# Patient Record
Sex: MALE | Race: WHITE | NOT HISPANIC OR LATINO | Employment: OTHER | URBAN - METROPOLITAN AREA
[De-identification: names, ages, dates, MRNs, and addresses within clinical notes are randomized per-mention and may not be internally consistent; named-entity substitution may affect disease eponyms.]

---

## 2017-02-08 ENCOUNTER — CONVERSION ENCOUNTER (OUTPATIENT)
Dept: FAMILY MEDICINE CLINIC | Facility: CLINIC | Age: 72
End: 2017-02-08

## 2017-02-08 LAB
ALBUMIN SERPL-MCNC: 4.6 G/DL (ref 3.6–5.1)
ALBUMIN/GLOB SERPL: ABNORMAL {RATIO} (ref 1–2.5)
ALP SERPL-CCNC: 60 UNITS/L (ref 40–115)
ALT SERPL-CCNC: 26 UNITS/L (ref 9–46)
AST SERPL-CCNC: 27 UNITS/L (ref 10–35)
BASOPHILS # BLD AUTO: ABNORMAL 10*3/MM3 (ref 0–200)
BASOPHILS NFR BLD AUTO: 0.6 %
BILIRUB SERPL-MCNC: 0.7 MG/DL (ref 0.2–1.2)
BUN SERPL-MCNC: 7 MG/DL (ref 7–25)
BUN/CREAT SERPL: ABNORMAL (ref 6–22)
CALCIUM SERPL-MCNC: 10 MG/DL (ref 8.6–10.3)
CHLORIDE SERPL-SCNC: 104 MMOL/L (ref 98–110)
CO2 CONTENT VENOUS: 30 MMOL/L (ref 20–31)
CONV NEUTROPHILS/100 LEUKOCYTES IN BODY FLUID BY MANUAL COUNT: 58.2 %
CONV TOTAL PROTEIN: 7.3 G/DL (ref 6.1–8.1)
CREAT UR-MCNC: 1.02 MG/DL (ref 0.7–1.18)
EOSINOPHIL # BLD AUTO: 2.7 %
EOSINOPHIL # BLD AUTO: ABNORMAL 10*3/MM3 (ref 15–500)
ERYTHROCYTE [DISTWIDTH] IN BLOOD BY AUTOMATED COUNT: 14.9 % (ref 11–15)
GLOBULIN UR ELPH-MCNC: ABNORMAL G/DL (ref 1.9–3.7)
GLUCOSE SERPL-MCNC: 100 MG/DL (ref 65–99)
HCT VFR BLD AUTO: 44.1 % (ref 38.5–50)
HGB BLD-MCNC: 14.6 G/DL (ref 13.2–17.1)
LYMPHOCYTES # BLD AUTO: ABNORMAL 10*3/MM3 (ref 850–3900)
LYMPHOCYTES NFR BLD AUTO: 30.5 %
MCH RBC QN AUTO: 30.1 PG (ref 27–33)
MCHC RBC AUTO-ENTMCNC: ABNORMAL % (ref 32–36)
MCV RBC AUTO: 90.7 FL (ref 80–100)
MONOCYTES # BLD AUTO: ABNORMAL 10*3/MICROLITER (ref 200–950)
MONOCYTES NFR BLD AUTO: 8 %
NEUTROPHILS # BLD AUTO: ABNORMAL 10*3/MM3 (ref 1500–7800)
PLATELET # BLD AUTO: ABNORMAL 10*3/MM3 (ref 140–400)
PMV BLD AUTO: 9.8 FL (ref 7.5–12.5)
POTASSIUM SERPL-SCNC: 4.2 MMOL/L (ref 3.5–5.3)
PSA SERPL-MCNC: 0.4 NG/ML
RBC # BLD AUTO: ABNORMAL 10*6/MM3 (ref 4.2–5.8)
SODIUM SERPL-SCNC: 142 MMOL/L (ref 135–146)
TSH SERPL-ACNC: 3.9 MICROINTL UNITS/ML (ref 0.4–4.5)
WBC # BLD AUTO: ABNORMAL K/UL (ref 3.8–10.8)

## 2017-08-03 ENCOUNTER — HOSPITAL ENCOUNTER (OUTPATIENT)
Dept: MRI IMAGING | Facility: HOSPITAL | Age: 72
Discharge: HOME | End: 2017-08-03
Attending: PSYCHIATRY & NEUROLOGY | Admitting: PSYCHIATRY & NEUROLOGY

## 2017-08-03 LAB — CREAT BLDA-MCNC: 0.9 MG/DL (ref 0.6–1.3)

## 2017-08-10 ENCOUNTER — HOSPITAL ENCOUNTER (OUTPATIENT)
Dept: FAMILY MEDICINE CLINIC | Facility: CLINIC | Age: 72
Setting detail: SPECIMEN
Discharge: HOME | End: 2017-08-10
Attending: NURSE PRACTITIONER | Admitting: NURSE PRACTITIONER

## 2017-08-10 LAB
ALBUMIN SERPL-MCNC: 4.2 G/DL (ref 3.5–4.8)
ALBUMIN/GLOB SERPL: 1.5 {RATIO} (ref 1–1.7)
ALP SERPL-CCNC: 56 IU/L (ref 32–91)
ALT SERPL-CCNC: 20 IU/L (ref 17–63)
ANION GAP SERPL CALC-SCNC: 16.9 MMOL/L (ref 10–20)
AST SERPL-CCNC: 27 IU/L (ref 15–41)
BASOPHILS # BLD AUTO: 0 10*3/UL (ref 0–0.2)
BASOPHILS NFR BLD AUTO: 1 % (ref 0–2)
BILIRUB SERPL-MCNC: 0.7 MG/DL (ref 0.3–1.2)
BUN SERPL-MCNC: 5 MG/DL (ref 8–20)
BUN/CREAT SERPL: 5 (ref 6.2–20.3)
CALCIUM SERPL-MCNC: 9.4 MG/DL (ref 8.9–10.3)
CHLORIDE SERPL-SCNC: 102 MMOL/L (ref 101–111)
CONV CO2: 27 MMOL/L (ref 22–32)
CONV TOTAL PROTEIN: 7 G/DL (ref 6.1–7.9)
CREAT UR-MCNC: 1 MG/DL (ref 0.7–1.2)
DIFFERENTIAL METHOD BLD: (no result)
EOSINOPHIL # BLD AUTO: 0.1 10*3/UL (ref 0–0.3)
EOSINOPHIL # BLD AUTO: 2 % (ref 0–3)
ERYTHROCYTE [DISTWIDTH] IN BLOOD BY AUTOMATED COUNT: 14.7 % (ref 11.5–14.5)
GLOBULIN UR ELPH-MCNC: 2.8 G/DL (ref 2.5–3.8)
GLUCOSE SERPL-MCNC: 113 MG/DL (ref 65–99)
HCT VFR BLD AUTO: 43.7 % (ref 40–54)
HGB BLD-MCNC: 14.4 G/DL (ref 14–18)
LYMPHOCYTES # BLD AUTO: 1.7 10*3/UL (ref 0.8–4.8)
LYMPHOCYTES NFR BLD AUTO: 30 % (ref 18–42)
MCH RBC QN AUTO: 29.1 PG (ref 26–32)
MCHC RBC AUTO-ENTMCNC: 32.9 G/DL (ref 32–36)
MCV RBC AUTO: 88.5 FL (ref 80–94)
MONOCYTES # BLD AUTO: 0.6 10*3/UL (ref 0.1–1.3)
MONOCYTES NFR BLD AUTO: 10 % (ref 2–11)
NEUTROPHILS # BLD AUTO: 3.4 10*3/UL (ref 2.3–8.6)
NEUTROPHILS NFR BLD AUTO: 57 % (ref 50–75)
NRBC BLD AUTO-RTO: 0 /100{WBCS}
NRBC/RBC NFR BLD MANUAL: 0 10*3/UL
PLATELET # BLD AUTO: 178 10*3/UL (ref 150–450)
PMV BLD AUTO: 9.7 FL (ref 7.4–10.4)
POTASSIUM SERPL-SCNC: 3.9 MMOL/L (ref 3.6–5.1)
RBC # BLD AUTO: 4.94 10*6/UL (ref 4.6–6)
SODIUM SERPL-SCNC: 142 MMOL/L (ref 136–144)
TESTOST SERPL-MCNC: 3.7 NG/ML (ref 1.7–7.8)
VIT B12 SERPL-MCNC: >1500 PG/ML (ref 180–914)
WBC # BLD AUTO: 5.8 10*3/UL (ref 4.5–11.5)

## 2018-01-09 ENCOUNTER — HOSPITAL ENCOUNTER (OUTPATIENT)
Dept: FAMILY MEDICINE CLINIC | Facility: CLINIC | Age: 73
Setting detail: SPECIMEN
Discharge: HOME | End: 2018-01-09
Attending: NURSE PRACTITIONER | Admitting: NURSE PRACTITIONER

## 2018-01-09 LAB
ALBUMIN SERPL-MCNC: 4.1 G/DL (ref 3.5–4.8)
ALBUMIN/GLOB SERPL: 1.4 {RATIO} (ref 1–1.7)
ALP SERPL-CCNC: 51 IU/L (ref 32–91)
ALT SERPL-CCNC: 28 IU/L (ref 17–63)
ANION GAP SERPL CALC-SCNC: 13.1 MMOL/L (ref 10–20)
AST SERPL-CCNC: 29 IU/L (ref 15–41)
BASOPHILS # BLD AUTO: 0.1 10*3/UL (ref 0–0.2)
BASOPHILS NFR BLD AUTO: 1 % (ref 0–2)
BILIRUB SERPL-MCNC: 0.9 MG/DL (ref 0.3–1.2)
BUN SERPL-MCNC: 10 MG/DL (ref 8–20)
BUN/CREAT SERPL: 9.1 (ref 6.2–20.3)
CALCIUM SERPL-MCNC: 9.2 MG/DL (ref 8.9–10.3)
CHLORIDE SERPL-SCNC: 103 MMOL/L (ref 101–111)
CHOLEST SERPL-MCNC: 173 MG/DL
CHOLEST/HDLC SERPL: 3.2 {RATIO}
CONV CO2: 26 MMOL/L (ref 22–32)
CONV LDL CHOLESTEROL DIRECT: 99 MG/DL (ref 0–100)
CONV TOTAL PROTEIN: 7.1 G/DL (ref 6.1–7.9)
CREAT UR-MCNC: 1.1 MG/DL (ref 0.7–1.2)
DIFFERENTIAL METHOD BLD: (no result)
EOSINOPHIL # BLD AUTO: 0.2 10*3/UL (ref 0–0.3)
EOSINOPHIL # BLD AUTO: 3 % (ref 0–3)
ERYTHROCYTE [DISTWIDTH] IN BLOOD BY AUTOMATED COUNT: 14.1 % (ref 11.5–14.5)
GLOBULIN UR ELPH-MCNC: 3 G/DL (ref 2.5–3.8)
GLUCOSE SERPL-MCNC: 108 MG/DL (ref 65–99)
HCT VFR BLD AUTO: 45.4 % (ref 40–54)
HDLC SERPL-MCNC: 53 MG/DL
HGB BLD-MCNC: 15 G/DL (ref 14–18)
LDLC/HDLC SERPL: 1.9 {RATIO}
LIPID INTERPRETATION: NORMAL
LYMPHOCYTES # BLD AUTO: 2 10*3/UL (ref 0.8–4.8)
LYMPHOCYTES NFR BLD AUTO: 29 % (ref 18–42)
MCH RBC QN AUTO: 30 PG (ref 26–32)
MCHC RBC AUTO-ENTMCNC: 33.1 G/DL (ref 32–36)
MCV RBC AUTO: 90.7 FL (ref 80–94)
MONOCYTES # BLD AUTO: 0.6 10*3/UL (ref 0.1–1.3)
MONOCYTES NFR BLD AUTO: 9 % (ref 2–11)
NEUTROPHILS # BLD AUTO: 3.9 10*3/UL (ref 2.3–8.6)
NEUTROPHILS NFR BLD AUTO: 58 % (ref 50–75)
NRBC BLD AUTO-RTO: 0 /100{WBCS}
NRBC/RBC NFR BLD MANUAL: 0 10*3/UL
PLATELET # BLD AUTO: 185 10*3/UL (ref 150–450)
PMV BLD AUTO: 9.3 FL (ref 7.4–10.4)
POTASSIUM SERPL-SCNC: 4.1 MMOL/L (ref 3.6–5.1)
RBC # BLD AUTO: 5 10*6/UL (ref 4.6–6)
SODIUM SERPL-SCNC: 138 MMOL/L (ref 136–144)
TRIGL SERPL-MCNC: 140 MG/DL
VLDLC SERPL CALC-MCNC: 20.5 MG/DL
WBC # BLD AUTO: 6.7 10*3/UL (ref 4.5–11.5)

## 2018-02-13 ENCOUNTER — HOSPITAL ENCOUNTER (OUTPATIENT)
Dept: CARDIOLOGY | Facility: HOSPITAL | Age: 73
Discharge: HOME | End: 2018-02-13
Attending: INTERNAL MEDICINE | Admitting: INTERNAL MEDICINE

## 2018-03-05 ENCOUNTER — HOSPITAL ENCOUNTER (OUTPATIENT)
Dept: LAB | Facility: HOSPITAL | Age: 73
Discharge: HOME | End: 2018-03-05
Attending: PSYCHIATRY & NEUROLOGY | Admitting: PSYCHIATRY & NEUROLOGY

## 2018-05-15 ENCOUNTER — HOSPITAL ENCOUNTER (OUTPATIENT)
Dept: MRI IMAGING | Facility: HOSPITAL | Age: 73
Discharge: HOME | End: 2018-05-15
Attending: PSYCHIATRY & NEUROLOGY | Admitting: PSYCHIATRY & NEUROLOGY

## 2018-05-15 LAB — CREAT BLDA-MCNC: 1 MG/DL (ref 0.6–1.3)

## 2019-02-21 ENCOUNTER — HOSPITAL ENCOUNTER (OUTPATIENT)
Dept: FAMILY MEDICINE CLINIC | Facility: CLINIC | Age: 74
Setting detail: SPECIMEN
Discharge: HOME | End: 2019-02-21
Attending: NURSE PRACTITIONER | Admitting: NURSE PRACTITIONER

## 2019-02-21 LAB
ALBUMIN SERPL-MCNC: 4.3 G/DL (ref 3.5–4.8)
ALBUMIN/GLOB SERPL: 1.5 {RATIO} (ref 1–1.7)
ALP SERPL-CCNC: 63 IU/L (ref 32–91)
ALT SERPL-CCNC: 20 IU/L (ref 17–63)
ANION GAP SERPL CALC-SCNC: 15.4 MMOL/L (ref 10–20)
AST SERPL-CCNC: 24 IU/L (ref 15–41)
BASOPHILS # BLD AUTO: 0 10*3/UL (ref 0–0.2)
BASOPHILS NFR BLD AUTO: 1 % (ref 0–2)
BILIRUB SERPL-MCNC: 1.1 MG/DL (ref 0.3–1.2)
BUN SERPL-MCNC: 7 MG/DL (ref 8–20)
BUN/CREAT SERPL: 6.4 (ref 6.2–20.3)
CALCIUM SERPL-MCNC: 9.2 MG/DL (ref 8.9–10.3)
CHLORIDE SERPL-SCNC: 102 MMOL/L (ref 101–111)
CHOLEST SERPL-MCNC: 160 MG/DL
CHOLEST/HDLC SERPL: 2.9 {RATIO}
CONV CO2: 27 MMOL/L (ref 22–32)
CONV LDL CHOLESTEROL DIRECT: 84 MG/DL (ref 0–100)
CONV TOTAL PROTEIN: 7.2 G/DL (ref 6.1–7.9)
CREAT UR-MCNC: 1.1 MG/DL (ref 0.7–1.2)
DIFFERENTIAL METHOD BLD: (no result)
EOSINOPHIL # BLD AUTO: 0.1 10*3/UL (ref 0–0.3)
EOSINOPHIL # BLD AUTO: 2 % (ref 0–3)
ERYTHROCYTE [DISTWIDTH] IN BLOOD BY AUTOMATED COUNT: 14.3 % (ref 11.5–14.5)
GLOBULIN UR ELPH-MCNC: 2.9 G/DL (ref 2.5–3.8)
GLUCOSE SERPL-MCNC: 106 MG/DL (ref 65–99)
HCT VFR BLD AUTO: 46.3 % (ref 40–54)
HDLC SERPL-MCNC: 56 MG/DL
HGB BLD-MCNC: 15.7 G/DL (ref 14–18)
LDLC/HDLC SERPL: 1.5 {RATIO}
LIPID INTERPRETATION: NORMAL
LYMPHOCYTES # BLD AUTO: 1.7 10*3/UL (ref 0.8–4.8)
LYMPHOCYTES NFR BLD AUTO: 30 % (ref 18–42)
MCH RBC QN AUTO: 30.2 PG (ref 26–32)
MCHC RBC AUTO-ENTMCNC: 33.8 G/DL (ref 32–36)
MCV RBC AUTO: 89.2 FL (ref 80–94)
MONOCYTES # BLD AUTO: 0.4 10*3/UL (ref 0.1–1.3)
MONOCYTES NFR BLD AUTO: 8 % (ref 2–11)
NEUTROPHILS # BLD AUTO: 3.5 10*3/UL (ref 2.3–8.6)
NEUTROPHILS NFR BLD AUTO: 59 % (ref 50–75)
NRBC BLD AUTO-RTO: 0 /100{WBCS}
NRBC/RBC NFR BLD MANUAL: 0 10*3/UL
PLATELET # BLD AUTO: 173 10*3/UL (ref 150–450)
PMV BLD AUTO: 9.5 FL (ref 7.4–10.4)
POTASSIUM SERPL-SCNC: 4.4 MMOL/L (ref 3.6–5.1)
RBC # BLD AUTO: 5.19 10*6/UL (ref 4.6–6)
SODIUM SERPL-SCNC: 140 MMOL/L (ref 136–144)
TRIGL SERPL-MCNC: 109 MG/DL
VLDLC SERPL CALC-MCNC: 20.2 MG/DL
WBC # BLD AUTO: 5.8 10*3/UL (ref 4.5–11.5)

## 2019-06-01 ENCOUNTER — TRANSCRIBE ORDERS (OUTPATIENT)
Dept: CARDIOLOGY | Facility: CLINIC | Age: 74
End: 2019-06-01

## 2019-06-01 DIAGNOSIS — R03.0 BORDERLINE SYSTOLIC HTN: ICD-10-CM

## 2019-06-01 DIAGNOSIS — I25.10 CVD (CARDIOVASCULAR DISEASE): Primary | ICD-10-CM

## 2019-08-19 ENCOUNTER — APPOINTMENT (OUTPATIENT)
Dept: GENERAL RADIOLOGY | Facility: HOSPITAL | Age: 74
End: 2019-08-19

## 2019-08-19 ENCOUNTER — HOSPITAL ENCOUNTER (OUTPATIENT)
Facility: HOSPITAL | Age: 74
Setting detail: OBSERVATION
Discharge: HOME | End: 2019-08-21
Attending: INTERNAL MEDICINE | Admitting: INTERNAL MEDICINE

## 2019-08-19 DIAGNOSIS — R07.9 CHEST PAIN, UNSPECIFIED TYPE: Primary | ICD-10-CM

## 2019-08-19 LAB
ANION GAP SERPL CALCULATED.3IONS-SCNC: 14.6 MMOL/L (ref 5–15)
APTT PPP: 23.7 SECONDS (ref 24–31)
BASOPHILS # BLD AUTO: 0.1 10*3/MM3 (ref 0–0.2)
BASOPHILS NFR BLD AUTO: 1.3 % (ref 0–1.5)
BNP SERPL-MCNC: 11 PG/ML
BUN BLD-MCNC: 6 MG/DL (ref 8–20)
BUN/CREAT SERPL: 4.6 (ref 6.2–20.3)
CALCIUM SPEC-SCNC: 9.2 MG/DL (ref 8.9–10.3)
CHLORIDE SERPL-SCNC: 100 MMOL/L (ref 101–111)
CO2 SERPL-SCNC: 24 MMOL/L (ref 22–32)
CREAT BLD-MCNC: 1.3 MG/DL (ref 0.7–1.2)
DEPRECATED RDW RBC AUTO: 42.4 FL (ref 37–54)
EOSINOPHIL # BLD AUTO: 0.1 10*3/MM3 (ref 0–0.4)
EOSINOPHIL NFR BLD AUTO: 1.7 % (ref 0.3–6.2)
ERYTHROCYTE [DISTWIDTH] IN BLOOD BY AUTOMATED COUNT: 13.7 % (ref 12.3–15.4)
GFR SERPL CREATININE-BSD FRML MDRD: 54 ML/MIN/1.73
GLUCOSE BLD-MCNC: 90 MG/DL (ref 65–99)
HCT VFR BLD AUTO: 42 % (ref 37.5–51)
HGB BLD-MCNC: 14 G/DL (ref 13–17.7)
INR PPP: 1.03 (ref 0.9–1.1)
LYMPHOCYTES # BLD AUTO: 2.2 10*3/MM3 (ref 0.7–3.1)
LYMPHOCYTES NFR BLD AUTO: 25.1 % (ref 19.6–45.3)
MCH RBC QN AUTO: 29.5 PG (ref 26.6–33)
MCHC RBC AUTO-ENTMCNC: 33.3 G/DL (ref 31.5–35.7)
MCV RBC AUTO: 88.5 FL (ref 79–97)
MONOCYTES # BLD AUTO: 0.7 10*3/MM3 (ref 0.1–0.9)
MONOCYTES NFR BLD AUTO: 7.6 % (ref 5–12)
NEUTROPHILS # BLD AUTO: 5.6 10*3/MM3 (ref 1.7–7)
NEUTROPHILS NFR BLD AUTO: 64.3 % (ref 42.7–76)
NRBC BLD AUTO-RTO: 0.1 /100 WBC (ref 0–0.2)
PLATELET # BLD AUTO: 175 10*3/MM3 (ref 140–450)
PMV BLD AUTO: 8.8 FL (ref 6–12)
POTASSIUM BLD-SCNC: 3.6 MMOL/L (ref 3.6–5.1)
PROTHROMBIN TIME: 10.6 SECONDS (ref 9.6–11.7)
RBC # BLD AUTO: 4.75 10*6/MM3 (ref 4.14–5.8)
SODIUM BLD-SCNC: 135 MMOL/L (ref 136–144)
TROPONIN I SERPL-MCNC: <0.03 NG/ML (ref 0–0.03)
WBC NRBC COR # BLD: 8.7 10*3/MM3 (ref 3.4–10.8)

## 2019-08-19 PROCEDURE — 85730 THROMBOPLASTIN TIME PARTIAL: CPT | Performed by: NURSE PRACTITIONER

## 2019-08-19 PROCEDURE — 99284 EMERGENCY DEPT VISIT MOD MDM: CPT

## 2019-08-19 PROCEDURE — 25010000002 ENOXAPARIN PER 10 MG: Performed by: INTERNAL MEDICINE

## 2019-08-19 PROCEDURE — G0378 HOSPITAL OBSERVATION PER HR: HCPCS

## 2019-08-19 PROCEDURE — 80048 BASIC METABOLIC PNL TOTAL CA: CPT | Performed by: NURSE PRACTITIONER

## 2019-08-19 PROCEDURE — 85025 COMPLETE CBC W/AUTO DIFF WBC: CPT | Performed by: NURSE PRACTITIONER

## 2019-08-19 PROCEDURE — 96372 THER/PROPH/DIAG INJ SC/IM: CPT

## 2019-08-19 PROCEDURE — 85610 PROTHROMBIN TIME: CPT | Performed by: NURSE PRACTITIONER

## 2019-08-19 PROCEDURE — 99220 PR INITIAL OBSERVATION CARE/DAY 70 MINUTES: CPT | Performed by: INTERNAL MEDICINE

## 2019-08-19 PROCEDURE — 71045 X-RAY EXAM CHEST 1 VIEW: CPT

## 2019-08-19 PROCEDURE — 99214 OFFICE O/P EST MOD 30 MIN: CPT | Performed by: INTERNAL MEDICINE

## 2019-08-19 PROCEDURE — 83880 ASSAY OF NATRIURETIC PEPTIDE: CPT | Performed by: NURSE PRACTITIONER

## 2019-08-19 PROCEDURE — 93005 ELECTROCARDIOGRAM TRACING: CPT | Performed by: INTERNAL MEDICINE

## 2019-08-19 PROCEDURE — 84484 ASSAY OF TROPONIN QUANT: CPT | Performed by: NURSE PRACTITIONER

## 2019-08-19 PROCEDURE — 93005 ELECTROCARDIOGRAM TRACING: CPT

## 2019-08-19 RX ORDER — PANTOPRAZOLE SODIUM 40 MG/1
40 TABLET, DELAYED RELEASE ORAL DAILY
Status: DISCONTINUED | OUTPATIENT
Start: 2019-08-20 | End: 2019-08-21 | Stop reason: HOSPADM

## 2019-08-19 RX ORDER — SODIUM CHLORIDE 0.9 % (FLUSH) 0.9 %
3 SYRINGE (ML) INJECTION EVERY 12 HOURS SCHEDULED
Status: DISCONTINUED | OUTPATIENT
Start: 2019-08-19 | End: 2019-08-21 | Stop reason: HOSPADM

## 2019-08-19 RX ORDER — SODIUM CHLORIDE 0.9 % (FLUSH) 0.9 %
10 SYRINGE (ML) INJECTION AS NEEDED
Status: DISCONTINUED | OUTPATIENT
Start: 2019-08-19 | End: 2019-08-21 | Stop reason: HOSPADM

## 2019-08-19 RX ORDER — MORPHINE SULFATE 4 MG/ML
4 INJECTION, SOLUTION INTRAMUSCULAR; INTRAVENOUS
Status: DISCONTINUED | OUTPATIENT
Start: 2019-08-19 | End: 2019-08-21 | Stop reason: HOSPADM

## 2019-08-19 RX ORDER — ROSUVASTATIN CALCIUM 10 MG/1
10 TABLET, COATED ORAL DAILY
Status: DISCONTINUED | OUTPATIENT
Start: 2019-08-20 | End: 2019-08-21 | Stop reason: HOSPADM

## 2019-08-19 RX ORDER — BIOTIN 1000 MCG
1 TABLET,CHEWABLE ORAL DAILY
COMMUNITY
End: 2021-01-01

## 2019-08-19 RX ORDER — NITROGLYCERIN 0.4 MG/1
0.4 TABLET SUBLINGUAL
Status: DISCONTINUED | OUTPATIENT
Start: 2019-08-19 | End: 2019-08-19 | Stop reason: SDUPTHER

## 2019-08-19 RX ORDER — GABAPENTIN 400 MG/1
400 CAPSULE ORAL EVERY 8 HOURS SCHEDULED
Status: DISCONTINUED | OUTPATIENT
Start: 2019-08-19 | End: 2019-08-21 | Stop reason: HOSPADM

## 2019-08-19 RX ORDER — NITROGLYCERIN 0.4 MG/1
0.4 TABLET SUBLINGUAL
Status: DISCONTINUED | OUTPATIENT
Start: 2019-08-19 | End: 2019-08-21 | Stop reason: HOSPADM

## 2019-08-19 RX ORDER — SODIUM CHLORIDE 0.9 % (FLUSH) 0.9 %
3-10 SYRINGE (ML) INJECTION AS NEEDED
Status: DISCONTINUED | OUTPATIENT
Start: 2019-08-19 | End: 2019-08-21 | Stop reason: HOSPADM

## 2019-08-19 RX ORDER — LANOLIN ALCOHOL/MO/W.PET/CERES
1000 CREAM (GRAM) TOPICAL DAILY
Status: DISCONTINUED | OUTPATIENT
Start: 2019-08-20 | End: 2019-08-21 | Stop reason: HOSPADM

## 2019-08-19 RX ORDER — LEVOTHYROXINE SODIUM 0.07 MG/1
75 TABLET ORAL
Status: DISCONTINUED | OUTPATIENT
Start: 2019-08-20 | End: 2019-08-21 | Stop reason: HOSPADM

## 2019-08-19 RX ORDER — ACETAMINOPHEN 325 MG/1
650 TABLET ORAL EVERY 6 HOURS PRN
Status: DISCONTINUED | OUTPATIENT
Start: 2019-08-19 | End: 2019-08-21 | Stop reason: HOSPADM

## 2019-08-19 RX ORDER — ASPIRIN 81 MG/1
324 TABLET, CHEWABLE ORAL ONCE
Status: COMPLETED | OUTPATIENT
Start: 2019-08-19 | End: 2019-08-19

## 2019-08-19 RX ORDER — ASPIRIN 81 MG/1
81 TABLET ORAL DAILY
Status: DISCONTINUED | OUTPATIENT
Start: 2019-08-20 | End: 2019-08-21 | Stop reason: HOSPADM

## 2019-08-19 RX ORDER — CHLORAL HYDRATE 500 MG
1000 CAPSULE ORAL
COMMUNITY
End: 2021-01-01

## 2019-08-19 RX ORDER — CYANOCOBALAMIN (VITAMIN B-12) 1000 MCG
100 TABLET ORAL DAILY
COMMUNITY
End: 2021-01-01

## 2019-08-19 RX ADMIN — ENOXAPARIN SODIUM 40 MG: 40 INJECTION SUBCUTANEOUS at 20:06

## 2019-08-19 RX ADMIN — GABAPENTIN 400 MG: 400 CAPSULE ORAL at 21:03

## 2019-08-19 RX ADMIN — Medication 3 ML: at 20:07

## 2019-08-19 RX ADMIN — ASPIRIN 81 MG 324 MG: 81 TABLET ORAL at 15:45

## 2019-08-19 RX ADMIN — ACETAMINOPHEN 650 MG: 325 TABLET ORAL at 23:13

## 2019-08-19 RX ADMIN — NITROGLYCERIN 1 INCH: 20 OINTMENT TOPICAL at 15:45

## 2019-08-19 NOTE — H&P
[facility] HOSPITALIST     Gris Muro APRN    CHIEF COMPLAINT:     Chest pain    HISTORY OF PRESENT ILLNESS:    Subjective  Clifford Weber is a 74 y.o. male who presents for evaluation of chest pain. Onset was 3 days ago. Symptoms have been unchanged since that time. The patient describes the pain as heaviness and radiates to the left shoulder. Patient rates pain as a 7/10 in intensity. Associated symptoms are: dyspnea. Aggravating factors are: exercise. Alleviating factors are: none. Patient's cardiac risk factors are: diabetes mellitus, dyslipidemia, family history of premature cardiovascular disease, hypertension and male gender. Patient's risk factors for DVT/PE: none. Previous cardiac testing: coronary angiography 3 years ago                    Past Medical History:   Diagnosis Date   • Abnormal cardiovascular stress test 11/01/2016    Abnormal Cardiolite Stress Test. Abstracted from [facility]   • Anxiety 03/15/2012    Uses this PRN, has stress due to caring for elderly parents. Abstracted from [facility].   • Atherosclerotic heart disease 03/15/2012   • Borderline hypertension 10/19/2015   • Bronchitis, acute 05/16/2016   • Chest pain 10/15/2014   • Chronic foot pain 10/16/2012    Will see his podiatrist. Abstracted from [facility].   • Constipation 01/09/2018   • Coronary artery disease 10/24/2016   • DDD (degenerative disc disease), lumbar 02/13/2013   • Depression 08/10/2017   • Dyslipidemia    • Erectile dysfunction of organic origin 12/07/2012    Samples of cialis 20mg, voucher for the 5mg, samples of levitra 20mg and voucher for viagra 100mg. Call with preference. Order total T. Abstracted from [facility].   • GERD (gastroesophageal reflux disease) 12/07/2012   • H/O myocardial perfusion scan 11/01/2016    With Stress Test, abnormal. Abstracted from [facility].   • Hand pain 04/24/2014   • Headache 01/18/2016   • Hyperlipidemia 02/13/2018   • Hypertension 08/10/2017   •  Hypogonadism, male 12/07/2012   • Hypothyroidism 02/08/2018   • Impacted cerumen, left ear 04/03/2019   • Influenza vaccination ordered 10/19/2016   • Insomnia 03/15/2012   • Lateral epicondylitis, left elbow 02/07/2017   • Left knee sprain 01/24/2013   • Leg pain, bilateral 04/02/2019   • Lumbar disc herniation with radiculopathy 09/08/2016   • Lumbar radiculitis 08/30/2016   • Mild memory disturbance 01/18/2016   • Myocardial infarction (CMS/HCC) 11/01/2016   • Osteoarthritis of sacroiliac joint 03/12/2014   • Pain in right lower leg 07/11/2016   • Paresthesia 08/30/2016   • Paresthesia of both legs     Lower legs   • Postherpetic neuralgia 03/15/2016   • Preoperative cardiovascular examination 10/24/2016   • Preventative health care 02/07/2017   • S/P cardiac catheterization 01/01/2000    S/P cardiac cath, left heart-- Heart catheterization 2000 at [facility] in [place], [place]. No records available. Abstracted from [facility].   • Sciatica, right side 06/04/2013   • Shingles    • Shortness of breath 10/15/2014   • Spinal stenosis, lumbar 02/13/2013    TENS unit evidently not covered by Medicare. Abstracted from [facility].   • Vitamin B12 deficiency 08/10/2017   • Vitamin D deficiency 01/09/2018     Past Surgical History:   Procedure Laterality Date   • CARDIAC CATHETERIZATION  2000   • COLONOSCOPY  10/28/2009   • OTHER SURGICAL HISTORY      Lapscopic surgery, both knees. Abstracted by [facility].   • OTHER SURGICAL HISTORY      Right rotor cuff Disorder. Abstracted from [facility].   • OTHER SURGICAL HISTORY      Kneww injections. Abstracted from [facility].   • OTHER SURGICAL HISTORY      Shot in lower back for bulging disc. Abstracted from [facility].     Family History   Problem Relation Age of Onset   • Alzheimer's disease Mother    • Hyperlipidemia Father    • Heart disease Father    • Hypertension Father    • Hyperlipidemia Brother    • Diabetes Brother    • Heart disease Brother    • Hypertension  Brother    • Heart disease Maternal Grandfather    • Alzheimer's disease Paternal Grandmother    • Cancer Cousin    • Cancer Other      Social History     Tobacco Use   • Smoking status: Never Smoker   • Smokeless tobacco: Never Used   Substance Use Topics   • Alcohol use: No     Frequency: Never   • Drug use: No     Medications Prior to Admission   Medication Sig Dispense Refill Last Dose   • aspirin (ASPIR) 81 MG EC tablet Take 81 mg by mouth Daily.   8/19/2019 at Unknown time   • Biotin 1000 MCG chewable tablet Chew 1 tablet Daily.   8/19/2019 at Unknown time   • Cyanocobalamin (B-12) 1000 MCG tablet Take 100 mcg by mouth Daily.   8/19/2019 at Unknown time   • gabapentin (NEURONTIN) 800 MG tablet Take 400 mg by mouth 3 (Three) Times a Day.   8/19/2019 at Unknown time   • levothyroxine (SYNTHROID) 75 MCG tablet Take 75 mcg by mouth Daily.   8/19/2019 at Unknown time   • Omega-3 Fatty Acids (FISH OIL) 1000 MG capsule capsule Take 1,000 mg by mouth Daily With Breakfast.   8/19/2019 at Unknown time   • omeprazole (priLOSEC) 40 MG capsule Take 20 mg by mouth Daily. Take two pills daily. Abstracted from [facility].    8/19/2019 at Unknown time   • rosuvastatin (CRESTOR) 10 MG tablet Take 10 mg by mouth Daily.   8/19/2019 at Unknown time   • nitroglycerin (NITROSTAT) 0.4 MG SL tablet 0.4 mg. Dissolve 1 tablet under tongue as needed for chest pain. May repeat dose every 5 min as needed for total of doses. Abstracted from [facility].      • polyethylene glycol (MIRALAX) powder Take 17 g by mouth Daily As Needed. Dissolve 17 grams in 8 oz. Liquid and drink daily. Abstracted from [facility].    Unknown at Unknown time   • tadalafil (CIALIS) 5 MG tablet Take 5 mg by mouth Daily As Needed.   Unknown at Unknown time   • vitamin D (ERGOCALCIFEROL) 12555 units capsule capsule Take 50,000 Units by mouth 1 (One) Time Per Week. Thursday   8/15/2019     Allergies:  Niacin    Immunization History   Administered Date(s) Administered  "  • Flu Vaccine High Dose PF 65YR+ 11/03/2011, 10/16/2012, 09/13/2013, 09/12/2014, 10/19/2016, 02/08/2018   • Pneumococcal Polysaccharide (PPSV23) 09/12/2014   • Zoster, Unspecified 09/12/2014           REVIEW OF SYSTEMS:     Review of Systems   All other systems reviewed and are negative.      Vital Signs  Temp:  [97.7 °F (36.5 °C)-97.8 °F (36.6 °C)] 97.7 °F (36.5 °C)  Heart Rate:  [51-60] 55  Resp:  [12-18] 12  BP: (104-160)/(68-76) 160/76    Flowsheet Rows      First Filed Value   Admission Height  188 cm (74\") Documented at 08/19/2019 1512   Admission Weight  93.8 kg (206 lb 12.7 oz) Documented at 08/19/2019 1512           Physical Exam:    Physical Exam  Physical Exam:    Vitals and Nursing notes reveiwed.     General Appearance: Alert, cooperative, in no acute distress    Head:    Normocephalic, without obvious abnormality, atraumatic    Eyes:           Lids and lashes normal, conjunctivae and sclerae normal, no   icterus, no pallor, corneas clear, PERRLA    Ears:    Ears appear intact with no abnormalities noted    Throat:   No oral lesions, no thrush, oral mucosa moist    Neck:   No adenopathy, supple, trachea midline, no thyromegaly, no carotid bruit, no JVD    Back:     No kyphosis present, no scoliosis present, no skin lesions, erythema or scars, no tenderness to percussion or palpation, range of motion normal    Lungs:     Clear to auscultation,respirations regular, even and unlabored    Heart:   Regular rhythm and normal rate, normal S1 and S2, no            murmur, no gallop, no rub, no click    Breast Exam  Deferred    Abdomen: Normal bowel sounds, no masses, no organomegaly, soft  non-tender, non-distended, no guarding, no rebound tenderness    Genitalia:    Deferred    Extremities: Moves all extremities well, no edema, no cyanosis, no redness    Pulses:   Pulses palpable and equal bilaterally    Skin:   No bleeding, bruising or rash    Lymph nodes: No palpable adenopathy    Neurologic:   Cranial " nerves 2 - 12 grossly intact, sensation intact, DTR  present and equal bilaterally      Emotional Behavior:   Normal   Debilities:  Age appropriate      Results Review:    I reviewed the patient's new clinical results.  Lab Results (most recent)     Procedure Component Value Units Date/Time    BNP [[id number]]  (Normal) Collected:  08/19/19 1535    Specimen:  Blood Updated:  08/19/19 1620     BNP 11.0 pg/mL      Comment: Results may be falsely decreased if patient taking Biotin.       Troponin [[id number]]  (Normal) Collected:  08/19/19 1534    Specimen:  Blood Updated:  08/19/19 1617     Troponin I <0.030 ng/mL     Narrative:       Troponin I Reference Range:    0.00-0.03  Negative.  Repeat testing in 4-6 hours if clinically indicated.    0.04-0.29  Suspicious for myocardial injury. Serial measurements and clinical  correlation may be necessary to confirm or exclude diagnosis of acute  coronary syndrome.  Repeat testing in 4-6 hours if indicated.     >0.29 Consistent with myocardial injury.  Recommend clinical and laboratory correlation.     Results my be falsely decreased if patient taking Biotin.     Basic Metabolic Panel [[id number]]  (Abnormal) Collected:  08/19/19 1535    Specimen:  Blood Updated:  08/19/19 1615     Glucose 90 mg/dL      BUN 6 mg/dL      Creatinine 1.30 mg/dL      Sodium 135 mmol/L      Potassium 3.6 mmol/L      Chloride 100 mmol/L      CO2 24.0 mmol/L      Calcium 9.2 mg/dL      eGFR Non African Amer 54 mL/min/1.73      BUN/Creatinine Ratio 4.6     Anion Gap 14.6 mmol/L     Narrative:       The MDRD GFR formula is only valid for adults with stable renal function between ages 18 and 70.    Protime-INR [[id number]]  (Normal) Collected:  08/19/19 1535    Specimen:  Blood Updated:  08/19/19 1550     Protime 10.6 Seconds      INR 1.03    aPTT [[id number]]  (Abnormal) Collected:  08/19/19 1535    Specimen:  Blood Updated:  08/19/19 1550     PTT 23.7 seconds     CBC & Differential [[id number]] Collected:   08/19/19 1534    Specimen:  Blood Updated:  08/19/19 1542    Narrative:       The following orders were created for panel order CBC & Differential.  Procedure                               Abnormality         Status                     ---------                               -----------         ------                     CBC Auto Differential[[id number]]        Normal              Final result                 Please view results for these tests on the individual orders.    CBC Auto Differential [[id number]]  (Normal) Collected:  08/19/19 1534    Specimen:  Blood Updated:  08/19/19 1542     WBC 8.70 10*3/mm3      RBC 4.75 10*6/mm3      Hemoglobin 14.0 g/dL      Hematocrit 42.0 %      MCV 88.5 fL      MCH 29.5 pg      MCHC 33.3 g/dL      RDW 13.7 %      RDW-SD 42.4 fl      MPV 8.8 fL      Platelets 175 10*3/mm3      Neutrophil % 64.3 %      Lymphocyte % 25.1 %      Monocyte % 7.6 %      Eosinophil % 1.7 %      Basophil % 1.3 %      Neutrophils, Absolute 5.60 10*3/mm3      Lymphocytes, Absolute 2.20 10*3/mm3      Monocytes, Absolute 0.70 10*3/mm3      Eosinophils, Absolute 0.10 10*3/mm3      Basophils, Absolute 0.10 10*3/mm3      nRBC 0.1 /100 WBC           Imaging Results (most recent)     Procedure Component Value Units Date/Time    XR Chest 1 View [[id number]] Collected:  08/19/19 1555     Updated:  08/19/19 1559    Narrative:       DATE OF EXAM:  8/19/2019 3:35 PM     PROCEDURE:  XR CHEST 1 VW-     INDICATIONS:  cp dyspnea       COMPARISON:  PA and lateral chest 11/02/2016.     TECHNIQUE:   Single radiographic view of the chest was obtained.     FINDINGS:  Clear lungs. Heart size within normal limits. Benign calcified right  hilar lymph nodes. Radiopaque material or Shield over the neck obscures  the thoracic inlet. No acute osseous abnormalities are identified. No  pneumothorax or pleural effusion.       Impression:       No acute chest findings.     Electronically Signed By-Dr. Mary Jo Patel MD On:8/19/2019 3:55  PM  This report was finalized on [id number] by Dr. Mary Jo Patel MD.        reviewed    ECG/EMG Results (most recent)     None        reviewed              XR Chest 1 View  Narrative: DATE OF EXAM:  8/19/2019 3:35 PM     PROCEDURE:  XR CHEST 1 VW-     INDICATIONS:  cp dyspnea       COMPARISON:  PA and lateral chest 11/02/2016.     TECHNIQUE:   Single radiographic view of the chest was obtained.     FINDINGS:  Clear lungs. Heart size within normal limits. Benign calcified right  hilar lymph nodes. Radiopaque material or Shield over the neck obscures  the thoracic inlet. No acute osseous abnormalities are identified. No  pneumothorax or pleural effusion.     Impression: No acute chest findings.     Electronically Signed By-Dr. Mary Jo Patel MD On:8/19/2019 3:55 PM  This report was finalized on [id number] by Dr. Mary Jo Patel MD.      Assessment/Plan       Chest pain      Plan    Chest pain concerning for unstable angina and patient has risk factors including hyperlipidemia/hypertension/coronary disease in the past which is nonobstructive and as well as patient has family history of CAD as well.  Patient at this time will be ruled out for MI and patient will be seen by cardiology and I recommend the patient should go for cardiac cath.    Hyperlipidemia patient is on Crestor and will check lipids    GERD and patient is on Protonix and will continue it.    DVT prophylaxis and will continue Lovenox which was ordered at the time of admission.    Hypothyroidism stable on levothyroxine    Disposition    Hopefully discharge tomorrow once ruled out and also evaluated for CAD    I discussed the patients findings and my recommendations with patient and family.     Bautista Quigley MD  08/19/19  6:36 PM

## 2019-08-19 NOTE — PLAN OF CARE
Problem: Patient Care Overview  Goal: Plan of Care Review  Outcome: Ongoing (interventions implemented as appropriate)   08/19/19 5335   Coping/Psychosocial   Plan of Care Reviewed With patient     Goal: Individualization and Mutuality  Outcome: Ongoing (interventions implemented as appropriate)    Goal: Discharge Needs Assessment  Outcome: Ongoing (interventions implemented as appropriate)    Goal: Interprofessional Rounds/Family Conf  Outcome: Ongoing (interventions implemented as appropriate)      Problem: Pain, Chronic (Adult)  Goal: Identify Related Risk Factors and Signs and Symptoms  Outcome: Ongoing (interventions implemented as appropriate)    Goal: Acceptable Pain/Comfort Level and Functional Ability  Outcome: Ongoing (interventions implemented as appropriate)

## 2019-08-19 NOTE — ED PROVIDER NOTES
Subjective   Chief Complaint: Chest pain  Context: Patient reports that he has been having intermittent chest discomfort with shortness of breath over the last few weeks.  He reports is becoming more prevalent.  Patient does have a heart history.  He reports that it has been a while since he had a stress test he thinks he is due for one.  Describes the pain as a heaviness/pressure with occasional cramping.  He does report some pain in his left upper back.  Duration: A few weeks  Timing: Intermittent  Severity: Mild  Associated symptoms and or modifying factors: As above      Request [facility] if available        History provided by:  Patient      Review of Systems   Constitutional: Negative for chills and fever.   HENT: Negative for congestion and sore throat.    Eyes: Negative for redness.   Respiratory: Positive for shortness of breath. Negative for cough.    Cardiovascular: Positive for chest pain.   Gastrointestinal: Negative for abdominal pain, diarrhea, nausea and vomiting.   Genitourinary: Negative for dysuria.   Musculoskeletal: Negative for back pain.   Skin: Negative for rash.   Neurological: Negative for headaches.   All other systems reviewed and are negative.      Past Medical History:   Diagnosis Date   • Abnormal cardiovascular stress test 11/01/2016    Abnormal Cardiolite Stress Test. Abstracted from [facility].   • Anxiety 03/15/2012    Uses this PRN, has stress due to caring for elderly parents. Abstracted from [facility].   • Atherosclerotic heart disease 03/15/2012   • Borderline hypertension 10/19/2015   • Bronchitis, acute 05/16/2016   • Chest pain 10/15/2014   • Chronic foot pain 10/16/2012    Will see his podiatrist. Abstracted from [facility].   • Constipation 01/09/2018   • Coronary artery disease 10/24/2016   • DDD (degenerative disc disease), lumbar 02/13/2013   • Depression 08/10/2017   • Dyslipidemia    • Erectile dysfunction of organic origin 12/07/2012    Samples of cialis 20mg, voucher  for the 5mg, samples of levitra 20mg and voucher for viagra 100mg. Call with preference. Order total T. Abstracted from [facility].   • GERD (gastroesophageal reflux disease) 12/07/2012   • H/O myocardial perfusion scan 11/01/2016    With Stress Test, abnormal. Abstracted from [facility].   • Hand pain 04/24/2014   • Headache 01/18/2016   • Hyperlipidemia 02/13/2018   • Hypertension 08/10/2017   • Hypogonadism, male 12/07/2012   • Hypothyroidism 02/08/2018   • Impacted cerumen, left ear 04/03/2019   • Influenza vaccination ordered 10/19/2016   • Insomnia 03/15/2012   • Lateral epicondylitis, left elbow 02/07/2017   • Left knee sprain 01/24/2013   • Leg pain, bilateral 04/02/2019   • Lumbar disc herniation with radiculopathy 09/08/2016   • Lumbar radiculitis 08/30/2016   • Mild memory disturbance 01/18/2016   • Myocardial infarction (CMS/[facility]) 11/01/2016   • Osteoarthritis of sacroiliac joint 03/12/2014   • Pain in right lower leg 07/11/2016   • Paresthesia 08/30/2016   • Paresthesia of both legs     Lower legs   • Postherpetic neuralgia 03/15/2016   • Preoperative cardiovascular examination 10/24/2016   • Preventative health care 02/07/2017   • S/P cardiac catheterization 01/01/2000    S/P cardiac cath, left heart-- Heart catheterization 2000 at [facility] in [place], [place]. No records available. Abstracted from [facility].   • Sciatica, right side 06/04/2013   • Shingles    • Shortness of breath 10/15/2014   • Spinal stenosis, lumbar 02/13/2013    TENS unit evidently not covered by Medicare. Abstracted from [facility].   • Vitamin B12 deficiency 08/10/2017   • Vitamin D deficiency 01/09/2018       Allergies   Allergen Reactions   • Niacin Unknown (See Comments)     Abstracted from [facility].       Past Surgical History:   Procedure Laterality Date   • CARDIAC CATHETERIZATION  2000   • COLONOSCOPY  10/28/2009   • OTHER SURGICAL HISTORY      Lapscopic surgery, both knees. Abstracted by [facility].   • OTHER  SURGICAL HISTORY      Right rotor cuff Disorder. Abstracted from [facility].   • OTHER SURGICAL HISTORY      Kneww injections. Abstracted from [facility].   • OTHER SURGICAL HISTORY      Shot in lower back for bulging disc. Abstracted from [facility].       Family History   Problem Relation Age of Onset   • Alzheimer's disease Mother    • Hyperlipidemia Father    • Heart disease Father    • Hypertension Father    • Hyperlipidemia Brother    • Diabetes Brother    • Heart disease Brother    • Hypertension Brother    • Heart disease Maternal Grandfather    • Alzheimer's disease Paternal Grandmother    • Cancer Cousin    • Cancer Other        Social History     Socioeconomic History   • Marital status:      Spouse name: Not on file   • Number of children: Not on file   • Years of education: Not on file   • Highest education level: Not on file   Tobacco Use   • Smoking status: Never Smoker   Substance and Sexual Activity   • Alcohol use: No     Frequency: Never   • Drug use: No           Objective   Physical Exam  The patient is well-developed, well-nourished, alert, cooperative and in no acute distress.    HEENT exam is normocephalic and atraumatic. Pupils equal ,round, reactive to light. Extraocular muscles are intact. Conjunctivae non- injected, sclerae anicteric, lids without ptosis, edema or erythema. Mucous membranes are moist.     Neck is supple, non-tender without lymphadenopathy. No JVD, bruit or stridor noted.     Lungs clear to auscultation bilaterally.    Cardiovascular. Regular rate and rhythm without murmur.    Abdomen is soft, nontender, nondistended. No guarding or rebound noted.     Back shows no CVA tenderness.     Extremities: There is no significant deformity or joint abnormality. No edema.     Neurologic: Oriented x3 without focal neurological deficits.    Skin shows no rash, petechiae, or purpura.    Procedures           ED Course  ED Course as of Aug 19 1703   Mon Aug 19, 2019   1623 CBC  unremarkable.  BUN 6, creatinine 1.3, sodium 135, chloride 100, initial troponin is negative at less than 0.03, BNP is 11.  Chest x-ray with no acute cardiopulmonary abnormality.  [AC]      ED Course User Index  [AC] Tyrese Chavarria APRN      Labs Reviewed   BASIC METABOLIC PANEL - Abnormal; Notable for the following components:       Result Value    BUN 6 (*)     Creatinine 1.30 (*)     Sodium 135 (*)     Chloride 100 (*)     eGFR Non African Amer 54 (*)     BUN/Creatinine Ratio 4.6 (*)     All other components within normal limits    Narrative:     The MDRD GFR formula is only valid for adults with stable renal function between ages 18 and 70.   APTT - Abnormal; Notable for the following components:    PTT 23.7 (*)     All other components within normal limits   PROTIME-INR - Normal   BNP (IN-HOUSE) - Normal   TROPONIN (IN-HOUSE) - Normal    Narrative:     Troponin I Reference Range:    0.00-0.03  Negative.  Repeat testing in 4-6 hours if clinically indicated.    0.04-0.29  Suspicious for myocardial injury. Serial measurements and clinical  correlation may be necessary to confirm or exclude diagnosis of acute  coronary syndrome.  Repeat testing in 4-6 hours if indicated.     >0.29 Consistent with myocardial injury.  Recommend clinical and laboratory correlation.     Results my be falsely decreased if patient taking Biotin.    CBC WITH AUTO DIFFERENTIAL - Normal   CBC AND DIFFERENTIAL    Narrative:     The following orders were created for panel order CBC & Differential.  Procedure                               Abnormality         Status                     ---------                               -----------         ------                     CBC Auto Differential[[id number]]        Normal              Final result                 Please view results for these tests on the individual orders.     Xr Chest 1 View    Result Date: 8/19/2019  No acute chest findings.  Electronically Signed By-Dr. Mary Jo Patel MD  "On:8/19/2019 3:55 PM This report was finalized on [id number] by Dr. Mary Jo Patel MD.    Medications   sodium chloride 0.9 % flush 10 mL (not administered)   nitroglycerin (NITROSTAT) ointment 1 inch (1 inch Topical Given 8/19/19 1545)   aspirin chewable tablet 324 mg (324 mg Oral Given 8/19/19 1545)     /71 (BP Location: Right arm, Patient Position: Lying)   Pulse 51   Temp 97.8 °F (36.6 °C) (Oral)   Resp 15   Ht 188 cm (74\")   Wt 93.8 kg (206 lb 12.7 oz)   SpO2 98%   BMI 26.55 kg/m²     EKG interpreted by Dr Mosher, sinus bradycardia, rate 58, old anterior septal infarct, no significant change from 11/2/2016          MDM  Number of Diagnoses or Management Options  Chest pain, unspecified type:   Diagnosis management comments: MEDICAL DECISION  Comorbidities: Noted in past history  Differentials: angina, STEMI, non-STEMI, pneumonia; this list is not all inclusive and does not constitute the entirety of considered causes  Radiology interpretation:  Images reviewed by me and interpreted by radiologist, chest x-ray no acute chest findings.  Lab interpretation:  Labs viewed by me significant for, initial troponin is negative.  Other labs as above    Results and plan for admission were discussed.  Admitted to Dr Quigley.        Amount and/or Complexity of Data Reviewed  Clinical lab tests: reviewed and ordered  Tests in the radiology section of CPT®: reviewed and ordered          Final diagnoses:   Chest pain, unspecified type            Tyrese Chavarria, APRN  08/19/19 1704    "

## 2019-08-20 ENCOUNTER — APPOINTMENT (OUTPATIENT)
Dept: NUCLEAR MEDICINE | Facility: HOSPITAL | Age: 74
End: 2019-08-20

## 2019-08-20 ENCOUNTER — HOSPITAL ENCOUNTER (OUTPATIENT)
Dept: CARDIOLOGY | Facility: HOSPITAL | Age: 74
Setting detail: OBSERVATION
Discharge: HOME | End: 2019-08-20

## 2019-08-20 VITALS
BODY MASS INDEX: 28.13 KG/M2 | SYSTOLIC BLOOD PRESSURE: 162 MMHG | HEIGHT: 72 IN | RESPIRATION RATE: 20 BRPM | HEART RATE: 53 BPM | DIASTOLIC BLOOD PRESSURE: 96 MMHG | WEIGHT: 207.67 LBS

## 2019-08-20 LAB
BH CV ECHO MEAS - ACS: 1.7 CM
BH CV ECHO MEAS - AO MAX PG (FULL): -0.34 MMHG
BH CV ECHO MEAS - AO MAX PG: 6.9 MMHG
BH CV ECHO MEAS - AO MEAN PG (FULL): 0.38 MMHG
BH CV ECHO MEAS - AO MEAN PG: 3.6 MMHG
BH CV ECHO MEAS - AO ROOT AREA (BSA CORRECTED): 1.4
BH CV ECHO MEAS - AO ROOT AREA: 7.4 CM^2
BH CV ECHO MEAS - AO ROOT DIAM: 3.1 CM
BH CV ECHO MEAS - AO V2 MAX: 131.4 CM/SEC
BH CV ECHO MEAS - AO V2 MEAN: 88.9 CM/SEC
BH CV ECHO MEAS - AO V2 VTI: 33.6 CM
BH CV ECHO MEAS - ASC AORTA: 3.4 CM
BH CV ECHO MEAS - AVA(I,A): 3.5 CM^2
BH CV ECHO MEAS - AVA(I,D): 3.5 CM^2
BH CV ECHO MEAS - AVA(V,A): 3.7 CM^2
BH CV ECHO MEAS - AVA(V,D): 3.7 CM^2
BH CV ECHO MEAS - BSA(HAYCOCK): 2.2 M^2
BH CV ECHO MEAS - BSA: 2.2 M^2
BH CV ECHO MEAS - BZI_BMI: 28.1 KILOGRAMS/M^2
BH CV ECHO MEAS - BZI_METRIC_HEIGHT: 182.9 CM
BH CV ECHO MEAS - BZI_METRIC_WEIGHT: 93.9 KG
BH CV ECHO MEAS - EDV(CUBED): 108.9 ML
BH CV ECHO MEAS - EDV(MOD-SP2): 114.3 ML
BH CV ECHO MEAS - EDV(MOD-SP4): 134.1 ML
BH CV ECHO MEAS - EDV(TEICH): 106.2 ML
BH CV ECHO MEAS - EF(CUBED): 70.3 %
BH CV ECHO MEAS - EF(MOD-BP): 58 %
BH CV ECHO MEAS - EF(MOD-SP2): 48.8 %
BH CV ECHO MEAS - EF(MOD-SP4): 67.9 %
BH CV ECHO MEAS - EF(TEICH): 61.9 %
BH CV ECHO MEAS - ESV(CUBED): 32.3 ML
BH CV ECHO MEAS - ESV(MOD-SP2): 58.5 ML
BH CV ECHO MEAS - ESV(MOD-SP4): 43 ML
BH CV ECHO MEAS - ESV(TEICH): 40.5 ML
BH CV ECHO MEAS - FS: 33.3 %
BH CV ECHO MEAS - IVS/LVPW: 0.98
BH CV ECHO MEAS - IVSD: 1.2 CM
BH CV ECHO MEAS - LA DIMENSION(2D): 3.6 CM
BH CV ECHO MEAS - LV DIASTOLIC VOL/BSA (35-75): 62 ML/M^2
BH CV ECHO MEAS - LV MASS(C)D: 213.5 GRAMS
BH CV ECHO MEAS - LV MASS(C)DI: 98.8 GRAMS/M^2
BH CV ECHO MEAS - LV MAX PG: 7.2 MMHG
BH CV ECHO MEAS - LV MEAN PG: 3.3 MMHG
BH CV ECHO MEAS - LV SYSTOLIC VOL/BSA (12-30): 19.9 ML/M^2
BH CV ECHO MEAS - LV V1 MAX: 134.6 CM/SEC
BH CV ECHO MEAS - LV V1 MEAN: 83.1 CM/SEC
BH CV ECHO MEAS - LV V1 VTI: 32.2 CM
BH CV ECHO MEAS - LVIDD: 4.8 CM
BH CV ECHO MEAS - LVIDS: 3.2 CM
BH CV ECHO MEAS - LVOT AREA: 3.6 CM^2
BH CV ECHO MEAS - LVOT DIAM: 2.2 CM
BH CV ECHO MEAS - LVPWD: 1.2 CM
BH CV ECHO MEAS - MV A MAX VEL: 59.3 CM/SEC
BH CV ECHO MEAS - MV DEC SLOPE: 406.8 CM/SEC^2
BH CV ECHO MEAS - MV DEC TIME: 0.21 SEC
BH CV ECHO MEAS - MV E MAX VEL: 87.1 CM/SEC
BH CV ECHO MEAS - MV E/A: 1.5
BH CV ECHO MEAS - MV MAX PG: 2.9 MMHG
BH CV ECHO MEAS - MV MEAN PG: 1.2 MMHG
BH CV ECHO MEAS - MV V2 MAX: 85.8 CM/SEC
BH CV ECHO MEAS - MV V2 MEAN: 52.7 CM/SEC
BH CV ECHO MEAS - MV V2 VTI: 33.1 CM
BH CV ECHO MEAS - MVA(VTI): 3.6 CM^2
BH CV ECHO MEAS - PA ACC TIME: 0.18 SEC
BH CV ECHO MEAS - PA MAX PG (FULL): 4.6 MMHG
BH CV ECHO MEAS - PA MAX PG: 6.5 MMHG
BH CV ECHO MEAS - PA MEAN PG (FULL): 2.5 MMHG
BH CV ECHO MEAS - PA MEAN PG: 3.6 MMHG
BH CV ECHO MEAS - PA PR(ACCEL): 0.11 MMHG
BH CV ECHO MEAS - PA V2 MAX: 127.9 CM/SEC
BH CV ECHO MEAS - PA V2 MEAN: 89.3 CM/SEC
BH CV ECHO MEAS - PA V2 VTI: 32.6 CM
BH CV ECHO MEAS - PI END-D VEL: 99.2 CM/SEC
BH CV ECHO MEAS - PI MAX PG: 9.9 MMHG
BH CV ECHO MEAS - PI MAX VEL: 157.4 CM/SEC
BH CV ECHO MEAS - PVA(I,A): 2.6 CM^2
BH CV ECHO MEAS - PVA(I,D): 2.6 CM^2
BH CV ECHO MEAS - PVA(V,A): 2.2 CM^2
BH CV ECHO MEAS - PVA(V,D): 2.2 CM^2
BH CV ECHO MEAS - QP/QS: 0.71
BH CV ECHO MEAS - RAP SYSTOLE: 5 MMHG
BH CV ECHO MEAS - RV MAX PG: 2 MMHG
BH CV ECHO MEAS - RV MEAN PG: 1.2 MMHG
BH CV ECHO MEAS - RV V1 MAX: 70 CM/SEC
BH CV ECHO MEAS - RV V1 MEAN: 51.2 CM/SEC
BH CV ECHO MEAS - RV V1 VTI: 20.8 CM
BH CV ECHO MEAS - RVDD: 3.5 CM
BH CV ECHO MEAS - RVOT AREA: 4 CM^2
BH CV ECHO MEAS - RVOT DIAM: 2.3 CM
BH CV ECHO MEAS - RVSP: 23.5 MMHG
BH CV ECHO MEAS - SI(AO): 115.6 ML/M^2
BH CV ECHO MEAS - SI(CUBED): 35.4 ML/M^2
BH CV ECHO MEAS - SI(LVOT): 54.3 ML/M^2
BH CV ECHO MEAS - SI(MOD-SP2): 25.8 ML/M^2
BH CV ECHO MEAS - SI(MOD-SP4): 42.1 ML/M^2
BH CV ECHO MEAS - SI(TEICH): 30.4 ML/M^2
BH CV ECHO MEAS - SV(AO): 249.9 ML
BH CV ECHO MEAS - SV(CUBED): 76.6 ML
BH CV ECHO MEAS - SV(LVOT): 117.5 ML
BH CV ECHO MEAS - SV(MOD-SP2): 55.8 ML
BH CV ECHO MEAS - SV(MOD-SP4): 91.1 ML
BH CV ECHO MEAS - SV(RVOT): 83.1 ML
BH CV ECHO MEAS - SV(TEICH): 65.7 ML
BH CV ECHO MEAS - TR MAX VEL: 214.5 CM/SEC

## 2019-08-20 PROCEDURE — 93016 CV STRESS TEST SUPVJ ONLY: CPT | Performed by: INTERNAL MEDICINE

## 2019-08-20 PROCEDURE — 78452 HT MUSCLE IMAGE SPECT MULT: CPT

## 2019-08-20 PROCEDURE — 93306 TTE W/DOPPLER COMPLETE: CPT

## 2019-08-20 PROCEDURE — 78452 HT MUSCLE IMAGE SPECT MULT: CPT | Performed by: INTERNAL MEDICINE

## 2019-08-20 PROCEDURE — A9500 TC99M SESTAMIBI: HCPCS | Performed by: INTERNAL MEDICINE

## 2019-08-20 PROCEDURE — 0 TECHNETIUM SESTAMIBI: Performed by: INTERNAL MEDICINE

## 2019-08-20 PROCEDURE — 99226 PR SBSQ OBSERVATION CARE/DAY 35 MINUTES: CPT | Performed by: INTERNAL MEDICINE

## 2019-08-20 PROCEDURE — 93018 CV STRESS TEST I&R ONLY: CPT | Performed by: INTERNAL MEDICINE

## 2019-08-20 PROCEDURE — 93306 TTE W/DOPPLER COMPLETE: CPT | Performed by: INTERNAL MEDICINE

## 2019-08-20 PROCEDURE — 93017 CV STRESS TEST TRACING ONLY: CPT

## 2019-08-20 PROCEDURE — G0378 HOSPITAL OBSERVATION PER HR: HCPCS

## 2019-08-20 PROCEDURE — 99214 OFFICE O/P EST MOD 30 MIN: CPT | Performed by: INTERNAL MEDICINE

## 2019-08-20 PROCEDURE — 25010000002 REGADENOSON 0.4 MG/5ML SOLUTION: Performed by: INTERNAL MEDICINE

## 2019-08-20 PROCEDURE — 96372 THER/PROPH/DIAG INJ SC/IM: CPT

## 2019-08-20 PROCEDURE — 25010000002 ENOXAPARIN PER 10 MG: Performed by: INTERNAL MEDICINE

## 2019-08-20 PROCEDURE — 0399T HC MYOCARDL STRAIN IMAG QUAN ASSMT PER SESS: CPT

## 2019-08-20 PROCEDURE — 0399T ADULT TRANSTHORACIC ECHO COMPLETE W/ CONT IF NECESSARY PER PROTOCOL: CPT | Performed by: INTERNAL MEDICINE

## 2019-08-20 RX ADMIN — GABAPENTIN 400 MG: 400 CAPSULE ORAL at 21:13

## 2019-08-20 RX ADMIN — LEVOTHYROXINE SODIUM 75 MCG: 75 TABLET ORAL at 05:19

## 2019-08-20 RX ADMIN — Medication 3 ML: at 10:47

## 2019-08-20 RX ADMIN — PANTOPRAZOLE SODIUM 40 MG: 40 TABLET, DELAYED RELEASE ORAL at 10:46

## 2019-08-20 RX ADMIN — CYANOCOBALAMIN TAB 1000 MCG 1000 MCG: 1000 TAB at 10:46

## 2019-08-20 RX ADMIN — TECHNETIUM TC 99M SESTAMIBI 1 DOSE: 1 INJECTION INTRAVENOUS at 06:30

## 2019-08-20 RX ADMIN — ROSUVASTATIN CALCIUM 10 MG: 10 TABLET, FILM COATED ORAL at 10:46

## 2019-08-20 RX ADMIN — TECHNETIUM TC 99M SESTAMIBI 1 DOSE: 1 INJECTION INTRAVENOUS at 08:36

## 2019-08-20 RX ADMIN — ENOXAPARIN SODIUM 40 MG: 40 INJECTION SUBCUTANEOUS at 15:08

## 2019-08-20 RX ADMIN — Medication 3 ML: at 21:13

## 2019-08-20 RX ADMIN — ASPIRIN 81 MG: 81 TABLET ORAL at 10:46

## 2019-08-20 RX ADMIN — GABAPENTIN 400 MG: 400 CAPSULE ORAL at 15:08

## 2019-08-20 RX ADMIN — REGADENOSON 0.4 MG: 0.08 INJECTION, SOLUTION INTRAVENOUS at 08:36

## 2019-08-20 RX ADMIN — GABAPENTIN 400 MG: 400 CAPSULE ORAL at 05:20

## 2019-08-20 NOTE — PLAN OF CARE
Problem: Patient Care Overview  Goal: Plan of Care Review  Outcome: Ongoing (interventions implemented as appropriate)   08/20/19 9855   Coping/Psychosocial   Plan of Care Reviewed With patient   Plan of Care Review   Progress no change   OTHER   Outcome Summary Patient underwent Myoview and echo this morning. No further complaints of pain. Patient waiting for results from tests this morning. Will continue to monitor.     Goal: Individualization and Mutuality  Outcome: Ongoing (interventions implemented as appropriate)    Goal: Discharge Needs Assessment  Outcome: Ongoing (interventions implemented as appropriate)    Goal: Interprofessional Rounds/Family Conf  Outcome: Ongoing (interventions implemented as appropriate)      Problem: Pain, Chronic (Adult)  Goal: Identify Related Risk Factors and Signs and Symptoms  Outcome: Ongoing (interventions implemented as appropriate)    Goal: Acceptable Pain/Comfort Level and Functional Ability  Outcome: Ongoing (interventions implemented as appropriate)

## 2019-08-20 NOTE — PROGRESS NOTES
Referring Provider: Bautista Quigley MD    Reason for follow-up:   Chest pain  Coronary artery disease  Renal dysfunction     Patient Care Team:  Gris Muro APRN as PCP - General  Gris Muro APRN as PCP - Claims Attributed    Subjective .  Feeling better     ROS    Since I have last seen, the patient has been without any chest discomfort ,shortness of breath, palpitations, dizziness or syncope.  Denies having any headache ,abdominal pain ,nausea, vomiting , diarrhea constipation, loss of weight or loss of appetite.  Denies having any excessive bruising ,hematuria or blood in the stool.    Review of all systems negative except as indicated    History  Past Medical History:   Diagnosis Date   • Abnormal cardiovascular stress test 11/01/2016    Abnormal Cardiolite Stress Test. Abstracted from [facility].   • Anxiety 03/15/2012    Uses this PRN, has stress due to caring for elderly parents. Abstracted from [facility].   • Atherosclerotic heart disease 03/15/2012   • Borderline hypertension 10/19/2015   • Bronchitis, acute 05/16/2016   • Chest pain 10/15/2014   • Chronic foot pain 10/16/2012    Will see his podiatrist. Abstracted from [facility].   • Constipation 01/09/2018   • Coronary artery disease 10/24/2016   • DDD (degenerative disc disease), lumbar 02/13/2013   • Depression 08/10/2017   • Dyslipidemia    • Erectile dysfunction of organic origin 12/07/2012    Samples of cialis 20mg, voucher for the 5mg, samples of levitra 20mg and voucher for viagra 100mg. Call with preference. Order total T. Abstracted from [facility].   • GERD (gastroesophageal reflux disease) 12/07/2012   • H/O myocardial perfusion scan 11/01/2016    With Stress Test, abnormal. Abstracted from [facility].   • Hand pain 04/24/2014   • Headache 01/18/2016   • Hyperlipidemia 02/13/2018   • Hypertension 08/10/2017   • Hypogonadism, male 12/07/2012   • Hypothyroidism 02/08/2018   • Impacted cerumen, left ear 04/03/2019   • Influenza  vaccination ordered 10/19/2016   • Insomnia 03/15/2012   • Lateral epicondylitis, left elbow 02/07/2017   • Left knee sprain 01/24/2013   • Leg pain, bilateral 04/02/2019   • Lumbar disc herniation with radiculopathy 09/08/2016   • Lumbar radiculitis 08/30/2016   • Mild memory disturbance 01/18/2016   • Myocardial infarction (CMS/HCC) 11/01/2016   • Osteoarthritis of sacroiliac joint 03/12/2014   • Pain in right lower leg 07/11/2016   • Paresthesia 08/30/2016   • Paresthesia of both legs     Lower legs   • Postherpetic neuralgia 03/15/2016   • Preoperative cardiovascular examination 10/24/2016   • Preventative health care 02/07/2017   • S/P cardiac catheterization 01/01/2000    S/P cardiac cath, left heart-- Heart catheterization 2000 at [facility] in [place], [place]. No records available. Abstracted from [email or web address].   • Sciatica, right side 06/04/2013   • Shingles    • Shortness of breath 10/15/2014   • Spinal stenosis, lumbar 02/13/2013    TENS unit evidently not covered by Medicare. Abstracted from [email or web address].   • Vitamin B12 deficiency 08/10/2017   • Vitamin D deficiency 01/09/2018       Past Surgical History:   Procedure Laterality Date   • CARDIAC CATHETERIZATION  2000   • COLONOSCOPY  10/28/2009   • OTHER SURGICAL HISTORY      Lapscopic surgery, both knees. Abstracted by [email or web address].   • OTHER SURGICAL HISTORY      Right rotor cuff Disorder. Abstracted from [email or web address].   • OTHER SURGICAL HISTORY      Kneww injections. Abstracted from [email or web address].   • OTHER SURGICAL HISTORY      Shot in lower back for bulging disc. Abstracted from [email or web address].       Family History   Problem Relation Age of Onset   • Alzheimer's disease Mother    • Hyperlipidemia Father    • Heart disease Father    • Hypertension Father    • Hyperlipidemia Brother    • Diabetes Brother    • Heart disease Brother    • Hypertension Brother    • Heart disease Maternal Grandfather    • Alzheimer's disease Paternal Grandmother    • Cancer Cousin     • Cancer Other        Social History     Tobacco Use   • Smoking status: Never Smoker   • Smokeless tobacco: Never Used   Substance Use Topics   • Alcohol use: No     Frequency: Never   • Drug use: No        Medications Prior to Admission   Medication Sig Dispense Refill Last Dose   • aspirin (ASPIR) 81 MG EC tablet Take 81 mg by mouth Daily.   8/19/2019 at Unknown time   • Biotin 1000 MCG chewable tablet Chew 1 tablet Daily.   8/19/2019 at Unknown time   • Cyanocobalamin (B-12) 1000 MCG tablet Take 100 mcg by mouth Daily.   8/19/2019 at Unknown time   • gabapentin (NEURONTIN) 800 MG tablet Take 400 mg by mouth 3 (Three) Times a Day.   8/19/2019 at Unknown time   • levothyroxine (SYNTHROID) 75 MCG tablet Take 75 mcg by mouth Daily.   8/19/2019 at Unknown time   • Omega-3 Fatty Acids (FISH OIL) 1000 MG capsule capsule Take 1,000 mg by mouth Daily With Breakfast.   8/19/2019 at Unknown time   • omeprazole (priLOSEC) 40 MG capsule Take 20 mg by mouth Daily. Take two pills daily. Abstracted from [facility].    8/19/2019 at Unknown time   • rosuvastatin (CRESTOR) 10 MG tablet Take 10 mg by mouth Daily.   8/19/2019 at Unknown time   • nitroglycerin (NITROSTAT) 0.4 MG SL tablet 0.4 mg. Dissolve 1 tablet under tongue as needed for chest pain. May repeat dose every 5 min as needed for total of doses. Abstracted from [facility].      • polyethylene glycol (MIRALAX) powder Take 17 g by mouth Daily As Needed. Dissolve 17 grams in 8 oz. Liquid and drink daily. Abstracted from [facility].    Unknown at Unknown time   • tadalafil (CIALIS) 5 MG tablet Take 5 mg by mouth Daily As Needed.   Unknown at Unknown time   • vitamin D (ERGOCALCIFEROL) 91920 units capsule capsule Take 50,000 Units by mouth 1 (One) Time Per Week. Thursday   8/15/2019       Allergies  Niacin    Scheduled Meds:  aspirin 81 mg Oral Daily   enoxaparin 40 mg Subcutaneous Daily   gabapentin 400 mg Oral Q8H   levothyroxine 75 mcg Oral Q AM   pantoprazole 40 mg  "Oral Daily   polyethylene glycol 17 g Oral Daily   rosuvastatin 10 mg Oral Daily   sodium chloride 3 mL Intravenous Q12H   vitamin B-12 1,000 mcg Oral Daily     Continuous Infusions:   PRN Meds:.•  acetaminophen  •  Morphine  •  nitroglycerin  •  [COMPLETED] Insert peripheral IV **AND** sodium chloride  •  sodium chloride    Objective     VITAL SIGNS  Vitals:    08/19/19 1637 08/19/19 1711 08/19/19 2000 08/20/19 0305   BP: 104/71 160/76 122/73 157/85   BP Location: Right arm Right arm     Patient Position: Lying Sitting     Pulse: 51 55 52 (!) 49   Resp: 15 12 18 16   Temp:  97.7 °F (36.5 °C) 97.7 °F (36.5 °C) 97.9 °F (36.6 °C)   TempSrc:  Oral     SpO2: 98% 96% 97% 97%   Weight:  87.2 kg (192 lb 3.9 oz)  94.2 kg (207 lb 10.8 oz)   Height:  182.9 cm (72\")         Flowsheet Rows      First Filed Value   Admission Height  188 cm (74\") Documented at 08/19/2019 1512   Admission Weight  93.8 kg (206 lb 12.7 oz) Documented at 08/19/2019 1512           TELEMETRY: Sinus rhythm    Physical Exam:  The patient is alert, oriented and in no distress.  Vital signs as noted above.  Head and neck revealed no carotid bruits or jugular venous distention.  No thyromegaly or lymphadenopathy is present  Lungs clear.  No wheezing.  Breath sounds are normal bilaterally.  Heart normal first and second heart sounds.  No murmur. No precordial rub is present.  No gallop is present.  Abdomen soft and nontender.  No organomegaly is present.  Extremities with good peripheral pulses without any pedal edema.  Skin warm and dry.  Musculoskeletal system is grossly normal  CNS grossly normal      Results Review:   I reviewed the patient's new clinical results.  Lab Results (last 24 hours)     Procedure Component Value Units Date/Time    BNP [[id number]]  (Normal) Collected:  08/19/19 1535    Specimen:  Blood Updated:  08/19/19 1620     BNP 11.0 pg/mL      Comment: Results may be falsely decreased if patient taking Biotin.       Troponin [[id number]]  " (Normal) Collected:  08/19/19 1534    Specimen:  Blood Updated:  08/19/19 1617     Troponin I <0.030 ng/mL     Narrative:       Troponin I Reference Range:    0.00-0.03  Negative.  Repeat testing in 4-6 hours if clinically indicated.    0.04-0.29  Suspicious for myocardial injury. Serial measurements and clinical  correlation may be necessary to confirm or exclude diagnosis of acute  coronary syndrome.  Repeat testing in 4-6 hours if indicated.     >0.29 Consistent with myocardial injury.  Recommend clinical and laboratory correlation.     Results my be falsely decreased if patient taking Biotin.     Basic Metabolic Panel [[id number]]  (Abnormal) Collected:  08/19/19 1535    Specimen:  Blood Updated:  08/19/19 1615     Glucose 90 mg/dL      BUN 6 mg/dL      Creatinine 1.30 mg/dL      Sodium 135 mmol/L      Potassium 3.6 mmol/L      Chloride 100 mmol/L      CO2 24.0 mmol/L      Calcium 9.2 mg/dL      eGFR Non African Amer 54 mL/min/1.73      BUN/Creatinine Ratio 4.6     Anion Gap 14.6 mmol/L     Narrative:       The MDRD GFR formula is only valid for adults with stable renal function between ages 18 and 70.    Protime-INR [[id number]]  (Normal) Collected:  08/19/19 1535    Specimen:  Blood Updated:  08/19/19 1550     Protime 10.6 Seconds      INR 1.03    aPTT [[id number]]  (Abnormal) Collected:  08/19/19 1535    Specimen:  Blood Updated:  08/19/19 1550     PTT 23.7 seconds     CBC & Differential [[id number]] Collected:  08/19/19 1534    Specimen:  Blood Updated:  08/19/19 1542    Narrative:       The following orders were created for panel order CBC & Differential.  Procedure                               Abnormality         Status                     ---------                               -----------         ------                     CBC Auto Differential[[id number]]        Normal              Final result                 Please view results for these tests on the individual orders.    CBC Auto Differential [[id number]]   (Normal) Collected:  08/19/19 1534    Specimen:  Blood Updated:  08/19/19 1542     WBC 8.70 10*3/mm3      RBC 4.75 10*6/mm3      Hemoglobin 14.0 g/dL      Hematocrit 42.0 %      MCV 88.5 fL      MCH 29.5 pg      MCHC 33.3 g/dL      RDW 13.7 %      RDW-SD 42.4 fl      MPV 8.8 fL      Platelets 175 10*3/mm3      Neutrophil % 64.3 %      Lymphocyte % 25.1 %      Monocyte % 7.6 %      Eosinophil % 1.7 %      Basophil % 1.3 %      Neutrophils, Absolute 5.60 10*3/mm3      Lymphocytes, Absolute 2.20 10*3/mm3      Monocytes, Absolute 0.70 10*3/mm3      Eosinophils, Absolute 0.10 10*3/mm3      Basophils, Absolute 0.10 10*3/mm3      nRBC 0.1 /100 WBC           Imaging Results (last 24 hours)     Procedure Component Value Units Date/Time    XR Chest 1 View [[id number]] Collected:  08/19/19 1555     Updated:  08/19/19 1559    Narrative:       DATE OF EXAM:  8/19/2019 3:35 PM     PROCEDURE:  XR CHEST 1 VW-     INDICATIONS:  cp dyspnea       COMPARISON:  PA and lateral chest 11/02/2016.     TECHNIQUE:   Single radiographic view of the chest was obtained.     FINDINGS:  Clear lungs. Heart size within normal limits. Benign calcified right  hilar lymph nodes. Radiopaque material or Shield over the neck obscures  the thoracic inlet. No acute osseous abnormalities are identified. No  pneumothorax or pleural effusion.       Impression:       No acute chest findings.     Electronically Signed By-Dr. Mary Jo Patel MD On:8/19/2019 3:55 PM  This report was finalized on [id number] by Dr. Mary Jo Patel MD.      LAB RESULTS (LAST 7 DAYS)    CBC  Results from last 7 days   Lab Units 08/19/19  1534   WBC 10*3/mm3 8.70   RBC 10*6/mm3 4.75   HEMOGLOBIN g/dL 14.0   HEMATOCRIT % 42.0   MCV fL 88.5   PLATELETS 10*3/mm3 175       BMP  Results from last 7 days   Lab Units 08/19/19  1535   SODIUM mmol/L 135*   POTASSIUM mmol/L 3.6   CHLORIDE mmol/L 100*   CO2 mmol/L 24.0   BUN mg/dL 6*   CREATININE mg/dL 1.30*   GLUCOSE mg/dL 90       CMP Results  from last 7 days   Lab Units 08/19/19  1535   SODIUM mmol/L 135*   POTASSIUM mmol/L 3.6   CHLORIDE mmol/L 100*   CO2 mmol/L 24.0   BUN mg/dL 6*   CREATININE mg/dL 1.30*   GLUCOSE mg/dL 90         BNP  Results from last 7 days   Lab Units 08/19/19  1535   BNP pg/mL 11.0       TROPONIN  Results from last 7 days   Lab Units 08/19/19  1534   TROPONIN I ng/mL <0.030       CoAg  Results from last 7 days   Lab Units 08/19/19  1535   INR  1.03   APTT seconds 23.7*       Creatinine Clearance  Estimated Creatinine Clearance: 61.2 mL/min (A) (by C-G formula based on SCr of 1.3 mg/dL (H)).    ABG        Radiology  Xr Chest 1 View    Result Date: 8/19/2019  No acute chest findings.  Electronically Signed By-Dr. Mary Jo Patel MD On:8/19/2019 3:55 PM This report was finalized on [id number] by Dr. Mary Jo Patel MD.              EKG      I personally viewed and interpreted the patient's EKG/Telemetry data:    ECHOCARDIOGRAM:             STRESS MYOVIEW:    Cardiolite (Tc-99m Sestamibi) stress test    CARDIAC CATHETERIZATION:            OTHER:         Assessment/Plan     Active Problems:    Chest pain        [[[[[[[[[[[[[[[[[[[    Impression  ===========      - chest discomfort suggestive of possible angina pectoris.   Troponin levels are negative.  X     Patient had cardiac catheterization in 2000 in [place].  Details are not available. ([place], [place] ).   Cardiac catheterization 11/04/2016 revealed 50% lad with anterior RCA.  Normal left ventricular function.  (Right radial approach)  Abnormal Lexiscan Cardiolite test with proximal inferior ischemia (11/01/2016) and 02/13/2018 (similar to 2016 and)     Echocardiogram is normal (11/01/2016 )     -dyslipidemia.  Renal dysfunction dysfunction.  Creatinine 1.3.     - strong family history of coronary artery disease     -Lapscopic surgery both knee Right rotor cuff disorder      -History of lumbar bulging disc .           -allergy to  niacin  ============  Plan  =========   chest pain suggestive of angina pectoris.  Troponin levels are negative.  EKG showed no acute changes.  Rule out progression of coronary artery disease.  Stress Cardiolite test-today  Echocardiogram-today.  Will review  Renal dysfunction BUN 6 creatinine 1.3    Medications were reviewed and updated.  Further plan will depend on patient's progress.    [[[[[[[[[[[[[[[[[[[[[            Cordelia Jacobo MD  08/20/19  6:11 AM

## 2019-08-20 NOTE — PLAN OF CARE
Problem: Patient Care Overview  Goal: Plan of Care Review  Outcome: Ongoing (interventions implemented as appropriate)    Goal: Individualization and Mutuality  Outcome: Ongoing (interventions implemented as appropriate)    Goal: Discharge Needs Assessment  Outcome: Ongoing (interventions implemented as appropriate)    Goal: Interprofessional Rounds/Family Conf  Outcome: Ongoing (interventions implemented as appropriate)      Problem: Pain, Chronic (Adult)  Goal: Identify Related Risk Factors and Signs and Symptoms  Outcome: Ongoing (interventions implemented as appropriate)    Goal: Acceptable Pain/Comfort Level and Functional Ability  Outcome: Ongoing (interventions implemented as appropriate)

## 2019-08-20 NOTE — PROGRESS NOTES
Hospitalist Team  LOS 0 days      Patient Care Team:  Gris Muro APRN as PCP - General  Gris Muro APRN as PCP - Claims Attributed    Assessment / Plan    Chest pain  -Patient denies any chest pain at this time  -Chest pain concerning for unstable angina and patient had a stress test done today and has been seen by cardiology.  As well as patient had echocardiogram done today  -Cardiac cath if patient has positive stress test     HOLA - eGFR 54, Cr 1.2, BUN 6 -Unsure of etiology at this time.       HTN - most recently measured at 137/70. Systolic may be elevated due to stress of hospitalization.     Hyperlipidemia -  follow up with primary care. Last lipid profile performed 2.21.19 TC was 160, HDL 56, LDL 84, VLDL 20.2 and TGs 109.  -Patient is on Crestor and will continue     Bilateral numbness and tingling in legs- history of lumbar disk herniation. outpatient follow up with primary care provider for further evaluation  -Patient is on gabapentin     Hypothyroidism - appears to be well controlled with levothyroxine. TSH last measured 2/2017.    GERD and patient is on Protonix and he is a stable with his symptoms        Chief Complaint / Subjective  Patient was seen for follow-up on chest pain  Patient seen and examined and he denies any complaints and feeling much better today and getting his a stress test and he also has been seen by cardiology.  Brief Synopsis of Hospital Course/HPI    Clifford Weber is a 74 y.o. male who presents for evaluation of chest pain. Onset was 3 days ago. Symptoms have been unchanged since that time. The patient describes the pain as heaviness and radiates to the left shoulder. Patient rates pain as a 7/10 in intensity. Associated symptoms are: dyspnea. Aggravating factors are: exercise. Alleviating factors are: none. Patient's cardiac risk factors are: diabetes mellitus, dyslipidemia, family history of premature cardiovascular disease, hypertension and male gender.  Patient's risk factors for DVT/PE: none. Previous cardiac testing: coronary angiography 3 years ago          Review of systems negative for all other 10 systems    ROS    Family History   Problem Relation Age of Onset   • Alzheimer's disease Mother    • Hyperlipidemia Father    • Heart disease Father    • Hypertension Father    • Hyperlipidemia Brother    • Diabetes Brother    • Heart disease Brother    • Hypertension Brother    • Heart disease Maternal Grandfather    • Alzheimer's disease Paternal Grandmother    • Cancer Cousin    • Cancer Other        Past Medical History:   Diagnosis Date   • Abnormal cardiovascular stress test 11/01/2016    Abnormal Cardiolite Stress Test. Abstracted from [facility].   • Anxiety 03/15/2012    Uses this PRN, has stress due to caring for elderly parents. Abstracted from [facility].   • Atherosclerotic heart disease 03/15/2012   • Borderline hypertension 10/19/2015   • Bronchitis, acute 05/16/2016   • Chest pain 10/15/2014   • Chronic foot pain 10/16/2012    Will see his podiatrist. Abstracted from [facility].   • Constipation 01/09/2018   • Coronary artery disease 10/24/2016   • DDD (degenerative disc disease), lumbar 02/13/2013   • Depression 08/10/2017   • Dyslipidemia    • Erectile dysfunction of organic origin 12/07/2012    Samples of cialis 20mg, voucher for the 5mg, samples of levitra 20mg and voucher for viagra 100mg. Call with preference. Order total T. Abstracted from [facility].   • GERD (gastroesophageal reflux disease) 12/07/2012   • H/O myocardial perfusion scan 11/01/2016    With Stress Test, abnormal. Abstracted from [facility].   • Hand pain 04/24/2014   • Headache 01/18/2016   • Hyperlipidemia 02/13/2018   • Hypertension 08/10/2017   • Hypogonadism, male 12/07/2012   • Hypothyroidism 02/08/2018   • Impacted cerumen, left ear 04/03/2019   • Influenza vaccination ordered 10/19/2016   • Insomnia 03/15/2012   • Lateral epicondylitis, left elbow 02/07/2017   • Left  "knee sprain 01/24/2013   • Leg pain, bilateral 04/02/2019   • Lumbar disc herniation with radiculopathy 09/08/2016   • Lumbar radiculitis 08/30/2016   • Mild memory disturbance 01/18/2016   • Myocardial infarction (CMS/HCC) 11/01/2016   • Osteoarthritis of sacroiliac joint 03/12/2014   • Pain in right lower leg 07/11/2016   • Paresthesia 08/30/2016   • Paresthesia of both legs     Lower legs   • Postherpetic neuralgia 03/15/2016   • Preoperative cardiovascular examination 10/24/2016   • Preventative health care 02/07/2017   • S/P cardiac catheterization 01/01/2000    S/P cardiac cath, left heart-- Heart catheterization 2000 at [facility] in [place], [place]. No records available. Abstracted from [facility].   • Sciatica, right side 06/04/2013   • Shingles    • Shortness of breath 10/15/2014   • Spinal stenosis, lumbar 02/13/2013    TENS unit evidently not covered by Medicare. Abstracted from [facility].   • Vitamin B12 deficiency 08/10/2017   • Vitamin D deficiency 01/09/2018       Social History     Socioeconomic History   • Marital status:      Spouse name: Not on file   • Number of children: Not on file   • Years of education: Not on file   • Highest education level: Not on file   Tobacco Use   • Smoking status: Never Smoker   • Smokeless tobacco: Never Used   Substance and Sexual Activity   • Alcohol use: No     Frequency: Never   • Drug use: No   • Sexual activity: Defer           Objective      Vital Signs  Temp:  [97.7 °F (36.5 °C)-97.9 °F (36.6 °C)] 97.9 °F (36.6 °C)  Heart Rate:  [49-55] 49  Resp:  [12-20] 16  BP: (104-162)/(71-96) 137/79  Oxygen Therapy  SpO2: 95 %  Pulse Oximetry Type: Continuous  Device (Oxygen Therapy): room air  Flowsheet Rows      First Filed Value   Admission Height  188 cm (74\") Documented at 08/19/2019 1512   Admission Weight  93.8 kg (206 lb 12.7 oz) Documented at 08/19/2019 1512        Intake & Output (last 3 days)       08/17 0701 - 08/18 0700 08/18 0701 - 08/19 " 0700 08/19 0701 - 08/20 0700 08/20 0701 - 08/21 0700            Urine Unmeasured Occurrence   2 x     Stool Unmeasured Occurrence   0 x         Lines, Drains & Airways    Active LDAs     Name:   Placement date:   Placement time:   Site:   Days:    Peripheral IV 08/19/19 1536 Left Antecubital   08/19/19    1536    Antecubital   1                  Physical Exam:    Vitals and Nursing notes reveiwed.     General Appearance: Alert, cooperative, in no acute distress    Head:    Normocephalic, without obvious abnormality, atraumatic    Eyes:           Lids and lashes normal, conjunctivae and sclerae normal, no   icterus, no pallor, corneas clear, PERRLA    Ears:    Ears appear intact with no abnormalities noted    Throat:   No oral lesions, no thrush, oral mucosa moist    Neck:   No adenopathy, supple, trachea midline, no thyromegaly, no carotid bruit, no JVD    Back:     No kyphosis present, no scoliosis present, no skin lesions, erythema or scars, no tenderness to percussion or palpation, range of motion normal    Lungs:     Clear to auscultation,respirations regular, even and unlabored    Heart:   Regular rhythm and normal rate, normal S1 and S2, no            murmur, no gallop, no rub, no click    Breast Exam  Deferred    Abdomen: Normal bowel sounds, no masses, no organomegaly, soft  non-tender, non-distended, no guarding, no rebound tenderness    Genitalia:    Deferred    Extremities: Moves all extremities well, no edema, no cyanosis, no redness    Pulses:   Pulses palpable and equal bilaterally    Skin:   No bleeding, bruising or rash    Lymph nodes: No palpable adenopathy    Neurologic:   Cranial nerves 2 - 12 grossly intact, sensation intact, DTR  present and equal bilaterally        Procedures:              Results Review:     I reviewed the patient's new clinical results.    Results from last 7 days   Lab Units 08/19/19  1534   WBC 10*3/mm3 8.70   HEMOGLOBIN g/dL 14.0   HEMATOCRIT % 42.0   PLATELETS 10*3/mm3  175     Results from last 7 days   Lab Units 08/19/19  1535   SODIUM mmol/L 135*   POTASSIUM mmol/L 3.6   CHLORIDE mmol/L 100*   CO2 mmol/L 24.0   BUN mg/dL 6*   CREATININE mg/dL 1.30*   CALCIUM mg/dL 9.2   GLUCOSE mg/dL 90         Lab Results   Component Value Date    CALCIUM 9.2 08/19/2019     No results found for: HGBA1C  Results from last 7 days   Lab Units 08/19/19  1535   INR  1.03               Microbiology Results (last 10 days)     ** No results found for the last 240 hours. **          ECG/EMG Results (most recent)     Procedure Component Value Units Date/Time    ECG 12 Lead [[id number]] Collected:  08/19/19 1528     Updated:  08/20/19 1054    Narrative:       HEART RATE= 58  bpm  RR Interval= 1024  ms  TN Interval= 206  ms  P Horizontal Axis= -13  deg  P Front Axis= 56  deg  QRSD Interval= 93  ms  QT Interval= 416  ms  QRS Axis= 52  deg  T Wave Axis= 59  deg  - ABNORMAL ECG -  Sinus bradycardia  Anteroseptal infarct, old  Electronically Signed By:   Date and Time of Study: 2019-08-19 15:28:50    Adult Transthoracic Echo Complete W/ Cont if Necessary Per Protocol [[id number]] Collected:  08/20/19 0834     Updated:  08/20/19 1353     BSA 2.2 m^2       CV ECHO KARLA - RVDD 3.5 cm      IVSd 1.2 cm      LVIDd 4.8 cm      LVIDs 3.2 cm      LVPWd 1.2 cm      IVS/LVPW 0.98     FS 33.3 %      EDV(Teich) 106.2 ml      ESV(Teich) 40.5 ml      EF(Teich) 61.9 %      EDV(cubed) 108.9 ml      ESV(cubed) 32.3 ml      EF(cubed) 70.3 %      LV mass(C)d 213.5 grams      LV mass(C)dI 98.8 grams/m^2      SV(Teich) 65.7 ml      SI(Teich) 30.4 ml/m^2      SV(cubed) 76.6 ml      SI(cubed) 35.4 ml/m^2      Ao root diam 3.1 cm      Ao root area 7.4 cm^2      ACS 1.7 cm      asc Aorta Diam 3.4 cm      LVOT diam 2.2 cm      LVOT area 3.6 cm^2      RVOT diam 2.3 cm      RVOT area 4.0 cm^2      EDV(MOD-sp4) 134.1 ml      ESV(MOD-sp4) 43.0 ml      EF(MOD-sp4) 67.9 %      EDV(MOD-sp2) 114.3 ml      ESV(MOD-sp2) 58.5 ml      EF(MOD-sp2)  48.8 %      SV(MOD-sp4) 91.1 ml      SI(MOD-sp4) 42.1 ml/m^2      SV(MOD-sp2) 55.8 ml      SI(MOD-sp2) 25.8 ml/m^2      Ao root area (BSA corrected) 1.4     LV Noble Vol (BSA corrected) 62.0 ml/m^2      LV Sys Vol (BSA corrected) 19.9 ml/m^2      MV E max compa 87.1 cm/sec      MV A max compa 59.3 cm/sec      MV E/A 1.5     MV V2 max 85.8 cm/sec      MV max PG 2.9 mmHg      MV V2 mean 52.7 cm/sec      MV mean PG 1.2 mmHg      MV V2 VTI 33.1 cm      MVA(VTI) 3.6 cm^2      MV dec slope 406.8 cm/sec^2      MV dec time 0.21 sec      Ao pk compa 131.4 cm/sec      Ao max PG 6.9 mmHg      Ao max PG (full) -0.34 mmHg      Ao V2 mean 88.9 cm/sec      Ao mean PG 3.6 mmHg      Ao mean PG (full) 0.38 mmHg      Ao V2 VTI 33.6 cm      ZAHIRA(I,A) 3.5 cm^2      ZAHIRA(I,D) 3.5 cm^2      ZAHIRA(V,A) 3.7 cm^2      ZAHIRA(V,D) 3.7 cm^2      LV V1 max PG 7.2 mmHg      LV V1 mean PG 3.3 mmHg      LV V1 max 134.6 cm/sec      LV V1 mean 83.1 cm/sec      LV V1 VTI 32.2 cm      SV(Ao) 249.9 ml      SI(Ao) 115.6 ml/m^2      SV(LVOT) 117.5 ml      SV(RVOT) 83.1 ml      SI(LVOT) 54.3 ml/m^2      PA V2 max 127.9 cm/sec      PA max PG 6.5 mmHg      PA max PG (full) 4.6 mmHg      PA V2 mean 89.3 cm/sec      PA mean PG 3.6 mmHg      PA mean PG (full) 2.5 mmHg      PA V2 VTI 32.6 cm      PVA(I,A) 2.6 cm^2      BH CV ECHO KARLA - PVA(I,D) 2.6 cm^2      BH CV ECHO KARLA - PVA(V,A) 2.2 cm^2      BH CV ECHO KARLA - PVA(V,D) 2.2 cm^2      PA acc time 0.18 sec      PI end-d compa 99.2 cm/sec      PI max compa 157.4 cm/sec      PI max PG 9.9 mmHg      RV V1 max PG 2.0 mmHg      RV V1 mean PG 1.2 mmHg      RV V1 max 70.0 cm/sec      RV V1 mean 51.2 cm/sec      RV V1 VTI 20.8 cm      TR max compa 214.5 cm/sec      RVSP(TR) 23.5 mmHg      RAP systole 5.0 mmHg      PA pr(Accel) 0.11 mmHg      Qp/Qs 0.71      CV ECHO KARLA - BZI_BMI 28.1 kilograms/m^2       CV ECHO KARLA - BSA(HAYCOCK) 2.2 m^2       CV ECHO KARLA - BZI_METRIC_WEIGHT 93.9 kg       CV ECHO KARLA - BZI_METRIC_HEIGHT  182.9 cm      EF(MOD-bp) 58.0 %      LA dimension(2D) 3.6 cm     Narrative:       Indications  Chest pain      Technically satisfactory study.  Mitral valve is structurally normal.  Minimal mitral regurgitation  Tricuspid valve is structurally normal.  Aortic valve is structurally normal.  Pulmonic valve could not be well visualized.  No evidence for mitral tricuspid or aortic regurgitation is seen by   Doppler study.  Left atrium is normal in size.  Right atrium is normal in size.  Left ventricle is normal in size and contractility with ejection fraction   of 60%.  Right ventricle is normal in size.  Atrial septum is intact.  Aorta is normal.  No pericardial effusion or intracardiac thrombus is seen.    Impression  Minimal mitral regurgitation  Normal echo Doppler study.  Left ventricular ejection fraction is 60%.                 Results for orders placed during the hospital encounter of 08/19/19   Adult Transthoracic Echo Complete W/ Cont if Necessary Per Protocol    Narrative Indications  Chest pain      Technically satisfactory study.  Mitral valve is structurally normal.  Minimal mitral regurgitation  Tricuspid valve is structurally normal.  Aortic valve is structurally normal.  Pulmonic valve could not be well visualized.  No evidence for mitral tricuspid or aortic regurgitation is seen by   Doppler study.  Left atrium is normal in size.  Right atrium is normal in size.  Left ventricle is normal in size and contractility with ejection fraction   of 60%.  Right ventricle is normal in size.  Atrial septum is intact.  Aorta is normal.  No pericardial effusion or intracardiac thrombus is seen.    Impression  Minimal mitral regurgitation  Normal echo Doppler study.  Left ventricular ejection fraction is 60%.         Xr Chest 1 View    Result Date: 8/19/2019  No acute chest findings.  Electronically Signed By-Dr. Mary Jo Patel MD On:8/19/2019 3:55 PM This report was finalized on [id number] by Dr. Mary Jo Patel,  MD.      Xrays, labs reviewed personally by physician.    Medication Review:   I have reviewed the patient's current medication list    Scheduled Meds    aspirin 81 mg Oral Daily   enoxaparin 40 mg Subcutaneous Daily   gabapentin 400 mg Oral Q8H   levothyroxine 75 mcg Oral Q AM   pantoprazole 40 mg Oral Daily   polyethylene glycol 17 g Oral Daily   rosuvastatin 10 mg Oral Daily   sodium chloride 3 mL Intravenous Q12H   vitamin B-12 1,000 mcg Oral Daily       Meds Infusions       Meds PRN  •  acetaminophen  •  Morphine  •  nitroglycerin  •  [COMPLETED] Insert peripheral IV **AND** sodium chloride  •  sodium chloride        Bautista Quigley MD  08/20/19  4:31 PM

## 2019-08-20 NOTE — CONSULTS
Referring Provider: Bautista Quigley MD  Reason for Consultation:   Chest pain  Coronary artery disease    Patient Care Team:  Gris Muro APRN as PCP - General  Gris Muro APRN as PCP - Claims Attributed    Chief complaint   Chest pain    Subjective .     History of present illness:  Clifford Weber is a 74 y.o. male who presents with     Patient is a pleasant 74-year-old white male admitted with history of intermittent left precordial chest discomfort without any radiation of the discomfort into the neck or into the arms.  It has been happening for the last several weeks.  It is associated with shortness of breath.  Chest discomfort has increased in frequency and intensity lately.  Denies having any other associated aggravating or elevating factors.  Intermittent to moderate to severe.  Patient has history of coronary artery disease.  Patient did not have any stress test recently.    Please see the assessment for summary.      ROS      Since I have last seen, the patient has been without any chest discomfort ,shortness of breath, palpitations, dizziness or syncope.  Denies having any headache ,abdominal pain ,nausea, vomiting , diarrhea constipation, loss of weight or loss of appetite.  Denies having any excessive bruising ,hematuria or blood in the stool.    Review of systems negative except as indicated      History  Past Medical History:   Diagnosis Date   • Abnormal cardiovascular stress test 11/01/2016    Abnormal Cardiolite Stress Test. Abstracted from [facility].   • Anxiety 03/15/2012    Uses this PRN, has stress due to caring for elderly parents. Abstracted from [facility].   • Atherosclerotic heart disease 03/15/2012   • Borderline hypertension 10/19/2015   • Bronchitis, acute 05/16/2016   • Chest pain 10/15/2014   • Chronic foot pain 10/16/2012    Will see his podiatrist. Abstracted from [facility].   • Constipation 01/09/2018   • Coronary artery disease 10/24/2016   • DDD (degenerative disc  disease), lumbar 02/13/2013   • Depression 08/10/2017   • Dyslipidemia    • Erectile dysfunction of organic origin 12/07/2012    Samples of cialis 20mg, voucher for the 5mg, samples of levitra 20mg and voucher for viagra 100mg. Call with preference. Order total T. Abstracted from [facility].   • GERD (gastroesophageal reflux disease) 12/07/2012   • H/O myocardial perfusion scan 11/01/2016    With Stress Test, abnormal. Abstracted from [facility].   • Hand pain 04/24/2014   • Headache 01/18/2016   • Hyperlipidemia 02/13/2018   • Hypertension 08/10/2017   • Hypogonadism, male 12/07/2012   • Hypothyroidism 02/08/2018   • Impacted cerumen, left ear 04/03/2019   • Influenza vaccination ordered 10/19/2016   • Insomnia 03/15/2012   • Lateral epicondylitis, left elbow 02/07/2017   • Left knee sprain 01/24/2013   • Leg pain, bilateral 04/02/2019   • Lumbar disc herniation with radiculopathy 09/08/2016   • Lumbar radiculitis 08/30/2016   • Mild memory disturbance 01/18/2016   • Myocardial infarction (CMS/HCC) 11/01/2016   • Osteoarthritis of sacroiliac joint 03/12/2014   • Pain in right lower leg 07/11/2016   • Paresthesia 08/30/2016   • Paresthesia of both legs     Lower legs   • Postherpetic neuralgia 03/15/2016   • Preoperative cardiovascular examination 10/24/2016   • Preventative health care 02/07/2017   • S/P cardiac catheterization 01/01/2000    S/P cardiac cath, left heart-- Heart catheterization 2000 at [facility] in [place], [place]. No records available. Abstracted from [facility].   • Sciatica, right side 06/04/2013   • Shingles    • Shortness of breath 10/15/2014   • Spinal stenosis, lumbar 02/13/2013    TENS unit evidently not covered by Medicare. Abstracted from [facility].   • Vitamin B12 deficiency 08/10/2017   • Vitamin D deficiency 01/09/2018       Past Surgical History:   Procedure Laterality Date   • CARDIAC CATHETERIZATION  2000   • COLONOSCOPY  10/28/2009   • OTHER SURGICAL HISTORY      Lapscopic  surgery, both knees. Abstracted by [facility].   • OTHER SURGICAL HISTORY      Right rotor cuff Disorder. Abstracted from [facility].   • OTHER SURGICAL HISTORY      Kneww injections. Abstracted from [facility].   • OTHER SURGICAL HISTORY      Shot in lower back for bulging disc. Abstracted from [facility].       Family History   Problem Relation Age of Onset   • Alzheimer's disease Mother    • Hyperlipidemia Father    • Heart disease Father    • Hypertension Father    • Hyperlipidemia Brother    • Diabetes Brother    • Heart disease Brother    • Hypertension Brother    • Heart disease Maternal Grandfather    • Alzheimer's disease Paternal Grandmother    • Cancer Cousin    • Cancer Other        Social History     Tobacco Use   • Smoking status: Never Smoker   • Smokeless tobacco: Never Used   Substance Use Topics   • Alcohol use: No     Frequency: Never   • Drug use: No        Medications Prior to Admission   Medication Sig Dispense Refill Last Dose   • aspirin (ASPIR) 81 MG EC tablet Take 81 mg by mouth Daily.   8/19/2019 at Unknown time   • Biotin 1000 MCG chewable tablet Chew 1 tablet Daily.   8/19/2019 at Unknown time   • Cyanocobalamin (B-12) 1000 MCG tablet Take 100 mcg by mouth Daily.   8/19/2019 at Unknown time   • gabapentin (NEURONTIN) 800 MG tablet Take 400 mg by mouth 3 (Three) Times a Day.   8/19/2019 at Unknown time   • levothyroxine (SYNTHROID) 75 MCG tablet Take 75 mcg by mouth Daily.   8/19/2019 at Unknown time   • Omega-3 Fatty Acids (FISH OIL) 1000 MG capsule capsule Take 1,000 mg by mouth Daily With Breakfast.   8/19/2019 at Unknown time   • omeprazole (priLOSEC) 40 MG capsule Take 20 mg by mouth Daily. Take two pills daily. Abstracted from [facility].    8/19/2019 at Unknown time   • rosuvastatin (CRESTOR) 10 MG tablet Take 10 mg by mouth Daily.   8/19/2019 at Unknown time   • nitroglycerin (NITROSTAT) 0.4 MG SL tablet 0.4 mg. Dissolve 1 tablet under tongue as needed for chest pain. May repeat  "dose every 5 min as needed for total of doses. Abstracted from [facility].      • polyethylene glycol (MIRALAX) powder Take 17 g by mouth Daily As Needed. Dissolve 17 grams in 8 oz. Liquid and drink daily. Abstracted from [facility].    Unknown at Unknown time   • tadalafil (CIALIS) 5 MG tablet Take 5 mg by mouth Daily As Needed.   Unknown at Unknown time   • vitamin D (ERGOCALCIFEROL) 57491 units capsule capsule Take 50,000 Units by mouth 1 (One) Time Per Week. Thursday   8/15/2019         Niacin    Scheduled Meds:  [START ON 8/20/2019] aspirin 81 mg Oral Daily   enoxaparin 40 mg Subcutaneous Daily   gabapentin 400 mg Oral Q8H   [START ON 8/20/2019] levothyroxine 75 mcg Oral Q AM   [START ON 8/20/2019] pantoprazole 40 mg Oral Daily   [START ON 8/20/2019] polyethylene glycol 17 g Oral Daily   [START ON 8/20/2019] rosuvastatin 10 mg Oral Daily   sodium chloride 3 mL Intravenous Q12H   [START ON 8/20/2019] vitamin B-12 1,000 mcg Oral Daily     Continuous Infusions:   PRN Meds:.Morphine  •  nitroglycerin  •  [COMPLETED] Insert peripheral IV **AND** sodium chloride  •  sodium chloride    Objective     VITAL SIGNS  Vitals:    08/19/19 1512 08/19/19 1545 08/19/19 1637 08/19/19 1711   BP: 110/68 111/69 104/71 160/76   BP Location:   Right arm Right arm   Patient Position: Sitting  Lying Sitting   Pulse: 60 60 51 55   Resp: 18  15 12   Temp: 97.8 °F (36.6 °C)   97.7 °F (36.5 °C)   TempSrc: Oral   Oral   SpO2: 96%  98% 96%   Weight: 93.8 kg (206 lb 12.7 oz)   87.2 kg (192 lb 3.9 oz)   Height: 188 cm (74\")   182.9 cm (72\")       Flowsheet Rows      First Filed Value   Admission Height  188 cm (74\") Documented at 08/19/2019 1512   Admission Weight  93.8 kg (206 lb 12.7 oz) Documented at 08/19/2019 1512           TELEMETRY: Sinus rhythm    Physical Exam:  The patient is alert, oriented and in no distress.  Vital signs as noted above.  Head and neck revealed no carotid bruits or jugular venous distention.  No thyromegaly or " lymph adenopathy is present  Lungs clear.  No wheezing.  Breath sounds are normal bilaterally.  Heart normal first and second heart sounds.No murmur.  No precordial rub is present.  No gallop is present.  Abdomen soft and nontender.  No organomegaly is present.  Extremities with good peripheral pulses without any pedal edema.  Skin warm and dry.  Musculoskeletal system is grossly normal  CNS grossly normal      Results Review:   I reviewed the patient's new clinical results.  Lab Results (last 24 hours)     Procedure Component Value Units Date/Time    BNP [[id number]]  (Normal) Collected:  08/19/19 1535    Specimen:  Blood Updated:  08/19/19 1620     BNP 11.0 pg/mL      Comment: Results may be falsely decreased if patient taking Biotin.       Troponin [[id number]]  (Normal) Collected:  08/19/19 1534    Specimen:  Blood Updated:  08/19/19 1617     Troponin I <0.030 ng/mL     Narrative:       Troponin I Reference Range:    0.00-0.03  Negative.  Repeat testing in 4-6 hours if clinically indicated.    0.04-0.29  Suspicious for myocardial injury. Serial measurements and clinical  correlation may be necessary to confirm or exclude diagnosis of acute  coronary syndrome.  Repeat testing in 4-6 hours if indicated.     >0.29 Consistent with myocardial injury.  Recommend clinical and laboratory correlation.     Results my be falsely decreased if patient taking Biotin.     Basic Metabolic Panel [[id number]]  (Abnormal) Collected:  08/19/19 1535    Specimen:  Blood Updated:  08/19/19 1615     Glucose 90 mg/dL      BUN 6 mg/dL      Creatinine 1.30 mg/dL      Sodium 135 mmol/L      Potassium 3.6 mmol/L      Chloride 100 mmol/L      CO2 24.0 mmol/L      Calcium 9.2 mg/dL      eGFR Non African Amer 54 mL/min/1.73      BUN/Creatinine Ratio 4.6     Anion Gap 14.6 mmol/L     Narrative:       The MDRD GFR formula is only valid for adults with stable renal function between ages 18 and 70.    Protime-INR [[id number]]  (Normal) Collected:   08/19/19 1535    Specimen:  Blood Updated:  08/19/19 1550     Protime 10.6 Seconds      INR 1.03    aPTT [[id number]]  (Abnormal) Collected:  08/19/19 1535    Specimen:  Blood Updated:  08/19/19 1550     PTT 23.7 seconds     CBC & Differential [[id number]] Collected:  08/19/19 1534    Specimen:  Blood Updated:  08/19/19 1542    Narrative:       The following orders were created for panel order CBC & Differential.  Procedure                               Abnormality         Status                     ---------                               -----------         ------                     CBC Auto Differential[[id number]]        Normal              Final result                 Please view results for these tests on the individual orders.    CBC Auto Differential [[id number]]  (Normal) Collected:  08/19/19 1534    Specimen:  Blood Updated:  08/19/19 1542     WBC 8.70 10*3/mm3      RBC 4.75 10*6/mm3      Hemoglobin 14.0 g/dL      Hematocrit 42.0 %      MCV 88.5 fL      MCH 29.5 pg      MCHC 33.3 g/dL      RDW 13.7 %      RDW-SD 42.4 fl      MPV 8.8 fL      Platelets 175 10*3/mm3      Neutrophil % 64.3 %      Lymphocyte % 25.1 %      Monocyte % 7.6 %      Eosinophil % 1.7 %      Basophil % 1.3 %      Neutrophils, Absolute 5.60 10*3/mm3      Lymphocytes, Absolute 2.20 10*3/mm3      Monocytes, Absolute 0.70 10*3/mm3      Eosinophils, Absolute 0.10 10*3/mm3      Basophils, Absolute 0.10 10*3/mm3      nRBC 0.1 /100 WBC           Imaging Results (last 24 hours)     Procedure Component Value Units Date/Time    XR Chest 1 View [[id number]] Collected:  08/19/19 1555     Updated:  08/19/19 1559    Narrative:       DATE OF EXAM:  8/19/2019 3:35 PM     PROCEDURE:  XR CHEST 1 VW-     INDICATIONS:  cp dyspnea       COMPARISON:  PA and lateral chest 11/02/2016.     TECHNIQUE:   Single radiographic view of the chest was obtained.     FINDINGS:  Clear lungs. Heart size within normal limits. Benign calcified right  hilar lymph nodes. Radiopaque  material or Shield over the neck obscures  the thoracic inlet. No acute osseous abnormalities are identified. No  pneumothorax or pleural effusion.       Impression:       No acute chest findings.     Electronically Signed By-Dr. Mary Jo Patel MD On:8/19/2019 3:55 PM  This report was finalized on [id number] by Dr. Mary Jo Patel MD.      LAB RESULTS (LAST 7 DAYS)    CBC  Results from last 7 days   Lab Units 08/19/19  1534   WBC 10*3/mm3 8.70   RBC 10*6/mm3 4.75   HEMOGLOBIN g/dL 14.0   HEMATOCRIT % 42.0   MCV fL 88.5   PLATELETS 10*3/mm3 175       BMP  Results from last 7 days   Lab Units 08/19/19  1535   SODIUM mmol/L 135*   POTASSIUM mmol/L 3.6   CHLORIDE mmol/L 100*   CO2 mmol/L 24.0   BUN mg/dL 6*   CREATININE mg/dL 1.30*   GLUCOSE mg/dL 90       CMP Results from last 7 days   Lab Units 08/19/19  1535   SODIUM mmol/L 135*   POTASSIUM mmol/L 3.6   CHLORIDE mmol/L 100*   CO2 mmol/L 24.0   BUN mg/dL 6*   CREATININE mg/dL 1.30*   GLUCOSE mg/dL 90         BNP  Results from last 7 days   Lab Units 08/19/19  1535   BNP pg/mL 11.0       TROPONIN  Results from last 7 days   Lab Units 08/19/19  1534   TROPONIN I ng/mL <0.030       CoAg  Results from last 7 days   Lab Units 08/19/19  1535   INR  1.03   APTT seconds 23.7*       Creatinine Clearance  Estimated Creatinine Clearance: 63.4 mL/min (A) (by C-G formula based on SCr of 1.3 mg/dL (H)).    ABG        Radiology  Xr Chest 1 View    Result Date: 8/19/2019  No acute chest findings.  Electronically Signed By-Dr. Mary Jo Patel MD On:8/19/2019 3:55 PM This report was finalized on [id number] by Dr. Mary Jo Patel MD.              EKG              I personally viewed and interpreted the patient's EKG/Telemetry data:    ECHOCARDIOGRAM:                 Cardiolite (Tc-99m Sestamibi) stress test      OTHER:     Assessment/Plan     Active Problems:    Chest pain    [[[[[[[[[[[[[[[[[[[    Impression  ===========      - chest discomfort suggestive of possible angina  pectoris.   Troponin levels are negative.  X    Patient had cardiac catheterization in 2000 in [place].  Details are not available. ([place], [place] ).   Cardiac catheterization 11/04/2016 revealed 50% lad with anterior RCA.  Normal left ventricular function.  (Right radial approach)  Abnormal Lexiscan Cardiolite test with proximal inferior ischemia (11/01/2016) and 02/13/2018 (similar to 2016 and)     Echocardiogram is normal (11/01/2016 )     -dyslipidemia.  Renal dysfunction dysfunction.  Creatinine 1.3.     - strong family history of coronary artery disease     -Lapscopic surgery both knee Right rotor cuff disorder         -History of lumbar bulging disc .           -allergy to niacin  ============  Plan  =========   chest pain suggestive of angina pectoris.  Troponin levels are negative.  EKG showed no acute changes per  Rule out progression of coronary artery disease.  Stress Cardiolite test  Echocardiogram.  Renal dysfunction  Renal dysfunction BUN 6 creatinine 1.3    Medications were reviewed and updated.  Further plan will depend on patient's progress.    [[[[[[[[[[[[[[[[[[[[[           Cordelia Jacobo MD  08/19/19  8:02 PM

## 2019-08-20 NOTE — PROGRESS NOTES
Discharge Planning Assessment  [facility]     Patient Name: Clifford Weber  MRN: [id number]  Today's Date: 8/20/2019    Admit Date: 8/19/2019    Discharge Needs Assessment     Row Name 08/20/19 1423       Living Environment    Lives With  spouse    Current Living Arrangements  home/apartment/condo    Primary Care Provided by  self    Provides Primary Care For  no one    Family Caregiver if Needed  spouse    Quality of Family Relationships  helpful;involved;supportive    Able to Return to Prior Arrangements  yes       Resource/Environmental Concerns    Resource/Environmental Concerns  none    Transportation Concerns  car, none       Transition Planning    Patient/Family Anticipates Transition to  home with family    Patient/Family Anticipated Services at Transition  none    Transportation Anticipated  car, drives self;family or friend will provide       Discharge Needs Assessment    Readmission Within the Last 30 Days  no previous admission in last 30 days    Concerns to be Addressed  no discharge needs identified;denies needs/concerns at this time    Equipment Currently Used at Home  cane, straight    Anticipated Changes Related to Illness  none    Equipment Needed After Discharge  none        Discharge Plan     Row Name 08/20/19 1424       Plan    Plan  Routine home     Patient/Family in Agreement with Plan  yes    Plan Comments  Met with patient at bedside. He is independent with ADLs. Patient still drives. Current with a PCP. Currently denies any discharge needs or concerns at this time.             Demographic Summary     Row Name 08/20/19 1418       General Information    Admission Type  observation    Arrived From  emergency department    Required Notices Provided  Observation Status Notice    Referral Source  admission list    Reason for Consult  discharge planning    Preferred Language  English     Used During This Interaction  no       Contact Information    Permission Granted to Share Info With           Functional Status     Row Name 08/20/19 1423       Functional Status    Usual Activity Tolerance  good    Current Activity Tolerance  good       Functional Status, IADL    Medications  independent    Meal Preparation  independent    Housekeeping  independent    Laundry  independent    Shopping  independent       Mental Status    General Appearance WDL  WDL       Mental Status Summary    Recent Changes in Mental Status/Cognitive Functioning  no changes          Patient Forms     Row Name 08/20/19 1425       Patient Forms    Patient Observation Letter  Delivered 8/19 per documentation list             Isabella BUNCH, RN  Phone: [phone]

## 2019-08-20 NOTE — PROGRESS NOTES
LOS: 0 days   Patient Care Team:  Gris Muro APRN as PCP - General  Gris Muro APRN as PCP - Claims Attributed    Chief Complaint: chest pain    Subjective     History of Present Illness    Subjective    History taken from: patient     The patient states that his pain is well controlled by the medication. He is not currently experiencing heaviness in his chest or radiation to his L shoulder and arm. He is hungry and slightly fatigued because he has not eaten today. He performed a cardiac stress test today and results are pending. Dr. Jacobo will determine the need for cardiac catheterization. He states that he has been experiencing shortness of breath with exertion with increasing frequency and intensity over the last several months. He has also noticed symptoms with opening the refrigerator and drinking pop and that the chest pain, heaviness and radiation progressed the last 3 days. He states that he has also noticed swelling in his lower extremities bilaterally that comes and goes, but not bruising, ecchymosis or changes in sensation or color of his feet.    He has been urinating, but has not had a bowel movement since admission.    The patient denies fever, chills, nausea, vomiting, diaphoresis, chest pain, abdominal pain, heaviness and radiation.     He states that he has been experiencing bilateral numbness and tingling in his legs to the ankles. The right side is worse than the left. Family history is positive for Alzheimer's disease. He has previously tested positive for CSF IgG, CSF albumin      Objective     Vital Signs  Temp:  [97.7 °F (36.5 °C)-97.9 °F (36.6 °C)] 97.9 °F (36.6 °C)  Heart Rate:  [49-60] 49  Resp:  [12-20] 16  BP: (104-162)/(68-96) 137/79    Objective    Results Review:     I reviewed the patient's new clinical results.    Medication Review: this was performed    Assessment/Plan        Chest pain  decreased chest pain and no current heaviness or radiation to the L  shoulder    HOLA - eGFR 54, Cr 1.2, BUN 6 -Unsure of etiology at this time. Most recent calcium and potassium (9.2 and 3.6, respectively on 8.19.19) and albumin (4.3 on 2.21.19) appear normal. possibly due to cardiorenal syndrome     Esequiel ROMERO, Tiffanie CRUZ, Deidre BLANCO, Jf BLANCO. Cardiorenal syndrome: Definition,  prevalence, diagnosis, and pathophysiology. https://www.dough.[employer]/contents/cardiorenal-syndrome-definition-prevalence-diagnosis-and-pathophysiology?search=cardiorenal%20AKI&source=search_result&selectedTitle=2~150&usage_type=default&display_rank=2. Last updated 15 January 2018. Accessed 20 August 2019     Or less likely due to Vitamin D toxicity, had been taking 50,000 units Ergocalciferol PO  q/7 days in April 2018. No recent vitamin D tests on record.    HTN - most recently measured at 137/70. Systolic may be elevated due to stress of hospitalization.    Hyperlipidemia - consider follow up with primary care. Last lipid profile performed 2.21.19, but TC was 160, HDL 56, LDL 84, VLDL 20.2 and TGs 109.    Bilateral numbness and tingling in legs- history of lumbar disk herniation. Consider outpatient follow up with orthopedics or neuro.    Hypothyroidism - appears to be well controlled with levothyroxine. TSH last measured 2/2017.    Assessment & Plan    DALLAS Ovalle Student  08/20/19  1:06 PM    Time: More than 50% of time spent in counseling and coordination of care:  Total face-to-face/floor time 15 min.  Time spent in counseling 4 min. Counseling included the following topics: avoid intense exertion

## 2019-08-21 VITALS
RESPIRATION RATE: 16 BRPM | SYSTOLIC BLOOD PRESSURE: 135 MMHG | HEART RATE: 51 BPM | BODY MASS INDEX: 27.66 KG/M2 | OXYGEN SATURATION: 97 % | WEIGHT: 204.2 LBS | DIASTOLIC BLOOD PRESSURE: 77 MMHG | TEMPERATURE: 98.3 F | HEIGHT: 72 IN

## 2019-08-21 LAB
ALBUMIN SERPL-MCNC: 4.1 G/DL (ref 3.5–4.8)
ALBUMIN/GLOB SERPL: 1.3 G/DL (ref 1–1.7)
ALP SERPL-CCNC: 56 U/L (ref 32–91)
ALT SERPL W P-5'-P-CCNC: 20 U/L (ref 17–63)
ANION GAP SERPL CALCULATED.3IONS-SCNC: 15.8 MMOL/L (ref 5–15)
APTT PPP: 22.8 SECONDS (ref 24–31)
ARTICHOKE IGE QN: 90 MG/DL (ref 0–100)
AST SERPL-CCNC: 26 U/L (ref 15–41)
BASOPHILS # BLD AUTO: 0.1 10*3/MM3 (ref 0–0.2)
BASOPHILS NFR BLD AUTO: 1.1 % (ref 0–1.5)
BH CV NUCLEAR PRIOR STUDY: 3
BH CV STRESS BP STAGE 1: NORMAL
BH CV STRESS BP STAGE 2: NORMAL
BH CV STRESS COMMENTS STAGE 1: NORMAL
BH CV STRESS COMMENTS STAGE 2: NORMAL
BH CV STRESS DOSE REGADENOSON STAGE 1: 0.4
BH CV STRESS DURATION MIN STAGE 1: 0
BH CV STRESS DURATION MIN STAGE 2: 4
BH CV STRESS DURATION SEC STAGE 1: 10
BH CV STRESS DURATION SEC STAGE 2: 0
BH CV STRESS HR STAGE 1: 77
BH CV STRESS HR STAGE 2: 80
BH CV STRESS PROTOCOL 1: NORMAL
BH CV STRESS RECOVERY BP: NORMAL MMHG
BH CV STRESS RECOVERY HR: 56 BPM
BH CV STRESS STAGE 1: 1
BH CV STRESS STAGE 2: 2
BILIRUB SERPL-MCNC: 0.7 MG/DL (ref 0.3–1.2)
BUN BLD-MCNC: 10 MG/DL (ref 8–20)
BUN/CREAT SERPL: 9.1 (ref 6.2–20.3)
CALCIUM SPEC-SCNC: 9.1 MG/DL (ref 8.9–10.3)
CHLORIDE SERPL-SCNC: 103 MMOL/L (ref 101–111)
CHOLEST SERPL-MCNC: 152 MG/DL
CK SERPL-CCNC: 191 U/L (ref 20–200)
CO2 SERPL-SCNC: 24 MMOL/L (ref 22–32)
CREAT BLD-MCNC: 1.1 MG/DL (ref 0.7–1.2)
CRP SERPL-MCNC: 0.18 MG/DL (ref 0–0.7)
DEPRECATED RDW RBC AUTO: 41.1 FL (ref 37–54)
EOSINOPHIL # BLD AUTO: 0.2 10*3/MM3 (ref 0–0.4)
EOSINOPHIL NFR BLD AUTO: 3.1 % (ref 0.3–6.2)
ERYTHROCYTE [DISTWIDTH] IN BLOOD BY AUTOMATED COUNT: 13.5 % (ref 12.3–15.4)
ERYTHROCYTE [SEDIMENTATION RATE] IN BLOOD: 9 MM/HR (ref 0–20)
FERRITIN SERPL-MCNC: 19 NG/ML (ref 24–336)
GFR SERPL CREATININE-BSD FRML MDRD: 66 ML/MIN/1.73
GLOBULIN UR ELPH-MCNC: 3.1 GM/DL (ref 2.5–3.8)
GLUCOSE BLD-MCNC: 105 MG/DL (ref 65–99)
HBA1C MFR BLD: 5.5 % (ref 3.5–5.6)
HCT VFR BLD AUTO: 43.2 % (ref 37.5–51)
HDLC SERPL QL: 3.23
HDLC SERPL-MCNC: 47 MG/DL
HGB BLD-MCNC: 14.5 G/DL (ref 13–17.7)
INR PPP: 0.95 (ref 0.9–1.1)
LDLC/HDLC SERPL: 1.72 {RATIO}
LYMPHOCYTES # BLD AUTO: 1.9 10*3/MM3 (ref 0.7–3.1)
LYMPHOCYTES NFR BLD AUTO: 31.4 % (ref 19.6–45.3)
MAGNESIUM SERPL-MCNC: 2 MG/DL (ref 1.8–2.5)
MAXIMAL PREDICTED HEART RATE: 148 BPM
MCH RBC QN AUTO: 29.5 PG (ref 26.6–33)
MCHC RBC AUTO-ENTMCNC: 33.5 G/DL (ref 31.5–35.7)
MCV RBC AUTO: 87.8 FL (ref 79–97)
MONOCYTES # BLD AUTO: 0.5 10*3/MM3 (ref 0.1–0.9)
MONOCYTES NFR BLD AUTO: 9.1 % (ref 5–12)
NEUTROPHILS # BLD AUTO: 3.3 10*3/MM3 (ref 1.7–7)
NEUTROPHILS NFR BLD AUTO: 55.3 % (ref 42.7–76)
NRBC BLD AUTO-RTO: 0.2 /100 WBC (ref 0–0.2)
PERCENT MAX PREDICTED HR: 54.05 %
PHOSPHATE SERPL-MCNC: 3.4 MG/DL (ref 2.4–4.7)
PLATELET # BLD AUTO: 184 10*3/MM3 (ref 140–450)
PMV BLD AUTO: 9.5 FL (ref 6–12)
POTASSIUM BLD-SCNC: 3.8 MMOL/L (ref 3.6–5.1)
PROT SERPL-MCNC: 7.2 G/DL (ref 6.1–7.9)
PROTHROMBIN TIME: 9.9 SECONDS (ref 9.6–11.7)
RBC # BLD AUTO: 4.93 10*6/MM3 (ref 4.14–5.8)
SODIUM BLD-SCNC: 139 MMOL/L (ref 136–144)
STRESS BASELINE BP: NORMAL MMHG
STRESS BASELINE HR: 54 BPM
STRESS PERCENT HR: 64 %
STRESS POST PEAK BP: NORMAL MMHG
STRESS POST PEAK HR: 80 BPM
STRESS TARGET HR: 126 BPM
TRIGL SERPL-MCNC: 120 MG/DL
TROPONIN I SERPL-MCNC: <0.03 NG/ML (ref 0–0.03)
TSH SERPL DL<=0.05 MIU/L-ACNC: 4.29 MIU/ML (ref 0.34–5.6)
URATE SERPL-MCNC: 6.7 MG/DL (ref 4.8–8.7)
VIT B12 BLD-MCNC: 819 PG/ML (ref 180–914)
VLDLC SERPL-MCNC: 24 MG/DL
WBC NRBC COR # BLD: 5.9 10*3/MM3 (ref 3.4–10.8)

## 2019-08-21 PROCEDURE — 80053 COMPREHEN METABOLIC PANEL: CPT | Performed by: INTERNAL MEDICINE

## 2019-08-21 PROCEDURE — 80061 LIPID PANEL: CPT | Performed by: INTERNAL MEDICINE

## 2019-08-21 PROCEDURE — 82728 ASSAY OF FERRITIN: CPT | Performed by: INTERNAL MEDICINE

## 2019-08-21 PROCEDURE — 85610 PROTHROMBIN TIME: CPT | Performed by: INTERNAL MEDICINE

## 2019-08-21 PROCEDURE — 99217 PR OBSERVATION CARE DISCHARGE MANAGEMENT: CPT | Performed by: INTERNAL MEDICINE

## 2019-08-21 PROCEDURE — G0378 HOSPITAL OBSERVATION PER HR: HCPCS

## 2019-08-21 PROCEDURE — 82550 ASSAY OF CK (CPK): CPT | Performed by: INTERNAL MEDICINE

## 2019-08-21 PROCEDURE — 99214 OFFICE O/P EST MOD 30 MIN: CPT | Performed by: INTERNAL MEDICINE

## 2019-08-21 PROCEDURE — 85025 COMPLETE CBC W/AUTO DIFF WBC: CPT | Performed by: INTERNAL MEDICINE

## 2019-08-21 PROCEDURE — 84100 ASSAY OF PHOSPHORUS: CPT | Performed by: INTERNAL MEDICINE

## 2019-08-21 PROCEDURE — 85730 THROMBOPLASTIN TIME PARTIAL: CPT | Performed by: INTERNAL MEDICINE

## 2019-08-21 PROCEDURE — 84443 ASSAY THYROID STIM HORMONE: CPT | Performed by: INTERNAL MEDICINE

## 2019-08-21 PROCEDURE — 84484 ASSAY OF TROPONIN QUANT: CPT | Performed by: INTERNAL MEDICINE

## 2019-08-21 PROCEDURE — 84550 ASSAY OF BLOOD/URIC ACID: CPT | Performed by: INTERNAL MEDICINE

## 2019-08-21 PROCEDURE — 83036 HEMOGLOBIN GLYCOSYLATED A1C: CPT | Performed by: INTERNAL MEDICINE

## 2019-08-21 PROCEDURE — 83735 ASSAY OF MAGNESIUM: CPT | Performed by: INTERNAL MEDICINE

## 2019-08-21 PROCEDURE — 86140 C-REACTIVE PROTEIN: CPT | Performed by: INTERNAL MEDICINE

## 2019-08-21 PROCEDURE — 82607 VITAMIN B-12: CPT | Performed by: INTERNAL MEDICINE

## 2019-08-21 PROCEDURE — 85652 RBC SED RATE AUTOMATED: CPT | Performed by: INTERNAL MEDICINE

## 2019-08-21 RX ADMIN — GABAPENTIN 400 MG: 400 CAPSULE ORAL at 06:10

## 2019-08-21 RX ADMIN — ROSUVASTATIN CALCIUM 10 MG: 10 TABLET, FILM COATED ORAL at 09:06

## 2019-08-21 RX ADMIN — ASPIRIN 81 MG: 81 TABLET ORAL at 09:06

## 2019-08-21 RX ADMIN — CYANOCOBALAMIN TAB 1000 MCG 1000 MCG: 1000 TAB at 09:06

## 2019-08-21 RX ADMIN — PANTOPRAZOLE SODIUM 40 MG: 40 TABLET, DELAYED RELEASE ORAL at 09:06

## 2019-08-21 RX ADMIN — LEVOTHYROXINE SODIUM 75 MCG: 75 TABLET ORAL at 05:26

## 2019-08-21 NOTE — PROGRESS NOTES
Referring Provider: Bautista Quigley MD    Reason for follow-up:   Chest pain  Coronary artery disease  Renal dysfunction     Patient Care Team:  Gris Muro APRN as PCP - General  Gris Muro APRN as PCP - Claims Attributed    Subjective .  Feeling better     ROS    Since I have last seen, the patient has been without any chest discomfort ,shortness of breath, palpitations, dizziness or syncope.  Denies having any headache ,abdominal pain ,nausea, vomiting , diarrhea constipation, loss of weight or loss of appetite.  Denies having any excessive bruising ,hematuria or blood in the stool.    Review of all systems negative except as indicated    History  Past Medical History:   Diagnosis Date   • Abnormal cardiovascular stress test 11/01/2016    Abnormal Cardiolite Stress Test. Abstracted from [facility].   • Anxiety 03/15/2012    Uses this PRN, has stress due to caring for elderly parents. Abstracted from [facility].   • Atherosclerotic heart disease 03/15/2012   • Borderline hypertension 10/19/2015   • Bronchitis, acute 05/16/2016   • Chest pain 10/15/2014   • Chronic foot pain 10/16/2012    Will see his podiatrist. Abstracted from [facility].   • Constipation 01/09/2018   • Coronary artery disease 10/24/2016   • DDD (degenerative disc disease), lumbar 02/13/2013   • Depression 08/10/2017   • Dyslipidemia    • Erectile dysfunction of organic origin 12/07/2012    Samples of cialis 20mg, voucher for the 5mg, samples of levitra 20mg and voucher for viagra 100mg. Call with preference. Order total T. Abstracted from [facility].   • GERD (gastroesophageal reflux disease) 12/07/2012   • H/O myocardial perfusion scan 11/01/2016    With Stress Test, abnormal. Abstracted from [facility].   • Hand pain 04/24/2014   • Headache 01/18/2016   • Hyperlipidemia 02/13/2018   • Hypertension 08/10/2017   • Hypogonadism, male 12/07/2012   • Hypothyroidism 02/08/2018   • Impacted cerumen, left ear 04/03/2019   • Influenza  vaccination ordered 10/19/2016   • Insomnia 03/15/2012   • Lateral epicondylitis, left elbow 02/07/2017   • Left knee sprain 01/24/2013   • Leg pain, bilateral 04/02/2019   • Lumbar disc herniation with radiculopathy 09/08/2016   • Lumbar radiculitis 08/30/2016   • Mild memory disturbance 01/18/2016   • Myocardial infarction (CMS/HCC) 11/01/2016   • Osteoarthritis of sacroiliac joint 03/12/2014   • Pain in right lower leg 07/11/2016   • Paresthesia 08/30/2016   • Paresthesia of both legs     Lower legs   • Postherpetic neuralgia 03/15/2016   • Preoperative cardiovascular examination 10/24/2016   • Preventative health care 02/07/2017   • S/P cardiac catheterization 01/01/2000    S/P cardiac cath, left heart-- Heart catheterization 2000 at [facility] in [place], [place]. No records available. Abstracted from [facility].   • Sciatica, right side 06/04/2013   • Shingles    • Shortness of breath 10/15/2014   • Spinal stenosis, lumbar 02/13/2013    TENS unit evidently not covered by Medicare. Abstracted from [facility].   • Vitamin B12 deficiency 08/10/2017   • Vitamin D deficiency 01/09/2018       Past Surgical History:   Procedure Laterality Date   • CARDIAC CATHETERIZATION  2000   • COLONOSCOPY  10/28/2009   • OTHER SURGICAL HISTORY      Lapscopic surgery, both knees. Abstracted by [facility].   • OTHER SURGICAL HISTORY      Right rotor cuff Disorder. Abstracted from [facility].   • OTHER SURGICAL HISTORY      Kneww injections. Abstracted from [facility].   • OTHER SURGICAL HISTORY      Shot in lower back for bulging disc. Abstracted from [facility].       Family History   Problem Relation Age of Onset   • Alzheimer's disease Mother    • Hyperlipidemia Father    • Heart disease Father    • Hypertension Father    • Hyperlipidemia Brother    • Diabetes Brother    • Heart disease Brother    • Hypertension Brother    • Heart disease Maternal Grandfather    • Alzheimer's disease Paternal Grandmother    • Cancer Cousin     • Cancer Other        Social History     Tobacco Use   • Smoking status: Never Smoker   • Smokeless tobacco: Never Used   Substance Use Topics   • Alcohol use: No     Frequency: Never   • Drug use: No        Medications Prior to Admission   Medication Sig Dispense Refill Last Dose   • aspirin (ASPIR) 81 MG EC tablet Take 81 mg by mouth Daily.   8/19/2019 at Unknown time   • Biotin 1000 MCG chewable tablet Chew 1 tablet Daily.   8/19/2019 at Unknown time   • Cyanocobalamin (B-12) 1000 MCG tablet Take 100 mcg by mouth Daily.   8/19/2019 at Unknown time   • gabapentin (NEURONTIN) 800 MG tablet Take 400 mg by mouth 3 (Three) Times a Day.   8/19/2019 at Unknown time   • levothyroxine (SYNTHROID) 75 MCG tablet Take 75 mcg by mouth Daily.   8/19/2019 at Unknown time   • Omega-3 Fatty Acids (FISH OIL) 1000 MG capsule capsule Take 1,000 mg by mouth Daily With Breakfast.   8/19/2019 at Unknown time   • omeprazole (priLOSEC) 40 MG capsule Take 20 mg by mouth Daily. Take two pills daily. Abstracted from [facility].    8/19/2019 at Unknown time   • rosuvastatin (CRESTOR) 10 MG tablet Take 10 mg by mouth Daily.   8/19/2019 at Unknown time   • nitroglycerin (NITROSTAT) 0.4 MG SL tablet 0.4 mg. Dissolve 1 tablet under tongue as needed for chest pain. May repeat dose every 5 min as needed for total of doses. Abstracted from [facility].      • polyethylene glycol (MIRALAX) powder Take 17 g by mouth Daily As Needed. Dissolve 17 grams in 8 oz. Liquid and drink daily. Abstracted from [facility].    Unknown at Unknown time   • tadalafil (CIALIS) 5 MG tablet Take 5 mg by mouth Daily As Needed.   Unknown at Unknown time   • vitamin D (ERGOCALCIFEROL) 32885 units capsule capsule Take 50,000 Units by mouth 1 (One) Time Per Week. Thursday   8/15/2019       Allergies  Niacin    Scheduled Meds:    aspirin 81 mg Oral Daily   enoxaparin 40 mg Subcutaneous Daily   gabapentin 400 mg Oral Q8H   levothyroxine 75 mcg Oral Q AM   pantoprazole 40 mg  "Oral Daily   polyethylene glycol 17 g Oral Daily   rosuvastatin 10 mg Oral Daily   sodium chloride 3 mL Intravenous Q12H   vitamin B-12 1,000 mcg Oral Daily     Continuous Infusions:   PRN Meds:.•  acetaminophen  •  Morphine  •  nitroglycerin  •  [COMPLETED] Insert peripheral IV **AND** sodium chloride  •  sodium chloride    Objective     VITAL SIGNS  Vitals:    08/20/19 0305 08/20/19 1122 08/20/19 2102 08/21/19 0408   BP: 157/85 137/79 162/91 135/77   BP Location:  Right arm Right arm Right arm   Patient Position:  Lying Lying Lying   Pulse: (!) 49 (!) 49 54 51   Resp: 16 16 17 16   Temp: 97.9 °F (36.6 °C) 97.9 °F (36.6 °C)  98.3 °F (36.8 °C)   TempSrc:  Oral  Oral   SpO2: 97% 95% 94% 97%   Weight: 94.2 kg (207 lb 10.8 oz)   92.6 kg (204 lb 3.2 oz)   Height:           Flowsheet Rows      First Filed Value   Admission Height  188 cm (74\") Documented at 08/19/2019 1512   Admission Weight  93.8 kg (206 lb 12.7 oz) Documented at 08/19/2019 1512           TELEMETRY: Sinus rhythm    Physical Exam:  The patient is alert, oriented and in no distress.  Vital signs as noted above.  Head and neck revealed no carotid bruits or jugular venous distention.  No thyromegaly or lymphadenopathy is present  Lungs clear.  No wheezing.  Breath sounds are normal bilaterally.  Heart normal first and second heart sounds.  No murmur. No precordial rub is present.  No gallop is present.  Abdomen soft and nontender.  No organomegaly is present.  Extremities with good peripheral pulses without any pedal edema.  Skin warm and dry.  Musculoskeletal system is grossly normal  CNS grossly normal      Results Review:   I reviewed the patient's new clinical results.  Lab Results (last 24 hours)     ** No results found for the last 24 hours. **          Imaging Results (last 24 hours)     ** No results found for the last 24 hours. **      LAB RESULTS (LAST 7 DAYS)    CBC  Results from last 7 days   Lab Units 08/19/19  1534   WBC 10*3/mm3 8.70   RBC " 10*6/mm3 4.75   HEMOGLOBIN g/dL 14.0   HEMATOCRIT % 42.0   MCV fL 88.5   PLATELETS 10*3/mm3 175       BMP  Results from last 7 days   Lab Units 08/19/19  1535   SODIUM mmol/L 135*   POTASSIUM mmol/L 3.6   CHLORIDE mmol/L 100*   CO2 mmol/L 24.0   BUN mg/dL 6*   CREATININE mg/dL 1.30*   GLUCOSE mg/dL 90       CMP   Results from last 7 days   Lab Units 08/19/19  1535   SODIUM mmol/L 135*   POTASSIUM mmol/L 3.6   CHLORIDE mmol/L 100*   CO2 mmol/L 24.0   BUN mg/dL 6*   CREATININE mg/dL 1.30*   GLUCOSE mg/dL 90         BNP  Results from last 7 days   Lab Units 08/19/19  1535   BNP pg/mL 11.0       TROPONIN  Results from last 7 days   Lab Units 08/19/19  1534   TROPONIN I ng/mL <0.030       CoAg  Results from last 7 days   Lab Units 08/19/19  1535   INR  1.03   APTT seconds 23.7*       Creatinine Clearance  Estimated Creatinine Clearance: 67.3 mL/min (A) (by C-G formula based on SCr of 1.3 mg/dL (H)).    ABG        Radiology  Xr Chest 1 View    Result Date: 8/19/2019  No acute chest findings.  Electronically Signed By-Dr. Mary Jo Patel MD On:8/19/2019 3:55 PM This report was finalized on [id number] by Dr. Mary Jo Patel MD.              EKG      I personally viewed and interpreted the patient's EKG/Telemetry data:    ECHOCARDIOGRAM:    Results for orders placed during the hospital encounter of 08/19/19   Adult Transthoracic Echo Complete W/ Cont if Necessary Per Protocol    Narrative Indications  Chest pain      Technically satisfactory study.  Mitral valve is structurally normal.  Minimal mitral regurgitation  Tricuspid valve is structurally normal.  Aortic valve is structurally normal.  Pulmonic valve could not be well visualized.  No evidence for mitral tricuspid or aortic regurgitation is seen by   Doppler study.  Left atrium is normal in size.  Right atrium is normal in size.  Left ventricle is normal in size and contractility with ejection fraction   of 60%.  Right ventricle is normal in size.  Atrial septum is  intact.  Aorta is normal.  No pericardial effusion or intracardiac thrombus is seen.    Impression  Minimal mitral regurgitation  Normal echo Doppler study.  Left ventricular ejection fraction is 60%.             STRESS MYOVIEW:    Cardiolite (Tc-99m Sestamibi) stress test    CARDIAC CATHETERIZATION:            OTHER:         Assessment/Plan     Active Problems:    Chest pain        [[[[[[[[[[[[[[[[[[[    Impression  ===========      - chest discomfort suggestive of possible angina pectoris.   Troponin levels are negative.  X     Patient had cardiac catheterization in 2000 in [place].  Details are not available. ([place], [place] ).   Cardiac catheterization 11/04/2016 revealed 50% lad with anterior RCA.  Normal left ventricular function.  (Right radial approach)  Abnormal Lexiscan Cardiolite test with proximal inferior ischemia (11/01/2016) and 02/13/2018 (similar to 2016 and)     Echocardiogram is normal (11/01/2016 )     -dyslipidemia.  Renal dysfunction dysfunction.  Creatinine 1.3.     - strong family history of coronary artery disease     -Lapscopic surgery both knee Right rotor cuff disorder      -History of lumbar bulging disc .           -allergy to niacin  ============  Plan  =========   chest pain suggestive of angina pectoris.  Troponin levels are negative.  EKG showed no acute changes.  Rule out progression of coronary artery disease.  Stress Cardiolite test showed mild apical infarction without ischemia  Echocardiogram- as above.  Renal dysfunction BUN 6 creatinine 1.3    Medications were reviewed and updated.  Have discussed with patient and attending physician for coordination of care.  Patient will be observed.  Follow-up in the office in 2 weeks and consider cardiac catheterization if patient has continued symptoms.  Patient was advised to come back to the hospital if he has any problems in the interim.  Further plan will depend on patient's progress.     [[[[[[[[[[[[[[[[[[[[[            Cordelia Jacobo MD  08/21/19  6:56 AM

## 2019-08-21 NOTE — PLAN OF CARE
Problem: Patient Care Overview  Goal: Individualization and Mutuality  Outcome: Ongoing (interventions implemented as appropriate)    Goal: Discharge Needs Assessment  Outcome: Ongoing (interventions implemented as appropriate)    Goal: Interprofessional Rounds/Family Conf  Outcome: Ongoing (interventions implemented as appropriate)      Problem: Pain, Chronic (Adult)  Goal: Identify Related Risk Factors and Signs and Symptoms  Outcome: Ongoing (interventions implemented as appropriate)    Goal: Acceptable Pain/Comfort Level and Functional Ability  Outcome: Ongoing (interventions implemented as appropriate)

## 2019-08-22 ENCOUNTER — READMISSION MANAGEMENT (OUTPATIENT)
Dept: CALL CENTER | Facility: HOSPITAL | Age: 74
End: 2019-08-22

## 2019-08-22 NOTE — DISCHARGE SUMMARY
Date of Admission: 8/19/2019    Date of Discharge:  8/21/2019    Length of stay:  LOS: 0 days     Presenting Problem/History of Present Illness  Active Hospital Problems    Diagnosis  POA   • Chest pain [R07.9]  Yes      Resolved Hospital Problems   No resolved problems to display.          Hospital Course  Patient is a 74 y.o. male presented with chest pain and has history of nonobstructive CAD in the past and patient was ruled out for MI underwent a stress Myoview which came out to be negative and also had echocardiogram which came out to be normal and was seen by cardiology.  Patient now being discharged home to follow with cardiology as an outpatient.  Patient was explained about his diagnosis and need for follow-up.    Chest pain       HOLA -improved        HTN - most recently measured at 137/70. Systolic may be elevated due to stress of hospitalization.     Hyperlipidemia -  follow up with primary care. Last lipid profile performed 2.21.19 TC was 160, HDL 56, LDL 84, VLDL 20.2 and TGs 109.  -Patient is on Crestor and will continue     Bilateral numbness and tingling in legs- history of lumbar disk herniation. outpatient follow up with primary care provider for further evaluation  -Patient is on gabapentin     Hypothyroidism - appears to be well controlled with levothyroxine. TSH last measured 2/2017.     GERD and patient is on Protonix and he is a stable with his symptoms    Past Medical History:     Past Medical History:   Diagnosis Date   • Abnormal cardiovascular stress test 11/01/2016    Abnormal Cardiolite Stress Test. Abstracted from [facility].   • Anxiety 03/15/2012    Uses this PRN, has stress due to caring for elderly parents. Abstracted from [facility] [facility].   • Atherosclerotic heart disease 03/15/2012   • Borderline hypertension 10/19/2015   • Bronchitis, acute 05/16/2016   • Chest pain 10/15/2014   • Chronic foot pain 10/16/2012    Will see his podiatrist. Abstracted from [facility].   • Constipation  01/09/2018   • Coronary artery disease 10/24/2016   • DDD (degenerative disc disease), lumbar 02/13/2013   • Depression 08/10/2017   • Dyslipidemia    • Erectile dysfunction of organic origin 12/07/2012    Samples of cialis 20mg, voucher for the 5mg, samples of levitra 20mg and voucher for viagra 100mg. Call with preference. Order total T. Abstracted from [facility].   • GERD (gastroesophageal reflux disease) 12/07/2012   • H/O myocardial perfusion scan 11/01/2016    With Stress Test, abnormal. Abstracted from [facility].   • Hand pain 04/24/2014   • Headache 01/18/2016   • Hyperlipidemia 02/13/2018   • Hypertension 08/10/2017   • Hypogonadism, male 12/07/2012   • Hypothyroidism 02/08/2018   • Impacted cerumen, left ear 04/03/2019   • Influenza vaccination ordered 10/19/2016   • Insomnia 03/15/2012   • Lateral epicondylitis, left elbow 02/07/2017   • Left knee sprain 01/24/2013   • Leg pain, bilateral 04/02/2019   • Lumbar disc herniation with radiculopathy 09/08/2016   • Lumbar radiculitis 08/30/2016   • Mild memory disturbance 01/18/2016   • Myocardial infarction (CMS/HCC) 11/01/2016   • Osteoarthritis of sacroiliac joint 03/12/2014   • Pain in right lower leg 07/11/2016   • Paresthesia 08/30/2016   • Paresthesia of both legs     Lower legs   • Postherpetic neuralgia 03/15/2016   • Preoperative cardiovascular examination 10/24/2016   • Preventative health care 02/07/2017   • S/P cardiac catheterization 01/01/2000    S/P cardiac cath, left heart-- Heart catheterization 2000 at [facility] in [place], [place]. No records available. Abstracted from [facility] [employer] [facility].   • Sciatica, right side 06/04/2013   • Shingles    • Shortness of breath 10/15/2014   • Spinal stenosis, lumbar 02/13/2013    TENS unit evidently not covered by Medicare. Abstracted from [facility] [employer] [facility].   • Vitamin B12 deficiency 08/10/2017   • Vitamin D deficiency 01/09/2018       Past Surgical History:     Past Surgical History:   Procedure Laterality Date    • CARDIAC CATHETERIZATION  2000   • COLONOSCOPY  10/28/2009   • OTHER SURGICAL HISTORY      Lapscopic surgery, both knees. Abstracted by [facility].   • OTHER SURGICAL HISTORY      Right rotor cuff Disorder. Abstracted from [facility].   • OTHER SURGICAL HISTORY      Kneww injections. Abstracted from [facility].   • OTHER SURGICAL HISTORY      Shot in lower back for bulging disc. Abstracted from [facility].       Social History:   Social History     Socioeconomic History   • Marital status:      Spouse name: Not on file   • Number of children: Not on file   • Years of education: Not on file   • Highest education level: Not on file   Tobacco Use   • Smoking status: Never Smoker   • Smokeless tobacco: Never Used   Substance and Sexual Activity   • Alcohol use: No     Frequency: Never   • Drug use: No   • Sexual activity: Defer       Procedures Performed         Vital Signs  Temp:  [98.3 °F (36.8 °C)] 98.3 °F (36.8 °C)  Heart Rate:  [51] 51  Resp:  [16] 16  BP: (135)/(77) 135/77    Physical Exam:  Physical Exam   Constitutional: He appears well-developed and well-nourished.   Cardiovascular: Normal rate and regular rhythm.   Pulmonary/Chest: Effort normal and breath sounds normal.   Nursing note and vitals reviewed.      Discharge Diagnosis:     Chest pain      Discharge Disposition  Home or Self Care       Discharge Medications      Continue These Medications      Instructions Start Date   ASPIR 81 MG EC tablet  Generic drug:  aspirin   81 mg, Oral, Daily      B-12 1000 MCG tablet   100 mcg, Oral, Daily      Biotin 1000 MCG chewable tablet   1 tablet, Oral, Daily      CIALIS 5 MG tablet  Generic drug:  tadalafil   5 mg, Oral, Daily PRN      fish oil 1000 MG capsule capsule   1,000 mg, Oral, Daily With Breakfast      gabapentin 800 MG tablet  Commonly known as:  NEURONTIN   400 mg, Oral, 3 Times Daily      MIRALAX powder  Generic drug:  polyethylene glycol   17 g, Oral, Daily PRN, Dissolve 17 grams in 8 oz.  Liquid and drink daily. Abstracted from [facility].      NITROSTAT 0.4 MG SL tablet  Generic drug:  nitroglycerin   0.4 mg, Dissolve 1 tablet under tongue as needed for chest pain. May repeat dose every 5 min as needed for total of doses. Abstracted from [facility].       omeprazole 40 MG capsule  Commonly known as:  priLOSEC   20 mg, Oral, Daily, Take two pills daily. Abstracted from [facility].      rosuvastatin 10 MG tablet  Commonly known as:  CRESTOR   10 mg, Oral, Daily      SYNTHROID 75 MCG tablet  Generic drug:  levothyroxine   75 mcg, Oral, Daily      vitamin D 27298 units capsule capsule  Commonly known as:  ERGOCALCIFEROL   50,000 Units, Oral, Weekly, Thursday             Consults:   Consults     No orders found from 7/21/2019 to 8/20/2019.          Pertinent Test Results:     I reviewed the patient's new clinical results.    Results from last 7 days   Lab Units 08/21/19  0722 08/19/19  1534   WBC 10*3/mm3 5.90 8.70   HEMOGLOBIN g/dL 14.5 14.0   HEMATOCRIT % 43.2 42.0   PLATELETS 10*3/mm3 184 175     Results from last 7 days   Lab Units 08/21/19  0721 08/19/19  1535   SODIUM mmol/L 139 135*   POTASSIUM mmol/L 3.8 3.6   CHLORIDE mmol/L 103 100*   CO2 mmol/L 24.0 24.0   BUN mg/dL 10 6*   CREATININE mg/dL 1.10 1.30*   GLUCOSE mg/dL 105* 90   CALCIUM mg/dL 9.1 9.2     Results from last 7 days   Lab Units 08/21/19  0721 08/19/19  1535   SODIUM mmol/L 139 135*   POTASSIUM mmol/L 3.8 3.6   CHLORIDE mmol/L 103 100*   CO2 mmol/L 24.0 24.0   BUN mg/dL 10 6*   CREATININE mg/dL 1.10 1.30*   CALCIUM mg/dL 9.1 9.2   BILIRUBIN mg/dL 0.7  --    ALK PHOS U/L 56  --    ALT (SGPT) U/L 20  --    AST (SGOT) U/L 26  --    GLUCOSE mg/dL 105* 90     Results from last 7 days   Lab Units 08/21/19  0721   MAGNESIUM mg/dL 2.0     Lab Results   Component Value Date    CALCIUM 9.1 08/21/2019    PHOS 3.4 08/21/2019     Hemoglobin A1C   Date Value Ref Range Status   08/21/2019 5.5 3.5 - 5.6 % Final             Microbiology Results (last  10 days)     ** No results found for the last 240 hours. **          ECG/EMG Results (most recent)     Procedure Component Value Units Date/Time    Adult Transthoracic Echo Complete W/ Cont if Necessary Per Protocol [[id number]] Collected:  08/20/19 0834     Updated:  08/20/19 1353     BSA 2.2 m^2       CV ECHO KARLA - RVDD 3.5 cm      IVSd 1.2 cm      LVIDd 4.8 cm      LVIDs 3.2 cm      LVPWd 1.2 cm      IVS/LVPW 0.98     FS 33.3 %      EDV(Teich) 106.2 ml      ESV(Teich) 40.5 ml      EF(Teich) 61.9 %      EDV(cubed) 108.9 ml      ESV(cubed) 32.3 ml      EF(cubed) 70.3 %      LV mass(C)d 213.5 grams      LV mass(C)dI 98.8 grams/m^2      SV(Teich) 65.7 ml      SI(Teich) 30.4 ml/m^2      SV(cubed) 76.6 ml      SI(cubed) 35.4 ml/m^2      Ao root diam 3.1 cm      Ao root area 7.4 cm^2      ACS 1.7 cm      asc Aorta Diam 3.4 cm      LVOT diam 2.2 cm      LVOT area 3.6 cm^2      RVOT diam 2.3 cm      RVOT area 4.0 cm^2      EDV(MOD-sp4) 134.1 ml      ESV(MOD-sp4) 43.0 ml      EF(MOD-sp4) 67.9 %      EDV(MOD-sp2) 114.3 ml      ESV(MOD-sp2) 58.5 ml      EF(MOD-sp2) 48.8 %      SV(MOD-sp4) 91.1 ml      SI(MOD-sp4) 42.1 ml/m^2      SV(MOD-sp2) 55.8 ml      SI(MOD-sp2) 25.8 ml/m^2      Ao root area (BSA corrected) 1.4     LV Noble Vol (BSA corrected) 62.0 ml/m^2      LV Sys Vol (BSA corrected) 19.9 ml/m^2      MV E max compa 87.1 cm/sec      MV A max compa 59.3 cm/sec      MV E/A 1.5     MV V2 max 85.8 cm/sec      MV max PG 2.9 mmHg      MV V2 mean 52.7 cm/sec      MV mean PG 1.2 mmHg      MV V2 VTI 33.1 cm      MVA(VTI) 3.6 cm^2      MV dec slope 406.8 cm/sec^2      MV dec time 0.21 sec      Ao pk compa 131.4 cm/sec      Ao max PG 6.9 mmHg      Ao max PG (full) -0.34 mmHg      Ao V2 mean 88.9 cm/sec      Ao mean PG 3.6 mmHg      Ao mean PG (full) 0.38 mmHg      Ao V2 VTI 33.6 cm      ZAHIRA(I,A) 3.5 cm^2      ZAHIRA(I,D) 3.5 cm^2      ZAHIRA(V,A) 3.7 cm^2      ZAHIRA(V,D) 3.7 cm^2      LV V1 max PG 7.2 mmHg      LV V1 mean PG 3.3 mmHg      LV  V1 max 134.6 cm/sec      LV V1 mean 83.1 cm/sec      LV V1 VTI 32.2 cm      SV(Ao) 249.9 ml      SI(Ao) 115.6 ml/m^2      SV(LVOT) 117.5 ml      SV(RVOT) 83.1 ml      SI(LVOT) 54.3 ml/m^2      PA V2 max 127.9 cm/sec      PA max PG 6.5 mmHg      PA max PG (full) 4.6 mmHg      PA V2 mean 89.3 cm/sec      PA mean PG 3.6 mmHg      PA mean PG (full) 2.5 mmHg      PA V2 VTI 32.6 cm      PVA(I,A) 2.6 cm^2       CV ECHO KARLA - PVA(I,D) 2.6 cm^2       CV ECHO KARLA - PVA(V,A) 2.2 cm^2       CV ECHO KARLA - PVA(V,D) 2.2 cm^2      PA acc time 0.18 sec      PI end-d compa 99.2 cm/sec      PI max compa 157.4 cm/sec      PI max PG 9.9 mmHg      RV V1 max PG 2.0 mmHg      RV V1 mean PG 1.2 mmHg      RV V1 max 70.0 cm/sec      RV V1 mean 51.2 cm/sec      RV V1 VTI 20.8 cm      TR max compa 214.5 cm/sec      RVSP(TR) 23.5 mmHg      RAP systole 5.0 mmHg      PA pr(Accel) 0.11 mmHg      Qp/Qs 0.71      CV ECHO KARLA - BZI_BMI 28.1 kilograms/m^2       CV ECHO [facility] - BSA([facility]) 2.2 m^2       CV ECHO KARLA - BZI_METRIC_WEIGHT 93.9 kg       CV ECHO KARLA - BZI_METRIC_HEIGHT 182.9 cm      EF(MOD-bp) 58.0 %      LA dimension(2D) 3.6 cm     Narrative:       Indications  Chest pain      Technically satisfactory study.  Mitral valve is structurally normal.  Minimal mitral regurgitation  Tricuspid valve is structurally normal.  Aortic valve is structurally normal.  Pulmonic valve could not be well visualized.  No evidence for mitral tricuspid or aortic regurgitation is seen by   Doppler study.  Left atrium is normal in size.  Right atrium is normal in size.  Left ventricle is normal in size and contractility with ejection fraction   of 60%.  Right ventricle is normal in size.  Atrial septum is intact.  Aorta is normal.  No pericardial effusion or intracardiac thrombus is seen.    Impression  Minimal mitral regurgitation  Normal echo Doppler study.  Left ventricular ejection fraction is 60%.      ECG 12 Lead [[id number]] Collected:  08/19/19  1528     Updated:  08/20/19 1746    Narrative:       HEART RATE= 58  bpm  RR Interval= 1024  ms  IN Interval= 206  ms  P Horizontal Axis= -13  deg  P Front Axis= 56  deg  QRSD Interval= 93  ms  QT Interval= 416  ms  QRS Axis= 52  deg  T Wave Axis= 59  deg  - ABNORMAL ECG -  Sinus bradycardia  Anteroseptal infarct, old  poor ant rs transtion  Electronically Signed By: Hong Mosher ([facility]) 20-Aug-2019 17:45:43  Date and Time of Study: 2019-08-19 15:28:50               Results for orders placed during the hospital encounter of 08/19/19   Adult Transthoracic Echo Complete W/ Cont if Necessary Per Protocol    Narrative Indications  Chest pain      Technically satisfactory study.  Mitral valve is structurally normal.  Minimal mitral regurgitation  Tricuspid valve is structurally normal.  Aortic valve is structurally normal.  Pulmonic valve could not be well visualized.  No evidence for mitral tricuspid or aortic regurgitation is seen by   Doppler study.  Left atrium is normal in size.  Right atrium is normal in size.  Left ventricle is normal in size and contractility with ejection fraction   of 60%.  Right ventricle is normal in size.  Atrial septum is intact.  Aorta is normal.  No pericardial effusion or intracardiac thrombus is seen.    Impression  Minimal mitral regurgitation  Normal echo Doppler study.  Left ventricular ejection fraction is 60%.         Xr Chest 1 View    Result Date: 8/19/2019  No acute chest findings.  Electronically Signed By-Dr. Mary Jo Patel MD On:8/19/2019 3:55 PM This report was finalized on [id number] by Dr. Mary Jo Patel MD.      Xrays, labs reviewed personally by physician.      Condition on Discharge:    Stable    Discharge Diet:     Activity at Discharge:     Follow-up Appointments  Future Appointments   Date Time Provider Department Center   9/30/2019 12:00 PM [facility] NA INJECTION ROOM  [facility] NA   9/30/2019  1:00 PM [facility] NA IMAGING  [facility] NA   9/30/2019   1:15 PM [facility] NA TREADMILL RM 1  [facility] NA   9/30/2019  2:15 PM [facility] NA IMAGING  [facility] NA   9/30/2019  2:40 PM Cordelia Jacobo MD [facility] NA CARD [facility] NA         Test Results Pending at Discharge       Risk for Readmission (LACE) Score: 3 (8/21/2019  6:00 AM)          Bautista Quigley MD  08/21/19  9:43 PM    .

## 2019-08-22 NOTE — OUTREACH NOTE
Prep Survey      Responses   Facility patient discharged from?  [facility]   Is patient eligible?  Yes   Discharge diagnosis  chest pain   Does the patient have one of the following disease processes/diagnoses(primary or secondary)?  Other   Does the patient have Home health ordered?  No   Is there a DME ordered?  No   Prep survey completed?  Yes          Love Rodriguez RN

## 2019-08-22 NOTE — PROGRESS NOTES
Case Management Discharge Note    Final Note: home    Destination      No service has been selected for the patient.      Durable Medical Equipment      No service has been selected for the patient.      Dialysis/Infusion      No service has been selected for the patient.      Home Medical Care      No service has been selected for the patient.      Therapy      No service has been selected for the patient.      Community Resources      No service has been selected for the patient.             Final Discharge Disposition Code: 01 - home or self-care

## 2019-08-23 ENCOUNTER — READMISSION MANAGEMENT (OUTPATIENT)
Dept: CALL CENTER | Facility: HOSPITAL | Age: 74
End: 2019-08-23

## 2019-08-23 NOTE — OUTREACH NOTE
Medical Week 1 Survey      Responses   Facility patient discharged from?  [facility]   Does the patient have one of the following disease processes/diagnoses(primary or secondary)?  Other   Is there a successful TCM telephone encounter documented?  No   Week 1 attempt successful?  No   Revoke  Decline to participate [No answer/Left voicemail]          Lucía Elizabeth LPN     Returned patient's call.  No answer/Left voicemail

## 2019-08-26 ENCOUNTER — EPISODE CHANGES (OUTPATIENT)
Dept: CASE MANAGEMENT | Facility: OTHER | Age: 74
End: 2019-08-26

## 2019-08-27 ENCOUNTER — PATIENT OUTREACH (OUTPATIENT)
Dept: CASE MANAGEMENT | Facility: OTHER | Age: 74
End: 2019-08-27

## 2019-08-27 NOTE — OUTREACH NOTE
Patient Outreach Note    Pt discharged from [facility] on 8/21/19, admitted with chest pain. RN-CC outreach call made to pt. Spoke very briefly with pt. RN-CC offered to assist pt with scheduling follow up appointment with PCP. Pt declined, states he will call. Pt requesting medical records from hospital stay. RN-CC informed pt he could request them from medical records at [facility] to obtain. Pt denies CP since hospital discharge. Pt then declined further conversation and ended phone call. No future outreach scheduled.     Todd Siddiqui RN    8/27/2019, 3:52 PM

## 2019-09-05 RX ORDER — ERGOCALCIFEROL 1.25 MG/1
CAPSULE ORAL
Qty: 12 CAPSULE | Refills: 1 | Status: SHIPPED | OUTPATIENT
Start: 2019-09-05 | End: 2020-02-28

## 2019-09-30 ENCOUNTER — HOSPITAL ENCOUNTER (OUTPATIENT)
Dept: CARDIOLOGY | Facility: HOSPITAL | Age: 74
End: 2019-09-30

## 2019-09-30 ENCOUNTER — HOSPITAL ENCOUNTER (OUTPATIENT)
Dept: CARDIOLOGY | Facility: HOSPITAL | Age: 74
Discharge: HOME | End: 2019-09-30

## 2019-09-30 ENCOUNTER — OFFICE VISIT (OUTPATIENT)
Dept: CARDIOLOGY | Facility: CLINIC | Age: 74
End: 2019-09-30

## 2019-09-30 VITALS
BODY MASS INDEX: 27.7 KG/M2 | HEART RATE: 50 BPM | SYSTOLIC BLOOD PRESSURE: 129 MMHG | WEIGHT: 204.5 LBS | DIASTOLIC BLOOD PRESSURE: 78 MMHG | OXYGEN SATURATION: 97 % | HEIGHT: 72 IN

## 2019-09-30 DIAGNOSIS — I25.10 CORONARY ARTERY DISEASE INVOLVING NATIVE CORONARY ARTERY OF NATIVE HEART WITHOUT ANGINA PECTORIS: Primary | ICD-10-CM

## 2019-09-30 DIAGNOSIS — I25.10 CVD (CARDIOVASCULAR DISEASE): ICD-10-CM

## 2019-09-30 DIAGNOSIS — E78.5 DYSLIPIDEMIA: ICD-10-CM

## 2019-09-30 DIAGNOSIS — R03.0 BORDERLINE SYSTOLIC HTN: ICD-10-CM

## 2019-09-30 PROCEDURE — 99213 OFFICE O/P EST LOW 20 MIN: CPT | Performed by: INTERNAL MEDICINE

## 2019-09-30 RX ORDER — TADALAFIL 20 MG/1
TABLET ORAL
Refills: 11 | COMMUNITY
Start: 2019-08-02 | End: 2021-01-01

## 2019-09-30 NOTE — PROGRESS NOTES
Encounter Date:09/30/2019  Recent hospital follow-up      Patient ID: Clifford Weber is a 74 y.o. male.    Chief Complaint:  Coronary artery disease  Dyslipidemia  Renal dysfunction    History of Present Illness  Patient recently was admitted to [facility] with chest discomfort.  Patient had stress Cardiolite test that showed mild apical infarction without ischemia.  Echocardiogram was normal.  Patient was discharged home.  Next    Since I have last seen, the patient has been without any chest discomfort ,shortness of breath, palpitations, dizziness or syncope.  Denies having any headache ,abdominal pain ,nausea, vomiting , diarrhea constipation, loss of weight or loss of appetite.  Denies having any excessive bruising ,hematuria or blood in the stool.    Review of all systems negative except as indicated    Assessment and Plan       [[[[[[[[[[[[[[[[[[[    Impression  ===========     -Coronary artery disease-stable angina pectoris    Patient had cardiac catheterization in 2000 in [place].  Details are not available. ([place], [place] ).   Cardiac catheterization 11/04/2016 revealed 50% lad with anterior RCA.  Normal left ventricular function.  (Right radial approach)    Lexiscan Cardiolite test 8/20/2019 revealed mild apical infarction without ischemia.  Echocardiogram is normal 8/20/2019    -dyslipidemia.  Renal dysfunction dysfunction.  Creatinine 1.3.     - strong family history of coronary artery disease     -Lapscopic surgery both knee Right rotor cuff disorder      -History of lumbar bulging disc .           -allergy to niacin  ============  Plan  =========  Patient is not having any angina pectoris or congestive heart failure.  medications were reviewed and updated.  No need for further work-up at this time.  Further plan will depend on patient's progress.    [[[[[[[[[[[[[[[[[[[[[                  Diagnosis Plan   1. Coronary artery disease involving native coronary artery of  native heart without angina pectoris     2. Dyslipidemia     LAB RESULTS (LAST 7 DAYS)    CBC        BMP        CMP         BNP        TROPONIN        CoAg        Creatinine Clearance  CrCl cannot be calculated (Patient's most recent lab result is older than the maximum 30 days allowed.).    ABG        Radiology  No radiology results for the last day                The following portions of the patient's history were reviewed and updated as appropriate: allergies, current medications, past family history, past medical history, past social history, past surgical history and problem list.    Review of Systems   Constitution: Positive for malaise/fatigue.   Cardiovascular: Positive for leg swelling (feet and legs ). Negative for chest pain, palpitations and syncope.   Respiratory: Negative for shortness of breath.    Skin: Negative for rash.   Gastrointestinal: Negative for nausea and vomiting.   Neurological: Negative for dizziness, light-headedness and numbness.         Current Outpatient Medications:   •  aspirin (ASPIR) 81 MG EC tablet, Take 81 mg by mouth Daily., Disp: , Rfl:   •  Biotin 1000 MCG chewable tablet, Chew 1 tablet Daily., Disp: , Rfl:   •  Cyanocobalamin (B-12) 1000 MCG tablet, Take 100 mcg by mouth Daily., Disp: , Rfl:   •  gabapentin (NEURONTIN) 800 MG tablet, Take 400 mg by mouth 3 (Three) Times a Day., Disp: , Rfl:   •  levothyroxine (SYNTHROID) 75 MCG tablet, Take 75 mcg by mouth Daily., Disp: , Rfl:   •  nitroglycerin (NITROSTAT) 0.4 MG SL tablet, 0.4 mg. Dissolve 1 tablet under tongue as needed for chest pain. May repeat dose every 5 min as needed for total of doses. Abstracted from [employer]., Disp: , Rfl:   •  Omega-3 Fatty Acids (FISH OIL) 1000 MG capsule capsule, Take 1,000 mg by mouth Daily With Breakfast., Disp: , Rfl:   •  omeprazole (priLOSEC) 40 MG capsule, Take 20 mg by mouth Daily. Take two pills daily. Abstracted from [facility]. , Disp: , Rfl:   •  polyethylene glycol (MIRALAX)  powder, Take 17 g by mouth Daily As Needed. Dissolve 17 grams in 8 oz. Liquid and drink daily. Abstracted from [facility]. , Disp: , Rfl:   •  rosuvastatin (CRESTOR) 10 MG tablet, Take 10 mg by mouth Daily., Disp: , Rfl:   •  tadalafil (CIALIS) 20 MG tablet, TAKE 1 TABLET BY MOUTH AS DIRECTED AS NEEDED, Disp: , Rfl: 11  •  tadalafil (CIALIS) 5 MG tablet, Take 5 mg by mouth Daily As Needed., Disp: , Rfl:   •  vitamin D (ERGOCALCIFEROL) 41421 units capsule capsule, TAKE 1 CAPSULE BY MOUTH ONCE WEEKLY, Disp: 12 capsule, Rfl: 1    Allergies   Allergen Reactions   • Niacin Unknown (See Comments)     Abstracted from [facility].       Family History   Problem Relation Age of Onset   • Alzheimer's disease Mother    • Hyperlipidemia Father    • Heart disease Father    • Hypertension Father    • Hyperlipidemia Brother    • Diabetes Brother    • Heart disease Brother    • Hypertension Brother    • Heart disease Maternal Grandfather    • Alzheimer's disease Paternal Grandmother    • Cancer Cousin    • Cancer Other        Past Surgical History:   Procedure Laterality Date   • CARDIAC CATHETERIZATION  2000   • COLONOSCOPY  10/28/2009   • OTHER SURGICAL HISTORY      Lapscopic surgery, both knees. Abstracted by [facility].   • OTHER SURGICAL HISTORY      Right rotor cuff Disorder. Abstracted from [facility].   • OTHER SURGICAL HISTORY      Kneww injections. Abstracted from [facility].   • OTHER SURGICAL HISTORY      Shot in lower back for bulging disc. Abstracted from [facility].       Past Medical History:   Diagnosis Date   • Abnormal cardiovascular stress test 11/01/2016    Abnormal Cardiolite Stress Test. Abstracted from [facility].   • Anxiety 03/15/2012    Uses this PRN, has stress due to caring for elderly parents. Abstracted from [facility].   • Atherosclerotic heart disease 03/15/2012   • Borderline hypertension 10/19/2015   • Bronchitis, acute 05/16/2016   • Chest pain 10/15/2014   • Chronic foot pain 10/16/2012    Will  see his podiatrist. Abstracted from [facility].   • Constipation 01/09/2018   • Coronary artery disease 10/24/2016   • DDD (degenerative disc disease), lumbar 02/13/2013   • Depression 08/10/2017   • Dyslipidemia    • Erectile dysfunction of organic origin 12/07/2012    Samples of cialis 20mg, voucher for the 5mg, samples of levitra 20mg and voucher for viagra 100mg. Call with preference. Order total T. Abstracted from [facility].   • GERD (gastroesophageal reflux disease) 12/07/2012   • H/O myocardial perfusion scan 11/01/2016    With Stress Test, abnormal. Abstracted from [facility].   • Hand pain 04/24/2014   • Headache 01/18/2016   • Hyperlipidemia 02/13/2018   • Hypertension 08/10/2017   • Hypogonadism, male 12/07/2012   • Hypothyroidism 02/08/2018   • Impacted cerumen, left ear 04/03/2019   • Influenza vaccination ordered 10/19/2016   • Insomnia 03/15/2012   • Lateral epicondylitis, left elbow 02/07/2017   • Left knee sprain 01/24/2013   • Leg pain, bilateral 04/02/2019   • Lumbar disc herniation with radiculopathy 09/08/2016   • Lumbar radiculitis 08/30/2016   • Mild memory disturbance 01/18/2016   • Myocardial infarction (CMS/[facility]) 11/01/2016   • Osteoarthritis of sacroiliac joint 03/12/2014   • Pain in right lower leg 07/11/2016   • Paresthesia 08/30/2016   • Paresthesia of both legs     Lower legs   • Postherpetic neuralgia 03/15/2016   • Preoperative cardiovascular examination 10/24/2016   • Preventative health care 02/07/2017   • S/P cardiac catheterization 01/01/2000    S/P cardiac cath, left heart-- Heart catheterization 2000 at [facility] in [place], [place]. No records available. Abstracted from [facility].   • Sciatica, right side 06/04/2013   • Shingles    • Shortness of breath 10/15/2014   • Spinal stenosis, lumbar 02/13/2013    TENS unit evidently not covered by Medicare. Abstracted from [employer].   • Vitamin B12 deficiency 08/10/2017   • Vitamin D deficiency 01/09/2018       Family  "History   Problem Relation Age of Onset   • Alzheimer's disease Mother    • Hyperlipidemia Father    • Heart disease Father    • Hypertension Father    • Hyperlipidemia Brother    • Diabetes Brother    • Heart disease Brother    • Hypertension Brother    • Heart disease Maternal Grandfather    • Alzheimer's disease Paternal Grandmother    • Cancer Cousin    • Cancer Other        Social History     Socioeconomic History   • Marital status:      Spouse name: Not on file   • Number of children: Not on file   • Years of education: Not on file   • Highest education level: Not on file   Tobacco Use   • Smoking status: Never Smoker   • Smokeless tobacco: Never Used   Substance and Sexual Activity   • Alcohol use: No     Frequency: Never   • Drug use: No   • Sexual activity: Defer         Procedures      Objective:       Physical Exam    /78 (BP Location: Left arm, Patient Position: Sitting, Cuff Size: Adult)   Pulse 50   Ht 182.9 cm (72\")   Wt 92.8 kg (204 lb 8 oz)   SpO2 97%   BMI 27.74 kg/m²   The patient is alert, oriented and in no distress.    Vital signs as noted above.    Head and neck revealed no carotid bruits or jugular venous distension.  No thyromegaly or lymphadenopathy is present.    Lungs clear.  No wheezing.  Breath sounds are normal bilaterally.    Heart normal first and second heart sounds.  No murmur..  No pericardial rub is present.  No gallop is present.    Abdomen soft and nontender.  No organomegaly is present.    Extremities revealed good peripheral pulses without any pedal edema.    Skin warm and dry.    Musculoskeletal system is grossly normal.    CNS grossly normal.        "

## 2019-11-15 RX ORDER — GABAPENTIN 800 MG/1
TABLET ORAL
Qty: 90 TABLET | Refills: 6 | Status: SHIPPED | OUTPATIENT
Start: 2019-11-15 | End: 2020-01-01

## 2020-01-01 ENCOUNTER — TELEPHONE (OUTPATIENT)
Dept: FAMILY MEDICINE CLINIC | Facility: CLINIC | Age: 75
End: 2020-01-01

## 2020-01-01 ENCOUNTER — OFFICE (OUTPATIENT)
Dept: URBAN - METROPOLITAN AREA CLINIC 64 | Facility: CLINIC | Age: 75
End: 2020-01-01

## 2020-01-01 ENCOUNTER — APPOINTMENT (OUTPATIENT)
Dept: CT IMAGING | Facility: HOSPITAL | Age: 75
End: 2020-01-01

## 2020-01-01 ENCOUNTER — HOSPITAL ENCOUNTER (EMERGENCY)
Facility: HOSPITAL | Age: 75
Discharge: HOME | End: 2020-12-20
Attending: EMERGENCY MEDICINE | Admitting: EMERGENCY MEDICINE

## 2020-01-01 ENCOUNTER — LAB (OUTPATIENT)
Dept: FAMILY MEDICINE CLINIC | Facility: CLINIC | Age: 75
End: 2020-01-01

## 2020-01-01 ENCOUNTER — OFFICE VISIT (OUTPATIENT)
Dept: FAMILY MEDICINE CLINIC | Facility: CLINIC | Age: 75
End: 2020-01-01

## 2020-01-01 VITALS
DIASTOLIC BLOOD PRESSURE: 81 MMHG | HEIGHT: 74 IN | SYSTOLIC BLOOD PRESSURE: 156 MMHG | HEART RATE: 55 BPM | TEMPERATURE: 97.6 F | OXYGEN SATURATION: 99 % | BODY MASS INDEX: 27.44 KG/M2 | RESPIRATION RATE: 16 BRPM | WEIGHT: 213.85 LBS

## 2020-01-01 VITALS
HEIGHT: 72 IN | TEMPERATURE: 97.1 F | DIASTOLIC BLOOD PRESSURE: 80 MMHG | WEIGHT: 203 LBS | SYSTOLIC BLOOD PRESSURE: 138 MMHG | BODY MASS INDEX: 27.5 KG/M2 | HEART RATE: 90 BPM

## 2020-01-01 VITALS
HEIGHT: 74 IN | HEART RATE: 60 BPM | DIASTOLIC BLOOD PRESSURE: 71 MMHG | WEIGHT: 208 LBS | SYSTOLIC BLOOD PRESSURE: 128 MMHG

## 2020-01-01 DIAGNOSIS — R93.3 ABNORMAL FINDINGS ON DIAGNOSTIC IMAGING OF OTHER PARTS OF DI: ICD-10-CM

## 2020-01-01 DIAGNOSIS — E55.9 VITAMIN D DEFICIENCY: ICD-10-CM

## 2020-01-01 DIAGNOSIS — R10.33 PERIUMBILICAL PAIN: ICD-10-CM

## 2020-01-01 DIAGNOSIS — G37.3: ICD-10-CM

## 2020-01-01 DIAGNOSIS — E53.8 B12 DEFICIENCY: ICD-10-CM

## 2020-01-01 DIAGNOSIS — R10.84 GENERALIZED ABDOMINAL PAIN: Primary | ICD-10-CM

## 2020-01-01 DIAGNOSIS — I25.118 ATHEROSCLEROTIC HEART DISEASE OF NATIVE CORONARY ARTERY WITH OTHER FORMS OF ANGINA PECTORIS (HCC): ICD-10-CM

## 2020-01-01 DIAGNOSIS — Z12.5 SCREENING FOR PROSTATE CANCER: ICD-10-CM

## 2020-01-01 DIAGNOSIS — Z00.00 PREVENTATIVE HEALTH CARE: ICD-10-CM

## 2020-01-01 DIAGNOSIS — R10.13 EPIGASTRIC PAIN: ICD-10-CM

## 2020-01-01 DIAGNOSIS — B01.12: ICD-10-CM

## 2020-01-01 DIAGNOSIS — Z00.00 PREVENTATIVE HEALTH CARE: Primary | ICD-10-CM

## 2020-01-01 DIAGNOSIS — R85.0 ABNORMAL LEVEL OF ENZYMES IN SPECIMENS FROM DIGESTIVE ORGANS: ICD-10-CM

## 2020-01-01 LAB
25(OH)D3 SERPL-MCNC: 52.2 NG/ML (ref 30–100)
ALBUMIN SERPL-MCNC: 4.4 G/DL (ref 3.5–5.2)
ALBUMIN SERPL-MCNC: 4.5 G/DL (ref 3.5–5.2)
ALBUMIN/GLOB SERPL: 1.5 G/DL
ALBUMIN/GLOB SERPL: 1.6 G/DL
ALP SERPL-CCNC: 71 U/L (ref 39–117)
ALP SERPL-CCNC: 83 U/L (ref 39–117)
ALT SERPL W P-5'-P-CCNC: 20 U/L (ref 1–41)
ALT SERPL W P-5'-P-CCNC: 23 U/L (ref 1–41)
ANION GAP SERPL CALCULATED.3IONS-SCNC: 10.1 MMOL/L (ref 5–15)
ANION GAP SERPL CALCULATED.3IONS-SCNC: 9 MMOL/L (ref 5–15)
AST SERPL-CCNC: 22 U/L (ref 1–40)
AST SERPL-CCNC: 25 U/L (ref 1–40)
BASOPHILS # BLD AUTO: 0.07 10*3/MM3 (ref 0–0.2)
BASOPHILS # BLD AUTO: 0.1 10*3/MM3 (ref 0–0.2)
BASOPHILS NFR BLD AUTO: 1.1 % (ref 0–1.5)
BASOPHILS NFR BLD AUTO: 1.3 % (ref 0–1.5)
BILIRUB SERPL-MCNC: 0.3 MG/DL (ref 0–1.2)
BILIRUB SERPL-MCNC: 0.8 MG/DL (ref 0–1.2)
BILIRUB UR QL STRIP: NEGATIVE
BILIRUB UR QL STRIP: NEGATIVE
BUN SERPL-MCNC: 10 MG/DL (ref 8–23)
BUN SERPL-MCNC: 11 MG/DL (ref 8–23)
BUN/CREAT SERPL: 11.5 (ref 7–25)
BUN/CREAT SERPL: 9.9 (ref 7–25)
CALCIUM SPEC-SCNC: 9.3 MG/DL (ref 8.6–10.5)
CALCIUM SPEC-SCNC: 9.6 MG/DL (ref 8.6–10.5)
CHLORIDE SERPL-SCNC: 101 MMOL/L (ref 98–107)
CHLORIDE SERPL-SCNC: 99 MMOL/L (ref 98–107)
CHOLEST SERPL-MCNC: 157 MG/DL (ref 0–200)
CLARITY UR: CLEAR
CLARITY UR: CLEAR
CO2 SERPL-SCNC: 26.9 MMOL/L (ref 22–29)
CO2 SERPL-SCNC: 28 MMOL/L (ref 22–29)
COLOR UR: YELLOW
COLOR UR: YELLOW
CREAT SERPL-MCNC: 0.96 MG/DL (ref 0.76–1.27)
CREAT SERPL-MCNC: 1.01 MG/DL (ref 0.76–1.27)
D-LACTATE SERPL-SCNC: 1.1 MMOL/L (ref 0.5–2)
DEPRECATED RDW RBC AUTO: 41.6 FL (ref 37–54)
DEPRECATED RDW RBC AUTO: 45.3 FL (ref 37–54)
EOSINOPHIL # BLD AUTO: 0.18 10*3/MM3 (ref 0–0.4)
EOSINOPHIL # BLD AUTO: 0.3 10*3/MM3 (ref 0–0.4)
EOSINOPHIL NFR BLD AUTO: 2.9 % (ref 0.3–6.2)
EOSINOPHIL NFR BLD AUTO: 3.7 % (ref 0.3–6.2)
ERYTHROCYTE [DISTWIDTH] IN BLOOD BY AUTOMATED COUNT: 13.4 % (ref 12.3–15.4)
ERYTHROCYTE [DISTWIDTH] IN BLOOD BY AUTOMATED COUNT: 13.6 % (ref 12.3–15.4)
GFR SERPL CREATININE-BSD FRML MDRD: 72 ML/MIN/1.73
GFR SERPL CREATININE-BSD FRML MDRD: 77 ML/MIN/1.73
GLOBULIN UR ELPH-MCNC: 2.9 GM/DL
GLOBULIN UR ELPH-MCNC: 2.9 GM/DL
GLUCOSE SERPL-MCNC: 102 MG/DL (ref 65–99)
GLUCOSE SERPL-MCNC: 72 MG/DL (ref 65–99)
GLUCOSE UR STRIP-MCNC: NEGATIVE MG/DL
GLUCOSE UR STRIP-MCNC: NEGATIVE MG/DL
HCT VFR BLD AUTO: 45.1 % (ref 37.5–51)
HCT VFR BLD AUTO: 46.3 % (ref 37.5–51)
HDLC SERPL-MCNC: 56 MG/DL (ref 40–60)
HGB BLD-MCNC: 14.9 G/DL (ref 13–17.7)
HGB BLD-MCNC: 15.3 G/DL (ref 13–17.7)
HGB UR QL STRIP.AUTO: NEGATIVE
HGB UR QL STRIP.AUTO: NEGATIVE
HOLD SPECIMEN: NORMAL
IMM GRANULOCYTES # BLD AUTO: 0.02 10*3/MM3 (ref 0–0.05)
IMM GRANULOCYTES NFR BLD AUTO: 0.3 % (ref 0–0.5)
KETONES UR QL STRIP: NEGATIVE
KETONES UR QL STRIP: NEGATIVE
LDLC SERPL CALC-MCNC: 81 MG/DL (ref 0–100)
LDLC/HDLC SERPL: 1.44 {RATIO}
LEUKOCYTE ESTERASE UR QL STRIP.AUTO: NEGATIVE
LEUKOCYTE ESTERASE UR QL STRIP.AUTO: NEGATIVE
LIPASE SERPL-CCNC: 81 U/L (ref 13–60)
LYMPHOCYTES # BLD AUTO: 2.34 10*3/MM3 (ref 0.7–3.1)
LYMPHOCYTES # BLD AUTO: 2.4 10*3/MM3 (ref 0.7–3.1)
LYMPHOCYTES NFR BLD AUTO: 31 % (ref 19.6–45.3)
LYMPHOCYTES NFR BLD AUTO: 37.1 % (ref 19.6–45.3)
MCH RBC QN AUTO: 29.4 PG (ref 26.6–33)
MCH RBC QN AUTO: 30.1 PG (ref 26.6–33)
MCHC RBC AUTO-ENTMCNC: 32.9 G/DL (ref 31.5–35.7)
MCHC RBC AUTO-ENTMCNC: 33 G/DL (ref 31.5–35.7)
MCV RBC AUTO: 89.2 FL (ref 79–97)
MCV RBC AUTO: 91.1 FL (ref 79–97)
MONOCYTES # BLD AUTO: 0.6 10*3/MM3 (ref 0.1–0.9)
MONOCYTES # BLD AUTO: 0.7 10*3/MM3 (ref 0.1–0.9)
MONOCYTES NFR BLD AUTO: 9 % (ref 5–12)
MONOCYTES NFR BLD AUTO: 9.5 % (ref 5–12)
NEUTROPHILS NFR BLD AUTO: 3.1 10*3/MM3 (ref 1.7–7)
NEUTROPHILS NFR BLD AUTO: 4.3 10*3/MM3 (ref 1.7–7)
NEUTROPHILS NFR BLD AUTO: 49.1 % (ref 42.7–76)
NEUTROPHILS NFR BLD AUTO: 55 % (ref 42.7–76)
NITRITE UR QL STRIP: NEGATIVE
NITRITE UR QL STRIP: NEGATIVE
NRBC BLD AUTO-RTO: 0 /100 WBC (ref 0–0.2)
NRBC BLD AUTO-RTO: 0.2 /100 WBC (ref 0–0.2)
PH UR STRIP.AUTO: 6 [PH] (ref 5–8)
PH UR STRIP.AUTO: 6.5 [PH] (ref 5–8)
PLATELET # BLD AUTO: 176 10*3/MM3 (ref 140–450)
PLATELET # BLD AUTO: 202 10*3/MM3 (ref 140–450)
PMV BLD AUTO: 11.3 FL (ref 6–12)
PMV BLD AUTO: 9.1 FL (ref 6–12)
POTASSIUM SERPL-SCNC: 4.1 MMOL/L (ref 3.5–5.2)
POTASSIUM SERPL-SCNC: 4.2 MMOL/L (ref 3.5–5.2)
PROT SERPL-MCNC: 7.3 G/DL (ref 6–8.5)
PROT SERPL-MCNC: 7.4 G/DL (ref 6–8.5)
PROT UR QL STRIP: NEGATIVE
PROT UR QL STRIP: NEGATIVE
PSA SERPL-MCNC: 0.54 NG/ML (ref 0–4)
QT INTERVAL: 427 MS
RBC # BLD AUTO: 5.06 10*6/MM3 (ref 4.14–5.8)
RBC # BLD AUTO: 5.08 10*6/MM3 (ref 4.14–5.8)
SODIUM SERPL-SCNC: 136 MMOL/L (ref 136–145)
SODIUM SERPL-SCNC: 138 MMOL/L (ref 136–145)
SP GR UR STRIP: 1.01 (ref 1–1.03)
SP GR UR STRIP: 1.02 (ref 1–1.03)
TRIGL SERPL-MCNC: 101 MG/DL (ref 0–150)
TROPONIN T SERPL-MCNC: <0.01 NG/ML (ref 0–0.03)
TSH SERPL DL<=0.05 MIU/L-ACNC: 2 UIU/ML (ref 0.27–4.2)
UROBILINOGEN UR QL STRIP: NORMAL
UROBILINOGEN UR QL STRIP: NORMAL
VIT B12 BLD-MCNC: 550 PG/ML (ref 211–946)
VLDLC SERPL-MCNC: 20.2 MG/DL (ref 5–40)
WBC # BLD AUTO: 6.31 10*3/MM3 (ref 3.4–10.8)
WBC # BLD AUTO: 7.8 10*3/MM3 (ref 3.4–10.8)
WHOLE BLOOD HOLD SPECIMEN: NORMAL

## 2020-01-01 PROCEDURE — 0 IOPAMIDOL PER 1 ML: Performed by: EMERGENCY MEDICINE

## 2020-01-01 PROCEDURE — 93005 ELECTROCARDIOGRAM TRACING: CPT | Performed by: EMERGENCY MEDICINE

## 2020-01-01 PROCEDURE — 99283 EMERGENCY DEPT VISIT LOW MDM: CPT

## 2020-01-01 PROCEDURE — 83605 ASSAY OF LACTIC ACID: CPT

## 2020-01-01 PROCEDURE — 82306 VITAMIN D 25 HYDROXY: CPT | Performed by: NURSE PRACTITIONER

## 2020-01-01 PROCEDURE — 80053 COMPREHEN METABOLIC PANEL: CPT | Performed by: EMERGENCY MEDICINE

## 2020-01-01 PROCEDURE — 85025 COMPLETE CBC W/AUTO DIFF WBC: CPT | Performed by: EMERGENCY MEDICINE

## 2020-01-01 PROCEDURE — 83690 ASSAY OF LIPASE: CPT | Performed by: EMERGENCY MEDICINE

## 2020-01-01 PROCEDURE — 99213 OFFICE O/P EST LOW 20 MIN: CPT | Performed by: NURSE PRACTITIONER

## 2020-01-01 PROCEDURE — 99203 OFFICE O/P NEW LOW 30 MIN: CPT | Performed by: INTERNAL MEDICINE

## 2020-01-01 PROCEDURE — 74177 CT ABD & PELVIS W/CONTRAST: CPT

## 2020-01-01 PROCEDURE — 85025 COMPLETE CBC W/AUTO DIFF WBC: CPT | Performed by: NURSE PRACTITIONER

## 2020-01-01 PROCEDURE — 81003 URINALYSIS AUTO W/O SCOPE: CPT | Performed by: NURSE PRACTITIONER

## 2020-01-01 PROCEDURE — 80061 LIPID PANEL: CPT | Performed by: NURSE PRACTITIONER

## 2020-01-01 PROCEDURE — G0103 PSA SCREENING: HCPCS | Performed by: NURSE PRACTITIONER

## 2020-01-01 PROCEDURE — 81003 URINALYSIS AUTO W/O SCOPE: CPT | Performed by: EMERGENCY MEDICINE

## 2020-01-01 PROCEDURE — 84443 ASSAY THYROID STIM HORMONE: CPT | Performed by: NURSE PRACTITIONER

## 2020-01-01 PROCEDURE — 82607 VITAMIN B-12: CPT | Performed by: NURSE PRACTITIONER

## 2020-01-01 PROCEDURE — 84484 ASSAY OF TROPONIN QUANT: CPT | Performed by: EMERGENCY MEDICINE

## 2020-01-01 PROCEDURE — G0439 PPPS, SUBSEQ VISIT: HCPCS | Performed by: NURSE PRACTITIONER

## 2020-01-01 PROCEDURE — 80053 COMPREHEN METABOLIC PANEL: CPT | Performed by: NURSE PRACTITIONER

## 2020-01-01 RX ORDER — OMEPRAZOLE 20 MG/1
CAPSULE, DELAYED RELEASE ORAL
Qty: 180 CAPSULE | Refills: 2 | Status: SHIPPED | OUTPATIENT
Start: 2020-01-01 | End: 2021-01-01

## 2020-01-01 RX ORDER — GABAPENTIN 800 MG/1
TABLET ORAL
Qty: 90 TABLET | Refills: 5 | Status: SHIPPED | OUTPATIENT
Start: 2020-01-01 | End: 2021-01-01

## 2020-01-01 RX ORDER — PREGABALIN 150 MG/1
150 CAPSULE ORAL 2 TIMES DAILY
Qty: 60 CAPSULE | Refills: 3 | Status: SHIPPED | OUTPATIENT
Start: 2020-01-01 | End: 2020-01-01

## 2020-01-01 RX ORDER — LEVOTHYROXINE SODIUM 75 MCG
TABLET ORAL
Qty: 90 TABLET | Refills: 0 | Status: SHIPPED | OUTPATIENT
Start: 2020-01-01 | End: 2021-01-01

## 2020-01-01 RX ORDER — LEVOTHYROXINE SODIUM 75 MCG
TABLET ORAL
Qty: 90 TABLET | Refills: 0 | Status: SHIPPED | OUTPATIENT
Start: 2020-01-01 | End: 2020-01-01

## 2020-01-01 RX ORDER — SODIUM CHLORIDE 0.9 % (FLUSH) 0.9 %
10 SYRINGE (ML) INJECTION AS NEEDED
Status: DISCONTINUED | OUTPATIENT
Start: 2020-01-01 | End: 2020-01-01 | Stop reason: HOSPADM

## 2020-01-01 RX ORDER — OMEPRAZOLE 40 MG/1
40 CAPSULE, DELAYED RELEASE ORAL
Qty: 30 | Refills: 11 | Status: ACTIVE
Start: 2020-01-01

## 2020-01-01 RX ORDER — PREGABALIN 150 MG/1
150 CAPSULE ORAL 3 TIMES DAILY
Qty: 90 CAPSULE | Refills: 1 | Status: SHIPPED | OUTPATIENT
Start: 2020-01-01 | End: 2021-01-01

## 2020-01-01 RX ORDER — PROMETHAZINE HYDROCHLORIDE 25 MG/1
25 TABLET ORAL EVERY 6 HOURS PRN
Qty: 15 TABLET | Refills: 0 | Status: SHIPPED | OUTPATIENT
Start: 2020-01-01 | End: 2021-01-01

## 2020-01-01 RX ORDER — ERGOCALCIFEROL 1.25 MG/1
CAPSULE ORAL
Qty: 12 CAPSULE | Refills: 0 | Status: SHIPPED | OUTPATIENT
Start: 2020-01-01 | End: 2021-01-01

## 2020-01-01 RX ADMIN — IOPAMIDOL 100 ML: 755 INJECTION, SOLUTION INTRAVENOUS at 20:56

## 2020-01-01 RX ADMIN — SODIUM CHLORIDE 500 ML: 9 INJECTION, SOLUTION INTRAVENOUS at 19:43

## 2020-02-28 RX ORDER — ERGOCALCIFEROL 1.25 MG/1
CAPSULE ORAL
Qty: 12 CAPSULE | Refills: 0 | Status: SHIPPED | OUTPATIENT
Start: 2020-02-28 | End: 2020-05-01

## 2020-03-18 RX ORDER — LEVOTHYROXINE SODIUM 75 MCG
TABLET ORAL
Qty: 90 TABLET | Refills: 1 | Status: SHIPPED | OUTPATIENT
Start: 2020-03-18 | End: 2020-01-01

## 2020-03-23 RX ORDER — ROSUVASTATIN CALCIUM 10 MG/1
TABLET, COATED ORAL
Qty: 90 TABLET | Refills: 2 | Status: SHIPPED | OUTPATIENT
Start: 2020-03-23 | End: 2021-01-01

## 2020-05-01 RX ORDER — ERGOCALCIFEROL 1.25 MG/1
CAPSULE ORAL
Qty: 12 CAPSULE | Refills: 0 | Status: SHIPPED | OUTPATIENT
Start: 2020-05-01 | End: 2020-01-01

## 2020-09-02 PROBLEM — E03.9 HYPOTHYROIDISM: Status: ACTIVE | Noted: 2018-02-08

## 2020-09-02 PROBLEM — B01.12: Status: ACTIVE | Noted: 2018-02-26

## 2020-09-02 PROBLEM — F32.A DEPRESSION: Status: ACTIVE | Noted: 2017-08-10

## 2020-09-02 PROBLEM — E53.8 COBALAMIN DEFICIENCY: Status: ACTIVE | Noted: 2017-08-10

## 2020-09-02 PROBLEM — K21.9 GASTROESOPHAGEAL REFLUX DISEASE: Status: ACTIVE | Noted: 2020-01-01

## 2020-09-02 PROBLEM — R20.0 LOWER EXTREMITY NUMBNESS: Status: ACTIVE | Noted: 2017-06-02

## 2020-09-02 PROBLEM — I21.3 ST ELEVATION (STEMI) MYOCARDIAL INFARCTION (HCC): Status: ACTIVE | Noted: 2020-01-01

## 2020-09-02 PROBLEM — G37.3: Status: ACTIVE | Noted: 2018-02-26

## 2020-09-02 PROBLEM — R26.89 NEURODEGENERATIVE GAIT DISORDER: Status: ACTIVE | Noted: 2017-08-01

## 2020-09-02 PROBLEM — E55.9 VITAMIN D DEFICIENCY: Status: ACTIVE | Noted: 2018-01-09

## 2020-09-02 PROBLEM — G47.33 OSA (OBSTRUCTIVE SLEEP APNEA): Status: ACTIVE | Noted: 2018-06-13

## 2020-09-02 PROBLEM — K59.00 CONSTIPATION: Status: ACTIVE | Noted: 2018-01-09

## 2020-09-02 PROBLEM — E78.5 HYPERLIPIDEMIA: Status: ACTIVE | Noted: 2020-01-01

## 2020-09-02 NOTE — PROGRESS NOTES
The ABCs of the Annual Wellness Visit  Subsequent Medicare Wellness Visit    Chief Complaint   Patient presents with   • Medicare Wellness-subsequent       Subjective   History of Present Illness:  Clifford Weber is a 75 y.o. male who presents for a Subsequent Medicare Wellness Visit and other concerns. Still with significant pain and numbness in right leg from previous shingles and postherpatic neuropathy. He is currently on gabapentin 800mg tid, but not sure how great it works. States he has pain in leg and around ankle. Feels like burning and someone squeezing around ankle.     HEALTH RISK ASSESSMENT    Recent Hospitalizations:  No hospitalization(s) within the last year.    Current Medical Providers:  Patient Care Team:  Cordelia Jacobo MD as PCP - General (Cardiology)  Wilmer Rodriguez MD as PCP - Claims Attributed    Smoking Status:  Social History     Tobacco Use   Smoking Status Never Smoker   Smokeless Tobacco Never Used       Alcohol Consumption:  Social History     Substance and Sexual Activity   Alcohol Use No   • Frequency: Never       Depression Screen:   PHQ-2/PHQ-9 Depression Screening 9/2/2020   Little interest or pleasure in doing things 0   Feeling down, depressed, or hopeless 0   Total Score 0       Fall Risk Screen:  STEADI Fall Risk Assessment was completed, and patient is at LOW risk for falls.Assessment completed on:9/2/2020    Health Habits and Functional and Cognitive Screening:  Functional & Cognitive Status 9/2/2020   Do you have difficulty preparing food and eating? No   Do you have difficulty bathing yourself, getting dressed or grooming yourself? No   Do you have difficulty using the toilet? No   Do you have difficulty moving around from place to place? No   Do you have trouble with steps or getting out of a bed or a chair? No   Current Diet Well Balanced Diet   Dental Exam Up to date   Eye Exam Up to date   Exercise (times per week) 5 times per week   Current Exercise Activities  Include Walking   Do you need help using the phone?  No   Are you deaf or do you have serious difficulty hearing?  No   Do you need help with transportation? Yes   Do you need help shopping? No   Do you need help preparing meals?  No   Do you need help with housework?  No   Do you need help with laundry? No   Do you need help taking your medications? No   Do you need help managing money? No   Do you ever drive or ride in a car without wearing a seat belt? No   Have you felt unusual stress, anger or loneliness in the last month? No   Who do you live with? Child   If you need help, do you have trouble finding someone available to you? No   Have you been bothered in the last four weeks by sexual problems? No   Do you have difficulty concentrating, remembering or making decisions? No       Does the patient have evidence of cognitive impairment? No    Asprin use counseling:Taking ASA appropriately as indicated    Age-appropriate Screening Schedule:  Refer to the list below for future screening recommendations based on patient's age, sex and/or medical conditions. Orders for these recommended tests are listed in the plan section. The patient has been provided with a written plan.    Health Maintenance   Topic Date Due   • LIPID PANEL  08/21/2020   • INFLUENZA VACCINE  10/30/2020 (Originally 8/1/2020)   • TDAP/TD VACCINES (1 - Tdap) 09/08/2021 (Originally 2/4/1956)   • ZOSTER VACCINE (1 of 2) 09/11/2021 (Originally 2/4/1997)   • COLONOSCOPY  04/22/2025          The following portions of the patient's history were reviewed and updated as appropriate: allergies, current medications, past family history, past medical history, past social history, past surgical history and problem list.    Outpatient Medications Prior to Visit   Medication Sig Dispense Refill   • aspirin (ASPIR) 81 MG EC tablet Take 81 mg by mouth Daily.     • Biotin 1000 MCG chewable tablet Chew 1 tablet Daily.     • Cyanocobalamin (B-12) 1000 MCG tablet Take  100 mcg by mouth Daily.     • gabapentin (NEURONTIN) 800 MG tablet TAKE ONE TABLET BY MOUTH THREE TIMES DAILY 90 tablet 6   • nitroglycerin (NITROSTAT) 0.4 MG SL tablet 0.4 mg. Dissolve 1 tablet under tongue as needed for chest pain. May repeat dose every 5 min as needed for total of doses. Abstracted from [employer].     • Omega-3 Fatty Acids (FISH OIL) 1000 MG capsule capsule Take 1,000 mg by mouth Daily With Breakfast.     • omeprazole (priLOSEC) 40 MG capsule Take 20 mg by mouth Daily. Take two pills daily. Abstracted from [employer].      • polyethylene glycol (MIRALAX) powder Take 17 g by mouth Daily As Needed. Dissolve 17 grams in 8 oz. Liquid and drink daily. Abstracted from [employer].      • rosuvastatin (CRESTOR) 10 MG tablet TAKE ONE TABLET BY MOUTH AT BEDTIME 90 tablet 2   • SYNTHROID 75 MCG tablet TAKE ONE TABLET BY MOUTH EVERY DAY 90 tablet 1   • tadalafil (CIALIS) 20 MG tablet TAKE 1 TABLET BY MOUTH AS DIRECTED AS NEEDED  11   • vitamin D (ERGOCALCIFEROL) 1.25 MG (02264 UT) capsule capsule TAKE 1 CAPSULE BY MOUTH ONCE WEEKLY 12 capsule 0   • tadalafil (CIALIS) 5 MG tablet Take 5 mg by mouth Daily As Needed.       No facility-administered medications prior to visit.        Patient Active Problem List   Diagnosis   • Chest pain   • Abnormal cardiovascular stress test   • Anxiety   • Atherosclerotic heart disease of native coronary artery with other forms of angina pectoris (CMS/HCC)   • Cobalamin deficiency   • Constipation   • Degeneration of lumbar or lumbosacral intervertebral disc   • Depression   • Gastroesophageal reflux disease   • Impotence of organic origin   • Insomnia   • Hypothyroidism   • Hypogonadism   • Hypertension   • Hyperlipidemia   • History of left heart catheterization ([facility])   • Lower extremity numbness   • Mild memory disturbance   • Lumbar radiculitis   • Osteoarthritis   • Neurodegenerative gait disorder   • PARAG (obstructive sleep apnea)   • Shingles   • Spinal stenosis of  "lumbar region   • Vitamin D deficiency   • ST elevation (STEMI) myocardial infarction (CMS/[facility])   • Varicella transverse myelitis (CMS/[facility])       Advanced Care Planning:  ACP discussion was held with the patient during this visit. Patient does not have an advance directive, information provided.    Review of Systems   Constitutional: Negative for activity change, appetite change and fever.   Respiratory: Negative for chest tightness.    Cardiovascular: Negative for chest pain, palpitations and leg swelling.   Gastrointestinal: Positive for constipation. Negative for abdominal distention, abdominal pain, diarrhea, nausea and vomiting.   Endocrine: Negative for polydipsia and polyuria.   Genitourinary: Negative for difficulty urinating and flank pain.   Musculoskeletal: Positive for gait problem. Negative for back pain.   Neurological: Positive for numbness. Negative for headaches.   Psychiatric/Behavioral: Negative for self-injury. The patient is not nervous/anxious.        Compared to one year ago, the patient feels his physical health is the same.  Compared to one year ago, the patient feels his mental health is the same.    Reviewed chart for potential of high risk medication in the elderly: yes  Reviewed chart for potential of harmful drug interactions in the elderly:yes    Objective         Vitals:    09/02/20 1043   BP: 138/80   BP Location: Left arm   Patient Position: Sitting   Cuff Size: Adult   Pulse: 90   Temp: 97.1 °F (36.2 °C)   TempSrc: Temporal   Weight: 92.1 kg (203 lb)   Height: 182.9 cm (72\")       Body mass index is 27.53 kg/m².  Discussed the patient's BMI with him. The BMI is in the acceptable range.    Physical Exam   Constitutional: He is oriented to person, place, and time. He appears well-developed and well-nourished.   HENT:   Head: Normocephalic.   Eyes: Pupils are equal, round, and reactive to light. Conjunctivae are normal.   Neck: Normal range of motion. Neck supple. No thyromegaly " present.   Cardiovascular: Normal rate and regular rhythm.   No murmur heard.  Pulmonary/Chest: Effort normal and breath sounds normal.   Abdominal: Soft. Bowel sounds are normal. There is no tenderness.   Musculoskeletal: Normal range of motion.   Neurological: He is alert and oriented to person, place, and time.   Skin: Skin is warm and dry. No lesion noted.   Psychiatric: He has a normal mood and affect. His behavior is normal.             Assessment/Plan   Medicare Risks and Personalized Health Plan  CMS Preventative Services Quick Reference  Advance Directive Discussion  Alcohol Misuse  Cardiovascular risk  Chronic Pain   Colon Cancer Screening  Dementia/Memory   Depression/Dysphoria  Fall Risk  Glaucoma Risk  Hearing Problem  Immunizations Discussed/Encouraged (specific immunizations; Influenza )    The above risks/problems have been discussed with the patient.  Pertinent information has been shared with the patient in the After Visit Summary.  Follow up plans and orders are seen below in the Assessment/Plan Section.    Diagnoses and all orders for this visit:    1. Preventative health care (Primary)  -     Comprehensive Metabolic Panel; Future  -     CBC & Differential; Future  -     Lipid Panel; Future  -     PSA Screen; Future  -     TSH; Future  -     Vitamin D 25 Hydroxy; Future  -     Vitamin B12; Future  -     Urinalysis With Culture If Indicated -; Future    2. Vitamin D deficiency  -     Vitamin D 25 Hydroxy; Future    3. B12 deficiency  -     Vitamin B12; Future    4. Screening for prostate cancer  -     PSA Screen; Future    5. Varicella transverse myelitis (CMS/HCC)  Assessment & Plan:  We will stop gabapentin and try lyrica.     Orders:  -     pregabalin (Lyrica) 150 MG capsule; Take 1 capsule by mouth 2 (Two) Times a Day.  Dispense: 60 capsule; Refill: 3    6. Atherosclerotic heart disease of native coronary artery with other forms of angina pectoris (CMS/HCC)  Assessment & Plan:  Coronary artery  disease is unchanged.  Continue current treatment regimen.  Cardiac status will be reassessed Follow up with Dr. Odalis alvarez. .      Follow Up:  Return in about 4 weeks (around 9/30/2020).      An After Visit Summary and PPPS were given to the patient.     During this visit for their annual exam, we reviewed their personal history, social history and family history. We went over their medications and all the recommended health maintenance items for their age group. They were given the opportunity to ask questions and discuss other concerns.

## 2020-09-02 NOTE — ASSESSMENT & PLAN NOTE
Coronary artery disease is unchanged.  Continue current treatment regimen.  Cardiac status will be reassessed Follow up with Dr. Odalis alvarez. .

## 2020-09-02 NOTE — PATIENT INSTRUCTIONS
Medicare Wellness  Personal Prevention Plan of Service     Date of Office Visit:  2020  Encounter Provider:  TESS Churchill  Place of Service:  [facility]  Patient Name: Clifford Weber  :  1945    As part of the Medicare Wellness portion of your visit today, we are providing you with this personalized preventive plan of services (PPPS). This plan is based upon recommendations of the [place] Preventive Services Task Force (USPSTF) and the Advisory Committee on Immunization Practices (ACIP).    This lists the preventive care services that should be considered, and provides dates of when you are due. Items listed as completed are up-to-date and do not require any further intervention.    Health Maintenance   Topic Date Due   • HEPATITIS C SCREENING  05/10/2019   • MEDICARE ANNUAL WELLNESS  05/10/2019   • LIPID PANEL  2020   • INFLUENZA VACCINE  10/30/2020 (Originally 2020)   • TDAP/TD VACCINES (1 - Tdap) 2021 (Originally 1956)   • ZOSTER VACCINE (1 of 2) 2021 (Originally 1997)   • COLONOSCOPY  2025   • Pneumococcal Vaccine Once at 65 Years Old  Completed       Orders Placed This Encounter   Procedures   • Comprehensive Metabolic Panel     Standing Status:   Future     Number of Occurrences:   1     Standing Expiration Date:   2021   • Lipid Panel     Standing Status:   Future     Number of Occurrences:   1     Standing Expiration Date:   2021   • PSA Screen     Standing Status:   Future     Number of Occurrences:   1     Standing Expiration Date:   2021   • TSH     Standing Status:   Future     Number of Occurrences:   1     Standing Expiration Date:   2021   • Vitamin D 25 Hydroxy     Standing Status:   Future     Number of Occurrences:   1     Standing Expiration Date:   2021   • Vitamin B12     Standing Status:   Future     Number of Occurrences:   1     Standing Expiration Date:   2021   •  Urinalysis With Culture If Indicated -     Standing Status:   Future     Standing Expiration Date:   9/2/2021   • CBC & Differential     Standing Status:   Future     Number of Occurrences:   1     Standing Expiration Date:   9/2/2021     Order Specific Question:   Manual Differential     Answer:   No       Return in about 4 weeks (around 9/30/2020).          Fall Prevention in the Home, Adult  Falls can cause injuries. They can happen to people of all ages. There are many things you can do to make your home safe and to help prevent falls. Ask for help when making these changes, if needed.  What actions can I take to prevent falls?  General Instructions  · Use good lighting in all rooms. Replace any light bulbs that burn out.  · Turn on the lights when you go into a dark area. Use night-lights.  · Keep items that you use often in easy-to-reach places. Lower the shelves around your home if necessary.  · Set up your furniture so you have a clear path. Avoid moving your furniture around.  · Do not have throw rugs and other things on the floor that can make you trip.  · Avoid walking on wet floors.  · If any of your floors are uneven, fix them.  · Add color or contrast paint or tape to clearly nahid and help you see:  ? Any grab bars or handrails.  ? First and last steps of stairways.  ? Where the edge of each step is.  · If you use a stepladder:  ? Make sure that it is fully opened. Do not climb a closed stepladder.  ? Make sure that both sides of the stepladder are locked into place.  ? Ask someone to hold the stepladder for you while you use it.  · If there are any pets around you, be aware of where they are.  What can I do in the bathroom?         · Keep the floor dry. Clean up any water that spills onto the floor as soon as it happens.  · Remove soap buildup in the tub or shower regularly.  · Use non-skid mats or decals on the floor of the tub or shower.  · Attach bath mats securely with double-sided, non-slip rug  tape.  · If you need to sit down in the shower, use a plastic, non-slip stool.  · Install grab bars by the toilet and in the tub and shower. Do not use towel bars as grab bars.  What can I do in the bedroom?  · Make sure that you have a light by your bed that is easy to reach.  · Do not use any sheets or blankets that are too big for your bed. They should not hang down onto the floor.  · Have a firm chair that has side arms. You can use this for support while you get dressed.  What can I do in the kitchen?  · Clean up any spills right away.  · If you need to reach something above you, use a strong step stool that has a grab bar.  · Keep electrical cords out of the way.  · Do not use floor polish or wax that makes floors slippery. If you must use wax, use non-skid floor wax.  What can I do with my stairs?  · Do not leave any items on the stairs.  · Make sure that you have a light switch at the top of the stairs and the bottom of the stairs. If you do not have them, ask someone to add them for you.  · Make sure that there are handrails on both sides of the stairs, and use them. Fix handrails that are broken or loose. Make sure that handrails are as long as the stairways.  · Install non-slip stair treads on all stairs in your home.  · Avoid having throw rugs at the top or bottom of the stairs. If you do have throw rugs, attach them to the floor with carpet tape.  · Choose a carpet that does not hide the edge of the steps on the stairway.  · Check any carpeting to make sure that it is firmly attached to the stairs. Fix any carpet that is loose or worn.  What can I do on the outside of my home?  · Use bright outdoor lighting.  · Regularly fix the edges of walkways and driveways and fix any cracks.  · Remove anything that might make you trip as you walk through a door, such as a raised step or threshold.  · Trim any bushes or trees on the path to your home.  · Regularly check to see if handrails are loose or broken. Make  sure that both sides of any steps have handrails.  · Install guardrails along the edges of any raised decks and porches.  · Clear walking paths of anything that might make someone trip, such as tools or rocks.  · Have any leaves, snow, or ice cleared regularly.  · Use sand or salt on walking paths during winter.  · Clean up any spills in your garage right away. This includes grease or oil spills.  What other actions can I take?  · Wear shoes that:  ? Have a low heel. Do not wear high heels.  ? Have rubber bottoms.  ? Are comfortable and fit you well.  ? Are closed at the toe. Do not wear open-toe sandals.  · Use tools that help you move around (mobility aids) if they are needed. These include:  ? Canes.  ? Walkers.  ? Scooters.  ? Crutches.  · Review your medicines with your doctor. Some medicines can make you feel dizzy. This can increase your chance of falling.  Ask your doctor what other things you can do to help prevent falls.  Where to find more information  · Centers for Disease Control and Prevention, [place]: https://cdc.gov  · [employer] [place] [employer] Aging: https://yr8bard.bartolo.nih.gov  Contact a doctor if:  · You are afraid of falling at home.  · You feel weak, drowsy, or dizzy at home.  · You fall at home.  Summary  · There are many simple things that you can do to make your home safe and to help prevent falls.  · Ways to make your home safe include removing tripping hazards and installing grab bars in the bathroom.  · Ask for help when making these changes in your home.  This information is not intended to replace advice given to you by your health care provider. Make sure you discuss any questions you have with your health care provider.  Document Released: 10/14/2010 Document Revised: 04/09/2020 Document Reviewed: 08/02/2018  Elsevier Patient Education © 2020 [employer].      Sit-to-Stand Exercise    The sit-to-stand exercise (also known as the chair stand or chair rise exercise) strengthens your lower  body and helps you maintain or improve your mobility and independence. The goal is to do the sit-to-stand exercise without using your hands. This will be easier as you become stronger. You should always talk with your health care provider before starting any exercise program, especially if you have had recent surgery.  Do the exercise exactly as told by your health care provider and adjust it as directed. It is normal to feel mild stretching, pulling, tightness, or discomfort as you do this exercise, but you should stop right away if you feel sudden pain or your pain gets worse. Do not begin doing this exercise until told by your health care provider.  What the sit-to-stand exercise does  The sit-to-stand exercise helps to strengthen the muscles in your thighs and the muscles in the center of your body that give you stability (core muscles). This exercise is especially helpful if:  · You have had knee or hip surgery.  · You have trouble getting up from a chair, out of a car, or off the toilet.  How to do the sit-to-stand exercise  1. Sit toward the front edge of a sturdy chair without armrests. Your knees should be bent and your feet should be flat on the floor and shoulder-width apart.  2. Place your hands lightly on each side of the seat. Keep your back and neck as straight as possible, with your chest slightly forward.  3. Breathe in slowly. Lean forward and slightly shift your weight to the front of your feet.  4. Breathe out as you slowly stand up. Use your hands as little as possible.  5. Stand and pause for a full breath in and out.  6. Breathe in as you sit down slowly. Tighten your core and abdominal muscles to control your lowering as much as possible.  7. Breathe out slowly.  8. Do this exercise 10-15 times. If needed, do it fewer times until you build up strength.  9. Rest for 1 minute, then do another set of 10-15 repetitions.  To change the difficulty of the sit-to-stand exercise  · If the exercise is  too difficult, use a chair with sturdy armrests, and push off the armrests to help you come to the standing position. You can also use the armrests to help slowly lower yourself back to sitting. As this gets easier, try to use your arms less. You can also place a firm cushion or pillow on the chair to make the surface higher.  · If this exercise is too easy, do not use your arms to help raise or lower yourself. You can also wear a weighted vest, use hand weights, increase your repetitions, or try a lower chair.  General tips  · You may feel tired when starting an exercise routine. This is normal.  · You may have muscle soreness that lasts a few days. This is normal. As you get stronger, you may not feel muscle soreness.  · Use smooth, steady movements.  · Do not  hold your breath during strength exercises. This can cause unsafe changes in your blood pressure.  · Breathe in slowly through your nose, and breathe out slowly through your mouth.  Summary  · Strengthening your lower body is an important step to help you move safely and independently.  · The sit-to-stand exercise helps strengthen the muscles in your thighs and core.  · You should always talk with your health care provider before starting any exercise program, especially if you have had recent surgery.  This information is not intended to replace advice given to you by your health care provider. Make sure you discuss any questions you have with your health care provider.  Document Released: 02/08/2018 Document Revised: 10/16/2019 Document Reviewed: 02/08/2018  [employer] Patient Education © 2020 [employer].      Advance Directive    Advance directives are legal documents that let you make choices ahead of time about your health care and medical treatment in case you become unable to communicate for yourself. Advance directives are a way for you to communicate your wishes to family, friends, and health care providers. This can help convey your decisions  about end-of-life care if you become unable to communicate.  Discussing and writing advance directives should happen over time rather than all at once. Advance directives can be changed depending on your situation and what you want, even after you have signed the advance directives.  If you do not have an advance directive, some states assign family decision makers to act on your behalf based on how closely you are related to them. Each state has its own laws regarding advance directives. You may want to check with your health care provider, , or state representative about the laws in your state. There are different types of advance directives, such as:  · Medical power of .  · Living will.  · Do not resuscitate (DNR) or do not attempt resuscitation (DNAR) order.  Health care proxy and medical power of   A health care proxy, also called a health care agent, is a person who is appointed to make medical decisions for you in cases in which you are unable to make the decisions yourself. Generally, people choose someone they know well and trust to represent their preferences. Make sure to ask this person for an agreement to act as your proxy. A proxy may have to exercise judgment in the event of a medical decision for which your wishes are not known.  A medical power of  is a legal document that names your health care proxy. Depending on the laws in your state, after the document is written, it may also need to be:  · Signed.  · Notarized.  · Dated.  · Copied.  · Witnessed.  · Incorporated into your medical record.  You may also want to appoint someone to manage your financial affairs in a situation in which you are unable to do so. This is called a durable power of  for finances. It is a separate legal document from the durable power of  for health care. You may choose the same person or someone different from your health care proxy to act as your agent in financial  matters.  If you do not appoint a proxy, or if there is a concern that the proxy is not acting in your best interests, a court-appointed guardian may be designated to act on your behalf.  Living will  A living will is a set of instructions documenting your wishes about medical care when you cannot express them yourself. Health care providers should keep a copy of your living will in your medical record. You may want to give a copy to family members or friends. To alert caregivers in case of an emergency, you can place a card in your wallet to let them know that you have a living will and where they can find it. A living will is used if you become:  · Terminally ill.  · Incapacitated.  · Unable to communicate or make decisions.  Items to consider in your living will include:  · The use or non-use of life-sustaining equipment, such as dialysis machines and breathing machines (ventilators).  · A DNR or DNAR order, which is the instruction not to use cardiopulmonary resuscitation (CPR) if breathing or heartbeat stops.  · The use or non-use of tube feeding.  · Withholding of food and fluids.  · Comfort (palliative) care when the goal becomes comfort rather than a cure.  · Organ and tissue donation.  A living will does not give instructions for distributing your money and property if you should pass away. It is recommended that you seek the advice of a  when writing a will. Decisions about taxes, beneficiaries, and asset distribution will be legally binding. This process can relieve your family and friends of any concerns surrounding disputes or questions that may come up about the distribution of your assets.  DNR or DNAR  A DNR or DNAR order is a request not to have CPR in the event that your heart stops beating or you stop breathing. If a DNR or DNAR order has not been made and shared, a health care provider will try to help any patient whose heart has stopped or who has stopped breathing. If you plan to have  surgery, talk with your health care provider about how your DNR or DNAR order will be followed if problems occur.  Summary  · Advance directives are the legal documents that allow you to make choices ahead of time about your health care and medical treatment in case you become unable to communicate for yourself.  · The process of discussing and writing advance directives should happen over time. You can change the advance directives, even after you have signed them.  · Advance directives include DNR or DNAR orders, living crowder, and designating an agent as your medical power of .  This information is not intended to replace advice given to you by your health care provider. Make sure you discuss any questions you have with your health care provider.  Document Released: 03/26/2009 Document Revised: 01/22/2020 Document Reviewed: 11/06/2017  Elsevier Patient Education © 2020 [employer].

## 2020-12-09 NOTE — TELEPHONE ENCOUNTER
PATIENTS WIFE IS CALLING IN SHE STATES THAT PATIENT IS EXPERIENCING SOME PAINS UNDERNEATH HIS RIB CAGE AND IS WANTING TO GET XRAY OF THIS AREA.    SHE STATES BEEN GOING ON FOR QUITE A FEW DAYS NOW.      PLEASE ADVISE     CALLBACK NUMBER    [phone]

## 2020-12-09 NOTE — TELEPHONE ENCOUNTER
Pt called and is having stomach pain offered appointment .he is going seek treatment at urgent care center

## 2020-12-09 NOTE — TELEPHONE ENCOUNTER
Ok for rib series at [facility], but need to know which side. Please contact pt and find out which side, then place order for him to go to [facility].

## 2020-12-21 NOTE — ED PROVIDER NOTES
Subjective   Chief complaint abdominal pain    History of present illness 75-year-old male complains of intermittent abdominal pain in the mid abdomen to the right side of his abdomen for about 2 months.  Nothing seems to trigger it it comes and goes even when he sitting still becomes moderate cramping in nature from the epigastric area to the right side.  No fever chills sweats he has nausea but no vomiting.  No chest pain neck arm or jaw pain no injury no one at home with similar illness no foreign travels no leg pain or swelling no recent long car ride plane ride immobilization.  Denies any injury or evaluation has had no black or bloody stools some constipation.  No weight loss no night sweats.  Patient states he had some pain earlier this evening and lasted for several minutes but now he has no pain is gone          Review of Systems   Constitutional: Negative for chills and fever.   HENT: Negative for congestion and sinus pressure.    Eyes: Negative for photophobia and visual disturbance.   Respiratory: Negative for chest tightness and shortness of breath.    Cardiovascular: Negative for chest pain and leg swelling.   Gastrointestinal: Positive for abdominal pain and nausea. Negative for vomiting.   Endocrine: Negative for cold intolerance and heat intolerance.   Genitourinary: Negative for difficulty urinating and dysuria.   Musculoskeletal: Negative for arthralgias and back pain.   Skin: Negative for color change and pallor.   Neurological: Negative for dizziness and light-headedness.   Psychiatric/Behavioral: Negative for agitation and behavioral problems.       Past Medical History:   Diagnosis Date   • Abnormal cardiovascular stress test 11/01/2016    Abnormal Cardiolite Stress Test. Abstracted from [facility].   • Anxiety 03/15/2012    Uses this PRN, has stress due to caring for elderly parents. Abstracted from [facility].   • Atherosclerotic heart disease 03/15/2012   • Borderline hypertension  10/19/2015   • Bronchitis, acute 05/16/2016   • Chest pain 10/15/2014   • Chronic foot pain 10/16/2012    Will see his podiatrist. Abstracted from [facility].   • Constipation 01/09/2018   • Coronary artery disease 10/24/2016   • DDD (degenerative disc disease), lumbar 02/13/2013   • Depression 08/10/2017   • Dyslipidemia    • Erectile dysfunction of organic origin 12/07/2012    Samples of cialis 20mg, voucher for the 5mg, samples of levitra 20mg and voucher for viagra 100mg. Call with preference. Order total T. Abstracted from [facility].   • GERD (gastroesophageal reflux disease) 12/07/2012   • H/O myocardial perfusion scan 11/01/2016    With Stress Test, abnormal. Abstracted from [facility].   • Hand pain 04/24/2014   • Headache 01/18/2016   • Hyperlipidemia 02/13/2018   • Hypertension 08/10/2017   • Hypogonadism, male 12/07/2012   • Hypothyroidism 02/08/2018   • Impacted cerumen, left ear 04/03/2019   • Influenza vaccination ordered 10/19/2016   • Insomnia 03/15/2012   • Lateral epicondylitis, left elbow 02/07/2017   • Left knee sprain 01/24/2013   • Leg pain, bilateral 04/02/2019   • Lumbar disc herniation with radiculopathy 09/08/2016   • Lumbar radiculitis 08/30/2016   • Mild memory disturbance 01/18/2016   • Myocardial infarction (CMS/[facility]) 11/01/2016   • Osteoarthritis of sacroiliac joint 03/12/2014   • Pain in right lower leg 07/11/2016   • Paresthesia 08/30/2016   • Paresthesia of both legs     Lower legs   • Postherpetic neuralgia 03/15/2016   • Preoperative cardiovascular examination 10/24/2016   • Preventative health care 02/07/2017   • S/P cardiac catheterization 01/01/2000    S/P cardiac cath, left heart-- Heart catheterization 2000 at [facility] in [place], [place]. No records available. Abstracted from [facility].   • Sciatica, right side 06/04/2013   • Shingles    • Shortness of breath 10/15/2014   • Spinal stenosis, lumbar 02/13/2013    TENS unit evidently not covered by Medicare. Abstracted  from [facility].   • Vitamin B12 deficiency 08/10/2017   • Vitamin D deficiency 01/09/2018       Allergies   Allergen Reactions   • Niacin Unknown (See Comments)     Abstracted from [facility].       Past Surgical History:   Procedure Laterality Date   • CARDIAC CATHETERIZATION  2000   • COLONOSCOPY  10/28/2009   • OTHER SURGICAL HISTORY      Lapscopic surgery, both knees. Abstracted by [facility].   • OTHER SURGICAL HISTORY      Kneww injections. Abstracted from [facility].   • OTHER SURGICAL HISTORY      Shot in lower back for bulging disc. Abstracted from [facility].       Family History   Problem Relation Age of Onset   • Alzheimer's disease Mother    • Hyperlipidemia Father    • Heart disease Father    • Hypertension Father    • Hyperlipidemia Brother    • Diabetes Brother    • Heart disease Brother    • Hypertension Brother    • Heart disease Maternal Grandfather    • Alzheimer's disease Paternal Grandmother    • Cancer Cousin    • Cancer Other    • Heart disease Brother    • Hypertension Brother        Social History     Socioeconomic History   • Marital status:      Spouse name: Not on file   • Number of children: 1   • Years of education: Not on file   • Highest education level: Not on file   Tobacco Use   • Smoking status: Never Smoker   • Smokeless tobacco: Never Used   Substance and Sexual Activity   • Alcohol use: No     Frequency: Never   • Drug use: No   • Sexual activity: Defer     Prior to Admission medications    Medication Sig Start Date End Date Taking? Authorizing Provider   aspirin (ASPIR) 81 MG EC tablet Take 81 mg by mouth Daily. 5/22/12   Beena Siddiqui APRN   Biotin 1000 MCG chewable tablet Chew 1 tablet Daily.    Provider, MD Maritza   Cyanocobalamin (B-12) 1000 MCG tablet Take 100 mcg by mouth Daily.    ProviderMaritza MD   gabapentin (NEURONTIN) 800 MG tablet TAKE ONE TABLET BY MOUTH THREE TIMES DAILY 9/24/20   rGis Muro APRN   nitroglycerin (NITROSTAT) 0.4 MG  SL tablet 0.4 mg. Dissolve 1 tablet under tongue as needed for chest pain. May repeat dose every 5 min as needed for total of doses. Abstracted from [facility]. 10/15/14   Cordelia Jacobo MD   Omega-3 Fatty Acids (FISH OIL) 1000 MG capsule capsule Take 1,000 mg by mouth Daily With Breakfast.    Maritza Henley MD   omeprazole (priLOSEC) 20 MG capsule TAKE 2 CAPSULES BY MOUTH EVERY DAY 10/5/20   Deborah Zacarias MD   polyethylene glycol (MIRALAX) powder Take 17 g by mouth Daily As Needed. Dissolve 17 grams in 8 oz. Liquid and drink daily. Abstracted from [facility].     Maritza Henley MD   pregabalin (Lyrica) 150 MG capsule Take 1 capsule by mouth 3 (Three) Times a Day. 10/22/20   Grsi Muro APRN   promethazine (PHENERGAN) 25 MG tablet Take 1 tablet by mouth Every 6 (Six) Hours As Needed for Nausea or Vomiting. 12/20/20   Mike Laguerre MD   rosuvastatin (CRESTOR) 10 MG tablet TAKE ONE TABLET BY MOUTH AT BEDTIME 3/23/20   Gris Muro APRN   Synthroid 75 MCG tablet TAKE ONE TABLET BY MOUTH EVERY DAY 11/30/20   Gris Muro APRN   tadalafil (CIALIS) 20 MG tablet TAKE 1 TABLET BY MOUTH AS DIRECTED AS NEEDED 8/2/19   Maritza Henley MD   vitamin D (ERGOCALCIFEROL) 1.25 MG (35686 UT) capsule capsule TAKE 1 CAPSULE BY MOUTH ONCE WEEKLY 7/31/20   Gris Muro APRN     EKG my interpretation normal sinus rhythm rate of 60 normal axis no hypertrophy evidence of previous anterior septal infarct QTC of 430 abnormal EKG but no change from previous      Objective   Physical Exam  75-year-old male awake alert no acute distress HEENT extraocular muscles intact pupils equal round reactive sclera clear mouth is clear neck is supple no adenopathy no JVD lungs clear no retraction no CVA tenderness heart regular without murmur abdomen soft nontender nondistended good bowel sounds no peritoneal findings or pulsatile masses no bruits extremities pulses are equal throughout upper and lower  extremities no edema cords or Homans' sign evidence of DVT.  Patient's awake alert follows commands no facial symmetry normal speech without focal weakness  Procedures           ED Course      Results for orders placed or performed during the hospital encounter of 12/20/20   Comprehensive Metabolic Panel    Specimen: Blood   Result Value Ref Range    Glucose 72 65 - 99 mg/dL    BUN 10 8 - 23 mg/dL    Creatinine 1.01 0.76 - 1.27 mg/dL    Sodium 136 136 - 145 mmol/L    Potassium 4.1 3.5 - 5.2 mmol/L    Chloride 99 98 - 107 mmol/L    CO2 28.0 22.0 - 29.0 mmol/L    Calcium 9.3 8.6 - 10.5 mg/dL    Total Protein 7.4 6.0 - 8.5 g/dL    Albumin 4.50 3.50 - 5.20 g/dL    ALT (SGPT) 20 1 - 41 U/L    AST (SGOT) 22 1 - 40 U/L    Alkaline Phosphatase 83 39 - 117 U/L    Total Bilirubin 0.3 0.0 - 1.2 mg/dL    eGFR Non African Amer 72 >60 mL/min/1.73    Globulin 2.9 gm/dL    A/G Ratio 1.6 g/dL    BUN/Creatinine Ratio 9.9 7.0 - 25.0    Anion Gap 9.0 5.0 - 15.0 mmol/L   Lipase    Specimen: Blood   Result Value Ref Range    Lipase 81 (H) 13 - 60 U/L   Urinalysis With Microscopic If Indicated (No Culture) - Urine, Clean Catch    Specimen: Urine, Clean Catch   Result Value Ref Range    Color, UA Yellow Yellow, Straw    Appearance, UA Clear Clear    pH, UA 6.5 5.0 - 8.0    Specific Gravity, UA 1.010 1.005 - 1.030    Glucose, UA Negative Negative    Ketones, UA Negative Negative    Bilirubin, UA Negative Negative    Blood, UA Negative Negative    Protein, UA Negative Negative    Leuk Esterase, UA Negative Negative    Nitrite, UA Negative Negative    Urobilinogen, UA 0.2 E.U./dL 0.2 - 1.0 E.U./dL   Troponin    Specimen: Blood   Result Value Ref Range    Troponin T <0.010 0.000 - 0.030 ng/mL   CBC Auto Differential    Specimen: Blood   Result Value Ref Range    WBC 7.80 3.40 - 10.80 10*3/mm3    RBC 5.06 4.14 - 5.80 10*6/mm3    Hemoglobin 14.9 13.0 - 17.7 g/dL    Hematocrit 45.1 37.5 - 51.0 %    MCV 89.2 79.0 - 97.0 fL    MCH 29.4 26.6 - 33.0  pg    MCHC 32.9 31.5 - 35.7 g/dL    RDW 13.6 12.3 - 15.4 %    RDW-SD 41.6 37.0 - 54.0 fl    MPV 9.1 6.0 - 12.0 fL    Platelets 202 140 - 450 10*3/mm3    Neutrophil % 55.0 42.7 - 76.0 %    Lymphocyte % 31.0 19.6 - 45.3 %    Monocyte % 9.0 5.0 - 12.0 %    Eosinophil % 3.7 0.3 - 6.2 %    Basophil % 1.3 0.0 - 1.5 %    Neutrophils, Absolute 4.30 1.70 - 7.00 10*3/mm3    Lymphocytes, Absolute 2.40 0.70 - 3.10 10*3/mm3    Monocytes, Absolute 0.70 0.10 - 0.90 10*3/mm3    Eosinophils, Absolute 0.30 0.00 - 0.40 10*3/mm3    Basophils, Absolute 0.10 0.00 - 0.20 10*3/mm3    nRBC 0.2 0.0 - 0.2 /100 WBC   POC Lactate    Specimen: Blood   Result Value Ref Range    Lactate 1.1 0.5 - 2.0 mmol/L   ECG 12 Lead   Result Value Ref Range    QT Interval 427 ms   Light Blue Top   Result Value Ref Range    Extra Tube hold for add-on    Gold Top - SST   Result Value Ref Range    Extra Tube Hold for add-ons.      Ct Abdomen Pelvis With Contrast    Result Date: 12/20/2020  1. No evidence of acute disease in the abdomen or pelvis. 2. Hypoenhancing lesion in the uncinate process of the pancreas. Although nonspecific, this should be considered a neoplastic process until proven otherwise. Pancreatic mass protocol CT or MRI is recommended for further evaluation. 3. Polypoid nodularity along the posterior wall of the urinary bladder. This should also be considered neoplasm until proven otherwise. Cystoscopy is recommended. Alternative differential considerations include blood products and debris/fungus ball. 4. Small hiatal hernia. 5. Fat-containing umbilical hernia. 6. Additional chronic findings as above. Electronically signed by:  Toi Fitzgerald M.D.  12/20/2020 7:18 PM    Medications   sodium chloride 0.9 % flush 10 mL (has no administration in time range)   sodium chloride 0.9 % bolus 500 mL (500 mL Intravenous New Bag 12/20/20 1943)   iopamidol (ISOVUE-370) 76 % injection 100 mL (100 mL Intravenous Given 12/20/20 4606)                                             MDM  Number of Diagnoses or Management Options  Generalized abdominal pain:   Diagnosis management comments: Medical decision making.  Patient IV normal saline 500 cc bolus laboratory evaluation EKG was obtained chemistries unremarkable liver enzymes unremarkable lipase was 81 urine was negative troponin was negative CBC unremarkable lactic acid unremarkable EKG unchanged CT abdomen pelvis the patient has a hypoenhancing lesion in the uncinate process of the pancreas is nonspecific but needs to follow-up for pancreatic cancer.  Patient has a polypoid nodularity on the posterior wall of the bladder that needs to be followed up as well.  Hiatal hernia umbilical hernia fat-containing.  Without other acute findings.  Patient repeat exam is resting company abdomen soft nontender good bowel sounds no pulsatile masses we talked about the findings.  Stressed the importance of follow-up I will see need for admission tonight.  We talked about pancreatic cancer.  Talked about gallbladder disease talked about bladder cancer and this needs to be followed up with his doctor calling them tomorrow and will make referrals as well but this needs to be checked out in the next couple weeks to make sure is nothing such as a cancer.  He voices understanding.  He was stable he looked well he will be discharged home for outpatient management      Final diagnoses:   Generalized abdominal pain            Mike Laguerre MD  12/20/20 4554

## 2020-12-21 NOTE — DISCHARGE INSTRUCTIONS
[place] diet no greasy fried fatty spicy food or caffeine.  Phenergan.  Please follow-up as directed above call your doctor tomorrow and follow-up this pancreatic lesion as soon as possible.  Rule out pancreatic cancer.  Also follow-up with urology and get cystoscopy for bladder issue and abnormality as well.  Return for fever vomiting black or bloody stool increasing pain or any other new or worsening problems or concerns

## 2021-01-01 ENCOUNTER — INPATIENT HOSPITAL (OUTPATIENT)
Dept: URBAN - METROPOLITAN AREA HOSPITAL 84 | Facility: HOSPITAL | Age: 76
End: 2021-01-01

## 2021-01-01 ENCOUNTER — TELEPHONE (OUTPATIENT)
Dept: FAMILY MEDICINE CLINIC | Facility: CLINIC | Age: 76
End: 2021-01-01

## 2021-01-01 ENCOUNTER — ANESTHESIA (OUTPATIENT)
Dept: GASTROENTEROLOGY | Facility: HOSPITAL | Age: 76
End: 2021-01-01

## 2021-01-01 ENCOUNTER — LAB (OUTPATIENT)
Dept: LAB | Facility: HOSPITAL | Age: 76
End: 2021-01-01

## 2021-01-01 ENCOUNTER — TRANSCRIBE ORDERS (OUTPATIENT)
Dept: ADMINISTRATIVE | Facility: HOSPITAL | Age: 76
End: 2021-01-01

## 2021-01-01 ENCOUNTER — LAB REQUISITION (OUTPATIENT)
Dept: LAB | Facility: HOSPITAL | Age: 76
End: 2021-01-01

## 2021-01-01 ENCOUNTER — HOSPITAL ENCOUNTER (OUTPATIENT)
Facility: HOSPITAL | Age: 76
Setting detail: HOSPITAL OUTPATIENT SURGERY
Discharge: HOME | End: 2021-01-07
Attending: INTERNAL MEDICINE | Admitting: INTERNAL MEDICINE

## 2021-01-01 ENCOUNTER — HOSPITAL ENCOUNTER (OUTPATIENT)
Dept: INTERVENTIONAL RADIOLOGY/VASCULAR | Facility: HOSPITAL | Age: 76
Discharge: HOME | End: 2021-03-26
Admitting: INTERNAL MEDICINE

## 2021-01-01 ENCOUNTER — ANESTHESIA EVENT (OUTPATIENT)
Dept: GASTROENTEROLOGY | Facility: HOSPITAL | Age: 76
End: 2021-01-01

## 2021-01-01 ENCOUNTER — OFFICE (OUTPATIENT)
Dept: URBAN - METROPOLITAN AREA CLINIC 64 | Facility: CLINIC | Age: 76
End: 2021-01-01

## 2021-01-01 ENCOUNTER — HOSPITAL ENCOUNTER (INPATIENT)
Facility: HOSPITAL | Age: 76
LOS: 4 days | Discharge: LEFT BEFORE DISCHARGE | End: 2021-04-23
Attending: EMERGENCY MEDICINE | Admitting: INTERNAL MEDICINE

## 2021-01-01 ENCOUNTER — APPOINTMENT (OUTPATIENT)
Dept: GENERAL RADIOLOGY | Facility: HOSPITAL | Age: 76
End: 2021-01-01

## 2021-01-01 ENCOUNTER — HOSPITAL ENCOUNTER (EMERGENCY)
Facility: HOSPITAL | Age: 76
Discharge: HOME | End: 2021-01-10
Attending: EMERGENCY MEDICINE | Admitting: EMERGENCY MEDICINE

## 2021-01-01 ENCOUNTER — APPOINTMENT (OUTPATIENT)
Dept: CT IMAGING | Facility: HOSPITAL | Age: 76
End: 2021-01-01

## 2021-01-01 ENCOUNTER — HOSPITAL ENCOUNTER (INPATIENT)
Facility: HOSPITAL | Age: 76
LOS: 1 days | End: 2021-06-29
Attending: EMERGENCY MEDICINE | Admitting: EMERGENCY MEDICINE

## 2021-01-01 ENCOUNTER — HOSPITAL ENCOUNTER (EMERGENCY)
Facility: HOSPITAL | Age: 76
Discharge: HOME | End: 2021-04-03
Admitting: EMERGENCY MEDICINE

## 2021-01-01 ENCOUNTER — ON CAMPUS - OUTPATIENT (OUTPATIENT)
Dept: URBAN - METROPOLITAN AREA HOSPITAL 85 | Facility: HOSPITAL | Age: 76
End: 2021-01-01

## 2021-01-01 VITALS
DIASTOLIC BLOOD PRESSURE: 82 MMHG | TEMPERATURE: 97.9 F | HEIGHT: 74 IN | OXYGEN SATURATION: 95 % | HEART RATE: 55 BPM | SYSTOLIC BLOOD PRESSURE: 158 MMHG | WEIGHT: 169.31 LBS | BODY MASS INDEX: 21.73 KG/M2 | RESPIRATION RATE: 18 BRPM

## 2021-01-01 VITALS
HEIGHT: 74 IN | SYSTOLIC BLOOD PRESSURE: 137 MMHG | TEMPERATURE: 97.6 F | DIASTOLIC BLOOD PRESSURE: 83 MMHG | RESPIRATION RATE: 16 BRPM | WEIGHT: 167.55 LBS | BODY MASS INDEX: 21.5 KG/M2 | OXYGEN SATURATION: 100 % | HEART RATE: 88 BPM

## 2021-01-01 VITALS
HEART RATE: 57 BPM | HEIGHT: 74 IN | SYSTOLIC BLOOD PRESSURE: 169 MMHG | TEMPERATURE: 97.5 F | RESPIRATION RATE: 17 BRPM | DIASTOLIC BLOOD PRESSURE: 89 MMHG | WEIGHT: 204.37 LBS | OXYGEN SATURATION: 98 % | BODY MASS INDEX: 26.23 KG/M2

## 2021-01-01 VITALS
OXYGEN SATURATION: 97 % | WEIGHT: 202.82 LBS | BODY MASS INDEX: 26.03 KG/M2 | HEIGHT: 74 IN | RESPIRATION RATE: 18 BRPM | SYSTOLIC BLOOD PRESSURE: 138 MMHG | HEART RATE: 60 BPM | DIASTOLIC BLOOD PRESSURE: 98 MMHG | TEMPERATURE: 98.1 F

## 2021-01-01 VITALS
WEIGHT: 186 LBS | HEART RATE: 62 BPM | SYSTOLIC BLOOD PRESSURE: 101 MMHG | DIASTOLIC BLOOD PRESSURE: 61 MMHG | HEIGHT: 74 IN

## 2021-01-01 VITALS
WEIGHT: 170 LBS | RESPIRATION RATE: 8 BRPM | OXYGEN SATURATION: 58 % | DIASTOLIC BLOOD PRESSURE: 63 MMHG | HEART RATE: 28 BPM | BODY MASS INDEX: 25.18 KG/M2 | SYSTOLIC BLOOD PRESSURE: 142 MMHG | HEIGHT: 69 IN

## 2021-01-01 VITALS
HEART RATE: 66 BPM | SYSTOLIC BLOOD PRESSURE: 148 MMHG | WEIGHT: 194 LBS | DIASTOLIC BLOOD PRESSURE: 94 MMHG | HEIGHT: 74 IN

## 2021-01-01 DIAGNOSIS — K85.90 ACUTE PANCREATITIS, UNSPECIFIED COMPLICATION STATUS, UNSPECIFIED PANCREATITIS TYPE: ICD-10-CM

## 2021-01-01 DIAGNOSIS — R10.13 EPIGASTRIC PAIN: ICD-10-CM

## 2021-01-01 DIAGNOSIS — E87.6 HYPOKALEMIA: ICD-10-CM

## 2021-01-01 DIAGNOSIS — C25.9 MALIGNANT NEOPLASM OF PANCREAS, UNSPECIFIED: ICD-10-CM

## 2021-01-01 DIAGNOSIS — R68.81 EARLY SATIETY: ICD-10-CM

## 2021-01-01 DIAGNOSIS — K86.89 OTHER SPECIFIED DISEASES OF PANCREAS: ICD-10-CM

## 2021-01-01 DIAGNOSIS — D64.9 ANEMIA, UNSPECIFIED TYPE: ICD-10-CM

## 2021-01-01 DIAGNOSIS — D61.818 OTHER PANCYTOPENIA: ICD-10-CM

## 2021-01-01 DIAGNOSIS — C25.7 MALIGNANT NEOPLASM OF OTHER PARTS OF PANCREAS: ICD-10-CM

## 2021-01-01 DIAGNOSIS — I25.10 ATHEROSCLEROTIC HEART DISEASE OF NATIVE CORONARY ARTERY WITH: ICD-10-CM

## 2021-01-01 DIAGNOSIS — R93.3 ABNORMAL FINDINGS ON DIAGNOSTIC IMAGING OF OTHER PARTS OF DI: ICD-10-CM

## 2021-01-01 DIAGNOSIS — R19.7 DIARRHEA, UNSPECIFIED TYPE: Primary | ICD-10-CM

## 2021-01-01 DIAGNOSIS — K86.89 PANCREATIC MASS: ICD-10-CM

## 2021-01-01 DIAGNOSIS — D70.1 CHEMOTHERAPY-INDUCED NEUTROPENIA (HCC): ICD-10-CM

## 2021-01-01 DIAGNOSIS — R11.2 NAUSEA WITH VOMITING, UNSPECIFIED: ICD-10-CM

## 2021-01-01 DIAGNOSIS — R10.30 LOWER ABDOMINAL PAIN, UNSPECIFIED: ICD-10-CM

## 2021-01-01 DIAGNOSIS — C25.9 MALIGNANT NEOPLASM OF PANCREAS, UNSPECIFIED LOCATION OF MALIGNANCY (HCC): Primary | ICD-10-CM

## 2021-01-01 DIAGNOSIS — K44.9 DIAPHRAGMATIC HERNIA WITHOUT OBSTRUCTION OR GANGRENE: ICD-10-CM

## 2021-01-01 DIAGNOSIS — C25.0 MALIGNANT NEOPLASM OF HEAD OF PANCREAS: ICD-10-CM

## 2021-01-01 DIAGNOSIS — K59.00 CONSTIPATION, UNSPECIFIED: ICD-10-CM

## 2021-01-01 DIAGNOSIS — N32.9 BLADDER DISORDER, UNSPECIFIED: ICD-10-CM

## 2021-01-01 DIAGNOSIS — Z45.2 ENCOUNTER FOR CARE RELATED TO PORT-A-CATH: ICD-10-CM

## 2021-01-01 DIAGNOSIS — R10.9 ABDOMINAL CRAMPING: ICD-10-CM

## 2021-01-01 DIAGNOSIS — R19.5 OTHER FECAL ABNORMALITIES: ICD-10-CM

## 2021-01-01 DIAGNOSIS — R19.09 OTHER INTRA-ABDOMINAL AND PELVIC SWELLING, MASS AND LUMP: ICD-10-CM

## 2021-01-01 DIAGNOSIS — R19.7 DIARRHEA, UNSPECIFIED: ICD-10-CM

## 2021-01-01 DIAGNOSIS — K52.9 NONINFECTIVE GASTROENTERITIS AND COLITIS, UNSPECIFIED: ICD-10-CM

## 2021-01-01 DIAGNOSIS — R11.0 NAUSEA: ICD-10-CM

## 2021-01-01 DIAGNOSIS — R19.7 DIARRHEA, UNSPECIFIED TYPE: ICD-10-CM

## 2021-01-01 DIAGNOSIS — M51.37 DEGENERATION OF LUMBAR OR LUMBOSACRAL INTERVERTEBRAL DISC: ICD-10-CM

## 2021-01-01 DIAGNOSIS — K86.89 DILATION OF PANCREATIC DUCT: ICD-10-CM

## 2021-01-01 DIAGNOSIS — I46.9 CARDIAC ARREST (HCC): Primary | ICD-10-CM

## 2021-01-01 DIAGNOSIS — K52.9 ENTERITIS: ICD-10-CM

## 2021-01-01 DIAGNOSIS — C25.0 MALIGNANT NEOPLASM OF HEAD OF PANCREAS (HCC): Primary | ICD-10-CM

## 2021-01-01 DIAGNOSIS — K92.2 GASTROINTESTINAL HEMORRHAGE, UNSPECIFIED GASTROINTESTINAL HEMORRHAGE TYPE: ICD-10-CM

## 2021-01-01 DIAGNOSIS — T45.1X5A CHEMOTHERAPY-INDUCED NEUTROPENIA (HCC): ICD-10-CM

## 2021-01-01 DIAGNOSIS — R93.3 ABNORMAL FINDING ON GI TRACT IMAGING: ICD-10-CM

## 2021-01-01 DIAGNOSIS — M51.37 DEGENERATION OF LUMBAR OR LUMBOSACRAL INTERVERTEBRAL DISC: Primary | ICD-10-CM

## 2021-01-01 DIAGNOSIS — R10.13 EPIGASTRIC PAIN: Primary | ICD-10-CM

## 2021-01-01 LAB
ABO GROUP BLD: NORMAL
ACANTHOCYTES BLD QL SMEAR: ABNORMAL
ADV 40+41 DNA STL QL NAA+NON-PROBE: NOT DETECTED
ALBUMIN SERPL-MCNC: 2.2 G/DL (ref 3.5–5.2)
ALBUMIN SERPL-MCNC: 2.7 G/DL (ref 3.5–5.2)
ALBUMIN SERPL-MCNC: 2.7 G/DL (ref 3.5–5.2)
ALBUMIN SERPL-MCNC: 2.8 G/DL (ref 3.5–5.2)
ALBUMIN SERPL-MCNC: 3.1 G/DL (ref 3.5–5.2)
ALBUMIN SERPL-MCNC: 3.6 G/DL (ref 3.5–5.2)
ALBUMIN SERPL-MCNC: 3.7 G/DL (ref 3.5–5.2)
ALBUMIN SERPL-MCNC: 4.6 G/DL (ref 3.5–5.2)
ALBUMIN/GLOB SERPL: 1.1 G/DL
ALBUMIN/GLOB SERPL: 1.2 G/DL
ALBUMIN/GLOB SERPL: 1.2 G/DL
ALBUMIN/GLOB SERPL: 1.7 G/DL
ALBUMIN/GLOB SERPL: 1.8 G/DL
ALP SERPL-CCNC: 102 U/L (ref 39–117)
ALP SERPL-CCNC: 109 U/L (ref 39–117)
ALP SERPL-CCNC: 115 U/L (ref 39–117)
ALP SERPL-CCNC: 126 U/L (ref 39–117)
ALP SERPL-CCNC: 152 U/L (ref 39–117)
ALP SERPL-CCNC: 67 U/L (ref 39–117)
ALP SERPL-CCNC: 84 U/L (ref 39–117)
ALP SERPL-CCNC: 91 U/L (ref 39–117)
ALT SERPL W P-5'-P-CCNC: 15 U/L (ref 1–41)
ALT SERPL W P-5'-P-CCNC: 18 U/L (ref 1–41)
ALT SERPL W P-5'-P-CCNC: 20 U/L (ref 1–41)
ALT SERPL W P-5'-P-CCNC: 20 U/L (ref 1–41)
ALT SERPL W P-5'-P-CCNC: 27 U/L (ref 1–41)
ALT SERPL W P-5'-P-CCNC: 29 U/L (ref 1–41)
ALT SERPL W P-5'-P-CCNC: 31 U/L (ref 1–41)
ALT SERPL W P-5'-P-CCNC: 31 U/L (ref 1–41)
AMMONIA BLD-SCNC: 23 UMOL/L (ref 16–60)
ANION GAP SERPL CALCULATED.3IONS-SCNC: 10 MMOL/L (ref 5–15)
ANION GAP SERPL CALCULATED.3IONS-SCNC: 12 MMOL/L (ref 5–15)
ANION GAP SERPL CALCULATED.3IONS-SCNC: 13 MMOL/L (ref 5–15)
ANION GAP SERPL CALCULATED.3IONS-SCNC: 14 MMOL/L (ref 5–15)
ANION GAP SERPL CALCULATED.3IONS-SCNC: 14 MMOL/L (ref 5–15)
ANION GAP SERPL CALCULATED.3IONS-SCNC: 15 MMOL/L (ref 5–15)
ANION GAP SERPL CALCULATED.3IONS-SCNC: 15 MMOL/L (ref 5–15)
ANION GAP SERPL CALCULATED.3IONS-SCNC: 17 MMOL/L (ref 5–15)
ANION GAP SERPL CALCULATED.3IONS-SCNC: 17 MMOL/L (ref 5–15)
ANION GAP SERPL CALCULATED.3IONS-SCNC: 19 MMOL/L (ref 5–15)
ANION GAP SERPL CALCULATED.3IONS-SCNC: 30 MMOL/L (ref 5–15)
ANISOCYTOSIS BLD QL: ABNORMAL
APTT PPP: <20 SECONDS (ref 24–31)
ARTERIAL PATENCY WRIST A: POSITIVE
AST SERPL-CCNC: 22 U/L (ref 1–40)
AST SERPL-CCNC: 27 U/L (ref 1–40)
AST SERPL-CCNC: 28 U/L (ref 1–40)
AST SERPL-CCNC: 32 U/L (ref 1–40)
AST SERPL-CCNC: 32 U/L (ref 1–40)
AST SERPL-CCNC: 39 U/L (ref 1–40)
AST SERPL-CCNC: 40 U/L (ref 1–40)
AST SERPL-CCNC: 43 U/L (ref 1–40)
ASTRO TYP 1-8 RNA STL QL NAA+NON-PROBE: NOT DETECTED
ATMOSPHERIC PRESS: ABNORMAL MM[HG]
BACTERIA BLD CULT: ABNORMAL
BACTERIA SPEC AEROBE CULT: ABNORMAL
BACTERIA SPEC AEROBE CULT: NORMAL
BASE EXCESS BLDA CALC-SCNC: -26.1 MMOL/L (ref 0–3)
BASOPHILS # BLD AUTO: 0.1 10*3/MM3 (ref 0–0.2)
BASOPHILS NFR BLD AUTO: 0.9 % (ref 0–1.5)
BDY SITE: ABNORMAL
BH BB BLOOD EXPIRATION DATE: NORMAL
BH BB BLOOD EXPIRATION DATE: NORMAL
BH BB BLOOD TYPE BARCODE: 5100
BH BB BLOOD TYPE BARCODE: 5100
BH BB DISPENSE STATUS: NORMAL
BH BB DISPENSE STATUS: NORMAL
BH BB PRODUCT CODE: NORMAL
BH BB PRODUCT CODE: NORMAL
BH BB UNIT NUMBER: NORMAL
BH BB UNIT NUMBER: NORMAL
BILIRUB CONJ SERPL-MCNC: <0.2 MG/DL (ref 0–0.3)
BILIRUB INDIRECT SERPL-MCNC: ABNORMAL MG/DL
BILIRUB SERPL-MCNC: 0.3 MG/DL (ref 0–1.2)
BILIRUB SERPL-MCNC: 0.4 MG/DL (ref 0–1.2)
BILIRUB SERPL-MCNC: 0.5 MG/DL (ref 0–1.2)
BILIRUB SERPL-MCNC: 0.5 MG/DL (ref 0–1.2)
BILIRUB SERPL-MCNC: 0.6 MG/DL (ref 0–1.2)
BILIRUB SERPL-MCNC: 0.6 MG/DL (ref 0–1.2)
BILIRUB SERPL-MCNC: 0.8 MG/DL (ref 0–1.2)
BILIRUB SERPL-MCNC: <0.2 MG/DL (ref 0–1.2)
BLASTS NFR BLD MANUAL: 3 % (ref 0–0)
BLD GP AB SCN SERPL QL: NEGATIVE
BOTTLE TYPE: ABNORMAL
BUN SERPL-MCNC: 10 MG/DL (ref 8–23)
BUN SERPL-MCNC: 12 MG/DL (ref 8–23)
BUN SERPL-MCNC: 13 MG/DL (ref 8–23)
BUN SERPL-MCNC: 13 MG/DL (ref 8–23)
BUN SERPL-MCNC: 16 MG/DL (ref 8–23)
BUN SERPL-MCNC: 17 MG/DL (ref 8–23)
BUN SERPL-MCNC: 17 MG/DL (ref 8–23)
BUN SERPL-MCNC: 18 MG/DL (ref 8–23)
BUN SERPL-MCNC: 18 MG/DL (ref 8–23)
BUN SERPL-MCNC: 20 MG/DL (ref 8–23)
BUN SERPL-MCNC: 26 MG/DL (ref 8–23)
BUN/CREAT SERPL: 14.5 (ref 7–25)
BUN/CREAT SERPL: 15.3 (ref 7–25)
BUN/CREAT SERPL: 16.5 (ref 7–25)
BUN/CREAT SERPL: 16.7 (ref 7–25)
BUN/CREAT SERPL: 17.3 (ref 7–25)
BUN/CREAT SERPL: 18 (ref 7–25)
BUN/CREAT SERPL: 20 (ref 7–25)
BUN/CREAT SERPL: 21.3 (ref 7–25)
BUN/CREAT SERPL: 21.7 (ref 7–25)
BUN/CREAT SERPL: 24.7 (ref 7–25)
BUN/CREAT SERPL: 25.7 (ref 7–25)
BURR CELLS BLD QL SMEAR: ABNORMAL
C CAYETANENSIS DNA STL QL NAA+NON-PROBE: NOT DETECTED
C COLI+JEJ+UPSA DNA STL QL NAA+NON-PROBE: NOT DETECTED
C DIFF GDH STL QL: NEGATIVE
C DIFF GDH STL QL: NEGATIVE
CA-I BLDA-SCNC: 1.08 MMOL/L (ref 1.15–1.33)
CALCIUM SPEC-SCNC: 7.7 MG/DL (ref 8.6–10.5)
CALCIUM SPEC-SCNC: 8.1 MG/DL (ref 8.6–10.5)
CALCIUM SPEC-SCNC: 8.2 MG/DL (ref 8.6–10.5)
CALCIUM SPEC-SCNC: 8.3 MG/DL (ref 8.6–10.5)
CALCIUM SPEC-SCNC: 8.5 MG/DL (ref 8.6–10.5)
CALCIUM SPEC-SCNC: 8.5 MG/DL (ref 8.6–10.5)
CALCIUM SPEC-SCNC: 8.7 MG/DL (ref 8.6–10.5)
CALCIUM SPEC-SCNC: 8.7 MG/DL (ref 8.6–10.5)
CALCIUM SPEC-SCNC: 8.8 MG/DL (ref 8.6–10.5)
CALCIUM SPEC-SCNC: 8.9 MG/DL (ref 8.6–10.5)
CALCIUM SPEC-SCNC: 9.6 MG/DL (ref 8.6–10.5)
CANCER AG19-9 SERPL-ACNC: 54.7 U/ML
CHLORIDE SERPL-SCNC: 104 MMOL/L (ref 98–107)
CHLORIDE SERPL-SCNC: 106 MMOL/L (ref 98–107)
CHLORIDE SERPL-SCNC: 107 MMOL/L (ref 98–107)
CHLORIDE SERPL-SCNC: 96 MMOL/L (ref 98–107)
CHLORIDE SERPL-SCNC: 97 MMOL/L (ref 98–107)
CHLORIDE SERPL-SCNC: 98 MMOL/L (ref 98–107)
CHLORIDE SERPL-SCNC: 99 MMOL/L (ref 98–107)
CHLORIDE SERPL-SCNC: 99 MMOL/L (ref 98–107)
CHOLEST SERPL-MCNC: 149 MG/DL (ref 0–200)
CO2 BLDA-SCNC: 9.1 MMOL/L (ref 22–29)
CO2 SERPL-SCNC: 20 MMOL/L (ref 22–29)
CO2 SERPL-SCNC: 20 MMOL/L (ref 22–29)
CO2 SERPL-SCNC: 21 MMOL/L (ref 22–29)
CO2 SERPL-SCNC: 21 MMOL/L (ref 22–29)
CO2 SERPL-SCNC: 23 MMOL/L (ref 22–29)
CO2 SERPL-SCNC: 24 MMOL/L (ref 22–29)
CO2 SERPL-SCNC: 28 MMOL/L (ref 22–29)
CO2 SERPL-SCNC: 28 MMOL/L (ref 22–29)
CO2 SERPL-SCNC: 9 MMOL/L (ref 22–29)
CREAT SERPL-MCNC: 0.61 MG/DL (ref 0.76–1.27)
CREAT SERPL-MCNC: 0.69 MG/DL (ref 0.76–1.27)
CREAT SERPL-MCNC: 0.72 MG/DL (ref 0.76–1.27)
CREAT SERPL-MCNC: 0.75 MG/DL (ref 0.76–1.27)
CREAT SERPL-MCNC: 0.81 MG/DL (ref 0.76–1.27)
CREAT SERPL-MCNC: 0.83 MG/DL (ref 0.76–1.27)
CREAT SERPL-MCNC: 0.85 MG/DL (ref 0.76–1.27)
CREAT SERPL-MCNC: 0.97 MG/DL (ref 0.76–1.27)
CREAT SERPL-MCNC: 1 MG/DL (ref 0.76–1.27)
CREAT SERPL-MCNC: 1.01 MG/DL (ref 0.76–1.27)
CREAT SERPL-MCNC: 1.11 MG/DL (ref 0.76–1.27)
CROSSMATCH INTERPRETATION: NORMAL
CROSSMATCH INTERPRETATION: NORMAL
CRYPTOSP DNA STL QL NAA+NON-PROBE: NOT DETECTED
D-LACTATE SERPL-SCNC: 15.9 MMOL/L (ref 0.5–2)
D-LACTATE SERPL-SCNC: 2.2 MMOL/L (ref 0.5–2)
D-LACTATE SERPL-SCNC: 2.3 MMOL/L (ref 0.5–2)
D-LACTATE SERPL-SCNC: 2.5 MMOL/L (ref 0.5–2)
D-LACTATE SERPL-SCNC: 4.5 MMOL/L (ref 0.5–2)
DEPRECATED RDW RBC AUTO: 39.8 FL (ref 37–54)
DEPRECATED RDW RBC AUTO: 43.8 FL (ref 37–54)
DEPRECATED RDW RBC AUTO: 45.1 FL (ref 37–54)
DEPRECATED RDW RBC AUTO: 48.6 FL (ref 37–54)
DEPRECATED RDW RBC AUTO: 49.4 FL (ref 37–54)
DEPRECATED RDW RBC AUTO: 50.3 FL (ref 37–54)
DEPRECATED RDW RBC AUTO: 56.4 FL (ref 37–54)
E HISTOLYT DNA STL QL NAA+NON-PROBE: NOT DETECTED
EAEC PAA PLAS AGGR+AATA ST NAA+NON-PRB: NOT DETECTED
EC STX1+STX2 GENES STL QL NAA+NON-PROBE: NOT DETECTED
ELLIPTOCYTES BLD QL SMEAR: ABNORMAL
ELLIPTOCYTES BLD QL SMEAR: ABNORMAL
EOSINOPHIL # BLD AUTO: 0.1 10*3/MM3 (ref 0–0.4)
EOSINOPHIL # BLD MANUAL: 0.01 10*3/MM3 (ref 0–0.4)
EOSINOPHIL # BLD MANUAL: 0.04 10*3/MM3 (ref 0–0.4)
EOSINOPHIL # BLD MANUAL: 0.08 10*3/MM3 (ref 0–0.4)
EOSINOPHIL # BLD MANUAL: 0.69 10*3/MM3 (ref 0–0.4)
EOSINOPHIL NFR BLD AUTO: 1.2 % (ref 0.3–6.2)
EOSINOPHIL NFR BLD MANUAL: 1 % (ref 0.3–6.2)
EOSINOPHIL NFR BLD MANUAL: 1 % (ref 0.3–6.2)
EOSINOPHIL NFR BLD MANUAL: 2 % (ref 0.3–6.2)
EOSINOPHIL NFR BLD MANUAL: 2 % (ref 0.3–6.2)
EPEC EAE GENE STL QL NAA+NON-PROBE: NOT DETECTED
ERYTHROCYTE [DISTWIDTH] IN BLOOD BY AUTOMATED COUNT: 13.2 % (ref 12.3–15.4)
ERYTHROCYTE [DISTWIDTH] IN BLOOD BY AUTOMATED COUNT: 15.1 % (ref 12.3–15.4)
ERYTHROCYTE [DISTWIDTH] IN BLOOD BY AUTOMATED COUNT: 15.5 % (ref 12.3–15.4)
ERYTHROCYTE [DISTWIDTH] IN BLOOD BY AUTOMATED COUNT: 15.7 % (ref 12.3–15.4)
ERYTHROCYTE [DISTWIDTH] IN BLOOD BY AUTOMATED COUNT: 16.5 % (ref 12.3–15.4)
ERYTHROCYTE [DISTWIDTH] IN BLOOD BY AUTOMATED COUNT: 16.8 % (ref 12.3–15.4)
ERYTHROCYTE [DISTWIDTH] IN BLOOD BY AUTOMATED COUNT: 17.1 % (ref 12.3–15.4)
ETEC LTA+ST1A+ST1B TOX ST NAA+NON-PROBE: NOT DETECTED
FERRITIN SERPL-MCNC: 2417 NG/ML (ref 30–400)
FOLATE SERPL-MCNC: >20 NG/ML (ref 4.78–24.2)
G LAMBLIA DNA STL QL NAA+NON-PROBE: NOT DETECTED
GFR SERPL CREATININE-BSD FRML MDRD: 102 ML/MIN/1.73
GFR SERPL CREATININE-BSD FRML MDRD: 107 ML/MIN/1.73
GFR SERPL CREATININE-BSD FRML MDRD: 112 ML/MIN/1.73
GFR SERPL CREATININE-BSD FRML MDRD: 129 ML/MIN/1.73
GFR SERPL CREATININE-BSD FRML MDRD: 65 ML/MIN/1.73
GFR SERPL CREATININE-BSD FRML MDRD: 72 ML/MIN/1.73
GFR SERPL CREATININE-BSD FRML MDRD: 73 ML/MIN/1.73
GFR SERPL CREATININE-BSD FRML MDRD: 76 ML/MIN/1.73
GFR SERPL CREATININE-BSD FRML MDRD: 88 ML/MIN/1.73
GFR SERPL CREATININE-BSD FRML MDRD: 91 ML/MIN/1.73
GFR SERPL CREATININE-BSD FRML MDRD: 93 ML/MIN/1.73
GLOBULIN UR ELPH-MCNC: 1.2 GM/DL
GLOBULIN UR ELPH-MCNC: 2.4 GM/DL
GLOBULIN UR ELPH-MCNC: 2.5 GM/DL
GLOBULIN UR ELPH-MCNC: 2.7 GM/DL
GLOBULIN UR ELPH-MCNC: 2.8 GM/DL
GLOBULIN UR ELPH-MCNC: 3 GM/DL
GLOBULIN UR ELPH-MCNC: 3.4 GM/DL
GLUCOSE BLDC GLUCOMTR-MCNC: 260 MG/DL (ref 74–100)
GLUCOSE BLDC GLUCOMTR-MCNC: 260 MG/DL (ref 74–100)
GLUCOSE SERPL-MCNC: 104 MG/DL (ref 65–99)
GLUCOSE SERPL-MCNC: 104 MG/DL (ref 65–99)
GLUCOSE SERPL-MCNC: 112 MG/DL (ref 65–99)
GLUCOSE SERPL-MCNC: 112 MG/DL (ref 65–99)
GLUCOSE SERPL-MCNC: 116 MG/DL (ref 65–99)
GLUCOSE SERPL-MCNC: 127 MG/DL (ref 65–99)
GLUCOSE SERPL-MCNC: 145 MG/DL (ref 65–99)
GLUCOSE SERPL-MCNC: 236 MG/DL (ref 65–99)
GLUCOSE SERPL-MCNC: 86 MG/DL (ref 65–99)
GLUCOSE SERPL-MCNC: 93 MG/DL (ref 65–99)
GLUCOSE SERPL-MCNC: 99 MG/DL (ref 65–99)
GRAM STN SPEC: ABNORMAL
GRAM STN SPEC: ABNORMAL
HAPTOGLOB SERPL-MCNC: 308 MG/DL (ref 30–200)
HCO3 BLDA-SCNC: 7.4 MMOL/L (ref 21–28)
HCT VFR BLD AUTO: 12.3 % (ref 37.5–51)
HCT VFR BLD AUTO: 26 % (ref 37.5–51)
HCT VFR BLD AUTO: 26.3 % (ref 37.5–51)
HCT VFR BLD AUTO: 26.7 % (ref 37.5–51)
HCT VFR BLD AUTO: 27.5 % (ref 37.5–51)
HCT VFR BLD AUTO: 31.4 % (ref 37.5–51)
HCT VFR BLD AUTO: 31.4 % (ref 37.5–51)
HCT VFR BLD AUTO: 32.4 % (ref 37.5–51)
HCT VFR BLD AUTO: 42.3 % (ref 37.5–51)
HCT VFR BLDA CALC: 19 % (ref 38–51)
HDLC SERPL-MCNC: 57 MG/DL (ref 40–60)
HEMOCCULT STL QL IA: POSITIVE
HEMODILUTION: NO
HGB BLD-MCNC: 10.7 G/DL (ref 13–17.7)
HGB BLD-MCNC: 10.8 G/DL (ref 13–17.7)
HGB BLD-MCNC: 10.8 G/DL (ref 13–17.7)
HGB BLD-MCNC: 14.3 G/DL (ref 13–17.7)
HGB BLD-MCNC: 3.6 G/DL (ref 13–17.7)
HGB BLD-MCNC: 8.6 G/DL (ref 13–17.7)
HGB BLD-MCNC: 8.6 G/DL (ref 13–17.7)
HGB BLD-MCNC: 8.9 G/DL (ref 13–17.7)
HGB BLD-MCNC: 9.3 G/DL (ref 13–17.7)
HGB BLDA-MCNC: 6.4 G/DL (ref 12–17)
HOLD SPECIMEN: NORMAL
INHALED O2 CONCENTRATION: 100 %
INR PPP: 1.13 (ref 0.93–1.1)
IRON 24H UR-MRATE: 17 MCG/DL (ref 59–158)
IRON SATN MFR SERPL: 7 % (ref 20–50)
ISOLATED FROM: ABNORMAL
LAB AP CASE REPORT: NORMAL
LACTOFERRIN STL QL LA: POSITIVE
LDLC SERPL CALC-MCNC: 69 MG/DL (ref 0–100)
LDLC/HDLC SERPL: 1.16 {RATIO}
LIPASE SERPL-CCNC: 10 U/L (ref 13–60)
LIPASE SERPL-CCNC: 105 U/L (ref 13–60)
LIPASE SERPL-CCNC: 4 U/L (ref 13–60)
LIPASE SERPL-CCNC: 8 U/L (ref 13–60)
LYMPHOCYTES # BLD AUTO: 1.8 10*3/MM3 (ref 0.7–3.1)
LYMPHOCYTES # BLD MANUAL: 0.61 10*3/MM3 (ref 0.7–3.1)
LYMPHOCYTES # BLD MANUAL: 0.78 10*3/MM3 (ref 0.7–3.1)
LYMPHOCYTES # BLD MANUAL: 1.21 10*3/MM3 (ref 0.7–3.1)
LYMPHOCYTES # BLD MANUAL: 2.3 10*3/MM3 (ref 0.7–3.1)
LYMPHOCYTES # BLD MANUAL: 2.47 10*3/MM3 (ref 0.7–3.1)
LYMPHOCYTES # BLD MANUAL: 2.98 10*3/MM3 (ref 0.7–3.1)
LYMPHOCYTES # BLD MANUAL: 4.9 10*3/MM3 (ref 0.7–3.1)
LYMPHOCYTES # BLD MANUAL: 6.9 10*3/MM3 (ref 0.7–3.1)
LYMPHOCYTES NFR BLD AUTO: 23.6 % (ref 19.6–45.3)
LYMPHOCYTES NFR BLD MANUAL: 1 % (ref 5–12)
LYMPHOCYTES NFR BLD MANUAL: 16 % (ref 5–12)
LYMPHOCYTES NFR BLD MANUAL: 17 % (ref 5–12)
LYMPHOCYTES NFR BLD MANUAL: 18 % (ref 19.6–45.3)
LYMPHOCYTES NFR BLD MANUAL: 19 % (ref 5–12)
LYMPHOCYTES NFR BLD MANUAL: 19 % (ref 5–12)
LYMPHOCYTES NFR BLD MANUAL: 2 % (ref 5–12)
LYMPHOCYTES NFR BLD MANUAL: 20 % (ref 19.6–45.3)
LYMPHOCYTES NFR BLD MANUAL: 21 % (ref 19.6–45.3)
LYMPHOCYTES NFR BLD MANUAL: 25 % (ref 5–12)
LYMPHOCYTES NFR BLD MANUAL: 31 % (ref 19.6–45.3)
LYMPHOCYTES NFR BLD MANUAL: 31 % (ref 5–12)
LYMPHOCYTES NFR BLD MANUAL: 55 % (ref 19.6–45.3)
LYMPHOCYTES NFR BLD MANUAL: 56 % (ref 19.6–45.3)
LYMPHOCYTES NFR BLD MANUAL: 56 % (ref 19.6–45.3)
LYMPHOCYTES NFR BLD MANUAL: 76 % (ref 19.6–45.3)
Lab: NORMAL
MACROCYTES BLD QL SMEAR: ABNORMAL
MAGNESIUM SERPL-MCNC: 1.7 MG/DL (ref 1.6–2.4)
MAGNESIUM SERPL-MCNC: 1.8 MG/DL (ref 1.6–2.4)
MAGNESIUM SERPL-MCNC: 1.8 MG/DL (ref 1.6–2.4)
MAGNESIUM SERPL-MCNC: 2 MG/DL (ref 1.6–2.4)
MAGNESIUM SERPL-MCNC: 2.1 MG/DL (ref 1.6–2.4)
MAGNESIUM SERPL-MCNC: 2.2 MG/DL (ref 1.6–2.4)
MAGNESIUM SERPL-MCNC: 2.4 MG/DL (ref 1.6–2.4)
MAGNESIUM SERPL-MCNC: 2.5 MG/DL (ref 1.6–2.4)
MCH RBC QN AUTO: 27.5 PG (ref 26.6–33)
MCH RBC QN AUTO: 27.9 PG (ref 26.6–33)
MCH RBC QN AUTO: 28 PG (ref 26.6–33)
MCH RBC QN AUTO: 28.3 PG (ref 26.6–33)
MCH RBC QN AUTO: 28.3 PG (ref 26.6–33)
MCH RBC QN AUTO: 28.4 PG (ref 26.6–33)
MCH RBC QN AUTO: 28.8 PG (ref 26.6–33)
MCH RBC QN AUTO: 29.4 PG (ref 26.6–33)
MCH RBC QN AUTO: 29.9 PG (ref 26.6–33)
MCHC RBC AUTO-ENTMCNC: 29.3 G/DL (ref 31.5–35.7)
MCHC RBC AUTO-ENTMCNC: 32.9 G/DL (ref 31.5–35.7)
MCHC RBC AUTO-ENTMCNC: 33 G/DL (ref 31.5–35.7)
MCHC RBC AUTO-ENTMCNC: 33.3 G/DL (ref 31.5–35.7)
MCHC RBC AUTO-ENTMCNC: 33.3 G/DL (ref 31.5–35.7)
MCHC RBC AUTO-ENTMCNC: 33.7 G/DL (ref 31.5–35.7)
MCHC RBC AUTO-ENTMCNC: 33.8 G/DL (ref 31.5–35.7)
MCHC RBC AUTO-ENTMCNC: 34.2 G/DL (ref 31.5–35.7)
MCHC RBC AUTO-ENTMCNC: 34.4 G/DL (ref 31.5–35.7)
MCV RBC AUTO: 102 FL (ref 79–97)
MCV RBC AUTO: 83 FL (ref 79–97)
MCV RBC AUTO: 83.3 FL (ref 79–97)
MCV RBC AUTO: 83.6 FL (ref 79–97)
MCV RBC AUTO: 83.7 FL (ref 79–97)
MCV RBC AUTO: 84 FL (ref 79–97)
MCV RBC AUTO: 84.1 FL (ref 79–97)
MCV RBC AUTO: 85.9 FL (ref 79–97)
MCV RBC AUTO: 87 FL (ref 79–97)
METAMYELOCYTES NFR BLD MANUAL: 10 % (ref 0–0)
METAMYELOCYTES NFR BLD MANUAL: 2 % (ref 0–0)
METAMYELOCYTES NFR BLD MANUAL: 3 % (ref 0–0)
METAMYELOCYTES NFR BLD MANUAL: 3 % (ref 0–0)
MODALITY: ABNORMAL
MONOCYTES # BLD AUTO: 0.12 10*3/MM3 (ref 0.1–0.9)
MONOCYTES # BLD AUTO: 0.14 10*3/MM3 (ref 0.1–0.9)
MONOCYTES # BLD AUTO: 0.21 10*3/MM3 (ref 0.1–0.9)
MONOCYTES # BLD AUTO: 0.43 10*3/MM3 (ref 0.1–0.9)
MONOCYTES # BLD AUTO: 0.6 10*3/MM3 (ref 0.1–0.9)
MONOCYTES # BLD AUTO: 0.78 10*3/MM3 (ref 0.1–0.9)
MONOCYTES # BLD AUTO: 0.98 10*3/MM3 (ref 0.1–0.9)
MONOCYTES # BLD AUTO: 2.41 10*3/MM3 (ref 0.1–0.9)
MONOCYTES # BLD AUTO: 5.52 10*3/MM3 (ref 0.1–0.9)
MONOCYTES NFR BLD AUTO: 7.9 % (ref 5–12)
MYELOCYTES NFR BLD MANUAL: 2 % (ref 0–0)
MYELOCYTES NFR BLD MANUAL: 3 % (ref 0–0)
MYELOCYTES NFR BLD MANUAL: 5 % (ref 0–0)
NEUTROPHILS # BLD AUTO: 0.17 10*3/MM3 (ref 1.7–7)
NEUTROPHILS # BLD AUTO: 0.29 10*3/MM3 (ref 1.7–7)
NEUTROPHILS # BLD AUTO: 0.98 10*3/MM3 (ref 1.7–7)
NEUTROPHILS # BLD AUTO: 1.05 10*3/MM3 (ref 1.7–7)
NEUTROPHILS # BLD AUTO: 1.56 10*3/MM3 (ref 1.7–7)
NEUTROPHILS # BLD AUTO: 18.98 10*3/MM3 (ref 1.7–7)
NEUTROPHILS # BLD AUTO: 6.58 10*3/MM3 (ref 1.7–7)
NEUTROPHILS # BLD AUTO: 7.95 10*3/MM3 (ref 1.7–7)
NEUTROPHILS NFR BLD AUTO: 5 10*3/MM3 (ref 1.7–7)
NEUTROPHILS NFR BLD AUTO: 66.4 % (ref 42.7–76)
NEUTROPHILS NFR BLD MANUAL: 10 % (ref 42.7–76)
NEUTROPHILS NFR BLD MANUAL: 15 % (ref 42.7–76)
NEUTROPHILS NFR BLD MANUAL: 16 % (ref 42.7–76)
NEUTROPHILS NFR BLD MANUAL: 20 % (ref 42.7–76)
NEUTROPHILS NFR BLD MANUAL: 23 % (ref 42.7–76)
NEUTROPHILS NFR BLD MANUAL: 34 % (ref 42.7–76)
NEUTROPHILS NFR BLD MANUAL: 52 % (ref 42.7–76)
NEUTROPHILS NFR BLD MANUAL: 8 % (ref 42.7–76)
NEUTS BAND NFR BLD MANUAL: 1 % (ref 0–5)
NEUTS BAND NFR BLD MANUAL: 11 % (ref 0–5)
NEUTS BAND NFR BLD MANUAL: 14 % (ref 0–5)
NEUTS BAND NFR BLD MANUAL: 28 % (ref 0–5)
NEUTS BAND NFR BLD MANUAL: 32 % (ref 0–5)
NEUTS BAND NFR BLD MANUAL: 4 % (ref 0–5)
NEUTS BAND NFR BLD MANUAL: 4 % (ref 0–5)
NEUTS BAND NFR BLD MANUAL: 6 % (ref 0–5)
NEUTS VAC BLD QL SMEAR: ABNORMAL
NOROVIRUS GI+II RNA STL QL NAA+NON-PROBE: NOT DETECTED
NRBC BLD AUTO-RTO: 0 /100 WBC (ref 0–0.2)
NRBC SPEC MANUAL: 5 /100 WBC (ref 0–0.2)
NT-PROBNP SERPL-MCNC: 634.4 PG/ML (ref 0–900)
OTHER CELLS %: 11 % (ref 0–0)
OVALOCYTES BLD QL SMEAR: ABNORMAL
OVALOCYTES BLD QL SMEAR: ABNORMAL
P SHIGELLOIDES DNA STL QL NAA+NON-PROBE: NOT DETECTED
PATH REPORT.FINAL DX SPEC: NORMAL
PATH REPORT.GROSS SPEC: NORMAL
PATH REPORT.GROSS SPEC: NORMAL
PATHOLOGY REVIEW: YES
PCO2 BLDA: 55.8 MM HG (ref 35–48)
PEEP RESPIRATORY: 5 CM[H2O]
PH BLDA: 6.73 PH UNITS (ref 7.35–7.45)
PLAT MORPH BLD: NORMAL
PLATELET # BLD AUTO: 109 10*3/MM3 (ref 140–450)
PLATELET # BLD AUTO: 119 10*3/MM3 (ref 140–450)
PLATELET # BLD AUTO: 138 10*3/MM3 (ref 140–450)
PLATELET # BLD AUTO: 141 10*3/MM3 (ref 140–450)
PLATELET # BLD AUTO: 151 10*3/MM3 (ref 140–450)
PLATELET # BLD AUTO: 173 10*3/MM3 (ref 140–450)
PLATELET # BLD AUTO: 173 10*3/MM3 (ref 140–450)
PLATELET # BLD AUTO: 91 10*3/MM3 (ref 140–450)
PLATELET # BLD AUTO: 97 10*3/MM3 (ref 140–450)
PMV BLD AUTO: 8 FL (ref 6–12)
PMV BLD AUTO: 8.7 FL (ref 6–12)
PMV BLD AUTO: 8.8 FL (ref 6–12)
PMV BLD AUTO: 9 FL (ref 6–12)
PMV BLD AUTO: 9.1 FL (ref 6–12)
PMV BLD AUTO: 9.1 FL (ref 6–12)
PMV BLD AUTO: 9.3 FL (ref 6–12)
PMV BLD AUTO: 9.3 FL (ref 6–12)
PMV BLD AUTO: 9.5 FL (ref 6–12)
PO2 BLDA: 127.8 MM HG (ref 83–108)
POIKILOCYTOSIS BLD QL SMEAR: ABNORMAL
POTASSIUM BLDA-SCNC: 4.5 MMOL/L (ref 3.5–4.5)
POTASSIUM SERPL-SCNC: 3.1 MMOL/L (ref 3.5–5.2)
POTASSIUM SERPL-SCNC: 3.2 MMOL/L (ref 3.5–5.2)
POTASSIUM SERPL-SCNC: 3.2 MMOL/L (ref 3.5–5.2)
POTASSIUM SERPL-SCNC: 3.3 MMOL/L (ref 3.5–5.2)
POTASSIUM SERPL-SCNC: 3.4 MMOL/L (ref 3.5–5.2)
POTASSIUM SERPL-SCNC: 3.4 MMOL/L (ref 3.5–5.2)
POTASSIUM SERPL-SCNC: 3.5 MMOL/L (ref 3.5–5.2)
POTASSIUM SERPL-SCNC: 3.6 MMOL/L (ref 3.5–5.2)
POTASSIUM SERPL-SCNC: 5.2 MMOL/L (ref 3.5–5.2)
PROCALCITONIN SERPL-MCNC: 3.52 NG/ML (ref 0–0.25)
PROMYELOCYTES NFR BLD MANUAL: 1 % (ref 0–0)
PROMYELOCYTES NFR BLD MANUAL: 1 % (ref 0–0)
PROMYELOCYTES NFR BLD MANUAL: 4 % (ref 0–0)
PROT SERPL-MCNC: 3.4 G/DL (ref 6–8.5)
PROT SERPL-MCNC: 4.8 G/DL (ref 6–8.5)
PROT SERPL-MCNC: 5.2 G/DL (ref 6–8.5)
PROT SERPL-MCNC: 5.2 G/DL (ref 6–8.5)
PROT SERPL-MCNC: 5.9 G/DL (ref 6–8.5)
PROT SERPL-MCNC: 6.7 G/DL (ref 6–8.5)
PROT SERPL-MCNC: 7 G/DL (ref 6–8.5)
PROT SERPL-MCNC: 7.3 G/DL (ref 6–8.5)
PROTHROMBIN TIME: 12.4 SECONDS (ref 9.6–11.7)
QT INTERVAL: 391 MS
QT INTERVAL: 471 MS
RBC # BLD AUTO: 1.21 10*6/MM3 (ref 4.14–5.8)
RBC # BLD AUTO: 3.06 10*6/MM3 (ref 4.14–5.8)
RBC # BLD AUTO: 3.12 10*6/MM3 (ref 4.14–5.8)
RBC # BLD AUTO: 3.17 10*6/MM3 (ref 4.14–5.8)
RBC # BLD AUTO: 3.28 10*6/MM3 (ref 4.14–5.8)
RBC # BLD AUTO: 3.76 10*6/MM3 (ref 4.14–5.8)
RBC # BLD AUTO: 3.79 10*6/MM3 (ref 4.14–5.8)
RBC # BLD AUTO: 3.87 10*6/MM3 (ref 4.14–5.8)
RBC # BLD AUTO: 4.86 10*6/MM3 (ref 4.14–5.8)
RESPIRATORY RATE: 18
RETICS # AUTO: 0.08 10*6/MM3 (ref 0.02–0.13)
RETICS/RBC NFR AUTO: 2.36 % (ref 0.7–1.9)
RH BLD: POSITIVE
RVA RNA STL QL NAA+NON-PROBE: NOT DETECTED
S ENT+BONG DNA STL QL NAA+NON-PROBE: NOT DETECTED
SAO2 % BLDCOA: 92.2 % (ref 94–98)
SAPO I+II+IV+V RNA STL QL NAA+NON-PROBE: NOT DETECTED
SARS-COV-2 ORF1AB RESP QL NAA+PROBE: NOT DETECTED
SARS-COV-2 RNA PNL SPEC NAA+PROBE: NOT DETECTED
SCAN SLIDE: NORMAL
SHIGELLA SP+EIEC IPAH ST NAA+NON-PROBE: NOT DETECTED
SMALL PLATELETS BLD QL SMEAR: ABNORMAL
SODIUM BLD-SCNC: 137 MMOL/L (ref 138–146)
SODIUM SERPL-SCNC: 133 MMOL/L (ref 136–145)
SODIUM SERPL-SCNC: 136 MMOL/L (ref 136–145)
SODIUM SERPL-SCNC: 138 MMOL/L (ref 136–145)
SODIUM SERPL-SCNC: 140 MMOL/L (ref 136–145)
SODIUM SERPL-SCNC: 141 MMOL/L (ref 136–145)
SODIUM SERPL-SCNC: 144 MMOL/L (ref 136–145)
SODIUM SERPL-SCNC: 144 MMOL/L (ref 136–145)
SODIUM SERPL-SCNC: 146 MMOL/L (ref 136–145)
SODIUM SERPL-SCNC: 146 MMOL/L (ref 136–145)
T&S EXPIRATION DATE: NORMAL
T3FREE SERPL-MCNC: 1.15 PG/ML (ref 2–4.4)
T4 FREE SERPL-MCNC: 1.01 NG/DL (ref 0.93–1.7)
TIBC SERPL-MCNC: 228 MCG/DL (ref 298–536)
TOXIC GRANULATION: ABNORMAL
TRANSFERRIN SERPL-MCNC: 153 MG/DL (ref 200–360)
TRIGL SERPL-MCNC: 130 MG/DL (ref 0–150)
TROPONIN T SERPL-MCNC: 0.24 NG/ML (ref 0–0.03)
TSH SERPL DL<=0.05 MIU/L-ACNC: 1.46 UIU/ML (ref 0.27–4.2)
TSH SERPL DL<=0.05 MIU/L-ACNC: 2.33 UIU/ML (ref 0.27–4.2)
UNIT  ABO: NORMAL
UNIT  ABO: NORMAL
UNIT  RH: NORMAL
UNIT  RH: NORMAL
V CHOL+PARA+VUL DNA STL QL NAA+NON-PROBE: NOT DETECTED
V CHOLERAE DNA STL QL NAA+NON-PROBE: NOT DETECTED
VARIANT LYMPHS NFR BLD MANUAL: 2 % (ref 0–5)
VARIANT LYMPHS NFR BLD MANUAL: 3 % (ref 0–5)
VENTILATOR MODE: ABNORMAL
VIT B12 BLD-MCNC: 1190 PG/ML (ref 211–946)
VLDLC SERPL-MCNC: 23 MG/DL (ref 5–40)
VT ON VENT VENT: 450 ML
WBC # BLD AUTO: 1.1 10*3/MM3 (ref 3.4–10.8)
WBC # BLD AUTO: 1.4 10*3/MM3 (ref 3.4–10.8)
WBC # BLD AUTO: 13.7 10*3/MM3 (ref 3.4–10.8)
WBC # BLD AUTO: 14.2 10*3/MM3 (ref 3.4–10.8)
WBC # BLD AUTO: 3.9 10*3/MM3 (ref 3.4–10.8)
WBC # BLD AUTO: 34.5 10*3/MM3 (ref 3.4–10.8)
WBC # BLD AUTO: 4.1 10*3/MM3 (ref 3.4–10.8)
WBC # BLD AUTO: 6.2 10*3/MM3 (ref 3.4–10.8)
WBC # BLD AUTO: 7.6 10*3/MM3 (ref 3.4–10.8)
WBC MORPH BLD: NORMAL
WHOLE BLOOD HOLD SPECIMEN: NORMAL
Y ENTEROCOL DNA STL QL NAA+NON-PROBE: NOT DETECTED

## 2021-01-01 PROCEDURE — 82728 ASSAY OF FERRITIN: CPT | Performed by: NURSE PRACTITIONER

## 2021-01-01 PROCEDURE — 25010000003 HEPARIN LOCK FLUSH PER 10 UNITS: Performed by: RADIOLOGY

## 2021-01-01 PROCEDURE — 82140 ASSAY OF AMMONIA: CPT | Performed by: NURSE PRACTITIONER

## 2021-01-01 PROCEDURE — 86923 COMPATIBILITY TEST ELECTRIC: CPT

## 2021-01-01 PROCEDURE — 86901 BLOOD TYPING SEROLOGIC RH(D): CPT

## 2021-01-01 PROCEDURE — 70450 CT HEAD/BRAIN W/O DYE: CPT

## 2021-01-01 PROCEDURE — 99285 EMERGENCY DEPT VISIT HI MDM: CPT

## 2021-01-01 PROCEDURE — 86900 BLOOD TYPING SEROLOGIC ABO: CPT

## 2021-01-01 PROCEDURE — 84466 ASSAY OF TRANSFERRIN: CPT | Performed by: NURSE PRACTITIONER

## 2021-01-01 PROCEDURE — 85018 HEMOGLOBIN: CPT

## 2021-01-01 PROCEDURE — 80048 BASIC METABOLIC PNL TOTAL CA: CPT | Performed by: PHYSICIAN ASSISTANT

## 2021-01-01 PROCEDURE — 99214 OFFICE O/P EST MOD 30 MIN: CPT | Performed by: INTERNAL MEDICINE

## 2021-01-01 PROCEDURE — 85007 BL SMEAR W/DIFF WBC COUNT: CPT | Performed by: EMERGENCY MEDICINE

## 2021-01-01 PROCEDURE — 85007 BL SMEAR W/DIFF WBC COUNT: CPT | Performed by: INTERNAL MEDICINE

## 2021-01-01 PROCEDURE — 25010000002 OCTREOTIDE PER 25 MCG: Performed by: INTERNAL MEDICINE

## 2021-01-01 PROCEDURE — 25010000002 ALTEPLASE PER 1 MG: Performed by: RADIOLOGY

## 2021-01-01 PROCEDURE — 43238 EGD US FINE NEEDLE BX/ASPIR: CPT | Performed by: INTERNAL MEDICINE

## 2021-01-01 PROCEDURE — 85025 COMPLETE CBC W/AUTO DIFF WBC: CPT | Performed by: PHYSICIAN ASSISTANT

## 2021-01-01 PROCEDURE — 83605 ASSAY OF LACTIC ACID: CPT | Performed by: INTERNAL MEDICINE

## 2021-01-01 PROCEDURE — 86901 BLOOD TYPING SEROLOGIC RH(D): CPT | Performed by: EMERGENCY MEDICINE

## 2021-01-01 PROCEDURE — 25010000002 PIPERACILLIN SOD-TAZOBACTAM PER 1 G: Performed by: INTERNAL MEDICINE

## 2021-01-01 PROCEDURE — 99232 SBSQ HOSP IP/OBS MODERATE 35: CPT | Performed by: NURSE PRACTITIONER

## 2021-01-01 PROCEDURE — 25010000002 MORPHINE PER 10 MG: Performed by: PHYSICIAN ASSISTANT

## 2021-01-01 PROCEDURE — 84132 ASSAY OF SERUM POTASSIUM: CPT | Performed by: PHYSICIAN ASSISTANT

## 2021-01-01 PROCEDURE — 87150 DNA/RNA AMPLIFIED PROBE: CPT | Performed by: EMERGENCY MEDICINE

## 2021-01-01 PROCEDURE — 80053 COMPREHEN METABOLIC PANEL: CPT | Performed by: EMERGENCY MEDICINE

## 2021-01-01 PROCEDURE — 83735 ASSAY OF MAGNESIUM: CPT | Performed by: PHYSICIAN ASSISTANT

## 2021-01-01 PROCEDURE — 83605 ASSAY OF LACTIC ACID: CPT | Performed by: PHYSICIAN ASSISTANT

## 2021-01-01 PROCEDURE — 85045 AUTOMATED RETICULOCYTE COUNT: CPT | Performed by: NURSE PRACTITIONER

## 2021-01-01 PROCEDURE — 99219 PR INITIAL OBSERVATION CARE/DAY 50 MINUTES: CPT | Performed by: PHYSICIAN ASSISTANT

## 2021-01-01 PROCEDURE — 82962 GLUCOSE BLOOD TEST: CPT

## 2021-01-01 PROCEDURE — 93005 ELECTROCARDIOGRAM TRACING: CPT | Performed by: EMERGENCY MEDICINE

## 2021-01-01 PROCEDURE — 94002 VENT MGMT INPAT INIT DAY: CPT

## 2021-01-01 PROCEDURE — 85025 COMPLETE CBC W/AUTO DIFF WBC: CPT | Performed by: EMERGENCY MEDICINE

## 2021-01-01 PROCEDURE — 82803 BLOOD GASES ANY COMBINATION: CPT

## 2021-01-01 PROCEDURE — 83690 ASSAY OF LIPASE: CPT | Performed by: EMERGENCY MEDICINE

## 2021-01-01 PROCEDURE — 94799 UNLISTED PULMONARY SVC/PX: CPT

## 2021-01-01 PROCEDURE — 80053 COMPREHEN METABOLIC PANEL: CPT | Performed by: INTERNAL MEDICINE

## 2021-01-01 PROCEDURE — 5A2204Z RESTORATION OF CARDIAC RHYTHM, SINGLE: ICD-10-PCS | Performed by: EMERGENCY MEDICINE

## 2021-01-01 PROCEDURE — 82330 ASSAY OF CALCIUM: CPT

## 2021-01-01 PROCEDURE — 96375 TX/PRO/DX INJ NEW DRUG ADDON: CPT

## 2021-01-01 PROCEDURE — 0 IOPAMIDOL PER 1 ML: Performed by: INTERNAL MEDICINE

## 2021-01-01 PROCEDURE — 25010000002 IRON SUCROSE PER 1 MG: Performed by: NURSE PRACTITIONER

## 2021-01-01 PROCEDURE — 84439 ASSAY OF FREE THYROXINE: CPT | Performed by: NURSE PRACTITIONER

## 2021-01-01 PROCEDURE — 25010000002 DEXAMETHASONE SODIUM PHOSPHATE 10 MG/ML SOLUTION: Performed by: EMERGENCY MEDICINE

## 2021-01-01 PROCEDURE — 0 IOPAMIDOL PER 1 ML: Performed by: EMERGENCY MEDICINE

## 2021-01-01 PROCEDURE — 74177 CT ABD & PELVIS W/CONTRAST: CPT

## 2021-01-01 PROCEDURE — 87449 NOS EACH ORGANISM AG IA: CPT | Performed by: INTERNAL MEDICINE

## 2021-01-01 PROCEDURE — 83540 ASSAY OF IRON: CPT | Performed by: NURSE PRACTITIONER

## 2021-01-01 PROCEDURE — 99291 CRITICAL CARE FIRST HOUR: CPT

## 2021-01-01 PROCEDURE — 36415 COLL VENOUS BLD VENIPUNCTURE: CPT

## 2021-01-01 PROCEDURE — 96374 THER/PROPH/DIAG INJ IV PUSH: CPT

## 2021-01-01 PROCEDURE — 93010 ELECTROCARDIOGRAM REPORT: CPT | Performed by: INTERNAL MEDICINE

## 2021-01-01 PROCEDURE — 25010000002 ONDANSETRON PER 1 MG: Performed by: NURSE PRACTITIONER

## 2021-01-01 PROCEDURE — 85610 PROTHROMBIN TIME: CPT | Performed by: EMERGENCY MEDICINE

## 2021-01-01 PROCEDURE — 88305 TISSUE EXAM BY PATHOLOGIST: CPT | Performed by: INTERNAL MEDICINE

## 2021-01-01 PROCEDURE — 25010000003 POTASSIUM CHLORIDE 10 MEQ/100ML SOLUTION: Performed by: INTERNAL MEDICINE

## 2021-01-01 PROCEDURE — 84132 ASSAY OF SERUM POTASSIUM: CPT | Performed by: INTERNAL MEDICINE

## 2021-01-01 PROCEDURE — 99223 1ST HOSP IP/OBS HIGH 75: CPT | Performed by: NURSE PRACTITIONER

## 2021-01-01 PROCEDURE — 36593 DECLOT VASCULAR DEVICE: CPT

## 2021-01-01 PROCEDURE — 84481 FREE ASSAY (FT-3): CPT | Performed by: NURSE PRACTITIONER

## 2021-01-01 PROCEDURE — 85007 BL SMEAR W/DIFF WBC COUNT: CPT | Performed by: NURSE PRACTITIONER

## 2021-01-01 PROCEDURE — 86850 RBC ANTIBODY SCREEN: CPT | Performed by: EMERGENCY MEDICINE

## 2021-01-01 PROCEDURE — 25810000003 SODIUM CHLORIDE 0.9 % WITH KCL 20 MEQ 20-0.9 MEQ/L-% SOLUTION: Performed by: INTERNAL MEDICINE

## 2021-01-01 PROCEDURE — 99283 EMERGENCY DEPT VISIT LOW MDM: CPT

## 2021-01-01 PROCEDURE — 5A12012 PERFORMANCE OF CARDIAC OUTPUT, SINGLE, MANUAL: ICD-10-PCS | Performed by: EMERGENCY MEDICINE

## 2021-01-01 PROCEDURE — 25010000002 ONDANSETRON PER 1 MG: Performed by: PHYSICIAN ASSISTANT

## 2021-01-01 PROCEDURE — 25010000003 AMPICILLIN-SULBACTAM PER 1.5 G: Performed by: EMERGENCY MEDICINE

## 2021-01-01 PROCEDURE — 0097U HC BIOFIRE FILMARRAY GI PANEL: CPT | Performed by: PHYSICIAN ASSISTANT

## 2021-01-01 PROCEDURE — 25010000003 AMPICILLIN-SULBACTAM PER 1.5 G: Performed by: PHYSICIAN ASSISTANT

## 2021-01-01 PROCEDURE — G0378 HOSPITAL OBSERVATION PER HR: HCPCS

## 2021-01-01 PROCEDURE — 84145 PROCALCITONIN (PCT): CPT | Performed by: PHYSICIAN ASSISTANT

## 2021-01-01 PROCEDURE — P9016 RBC LEUKOCYTES REDUCED: HCPCS

## 2021-01-01 PROCEDURE — 83010 ASSAY OF HAPTOGLOBIN QUANT: CPT | Performed by: NURSE PRACTITIONER

## 2021-01-01 PROCEDURE — 99225 PR SBSQ OBSERVATION CARE/DAY 25 MINUTES: CPT | Performed by: INTERNAL MEDICINE

## 2021-01-01 PROCEDURE — 82607 VITAMIN B-12: CPT | Performed by: PHYSICIAN ASSISTANT

## 2021-01-01 PROCEDURE — 85025 COMPLETE CBC W/AUTO DIFF WBC: CPT | Performed by: INTERNAL MEDICINE

## 2021-01-01 PROCEDURE — 25010000002 ONDANSETRON PER 1 MG: Performed by: EMERGENCY MEDICINE

## 2021-01-01 PROCEDURE — 63710000001 ONDANSETRON PER 8 MG: Performed by: PHYSICIAN ASSISTANT

## 2021-01-01 PROCEDURE — 84132 ASSAY OF SERUM POTASSIUM: CPT | Performed by: FAMILY MEDICINE

## 2021-01-01 PROCEDURE — 25010000002 MIDAZOLAM PER 1 MG: Performed by: ANESTHESIOLOGY

## 2021-01-01 PROCEDURE — 25010000003 MAGNESIUM SULFATE 4 GM/100ML SOLUTION: Performed by: PHYSICIAN ASSISTANT

## 2021-01-01 PROCEDURE — 99232 SBSQ HOSP IP/OBS MODERATE 35: CPT | Performed by: INTERNAL MEDICINE

## 2021-01-01 PROCEDURE — 80051 ELECTROLYTE PANEL: CPT

## 2021-01-01 PROCEDURE — 71045 X-RAY EXAM CHEST 1 VIEW: CPT

## 2021-01-01 PROCEDURE — 85007 BL SMEAR W/DIFF WBC COUNT: CPT | Performed by: PHYSICIAN ASSISTANT

## 2021-01-01 PROCEDURE — 36598 INJ W/FLUOR EVAL CV DEVICE: CPT

## 2021-01-01 PROCEDURE — 99221 1ST HOSP IP/OBS SF/LOW 40: CPT | Performed by: NURSE PRACTITIONER

## 2021-01-01 PROCEDURE — 83630 LACTOFERRIN FECAL (QUAL): CPT | Performed by: INTERNAL MEDICINE

## 2021-01-01 PROCEDURE — 80061 LIPID PANEL: CPT | Performed by: PHYSICIAN ASSISTANT

## 2021-01-01 PROCEDURE — 99214 OFFICE O/P EST MOD 30 MIN: CPT | Performed by: NURSE PRACTITIONER

## 2021-01-01 PROCEDURE — 80053 COMPREHEN METABOLIC PANEL: CPT | Performed by: NURSE PRACTITIONER

## 2021-01-01 PROCEDURE — 83735 ASSAY OF MAGNESIUM: CPT | Performed by: EMERGENCY MEDICINE

## 2021-01-01 PROCEDURE — 83690 ASSAY OF LIPASE: CPT | Performed by: NURSE PRACTITIONER

## 2021-01-01 PROCEDURE — 99231 SBSQ HOSP IP/OBS SF/LOW 25: CPT | Performed by: NURSE PRACTITIONER

## 2021-01-01 PROCEDURE — 84443 ASSAY THYROID STIM HORMONE: CPT | Performed by: NURSE PRACTITIONER

## 2021-01-01 PROCEDURE — 84443 ASSAY THYROID STIM HORMONE: CPT | Performed by: PHYSICIAN ASSISTANT

## 2021-01-01 PROCEDURE — 74018 RADEX ABDOMEN 1 VIEW: CPT

## 2021-01-01 PROCEDURE — 82274 ASSAY TEST FOR BLOOD FECAL: CPT | Performed by: INTERNAL MEDICINE

## 2021-01-01 PROCEDURE — 87147 CULTURE TYPE IMMUNOLOGIC: CPT | Performed by: EMERGENCY MEDICINE

## 2021-01-01 PROCEDURE — 87449 NOS EACH ORGANISM AG IA: CPT | Performed by: NURSE PRACTITIONER

## 2021-01-01 PROCEDURE — 82746 ASSAY OF FOLIC ACID SERUM: CPT | Performed by: NURSE PRACTITIONER

## 2021-01-01 PROCEDURE — 88307 TISSUE EXAM BY PATHOLOGIST: CPT | Performed by: UROLOGY

## 2021-01-01 PROCEDURE — 31500 INSERT EMERGENCY AIRWAY: CPT

## 2021-01-01 PROCEDURE — 84484 ASSAY OF TROPONIN QUANT: CPT | Performed by: EMERGENCY MEDICINE

## 2021-01-01 PROCEDURE — U0005 INFEC AGEN DETEC AMPLI PROBE: HCPCS | Performed by: EMERGENCY MEDICINE

## 2021-01-01 PROCEDURE — 86301 IMMUNOASSAY TUMOR CA 19-9: CPT | Performed by: INTERNAL MEDICINE

## 2021-01-01 PROCEDURE — 87324 CLOSTRIDIUM AG IA: CPT | Performed by: NURSE PRACTITIONER

## 2021-01-01 PROCEDURE — 36430 TRANSFUSION BLD/BLD COMPNT: CPT

## 2021-01-01 PROCEDURE — 87324 CLOSTRIDIUM AG IA: CPT | Performed by: INTERNAL MEDICINE

## 2021-01-01 PROCEDURE — 88172 CYTP DX EVAL FNA 1ST EA SITE: CPT | Performed by: INTERNAL MEDICINE

## 2021-01-01 PROCEDURE — U0004 COV-19 TEST NON-CDC HGH THRU: HCPCS | Performed by: EMERGENCY MEDICINE

## 2021-01-01 PROCEDURE — 25010000002 MORPHINE PER 10 MG: Performed by: EMERGENCY MEDICINE

## 2021-01-01 PROCEDURE — 83880 ASSAY OF NATRIURETIC PEPTIDE: CPT | Performed by: PHYSICIAN ASSISTANT

## 2021-01-01 PROCEDURE — 85730 THROMBOPLASTIN TIME PARTIAL: CPT | Performed by: EMERGENCY MEDICINE

## 2021-01-01 PROCEDURE — 36600 WITHDRAWAL OF ARTERIAL BLOOD: CPT

## 2021-01-01 PROCEDURE — 5A1935Z RESPIRATORY VENTILATION, LESS THAN 24 CONSECUTIVE HOURS: ICD-10-PCS | Performed by: EMERGENCY MEDICINE

## 2021-01-01 PROCEDURE — 25010000002 PROPOFOL 10 MG/ML EMULSION: Performed by: ANESTHESIOLOGY

## 2021-01-01 PROCEDURE — 0BH17EZ INSERTION OF ENDOTRACHEAL AIRWAY INTO TRACHEA, VIA NATURAL OR ARTIFICIAL OPENING: ICD-10-PCS | Performed by: EMERGENCY MEDICINE

## 2021-01-01 PROCEDURE — 25010000002 LEVOFLOXACIN PER 250 MG: Performed by: ANESTHESIOLOGY

## 2021-01-01 PROCEDURE — 85025 COMPLETE CBC W/AUTO DIFF WBC: CPT | Performed by: NURSE PRACTITIONER

## 2021-01-01 PROCEDURE — 87040 BLOOD CULTURE FOR BACTERIA: CPT | Performed by: EMERGENCY MEDICINE

## 2021-01-01 PROCEDURE — 25010000002 MORPHINE PER 10 MG: Performed by: NURSE PRACTITIONER

## 2021-01-01 PROCEDURE — 80076 HEPATIC FUNCTION PANEL: CPT | Performed by: INTERNAL MEDICINE

## 2021-01-01 PROCEDURE — 88177 CYTP FNA EVAL EA ADDL: CPT | Performed by: INTERNAL MEDICINE

## 2021-01-01 PROCEDURE — 25010000002 FILGRASTIM 480 MCG/0.8ML SOLUTION PREFILLED SYRINGE: Performed by: INTERNAL MEDICINE

## 2021-01-01 PROCEDURE — 92950 HEART/LUNG RESUSCITATION CPR: CPT

## 2021-01-01 PROCEDURE — 86900 BLOOD TYPING SEROLOGIC ABO: CPT | Performed by: EMERGENCY MEDICINE

## 2021-01-01 PROCEDURE — 25010000002 HYDROMORPHONE PER 4 MG: Performed by: INTERNAL MEDICINE

## 2021-01-01 PROCEDURE — 83605 ASSAY OF LACTIC ACID: CPT

## 2021-01-01 PROCEDURE — 25010000002 PROCHLORPERAZINE 10 MG/2ML SOLUTION: Performed by: PHYSICIAN ASSISTANT

## 2021-01-01 PROCEDURE — 25010000002 DEXAMETHASONE PER 1 MG: Performed by: ANESTHESIOLOGY

## 2021-01-01 PROCEDURE — 93005 ELECTROCARDIOGRAM TRACING: CPT | Performed by: INTERNAL MEDICINE

## 2021-01-01 PROCEDURE — 99221 1ST HOSP IP/OBS SF/LOW 40: CPT | Performed by: INTERNAL MEDICINE

## 2021-01-01 PROCEDURE — 25010000002 FENTANYL CITRATE (PF) 100 MCG/2ML SOLUTION: Performed by: ANESTHESIOLOGY

## 2021-01-01 PROCEDURE — U0003 INFECTIOUS AGENT DETECTION BY NUCLEIC ACID (DNA OR RNA); SEVERE ACUTE RESPIRATORY SYNDROME CORONAVIRUS 2 (SARS-COV-2) (CORONAVIRUS DISEASE [COVID-19]), AMPLIFIED PROBE TECHNIQUE, MAKING USE OF HIGH THROUGHPUT TECHNOLOGIES AS DESCRIBED BY CMS-2020-01-R: HCPCS

## 2021-01-01 PROCEDURE — 87040 BLOOD CULTURE FOR BACTERIA: CPT | Performed by: INTERNAL MEDICINE

## 2021-01-01 PROCEDURE — C9803 HOPD COVID-19 SPEC COLLECT: HCPCS

## 2021-01-01 PROCEDURE — 25010000002 ONDANSETRON PER 1 MG: Performed by: ANESTHESIOLOGY

## 2021-01-01 PROCEDURE — 25010000002 EPINEPHRINE 1 MG/10ML SOLUTION PREFILLED SYRINGE: Performed by: EMERGENCY MEDICINE

## 2021-01-01 PROCEDURE — 25010000002 HYDROMORPHONE PER 4 MG: Performed by: EMERGENCY MEDICINE

## 2021-01-01 RX ORDER — SENNA PLUS 8.6 MG/1
1 TABLET ORAL DAILY PRN
COMMUNITY

## 2021-01-01 RX ORDER — CALCIUM CARBONATE 750 MG/1
750 TABLET, CHEWABLE ORAL DAILY
COMMUNITY

## 2021-01-01 RX ORDER — ONDANSETRON 2 MG/ML
4 INJECTION INTRAMUSCULAR; INTRAVENOUS ONCE
Status: COMPLETED | OUTPATIENT
Start: 2021-01-01 | End: 2021-01-01

## 2021-01-01 RX ORDER — ONDANSETRON 2 MG/ML
4 INJECTION INTRAMUSCULAR; INTRAVENOUS ONCE AS NEEDED
Status: DISCONTINUED | OUTPATIENT
Start: 2021-01-01 | End: 2021-01-01 | Stop reason: HOSPADM

## 2021-01-01 RX ORDER — ROSUVASTATIN CALCIUM 10 MG/1
10 TABLET, COATED ORAL NIGHTLY
Status: DISCONTINUED | OUTPATIENT
Start: 2021-01-01 | End: 2021-01-01 | Stop reason: HOSPADM

## 2021-01-01 RX ORDER — ACETAMINOPHEN 160 MG/5ML
650 SOLUTION ORAL EVERY 4 HOURS PRN
Status: DISCONTINUED | OUTPATIENT
Start: 2021-01-01 | End: 2021-01-01 | Stop reason: HOSPADM

## 2021-01-01 RX ORDER — CLOTRIMAZOLE 10 MG/1
10 LOZENGE ORAL; TOPICAL
Status: DISCONTINUED | OUTPATIENT
Start: 2021-01-01 | End: 2021-01-01 | Stop reason: HOSPADM

## 2021-01-01 RX ORDER — SODIUM CHLORIDE 0.9 % (FLUSH) 0.9 %
10 SYRINGE (ML) INJECTION EVERY 12 HOURS SCHEDULED
Status: DISCONTINUED | OUTPATIENT
Start: 2021-01-01 | End: 2021-01-01 | Stop reason: HOSPADM

## 2021-01-01 RX ORDER — LOPERAMIDE HYDROCHLORIDE 2 MG/1
2 CAPSULE ORAL 4 TIMES DAILY PRN
Status: DISCONTINUED | OUTPATIENT
Start: 2021-01-01 | End: 2021-01-01 | Stop reason: HOSPADM

## 2021-01-01 RX ORDER — DEXAMETHASONE SODIUM PHOSPHATE 4 MG/ML
INJECTION, SOLUTION INTRA-ARTICULAR; INTRALESIONAL; INTRAMUSCULAR; INTRAVENOUS; SOFT TISSUE AS NEEDED
Status: DISCONTINUED | OUTPATIENT
Start: 2021-01-01 | End: 2021-01-01 | Stop reason: SURG

## 2021-01-01 RX ORDER — PREGABALIN 150 MG/1
CAPSULE ORAL
Qty: 90 CAPSULE | Refills: 1 | Status: SHIPPED | OUTPATIENT
Start: 2021-01-01 | End: 2021-01-01 | Stop reason: SDUPTHER

## 2021-01-01 RX ORDER — ONDANSETRON 2 MG/ML
INJECTION INTRAMUSCULAR; INTRAVENOUS AS NEEDED
Status: DISCONTINUED | OUTPATIENT
Start: 2021-01-01 | End: 2021-01-01 | Stop reason: SURG

## 2021-01-01 RX ORDER — LEVOFLOXACIN 5 MG/ML
INJECTION, SOLUTION INTRAVENOUS
Status: COMPLETED
Start: 2021-01-01 | End: 2021-01-01

## 2021-01-01 RX ORDER — PROMETHAZINE HYDROCHLORIDE 25 MG/1
25 TABLET ORAL EVERY 6 HOURS PRN
COMMUNITY

## 2021-01-01 RX ORDER — LORAZEPAM 0.5 MG/1
0.5 TABLET ORAL EVERY 8 HOURS PRN
COMMUNITY

## 2021-01-01 RX ORDER — NEOSTIGMINE METHYLSULFATE 5 MG/5 ML
SYRINGE (ML) INTRAVENOUS AS NEEDED
Status: DISCONTINUED | OUTPATIENT
Start: 2021-01-01 | End: 2021-01-01 | Stop reason: SURG

## 2021-01-01 RX ORDER — SCOLOPAMINE TRANSDERMAL SYSTEM 1 MG/1
PATCH, EXTENDED RELEASE TRANSDERMAL
Qty: 10 | Refills: 3 | Status: ACTIVE
Start: 2021-01-01

## 2021-01-01 RX ORDER — MONTELUKAST SODIUM 4 MG/1
1 TABLET, CHEWABLE ORAL EVERY 8 HOURS PRN
COMMUNITY
End: 2021-01-01

## 2021-01-01 RX ORDER — ROSUVASTATIN CALCIUM 10 MG/1
TABLET, COATED ORAL
Qty: 90 TABLET | Refills: 1 | Status: SHIPPED | OUTPATIENT
Start: 2021-01-01 | End: 2021-01-01

## 2021-01-01 RX ORDER — HYDROMORPHONE HCL 110MG/55ML
0.5 PATIENT CONTROLLED ANALGESIA SYRINGE INTRAVENOUS ONCE
Status: COMPLETED | OUTPATIENT
Start: 2021-01-01 | End: 2021-01-01

## 2021-01-01 RX ORDER — POTASSIUM CHLORIDE 750 MG/1
20 CAPSULE, EXTENDED RELEASE ORAL EVERY MORNING
Status: DISCONTINUED | OUTPATIENT
Start: 2021-01-01 | End: 2021-01-01 | Stop reason: HOSPADM

## 2021-01-01 RX ORDER — EPINEPHRINE 0.1 MG/ML
SYRINGE (ML) INJECTION
Status: COMPLETED | OUTPATIENT
Start: 2021-01-01 | End: 2021-01-01

## 2021-01-01 RX ORDER — MORPHINE SULFATE 4 MG/ML
2 INJECTION, SOLUTION INTRAMUSCULAR; INTRAVENOUS EVERY 4 HOURS PRN
Status: DISPENSED | OUTPATIENT
Start: 2021-01-01 | End: 2021-01-01

## 2021-01-01 RX ORDER — NOREPINEPHRINE BIT/0.9 % NACL 8 MG/250ML
.02-.3 INFUSION BOTTLE (ML) INTRAVENOUS
Status: DISCONTINUED | OUTPATIENT
Start: 2021-01-01 | End: 2021-06-30 | Stop reason: HOSPADM

## 2021-01-01 RX ORDER — ASPIRIN 81 MG/1
81 TABLET ORAL DAILY
Status: DISCONTINUED | OUTPATIENT
Start: 2021-01-01 | End: 2021-01-01 | Stop reason: HOSPADM

## 2021-01-01 RX ORDER — ROCURONIUM BROMIDE 10 MG/ML
INJECTION, SOLUTION INTRAVENOUS AS NEEDED
Status: DISCONTINUED | OUTPATIENT
Start: 2021-01-01 | End: 2021-01-01 | Stop reason: SURG

## 2021-01-01 RX ORDER — PROCHLORPERAZINE MALEATE 10 MG/1
30 TABLET ORAL
Qty: 60 | Refills: 5 | Status: ACTIVE
Start: 2021-01-01

## 2021-01-01 RX ORDER — ERGOCALCIFEROL 1.25 MG/1
CAPSULE ORAL
Qty: 12 CAPSULE | Refills: 0 | Status: SHIPPED | OUTPATIENT
Start: 2021-01-01 | End: 2021-01-01

## 2021-01-01 RX ORDER — CHOLESTYRAMINE LIGHT 4 G/5.7G
1 POWDER, FOR SUSPENSION ORAL DAILY
Status: DISCONTINUED | OUTPATIENT
Start: 2021-01-01 | End: 2021-01-01

## 2021-01-01 RX ORDER — SODIUM CHLORIDE, SODIUM LACTATE, POTASSIUM CHLORIDE, CALCIUM CHLORIDE 600; 310; 30; 20 MG/100ML; MG/100ML; MG/100ML; MG/100ML
150 INJECTION, SOLUTION INTRAVENOUS CONTINUOUS
Status: DISPENSED | OUTPATIENT
Start: 2021-01-01 | End: 2021-01-01

## 2021-01-01 RX ORDER — FENTANYL CITRATE 50 UG/ML
INJECTION, SOLUTION INTRAMUSCULAR; INTRAVENOUS AS NEEDED
Status: DISCONTINUED | OUTPATIENT
Start: 2021-01-01 | End: 2021-01-01 | Stop reason: SURG

## 2021-01-01 RX ORDER — SCOLOPAMINE TRANSDERMAL SYSTEM 1 MG/1
1 PATCH, EXTENDED RELEASE TRANSDERMAL
Status: DISCONTINUED | OUTPATIENT
Start: 2021-01-01 | End: 2021-01-01 | Stop reason: HOSPADM

## 2021-01-01 RX ORDER — HEPARIN SODIUM (PORCINE) LOCK FLUSH IV SOLN 100 UNIT/ML 100 UNIT/ML
SOLUTION INTRAVENOUS
Status: COMPLETED | OUTPATIENT
Start: 2021-01-01 | End: 2021-01-01

## 2021-01-01 RX ORDER — CHOLECALCIFEROL (VITAMIN D3) 125 MCG
5 CAPSULE ORAL NIGHTLY PRN
Status: DISCONTINUED | OUTPATIENT
Start: 2021-01-01 | End: 2021-01-01 | Stop reason: HOSPADM

## 2021-01-01 RX ORDER — SODIUM CHLORIDE AND POTASSIUM CHLORIDE 150; 900 MG/100ML; MG/100ML
75 INJECTION, SOLUTION INTRAVENOUS CONTINUOUS
Status: DISCONTINUED | OUTPATIENT
Start: 2021-01-01 | End: 2021-01-01 | Stop reason: HOSPADM

## 2021-01-01 RX ORDER — NOREPINEPHRINE BIT/0.9 % NACL 8 MG/250ML
INFUSION BOTTLE (ML) INTRAVENOUS
Status: DISCONTINUED
Start: 2021-01-01 | End: 2021-06-30 | Stop reason: HOSPADM

## 2021-01-01 RX ORDER — SODIUM CHLORIDE 0.9 % (FLUSH) 0.9 %
10 SYRINGE (ML) INJECTION AS NEEDED
Status: DISCONTINUED | OUTPATIENT
Start: 2021-01-01 | End: 2021-06-30 | Stop reason: HOSPADM

## 2021-01-01 RX ORDER — PANTOPRAZOLE SODIUM 40 MG/10ML
40 INJECTION, POWDER, LYOPHILIZED, FOR SOLUTION INTRAVENOUS ONCE
Status: COMPLETED | OUTPATIENT
Start: 2021-01-01 | End: 2021-01-01

## 2021-01-01 RX ORDER — PROPOFOL 10 MG/ML
VIAL (ML) INTRAVENOUS AS NEEDED
Status: DISCONTINUED | OUTPATIENT
Start: 2021-01-01 | End: 2021-01-01 | Stop reason: SURG

## 2021-01-01 RX ORDER — PREGABALIN 150 MG/1
CAPSULE ORAL
Qty: 90 CAPSULE | Refills: 0 | Status: SHIPPED | OUTPATIENT
Start: 2021-01-01 | End: 2021-01-01

## 2021-01-01 RX ORDER — OMEPRAZOLE 40 MG/1
40 CAPSULE, DELAYED RELEASE ORAL DAILY
COMMUNITY

## 2021-01-01 RX ORDER — IBUPROFEN 200 MG
200 TABLET ORAL EVERY 6 HOURS PRN
COMMUNITY

## 2021-01-01 RX ORDER — ERGOCALCIFEROL 1.25 MG/1
50000 CAPSULE ORAL WEEKLY
COMMUNITY

## 2021-01-01 RX ORDER — LEVOTHYROXINE SODIUM 0.07 MG/1
75 TABLET ORAL DAILY
COMMUNITY

## 2021-01-01 RX ORDER — ONDANSETRON 4 MG/1
4 TABLET, FILM COATED ORAL EVERY 6 HOURS PRN
Status: DISCONTINUED | OUTPATIENT
Start: 2021-01-01 | End: 2021-01-01 | Stop reason: HOSPADM

## 2021-01-01 RX ORDER — PROCHLORPERAZINE MALEATE 5 MG/1
10 TABLET ORAL EVERY 8 HOURS PRN
Status: DISCONTINUED | OUTPATIENT
Start: 2021-01-01 | End: 2021-01-01 | Stop reason: HOSPADM

## 2021-01-01 RX ORDER — CALCIUM GLUCONATE 20 MG/ML
1 INJECTION, SOLUTION INTRAVENOUS AS NEEDED
Status: DISCONTINUED | OUTPATIENT
Start: 2021-01-01 | End: 2021-01-01 | Stop reason: HOSPADM

## 2021-01-01 RX ORDER — SCOLOPAMINE TRANSDERMAL SYSTEM 1 MG/1
1 PATCH, EXTENDED RELEASE TRANSDERMAL
COMMUNITY

## 2021-01-01 RX ORDER — LEVOTHYROXINE SODIUM 75 MCG
TABLET ORAL
Qty: 90 TABLET | Refills: 0 | Status: SHIPPED | OUTPATIENT
Start: 2021-01-01 | End: 2021-01-01

## 2021-01-01 RX ORDER — NITROGLYCERIN 0.4 MG/1
0.4 TABLET SUBLINGUAL
Status: DISCONTINUED | OUTPATIENT
Start: 2021-01-01 | End: 2021-01-01 | Stop reason: HOSPADM

## 2021-01-01 RX ORDER — DEXAMETHASONE SODIUM PHOSPHATE 10 MG/ML
6 INJECTION, SOLUTION INTRAMUSCULAR; INTRAVENOUS ONCE
Status: COMPLETED | OUTPATIENT
Start: 2021-01-01 | End: 2021-01-01

## 2021-01-01 RX ORDER — SACCHAROMYCES BOULARDII 250 MG
500 CAPSULE ORAL 2 TIMES DAILY
Status: DISCONTINUED | OUTPATIENT
Start: 2021-01-01 | End: 2021-01-01 | Stop reason: HOSPADM

## 2021-01-01 RX ORDER — PANTOPRAZOLE SODIUM 40 MG/10ML
80 INJECTION, POWDER, LYOPHILIZED, FOR SOLUTION INTRAVENOUS ONCE
Status: COMPLETED | OUTPATIENT
Start: 2021-01-01 | End: 2021-01-01

## 2021-01-01 RX ORDER — POTASSIUM CHLORIDE 20 MEQ/1
20 TABLET, EXTENDED RELEASE ORAL DAILY
Status: DISCONTINUED | OUTPATIENT
Start: 2021-01-01 | End: 2021-01-01

## 2021-01-01 RX ORDER — POTASSIUM CHLORIDE 750 MG/1
20 TABLET, EXTENDED RELEASE ORAL 2 TIMES DAILY
Qty: 120 TABLET | Refills: 1 | Status: SHIPPED | OUTPATIENT
Start: 2021-01-01

## 2021-01-01 RX ORDER — TRAMADOL HYDROCHLORIDE 50 MG/1
50 TABLET ORAL EVERY 6 HOURS PRN
COMMUNITY

## 2021-01-01 RX ORDER — SCOLOPAMINE TRANSDERMAL SYSTEM 1 MG/1
1 PATCH, EXTENDED RELEASE TRANSDERMAL
COMMUNITY
End: 2021-01-01

## 2021-01-01 RX ORDER — LIDOCAINE HYDROCHLORIDE 10 MG/ML
INJECTION, SOLUTION EPIDURAL; INFILTRATION; INTRACAUDAL; PERINEURAL AS NEEDED
Status: DISCONTINUED | OUTPATIENT
Start: 2021-01-01 | End: 2021-01-01 | Stop reason: SURG

## 2021-01-01 RX ORDER — DIPHENOXYLATE HYDROCHLORIDE AND ATROPINE SULFATE 2.5; .025 MG/1; MG/1
1 TABLET ORAL 4 TIMES DAILY PRN
COMMUNITY
End: 2021-01-01

## 2021-01-01 RX ORDER — LORAZEPAM 0.5 MG/1
0.5 TABLET ORAL EVERY 8 HOURS PRN
Status: DISCONTINUED | OUTPATIENT
Start: 2021-01-01 | End: 2021-01-01 | Stop reason: HOSPADM

## 2021-01-01 RX ORDER — GLYCOPYRROLATE 1 MG/5 ML
SYRINGE (ML) INTRAVENOUS AS NEEDED
Status: DISCONTINUED | OUTPATIENT
Start: 2021-01-01 | End: 2021-01-01 | Stop reason: SURG

## 2021-01-01 RX ORDER — HYDROMORPHONE HCL 110MG/55ML
1 PATIENT CONTROLLED ANALGESIA SYRINGE INTRAVENOUS ONCE
Status: COMPLETED | OUTPATIENT
Start: 2021-01-01 | End: 2021-01-01

## 2021-01-01 RX ORDER — LOPERAMIDE HYDROCHLORIDE 2 MG/1
4 CAPSULE ORAL
Status: DISCONTINUED | OUTPATIENT
Start: 2021-01-01 | End: 2021-01-01 | Stop reason: HOSPADM

## 2021-01-01 RX ORDER — SODIUM CHLORIDE, SODIUM LACTATE, POTASSIUM CHLORIDE, CALCIUM CHLORIDE 600; 310; 30; 20 MG/100ML; MG/100ML; MG/100ML; MG/100ML
100 INJECTION, SOLUTION INTRAVENOUS CONTINUOUS
Status: DISCONTINUED | OUTPATIENT
Start: 2021-01-01 | End: 2021-01-01

## 2021-01-01 RX ORDER — DIPHENHYDRAMINE HYDROCHLORIDE AND LIDOCAINE HYDROCHLORIDE AND ALUMINUM HYDROXIDE AND MAGNESIUM HYDRO
5 KIT EVERY 6 HOURS
Status: DISCONTINUED | OUTPATIENT
Start: 2021-01-01 | End: 2021-01-01 | Stop reason: HOSPADM

## 2021-01-01 RX ORDER — ACETAMINOPHEN 325 MG/1
650 TABLET ORAL EVERY 4 HOURS PRN
Status: DISCONTINUED | OUTPATIENT
Start: 2021-01-01 | End: 2021-01-01 | Stop reason: HOSPADM

## 2021-01-01 RX ORDER — CALCIUM GLUCONATE 20 MG/ML
2 INJECTION, SOLUTION INTRAVENOUS AS NEEDED
Status: DISCONTINUED | OUTPATIENT
Start: 2021-01-01 | End: 2021-01-01 | Stop reason: HOSPADM

## 2021-01-01 RX ORDER — ROSUVASTATIN CALCIUM 10 MG/1
10 TABLET, COATED ORAL DAILY
COMMUNITY

## 2021-01-01 RX ORDER — MAGNESIUM SULFATE HEPTAHYDRATE 40 MG/ML
2 INJECTION, SOLUTION INTRAVENOUS AS NEEDED
Status: DISCONTINUED | OUTPATIENT
Start: 2021-01-01 | End: 2021-01-01 | Stop reason: HOSPADM

## 2021-01-01 RX ORDER — CHLORPHENIRAMINE MALEATE 4 MG/1
4 TABLET ORAL EVERY 6 HOURS PRN
Status: DISCONTINUED | OUTPATIENT
Start: 2021-01-01 | End: 2021-01-01 | Stop reason: HOSPADM

## 2021-01-01 RX ORDER — HYDROCODONE BITARTRATE AND ACETAMINOPHEN 5; 325 MG/1; MG/1
1 TABLET ORAL EVERY 6 HOURS PRN
Status: DISCONTINUED | OUTPATIENT
Start: 2021-01-01 | End: 2021-01-01 | Stop reason: HOSPADM

## 2021-01-01 RX ORDER — DOCUSATE SODIUM 100 MG/1
100 CAPSULE, LIQUID FILLED ORAL 2 TIMES DAILY PRN
COMMUNITY

## 2021-01-01 RX ORDER — CHOLESTYRAMINE LIGHT 4 G/5.7G
1 POWDER, FOR SUSPENSION ORAL EVERY 12 HOURS SCHEDULED
Status: DISCONTINUED | OUTPATIENT
Start: 2021-01-01 | End: 2021-01-01 | Stop reason: HOSPADM

## 2021-01-01 RX ORDER — SODIUM CHLORIDE, SODIUM LACTATE, POTASSIUM CHLORIDE, CALCIUM CHLORIDE 600; 310; 30; 20 MG/100ML; MG/100ML; MG/100ML; MG/100ML
100 INJECTION, SOLUTION INTRAVENOUS CONTINUOUS
Status: DISPENSED | OUTPATIENT
Start: 2021-01-01 | End: 2021-01-01

## 2021-01-01 RX ORDER — LEVOFLOXACIN 5 MG/ML
INJECTION, SOLUTION INTRAVENOUS AS NEEDED
Status: DISCONTINUED | OUTPATIENT
Start: 2021-01-01 | End: 2021-01-01 | Stop reason: SURG

## 2021-01-01 RX ORDER — PANTOPRAZOLE SODIUM 40 MG/1
40 TABLET, DELAYED RELEASE ORAL EVERY MORNING
Status: DISCONTINUED | OUTPATIENT
Start: 2021-01-01 | End: 2021-01-01 | Stop reason: HOSPADM

## 2021-01-01 RX ORDER — ONDANSETRON 2 MG/ML
4 INJECTION INTRAMUSCULAR; INTRAVENOUS EVERY 6 HOURS PRN
Status: DISCONTINUED | OUTPATIENT
Start: 2021-01-01 | End: 2021-01-01 | Stop reason: HOSPADM

## 2021-01-01 RX ORDER — SODIUM CHLORIDE 0.9 % (FLUSH) 0.9 %
10 SYRINGE (ML) INJECTION AS NEEDED
Status: DISCONTINUED | OUTPATIENT
Start: 2021-01-01 | End: 2021-01-01 | Stop reason: HOSPADM

## 2021-01-01 RX ORDER — PREGABALIN 75 MG/1
150 CAPSULE ORAL 3 TIMES DAILY
Status: DISCONTINUED | OUTPATIENT
Start: 2021-01-01 | End: 2021-01-01 | Stop reason: HOSPADM

## 2021-01-01 RX ORDER — MORPHINE SULFATE 4 MG/ML
4 INJECTION, SOLUTION INTRAMUSCULAR; INTRAVENOUS ONCE
Status: COMPLETED | OUTPATIENT
Start: 2021-01-01 | End: 2021-01-01

## 2021-01-01 RX ORDER — ONDANSETRON 4 MG/1
4 TABLET, ORALLY DISINTEGRATING ORAL EVERY 8 HOURS PRN
Qty: 12 TABLET | Refills: 0 | Status: SHIPPED | OUTPATIENT
Start: 2021-01-01 | End: 2021-01-01

## 2021-01-01 RX ORDER — SODIUM CHLORIDE 9 MG/ML
INJECTION, SOLUTION INTRAVENOUS CONTINUOUS PRN
Status: DISCONTINUED | OUTPATIENT
Start: 2021-01-01 | End: 2021-01-01 | Stop reason: SURG

## 2021-01-01 RX ORDER — DICYCLOMINE HYDROCHLORIDE 10 MG/1
20 CAPSULE ORAL 3 TIMES DAILY
Status: DISCONTINUED | OUTPATIENT
Start: 2021-01-01 | End: 2021-01-01 | Stop reason: HOSPADM

## 2021-01-01 RX ORDER — NITROGLYCERIN 0.4 MG/1
TABLET SUBLINGUAL
Qty: 25 TABLET | Refills: 0 | Status: SHIPPED | OUTPATIENT
Start: 2021-01-01 | End: 2021-01-01

## 2021-01-01 RX ORDER — PREGABALIN 150 MG/1
150 CAPSULE ORAL 3 TIMES DAILY
Qty: 90 CAPSULE | Refills: 3 | Status: SHIPPED | OUTPATIENT
Start: 2021-01-01

## 2021-01-01 RX ORDER — ACETAMINOPHEN 650 MG/1
650 SUPPOSITORY RECTAL EVERY 4 HOURS PRN
Status: DISCONTINUED | OUTPATIENT
Start: 2021-01-01 | End: 2021-01-01 | Stop reason: HOSPADM

## 2021-01-01 RX ORDER — TRAMADOL HYDROCHLORIDE 50 MG/1
TABLET ORAL
Qty: 26 TABLET | Refills: 0 | Status: SHIPPED | OUTPATIENT
Start: 2021-01-01 | End: 2021-01-01

## 2021-01-01 RX ORDER — HYDROCODONE BITARTRATE AND ACETAMINOPHEN 5; 325 MG/1; MG/1
1 TABLET ORAL EVERY 6 HOURS PRN
COMMUNITY

## 2021-01-01 RX ORDER — POTASSIUM CHLORIDE 20 MEQ/1
40 TABLET, EXTENDED RELEASE ORAL AS NEEDED
Status: DISCONTINUED | OUTPATIENT
Start: 2021-01-01 | End: 2021-01-01 | Stop reason: HOSPADM

## 2021-01-01 RX ORDER — MAGNESIUM SULFATE HEPTAHYDRATE 40 MG/ML
4 INJECTION, SOLUTION INTRAVENOUS AS NEEDED
Status: DISCONTINUED | OUTPATIENT
Start: 2021-01-01 | End: 2021-01-01 | Stop reason: HOSPADM

## 2021-01-01 RX ORDER — PROCHLORPERAZINE MALEATE 10 MG
10 TABLET ORAL EVERY 8 HOURS PRN
COMMUNITY
End: 2021-01-01

## 2021-01-01 RX ORDER — PHENYLEPHRINE HCL IN 0.9% NACL 0.5 MG/5ML
SYRINGE (ML) INTRAVENOUS AS NEEDED
Status: DISCONTINUED | OUTPATIENT
Start: 2021-01-01 | End: 2021-01-01 | Stop reason: SURG

## 2021-01-01 RX ORDER — DIPHENOXYLATE HYDROCHLORIDE AND ATROPINE SULFATE 2.5; .025 MG/1; MG/1
2 TABLET ORAL 4 TIMES DAILY
Status: DISCONTINUED | OUTPATIENT
Start: 2021-01-01 | End: 2021-01-01 | Stop reason: HOSPADM

## 2021-01-01 RX ORDER — ONDANSETRON 2 MG/ML
8 INJECTION INTRAMUSCULAR; INTRAVENOUS ONCE
Status: COMPLETED | OUTPATIENT
Start: 2021-01-01 | End: 2021-01-01

## 2021-01-01 RX ORDER — LEVOTHYROXINE SODIUM 0.07 MG/1
75 TABLET ORAL
Status: DISCONTINUED | OUTPATIENT
Start: 2021-01-01 | End: 2021-01-01 | Stop reason: HOSPADM

## 2021-01-01 RX ORDER — POTASSIUM CHLORIDE 750 MG/1
20 TABLET, EXTENDED RELEASE ORAL 2 TIMES DAILY
COMMUNITY
End: 2021-01-01 | Stop reason: SDUPTHER

## 2021-01-01 RX ORDER — POTASSIUM CHLORIDE 1.5 G/1.77G
40 POWDER, FOR SOLUTION ORAL AS NEEDED
Status: DISCONTINUED | OUTPATIENT
Start: 2021-01-01 | End: 2021-01-01 | Stop reason: HOSPADM

## 2021-01-01 RX ORDER — OCTREOTIDE ACETATE 100 UG/ML
100 INJECTION, SOLUTION INTRAVENOUS; SUBCUTANEOUS EVERY 12 HOURS SCHEDULED
Status: DISCONTINUED | OUTPATIENT
Start: 2021-01-01 | End: 2021-01-01 | Stop reason: HOSPADM

## 2021-01-01 RX ORDER — TRAMADOL HYDROCHLORIDE 50 MG/1
50 TABLET ORAL EVERY 6 HOURS PRN
Qty: 12 TABLET | Refills: 0 | Status: SHIPPED | OUTPATIENT
Start: 2021-01-01 | End: 2021-01-01

## 2021-01-01 RX ORDER — DIPHENOXYLATE HYDROCHLORIDE AND ATROPINE SULFATE 2.5; .025 MG/1; MG/1
1 TABLET ORAL 4 TIMES DAILY PRN
Status: DISCONTINUED | OUTPATIENT
Start: 2021-01-01 | End: 2021-01-01 | Stop reason: HOSPADM

## 2021-01-01 RX ORDER — POTASSIUM CHLORIDE 7.45 MG/ML
10 INJECTION INTRAVENOUS
Status: DISCONTINUED | OUTPATIENT
Start: 2021-01-01 | End: 2021-01-01 | Stop reason: HOSPADM

## 2021-01-01 RX ORDER — MIDAZOLAM HYDROCHLORIDE 1 MG/ML
INJECTION INTRAMUSCULAR; INTRAVENOUS AS NEEDED
Status: DISCONTINUED | OUTPATIENT
Start: 2021-01-01 | End: 2021-01-01 | Stop reason: SURG

## 2021-01-01 RX ORDER — PROCHLORPERAZINE EDISYLATE 5 MG/ML
10 INJECTION INTRAMUSCULAR; INTRAVENOUS EVERY 6 HOURS PRN
Status: DISCONTINUED | OUTPATIENT
Start: 2021-01-01 | End: 2021-01-01 | Stop reason: HOSPADM

## 2021-01-01 RX ORDER — CYPROHEPTADINE HYDROCHLORIDE 4 MG/1
4 TABLET ORAL 2 TIMES DAILY
COMMUNITY

## 2021-01-01 RX ORDER — ONDANSETRON 4 MG/1
4 TABLET, FILM COATED ORAL EVERY 6 HOURS PRN
COMMUNITY

## 2021-01-01 RX ADMIN — DIPHENOXYLATE HYDROCHLORIDE AND ATROPINE SULFATE 2 TABLET: 2.5; .025 TABLET ORAL at 11:50

## 2021-01-01 RX ADMIN — ASPIRIN 81 MG: 81 TABLET, COATED ORAL at 10:21

## 2021-01-01 RX ADMIN — SODIUM CHLORIDE 3 G: 900 INJECTION INTRAVENOUS at 02:37

## 2021-01-01 RX ADMIN — SODIUM CHLORIDE 1000 ML: 9 INJECTION, SOLUTION INTRAVENOUS at 18:18

## 2021-01-01 RX ADMIN — DIPHENOXYLATE HYDROCHLORIDE AND ATROPINE SULFATE 2 TABLET: 2.5; .025 TABLET ORAL at 21:50

## 2021-01-01 RX ADMIN — HYDROCODONE BITARTRATE AND ACETAMINOPHEN 1 TABLET: 5; 325 TABLET ORAL at 16:20

## 2021-01-01 RX ADMIN — ROSUVASTATIN CALCIUM 10 MG: 10 TABLET, FILM COATED ORAL at 00:21

## 2021-01-01 RX ADMIN — IRON SUCROSE 300 MG: 20 INJECTION, SOLUTION INTRAVENOUS at 16:21

## 2021-01-01 RX ADMIN — PREGABALIN 150 MG: 75 CAPSULE ORAL at 09:25

## 2021-01-01 RX ADMIN — PIPERACILLIN AND TAZOBACTAM 3.38 G: 3; .375 INJECTION, POWDER, LYOPHILIZED, FOR SOLUTION INTRAVENOUS at 17:20

## 2021-01-01 RX ADMIN — Medication 10 ML: at 19:48

## 2021-01-01 RX ADMIN — PREGABALIN 150 MG: 75 CAPSULE ORAL at 17:43

## 2021-01-01 RX ADMIN — DIPHENOXYLATE HYDROCHLORIDE AND ATROPINE SULFATE 2 TABLET: 2.5; .025 TABLET ORAL at 12:15

## 2021-01-01 RX ADMIN — OCTREOTIDE ACETATE 100 MCG: 100 INJECTION, SOLUTION INTRAVENOUS; SUBCUTANEOUS at 11:19

## 2021-01-01 RX ADMIN — Medication 500 MG: at 10:56

## 2021-01-01 RX ADMIN — PANCRELIPASE 36000 UNITS OF LIPASE: 36000; 180000; 114000 CAPSULE, DELAYED RELEASE PELLETS ORAL at 16:47

## 2021-01-01 RX ADMIN — DIPHENOXYLATE HYDROCHLORIDE AND ATROPINE SULFATE 2 TABLET: 2.5; .025 TABLET ORAL at 23:39

## 2021-01-01 RX ADMIN — Medication 500 MG: at 09:28

## 2021-01-01 RX ADMIN — Medication 500 MG: at 09:21

## 2021-01-01 RX ADMIN — Medication 0.2 MG: at 11:27

## 2021-01-01 RX ADMIN — EPINEPHRINE 1 MG: 0.1 INJECTION, SOLUTION ENDOTRACHEAL; INTRACARDIAC; INTRAVENOUS at 20:11

## 2021-01-01 RX ADMIN — SODIUM CHLORIDE 2000 ML: 9 INJECTION, SOLUTION INTRAVENOUS at 19:18

## 2021-01-01 RX ADMIN — OCTREOTIDE ACETATE 100 MCG: 100 INJECTION, SOLUTION INTRAVENOUS; SUBCUTANEOUS at 09:21

## 2021-01-01 RX ADMIN — OCTREOTIDE ACETATE 100 MCG: 100 INJECTION, SOLUTION INTRAVENOUS; SUBCUTANEOUS at 22:23

## 2021-01-01 RX ADMIN — SODIUM CHLORIDE, POTASSIUM CHLORIDE, SODIUM LACTATE AND CALCIUM CHLORIDE 1000 ML: 600; 310; 30; 20 INJECTION, SOLUTION INTRAVENOUS at 22:25

## 2021-01-01 RX ADMIN — Medication 10 ML: at 20:49

## 2021-01-01 RX ADMIN — PREGABALIN 150 MG: 75 CAPSULE ORAL at 20:48

## 2021-01-01 RX ADMIN — PIPERACILLIN AND TAZOBACTAM 3.38 G: 3; .375 INJECTION, POWDER, LYOPHILIZED, FOR SOLUTION INTRAVENOUS at 08:34

## 2021-01-01 RX ADMIN — PIPERACILLIN AND TAZOBACTAM 3.38 G: 3; .375 INJECTION, POWDER, LYOPHILIZED, FOR SOLUTION INTRAVENOUS at 19:17

## 2021-01-01 RX ADMIN — PANTOPRAZOLE SODIUM 40 MG: 40 TABLET, DELAYED RELEASE ORAL at 08:00

## 2021-01-01 RX ADMIN — PREGABALIN 150 MG: 75 CAPSULE ORAL at 22:38

## 2021-01-01 RX ADMIN — MORPHINE SULFATE 4 MG: 4 INJECTION INTRAVENOUS at 22:12

## 2021-01-01 RX ADMIN — Medication 10 ML: at 09:29

## 2021-01-01 RX ADMIN — ONDANSETRON 4 MG: 2 INJECTION INTRAMUSCULAR; INTRAVENOUS at 11:18

## 2021-01-01 RX ADMIN — PANTOPRAZOLE SODIUM 40 MG: 40 TABLET, DELAYED RELEASE ORAL at 05:04

## 2021-01-01 RX ADMIN — ONDANSETRON 4 MG: 2 INJECTION INTRAMUSCULAR; INTRAVENOUS at 10:22

## 2021-01-01 RX ADMIN — CHOLESTYRAMINE 4 G: 4 POWDER, FOR SUSPENSION ORAL at 22:48

## 2021-01-01 RX ADMIN — MIDAZOLAM 2 MG: 1 INJECTION INTRAMUSCULAR; INTRAVENOUS at 11:04

## 2021-01-01 RX ADMIN — IRON SUCROSE 300 MG: 20 INJECTION, SOLUTION INTRAVENOUS at 09:24

## 2021-01-01 RX ADMIN — PANCRELIPASE 36000 UNITS OF LIPASE: 36000; 180000; 114000 CAPSULE, DELAYED RELEASE PELLETS ORAL at 10:22

## 2021-01-01 RX ADMIN — Medication 10 ML: at 08:12

## 2021-01-01 RX ADMIN — FILGRASTIM 480 MCG: 480 INJECTION, SOLUTION INTRAVENOUS; SUBCUTANEOUS at 16:21

## 2021-01-01 RX ADMIN — SCOPALAMINE 1 PATCH: 1 PATCH, EXTENDED RELEASE TRANSDERMAL at 10:27

## 2021-01-01 RX ADMIN — DICYCLOMINE HYDROCHLORIDE 20 MG: 10 CAPSULE ORAL at 09:22

## 2021-01-01 RX ADMIN — LIDOCAINE HYDROCHLORIDE 100 MG: 20 INJECTION INTRAVENOUS at 20:15

## 2021-01-01 RX ADMIN — METRONIDAZOLE 500 MG: 500 INJECTION, SOLUTION INTRAVENOUS at 11:37

## 2021-01-01 RX ADMIN — EPINEPHRINE 1 MG: 0.1 INJECTION, SOLUTION ENDOTRACHEAL; INTRACARDIAC; INTRAVENOUS at 20:23

## 2021-01-01 RX ADMIN — HYDROCODONE BITARTRATE AND ACETAMINOPHEN 1 TABLET: 5; 325 TABLET ORAL at 04:48

## 2021-01-01 RX ADMIN — ROSUVASTATIN CALCIUM 10 MG: 10 TABLET, FILM COATED ORAL at 19:48

## 2021-01-01 RX ADMIN — DIPHENHYDRAMINE HYDROCHLORIDE AND LIDOCAINE HYDROCHLORIDE AND ALUMINUM HYDROXIDE AND MAGNESIUM HYDRO 5 ML: KIT at 07:15

## 2021-01-01 RX ADMIN — LEVOTHYROXINE SODIUM 75 MCG: 0.07 TABLET ORAL at 05:55

## 2021-01-01 RX ADMIN — PANCRELIPASE 36000 UNITS OF LIPASE: 36000; 180000; 114000 CAPSULE, DELAYED RELEASE PELLETS ORAL at 09:27

## 2021-01-01 RX ADMIN — ONDANSETRON 4 MG: 2 INJECTION INTRAMUSCULAR; INTRAVENOUS at 21:43

## 2021-01-01 RX ADMIN — PANCRELIPASE 36000 UNITS OF LIPASE: 36000; 180000; 114000 CAPSULE, DELAYED RELEASE PELLETS ORAL at 08:12

## 2021-01-01 RX ADMIN — PIPERACILLIN AND TAZOBACTAM 3.38 G: 3; .375 INJECTION, POWDER, LYOPHILIZED, FOR SOLUTION INTRAVENOUS at 19:47

## 2021-01-01 RX ADMIN — PIPERACILLIN AND TAZOBACTAM 3.38 G: 3; .375 INJECTION, POWDER, LYOPHILIZED, FOR SOLUTION INTRAVENOUS at 05:43

## 2021-01-01 RX ADMIN — SODIUM CHLORIDE: 0.9 INJECTION, SOLUTION INTRAVENOUS at 11:04

## 2021-01-01 RX ADMIN — DIPHENHYDRAMINE HYDROCHLORIDE AND LIDOCAINE HYDROCHLORIDE AND ALUMINUM HYDROXIDE AND MAGNESIUM HYDRO 5 ML: KIT at 11:46

## 2021-01-01 RX ADMIN — PIPERACILLIN AND TAZOBACTAM 3.38 G: 3; .375 INJECTION, POWDER, LYOPHILIZED, FOR SOLUTION INTRAVENOUS at 22:02

## 2021-01-01 RX ADMIN — SODIUM CHLORIDE AND POTASSIUM CHLORIDE 75 ML/HR: .9; .15 SOLUTION INTRAVENOUS at 17:20

## 2021-01-01 RX ADMIN — POTASSIUM CHLORIDE 40 MEQ: 1500 TABLET, EXTENDED RELEASE ORAL at 17:35

## 2021-01-01 RX ADMIN — PANTOPRAZOLE SODIUM 40 MG: 40 TABLET, DELAYED RELEASE ORAL at 05:55

## 2021-01-01 RX ADMIN — LEVOFLOXACIN 500 MG: 5 INJECTION, SOLUTION INTRAVENOUS at 11:32

## 2021-01-01 RX ADMIN — DICYCLOMINE HYDROCHLORIDE 20 MG: 10 CAPSULE ORAL at 16:20

## 2021-01-01 RX ADMIN — PANCRELIPASE 36000 UNITS OF LIPASE: 36000; 180000; 114000 CAPSULE, DELAYED RELEASE PELLETS ORAL at 09:21

## 2021-01-01 RX ADMIN — ONDANSETRON 4 MG: 2 INJECTION INTRAMUSCULAR; INTRAVENOUS at 11:02

## 2021-01-01 RX ADMIN — PANCRELIPASE 36000 UNITS OF LIPASE: 36000; 180000; 114000 CAPSULE, DELAYED RELEASE PELLETS ORAL at 09:24

## 2021-01-01 RX ADMIN — DEXAMETHASONE SODIUM PHOSPHATE 8 MG: 4 INJECTION, SOLUTION INTRAMUSCULAR; INTRAVENOUS at 11:23

## 2021-01-01 RX ADMIN — PANCRELIPASE 36000 UNITS OF LIPASE: 36000; 180000; 114000 CAPSULE, DELAYED RELEASE PELLETS ORAL at 08:30

## 2021-01-01 RX ADMIN — Medication 500 MG: at 22:02

## 2021-01-01 RX ADMIN — PIPERACILLIN AND TAZOBACTAM 3.38 G: 3; .375 INJECTION, POWDER, LYOPHILIZED, FOR SOLUTION INTRAVENOUS at 14:49

## 2021-01-01 RX ADMIN — Medication 10 ML: at 00:22

## 2021-01-01 RX ADMIN — PIPERACILLIN AND TAZOBACTAM 3.38 G: 3; .375 INJECTION, POWDER, LYOPHILIZED, FOR SOLUTION INTRAVENOUS at 05:05

## 2021-01-01 RX ADMIN — PIPERACILLIN AND TAZOBACTAM 3.38 G: 3; .375 INJECTION, POWDER, LYOPHILIZED, FOR SOLUTION INTRAVENOUS at 05:56

## 2021-01-01 RX ADMIN — PANTOPRAZOLE SODIUM 40 MG: 40 TABLET, DELAYED RELEASE ORAL at 05:37

## 2021-01-01 RX ADMIN — HYDROCODONE BITARTRATE AND ACETAMINOPHEN 1 TABLET: 5; 325 TABLET ORAL at 14:11

## 2021-01-01 RX ADMIN — ONDANSETRON 4 MG: 2 INJECTION INTRAMUSCULAR; INTRAVENOUS at 00:03

## 2021-01-01 RX ADMIN — MORPHINE SULFATE 4 MG: 4 INJECTION INTRAVENOUS at 18:18

## 2021-01-01 RX ADMIN — CHOLESTYRAMINE 4 G: 4 POWDER, FOR SUSPENSION ORAL at 23:58

## 2021-01-01 RX ADMIN — EPINEPHRINE 1 MG: 0.1 INJECTION, SOLUTION ENDOTRACHEAL; INTRACARDIAC; INTRAVENOUS at 20:17

## 2021-01-01 RX ADMIN — FENTANYL CITRATE 50 MCG: 50 INJECTION, SOLUTION INTRAMUSCULAR; INTRAVENOUS at 11:14

## 2021-01-01 RX ADMIN — ASPIRIN 81 MG: 81 TABLET, COATED ORAL at 08:11

## 2021-01-01 RX ADMIN — DIPHENOXYLATE HYDROCHLORIDE AND ATROPINE SULFATE 2 TABLET: 2.5; .025 TABLET ORAL at 17:19

## 2021-01-01 RX ADMIN — CLOTRIMAZOLE 10 MG: 10 LOZENGE ORAL; TOPICAL at 14:17

## 2021-01-01 RX ADMIN — Medication 10 ML: at 22:03

## 2021-01-01 RX ADMIN — LOPERAMIDE HYDROCHLORIDE 2 MG: 2 CAPSULE ORAL at 20:48

## 2021-01-01 RX ADMIN — PREGABALIN 150 MG: 75 CAPSULE ORAL at 10:21

## 2021-01-01 RX ADMIN — CHOLESTYRAMINE 4 G: 4 POWDER, FOR SUSPENSION ORAL at 08:12

## 2021-01-01 RX ADMIN — Medication 10 ML: at 23:43

## 2021-01-01 RX ADMIN — OCTREOTIDE ACETATE 100 MCG: 100 INJECTION, SOLUTION INTRAVENOUS; SUBCUTANEOUS at 22:45

## 2021-01-01 RX ADMIN — DIPHENOXYLATE HYDROCHLORIDE AND ATROPINE SULFATE 2 TABLET: 2.5; .025 TABLET ORAL at 11:46

## 2021-01-01 RX ADMIN — PANCRELIPASE 36000 UNITS OF LIPASE: 36000; 180000; 114000 CAPSULE, DELAYED RELEASE PELLETS ORAL at 22:37

## 2021-01-01 RX ADMIN — PANCRELIPASE 36000 UNITS OF LIPASE: 36000; 180000; 114000 CAPSULE, DELAYED RELEASE PELLETS ORAL at 22:02

## 2021-01-01 RX ADMIN — Medication 0.6 MG: at 11:50

## 2021-01-01 RX ADMIN — POTASSIUM CHLORIDE 40 MEQ: 1500 TABLET, EXTENDED RELEASE ORAL at 04:55

## 2021-01-01 RX ADMIN — SODIUM CHLORIDE, POTASSIUM CHLORIDE, SODIUM LACTATE AND CALCIUM CHLORIDE 500 ML: 600; 310; 30; 20 INJECTION, SOLUTION INTRAVENOUS at 07:58

## 2021-01-01 RX ADMIN — PANCRELIPASE 36000 UNITS OF LIPASE: 36000; 180000; 114000 CAPSULE, DELAYED RELEASE PELLETS ORAL at 15:17

## 2021-01-01 RX ADMIN — POTASSIUM CHLORIDE 20 MEQ: 750 CAPSULE, EXTENDED RELEASE ORAL at 09:36

## 2021-01-01 RX ADMIN — HEPARIN SODIUM (PORCINE) LOCK FLUSH IV SOLN 100 UNIT/ML 500 UNITS: 100 SOLUTION at 16:21

## 2021-01-01 RX ADMIN — PANCRELIPASE 36000 UNITS OF LIPASE: 36000; 180000; 114000 CAPSULE, DELAYED RELEASE PELLETS ORAL at 20:48

## 2021-01-01 RX ADMIN — OCTREOTIDE ACETATE 100 MCG: 100 INJECTION, SOLUTION INTRAVENOUS; SUBCUTANEOUS at 23:46

## 2021-01-01 RX ADMIN — POTASSIUM CHLORIDE 20 MEQ: 750 CAPSULE, EXTENDED RELEASE ORAL at 09:22

## 2021-01-01 RX ADMIN — DEXAMETHASONE SODIUM PHOSPHATE 6 MG: 10 INJECTION, SOLUTION INTRAMUSCULAR; INTRAVENOUS at 22:23

## 2021-01-01 RX ADMIN — PIPERACILLIN AND TAZOBACTAM 3.38 G: 3; .375 INJECTION, POWDER, LYOPHILIZED, FOR SOLUTION INTRAVENOUS at 11:50

## 2021-01-01 RX ADMIN — PREGABALIN 150 MG: 75 CAPSULE ORAL at 17:20

## 2021-01-01 RX ADMIN — Medication 10 ML: at 09:28

## 2021-01-01 RX ADMIN — HYDROCODONE BITARTRATE AND ACETAMINOPHEN 1 TABLET: 5; 325 TABLET ORAL at 00:20

## 2021-01-01 RX ADMIN — Medication 4 MG: at 11:50

## 2021-01-01 RX ADMIN — ASPIRIN 81 MG: 81 TABLET, COATED ORAL at 09:28

## 2021-01-01 RX ADMIN — PROCHLORPERAZINE EDISYLATE 10 MG: 5 INJECTION INTRAMUSCULAR; INTRAVENOUS at 05:02

## 2021-01-01 RX ADMIN — ROSUVASTATIN CALCIUM 10 MG: 10 TABLET, FILM COATED ORAL at 21:51

## 2021-01-01 RX ADMIN — IRON SUCROSE 300 MG: 20 INJECTION, SOLUTION INTRAVENOUS at 10:24

## 2021-01-01 RX ADMIN — POTASSIUM CHLORIDE 40 MEQ: 1500 TABLET, EXTENDED RELEASE ORAL at 00:21

## 2021-01-01 RX ADMIN — PIPERACILLIN AND TAZOBACTAM 3.38 G: 3; .375 INJECTION, POWDER, LYOPHILIZED, FOR SOLUTION INTRAVENOUS at 12:14

## 2021-01-01 RX ADMIN — LIDOCAINE HYDROCHLORIDE 50 MG: 10 INJECTION, SOLUTION EPIDURAL; INFILTRATION; INTRACAUDAL; PERINEURAL at 11:14

## 2021-01-01 RX ADMIN — CHOLESTYRAMINE 4 G: 4 POWDER, FOR SUSPENSION ORAL at 09:24

## 2021-01-01 RX ADMIN — PANCRELIPASE 36000 UNITS OF LIPASE: 36000; 180000; 114000 CAPSULE, DELAYED RELEASE PELLETS ORAL at 21:50

## 2021-01-01 RX ADMIN — EPINEPHRINE 1 MG: 0.1 INJECTION, SOLUTION ENDOTRACHEAL; INTRACARDIAC; INTRAVENOUS at 18:57

## 2021-01-01 RX ADMIN — Medication 500 MG: at 08:30

## 2021-01-01 RX ADMIN — DICYCLOMINE HYDROCHLORIDE 20 MG: 10 CAPSULE ORAL at 15:17

## 2021-01-01 RX ADMIN — Medication 500 MG: at 10:21

## 2021-01-01 RX ADMIN — OCTREOTIDE ACETATE 100 MCG: 100 INJECTION, SOLUTION INTRAVENOUS; SUBCUTANEOUS at 22:03

## 2021-01-01 RX ADMIN — PIPERACILLIN AND TAZOBACTAM 3.38 G: 3; .375 INJECTION, POWDER, LYOPHILIZED, FOR SOLUTION INTRAVENOUS at 14:35

## 2021-01-01 RX ADMIN — POTASSIUM CHLORIDE 20 MEQ: 1500 TABLET, EXTENDED RELEASE ORAL at 09:25

## 2021-01-01 RX ADMIN — POTASSIUM CHLORIDE 40 MEQ: 1.5 POWDER, FOR SOLUTION ORAL at 12:19

## 2021-01-01 RX ADMIN — DICYCLOMINE HYDROCHLORIDE 20 MG: 10 CAPSULE ORAL at 09:28

## 2021-01-01 RX ADMIN — LEVOTHYROXINE SODIUM 75 MCG: 0.07 TABLET ORAL at 08:13

## 2021-01-01 RX ADMIN — HYDROMORPHONE HYDROCHLORIDE 0.5 MG: 2 INJECTION, SOLUTION INTRAMUSCULAR; INTRAVENOUS; SUBCUTANEOUS at 18:40

## 2021-01-01 RX ADMIN — SCOPALAMINE 1 PATCH: 1 PATCH, EXTENDED RELEASE TRANSDERMAL at 11:20

## 2021-01-01 RX ADMIN — POTASSIUM CHLORIDE 40 MEQ: 1.5 POWDER, FOR SOLUTION ORAL at 06:33

## 2021-01-01 RX ADMIN — Medication 500 MG: at 23:42

## 2021-01-01 RX ADMIN — Medication 10 ML: at 09:22

## 2021-01-01 RX ADMIN — Medication 500 MG: at 20:48

## 2021-01-01 RX ADMIN — DIPHENOXYLATE HYDROCHLORIDE AND ATROPINE SULFATE 2 TABLET: 2.5; .025 TABLET ORAL at 11:18

## 2021-01-01 RX ADMIN — DICYCLOMINE HYDROCHLORIDE 20 MG: 10 CAPSULE ORAL at 17:37

## 2021-01-01 RX ADMIN — HYDROCODONE BITARTRATE AND ACETAMINOPHEN 1 TABLET: 5; 325 TABLET ORAL at 19:47

## 2021-01-01 RX ADMIN — OCTREOTIDE ACETATE 100 MCG: 100 INJECTION, SOLUTION INTRAVENOUS; SUBCUTANEOUS at 09:27

## 2021-01-01 RX ADMIN — SCOPALAMINE 1 PATCH: 1 PATCH, EXTENDED RELEASE TRANSDERMAL at 12:20

## 2021-01-01 RX ADMIN — PANCRELIPASE 36000 UNITS OF LIPASE: 36000; 180000; 114000 CAPSULE, DELAYED RELEASE PELLETS ORAL at 17:37

## 2021-01-01 RX ADMIN — SODIUM BICARBONATE 50 MEQ: 84 INJECTION, SOLUTION INTRAVENOUS at 20:13

## 2021-01-01 RX ADMIN — FILGRASTIM 480 MCG: 480 INJECTION, SOLUTION INTRAVENOUS; SUBCUTANEOUS at 17:43

## 2021-01-01 RX ADMIN — PANCRELIPASE 36000 UNITS OF LIPASE: 36000; 180000; 114000 CAPSULE, DELAYED RELEASE PELLETS ORAL at 17:19

## 2021-01-01 RX ADMIN — ROCURONIUM BROMIDE 50 MG: 10 INJECTION, SOLUTION INTRAVENOUS at 11:14

## 2021-01-01 RX ADMIN — ONDANSETRON HYDROCHLORIDE 4 MG: 4 TABLET, FILM COATED ORAL at 09:00

## 2021-01-01 RX ADMIN — SODIUM CHLORIDE 3 G: 900 INJECTION INTRAVENOUS at 22:30

## 2021-01-01 RX ADMIN — PANCRELIPASE 36000 UNITS OF LIPASE: 36000; 180000; 114000 CAPSULE, DELAYED RELEASE PELLETS ORAL at 16:42

## 2021-01-01 RX ADMIN — ONDANSETRON 8 MG: 2 INJECTION INTRAMUSCULAR; INTRAVENOUS at 18:40

## 2021-01-01 RX ADMIN — LOPERAMIDE HYDROCHLORIDE 2 MG: 2 CAPSULE ORAL at 10:01

## 2021-01-01 RX ADMIN — PREGABALIN 150 MG: 75 CAPSULE ORAL at 17:37

## 2021-01-01 RX ADMIN — DIPHENOXYLATE HYDROCHLORIDE AND ATROPINE SULFATE 2 TABLET: 2.5; .025 TABLET ORAL at 09:27

## 2021-01-01 RX ADMIN — PANCRELIPASE 36000 UNITS OF LIPASE: 36000; 180000; 114000 CAPSULE, DELAYED RELEASE PELLETS ORAL at 16:20

## 2021-01-01 RX ADMIN — DICYCLOMINE HYDROCHLORIDE 20 MG: 10 CAPSULE ORAL at 16:42

## 2021-01-01 RX ADMIN — Medication 500 MG: at 09:24

## 2021-01-01 RX ADMIN — Medication 10 ML: at 08:44

## 2021-01-01 RX ADMIN — LOPERAMIDE HYDROCHLORIDE 4 MG: 2 CAPSULE ORAL at 17:19

## 2021-01-01 RX ADMIN — DIPHENOXYLATE HYDROCHLORIDE AND ATROPINE SULFATE 2 TABLET: 2.5; .025 TABLET ORAL at 09:21

## 2021-01-01 RX ADMIN — PIPERACILLIN AND TAZOBACTAM 3.38 G: 3; .375 INJECTION, POWDER, LYOPHILIZED, FOR SOLUTION INTRAVENOUS at 23:33

## 2021-01-01 RX ADMIN — DICYCLOMINE HYDROCHLORIDE 20 MG: 10 CAPSULE ORAL at 08:30

## 2021-01-01 RX ADMIN — LEVOTHYROXINE SODIUM 75 MCG: 0.07 TABLET ORAL at 05:37

## 2021-01-01 RX ADMIN — PREGABALIN 150 MG: 75 CAPSULE ORAL at 09:21

## 2021-01-01 RX ADMIN — SODIUM CHLORIDE, POTASSIUM CHLORIDE, SODIUM LACTATE AND CALCIUM CHLORIDE 100 ML/HR: 600; 310; 30; 20 INJECTION, SOLUTION INTRAVENOUS at 00:22

## 2021-01-01 RX ADMIN — PANCRELIPASE 36000 UNITS OF LIPASE: 36000; 180000; 114000 CAPSULE, DELAYED RELEASE PELLETS ORAL at 09:59

## 2021-01-01 RX ADMIN — DIPHENOXYLATE HYDROCHLORIDE AND ATROPINE SULFATE 2 TABLET: 2.5; .025 TABLET ORAL at 22:35

## 2021-01-01 RX ADMIN — MORPHINE SULFATE 2 MG: 4 INJECTION INTRAVENOUS at 04:54

## 2021-01-01 RX ADMIN — PREGABALIN 150 MG: 75 CAPSULE ORAL at 16:20

## 2021-01-01 RX ADMIN — PIPERACILLIN AND TAZOBACTAM 3.38 G: 3; .375 INJECTION, POWDER, LYOPHILIZED, FOR SOLUTION INTRAVENOUS at 12:01

## 2021-01-01 RX ADMIN — PREGABALIN 150 MG: 75 CAPSULE ORAL at 21:53

## 2021-01-01 RX ADMIN — CHOLESTYRAMINE 4 G: 4 POWDER, FOR SUSPENSION ORAL at 22:02

## 2021-01-01 RX ADMIN — CLOTRIMAZOLE 10 MG: 10 LOZENGE ORAL; TOPICAL at 17:58

## 2021-01-01 RX ADMIN — LOPERAMIDE HYDROCHLORIDE 2 MG: 2 CAPSULE ORAL at 05:17

## 2021-01-01 RX ADMIN — PIPERACILLIN AND TAZOBACTAM 3.38 G: 3; .375 INJECTION, POWDER, LYOPHILIZED, FOR SOLUTION INTRAVENOUS at 04:48

## 2021-01-01 RX ADMIN — Medication 500 MG: at 21:51

## 2021-01-01 RX ADMIN — PREGABALIN 150 MG: 75 CAPSULE ORAL at 08:29

## 2021-01-01 RX ADMIN — DICYCLOMINE HYDROCHLORIDE 20 MG: 10 CAPSULE ORAL at 22:36

## 2021-01-01 RX ADMIN — LEVOTHYROXINE SODIUM 75 MCG: 0.07 TABLET ORAL at 05:17

## 2021-01-01 RX ADMIN — SODIUM CHLORIDE, POTASSIUM CHLORIDE, SODIUM LACTATE AND CALCIUM CHLORIDE 150 ML/HR: 600; 310; 30; 20 INJECTION, SOLUTION INTRAVENOUS at 12:09

## 2021-01-01 RX ADMIN — PREGABALIN 150 MG: 75 CAPSULE ORAL at 22:02

## 2021-01-01 RX ADMIN — PANTOPRAZOLE SODIUM 40 MG: 40 TABLET, DELAYED RELEASE ORAL at 10:21

## 2021-01-01 RX ADMIN — Medication 10 ML: at 22:34

## 2021-01-01 RX ADMIN — CHOLESTYRAMINE 4 G: 4 POWDER, FOR SUSPENSION ORAL at 20:48

## 2021-01-01 RX ADMIN — PANCRELIPASE 36000 UNITS OF LIPASE: 36000; 180000; 114000 CAPSULE, DELAYED RELEASE PELLETS ORAL at 23:40

## 2021-01-01 RX ADMIN — Medication 10 ML: at 10:24

## 2021-01-01 RX ADMIN — Medication 500 MG: at 09:59

## 2021-01-01 RX ADMIN — FENTANYL CITRATE 50 MCG: 50 INJECTION, SOLUTION INTRAMUSCULAR; INTRAVENOUS at 11:08

## 2021-01-01 RX ADMIN — PREGABALIN 150 MG: 75 CAPSULE ORAL at 15:17

## 2021-01-01 RX ADMIN — SODIUM CHLORIDE, POTASSIUM CHLORIDE, SODIUM LACTATE AND CALCIUM CHLORIDE 1000 ML: 600; 310; 30; 20 INJECTION, SOLUTION INTRAVENOUS at 18:41

## 2021-01-01 RX ADMIN — LEVOTHYROXINE SODIUM 75 MCG: 0.07 TABLET ORAL at 05:57

## 2021-01-01 RX ADMIN — PANTOPRAZOLE SODIUM 40 MG: 40 TABLET, DELAYED RELEASE ORAL at 06:04

## 2021-01-01 RX ADMIN — PREGABALIN 150 MG: 75 CAPSULE ORAL at 23:41

## 2021-01-01 RX ADMIN — HYDROMORPHONE HYDROCHLORIDE 1 MG: 2 INJECTION, SOLUTION INTRAMUSCULAR; INTRAVENOUS; SUBCUTANEOUS at 09:59

## 2021-01-01 RX ADMIN — PROPOFOL 200 MG: 10 INJECTION, EMULSION INTRAVENOUS at 11:14

## 2021-01-01 RX ADMIN — DIPHENOXYLATE HYDROCHLORIDE AND ATROPINE SULFATE 2 TABLET: 2.5; .025 TABLET ORAL at 17:15

## 2021-01-01 RX ADMIN — IOPAMIDOL 5 ML: 755 INJECTION, SOLUTION INTRAVENOUS at 15:40

## 2021-01-01 RX ADMIN — PANTOPRAZOLE SODIUM 40 MG: 40 TABLET, DELAYED RELEASE ORAL at 07:10

## 2021-01-01 RX ADMIN — PIPERACILLIN AND TAZOBACTAM 3.38 G: 3; .375 INJECTION, POWDER, LYOPHILIZED, FOR SOLUTION INTRAVENOUS at 22:29

## 2021-01-01 RX ADMIN — ALTEPLASE 4 MG: KIT at 15:49

## 2021-01-01 RX ADMIN — PREGABALIN 150 MG: 75 CAPSULE ORAL at 16:41

## 2021-01-01 RX ADMIN — DICYCLOMINE HYDROCHLORIDE 20 MG: 10 CAPSULE ORAL at 09:25

## 2021-01-01 RX ADMIN — DICYCLOMINE HYDROCHLORIDE 20 MG: 10 CAPSULE ORAL at 23:40

## 2021-01-01 RX ADMIN — DIPHENOXYLATE HYDROCHLORIDE AND ATROPINE SULFATE 2 TABLET: 2.5; .025 TABLET ORAL at 16:42

## 2021-01-01 RX ADMIN — CLOTRIMAZOLE 10 MG: 10 LOZENGE ORAL; TOPICAL at 11:18

## 2021-01-01 RX ADMIN — ONDANSETRON 4 MG: 2 INJECTION INTRAMUSCULAR; INTRAVENOUS at 00:22

## 2021-01-01 RX ADMIN — POTASSIUM CHLORIDE 10 MEQ: 7.46 INJECTION, SOLUTION INTRAVENOUS at 15:21

## 2021-01-01 RX ADMIN — PREGABALIN 150 MG: 75 CAPSULE ORAL at 20:15

## 2021-01-01 RX ADMIN — CLOTRIMAZOLE 10 MG: 10 LOZENGE ORAL; TOPICAL at 11:46

## 2021-01-01 RX ADMIN — PREGABALIN 150 MG: 75 CAPSULE ORAL at 08:12

## 2021-01-01 RX ADMIN — ONDANSETRON 4 MG: 2 INJECTION INTRAMUSCULAR; INTRAVENOUS at 15:14

## 2021-01-01 RX ADMIN — EPINEPHRINE 1 MG: 0.1 INJECTION, SOLUTION ENDOTRACHEAL; INTRACARDIAC; INTRAVENOUS at 18:53

## 2021-01-01 RX ADMIN — CHOLESTYRAMINE 4 G: 4 POWDER, FOR SUSPENSION ORAL at 22:04

## 2021-01-01 RX ADMIN — PIPERACILLIN AND TAZOBACTAM 3.38 G: 3; .375 INJECTION, POWDER, LYOPHILIZED, FOR SOLUTION INTRAVENOUS at 09:59

## 2021-01-01 RX ADMIN — ROSUVASTATIN CALCIUM 10 MG: 10 TABLET, FILM COATED ORAL at 22:37

## 2021-01-01 RX ADMIN — POTASSIUM CHLORIDE 20 MEQ: 1500 TABLET, EXTENDED RELEASE ORAL at 10:24

## 2021-01-01 RX ADMIN — LEVOTHYROXINE SODIUM 75 MCG: 0.07 TABLET ORAL at 05:04

## 2021-01-01 RX ADMIN — DICYCLOMINE HYDROCHLORIDE 20 MG: 10 CAPSULE ORAL at 22:02

## 2021-01-01 RX ADMIN — Medication 10 ML: at 21:53

## 2021-01-01 RX ADMIN — MAGNESIUM SULFATE HEPTAHYDRATE 4 G: 40 INJECTION, SOLUTION INTRAVENOUS at 10:56

## 2021-01-01 RX ADMIN — CHOLESTYRAMINE 4 G: 4 POWDER, FOR SUSPENSION ORAL at 08:29

## 2021-01-01 RX ADMIN — ROSUVASTATIN CALCIUM 10 MG: 10 TABLET, FILM COATED ORAL at 20:48

## 2021-01-01 RX ADMIN — DICYCLOMINE HYDROCHLORIDE 20 MG: 10 CAPSULE ORAL at 10:22

## 2021-01-01 RX ADMIN — ASPIRIN 81 MG: 81 TABLET, COATED ORAL at 08:30

## 2021-01-01 RX ADMIN — PIPERACILLIN AND TAZOBACTAM 3.38 G: 3; .375 INJECTION, POWDER, LYOPHILIZED, FOR SOLUTION INTRAVENOUS at 11:35

## 2021-01-01 RX ADMIN — LOPERAMIDE HYDROCHLORIDE 2 MG: 2 CAPSULE ORAL at 19:14

## 2021-01-01 RX ADMIN — ASPIRIN 81 MG: 81 TABLET, COATED ORAL at 09:59

## 2021-01-01 RX ADMIN — POTASSIUM CHLORIDE 20 MEQ: 750 CAPSULE, EXTENDED RELEASE ORAL at 08:44

## 2021-01-01 RX ADMIN — DICYCLOMINE HYDROCHLORIDE 20 MG: 10 CAPSULE ORAL at 21:52

## 2021-01-01 RX ADMIN — HYDROCODONE BITARTRATE AND ACETAMINOPHEN 1 TABLET: 5; 325 TABLET ORAL at 22:02

## 2021-01-01 RX ADMIN — POTASSIUM CHLORIDE 40 MEQ: 1500 TABLET, EXTENDED RELEASE ORAL at 19:47

## 2021-01-01 RX ADMIN — DIPHENOXYLATE HYDROCHLORIDE AND ATROPINE SULFATE 2 TABLET: 2.5; .025 TABLET ORAL at 16:41

## 2021-01-01 RX ADMIN — OCTREOTIDE ACETATE 100 MCG: 100 INJECTION, SOLUTION INTRAVENOUS; SUBCUTANEOUS at 20:48

## 2021-01-01 RX ADMIN — ROSUVASTATIN CALCIUM 10 MG: 10 TABLET, FILM COATED ORAL at 23:42

## 2021-01-01 RX ADMIN — LEVOTHYROXINE SODIUM 75 MCG: 0.07 TABLET ORAL at 07:09

## 2021-01-01 RX ADMIN — ONDANSETRON 4 MG: 2 INJECTION INTRAMUSCULAR; INTRAVENOUS at 11:46

## 2021-01-01 RX ADMIN — HYDROCODONE BITARTRATE AND ACETAMINOPHEN 1 TABLET: 5; 325 TABLET ORAL at 08:00

## 2021-01-01 RX ADMIN — IOPAMIDOL 100 ML: 755 INJECTION, SOLUTION INTRAVENOUS at 20:31

## 2021-01-01 RX ADMIN — OCTREOTIDE ACETATE 100 MCG: 100 INJECTION, SOLUTION INTRAVENOUS; SUBCUTANEOUS at 08:34

## 2021-01-01 RX ADMIN — PANCRELIPASE 36000 UNITS OF LIPASE: 36000; 180000; 114000 CAPSULE, DELAYED RELEASE PELLETS ORAL at 17:43

## 2021-01-01 RX ADMIN — PIPERACILLIN AND TAZOBACTAM 3.38 G: 3; .375 INJECTION, POWDER, LYOPHILIZED, FOR SOLUTION INTRAVENOUS at 21:58

## 2021-01-01 RX ADMIN — ONDANSETRON 4 MG: 2 INJECTION, SOLUTION INTRAMUSCULAR; INTRAVENOUS at 22:12

## 2021-01-01 RX ADMIN — ASPIRIN 81 MG: 81 TABLET, COATED ORAL at 09:22

## 2021-01-01 RX ADMIN — PANTOPRAZOLE SODIUM 40 MG: 40 INJECTION, POWDER, FOR SOLUTION INTRAVENOUS at 18:49

## 2021-01-01 RX ADMIN — DIPHENHYDRAMINE HYDROCHLORIDE AND LIDOCAINE HYDROCHLORIDE AND ALUMINUM HYDROXIDE AND MAGNESIUM HYDRO 5 ML: KIT at 15:17

## 2021-01-01 RX ADMIN — SODIUM CHLORIDE, POTASSIUM CHLORIDE, SODIUM LACTATE AND CALCIUM CHLORIDE 100 ML/HR: 600; 310; 30; 20 INJECTION, SOLUTION INTRAVENOUS at 03:49

## 2021-01-01 RX ADMIN — ASPIRIN 81 MG: 81 TABLET, COATED ORAL at 09:25

## 2021-01-01 RX ADMIN — Medication 10 ML: at 04:40

## 2021-01-01 RX ADMIN — PREGABALIN 150 MG: 75 CAPSULE ORAL at 09:28

## 2021-01-01 RX ADMIN — DIPHENHYDRAMINE HYDROCHLORIDE AND LIDOCAINE HYDROCHLORIDE AND ALUMINUM HYDROXIDE AND MAGNESIUM HYDRO 5 ML: KIT at 11:38

## 2021-01-01 RX ADMIN — DICYCLOMINE HYDROCHLORIDE 20 MG: 10 CAPSULE ORAL at 17:19

## 2021-01-01 RX ADMIN — HYDROCODONE BITARTRATE AND ACETAMINOPHEN 1 TABLET: 5; 325 TABLET ORAL at 22:20

## 2021-01-01 RX ADMIN — CLOTRIMAZOLE 10 MG: 10 LOZENGE ORAL; TOPICAL at 07:13

## 2021-01-01 RX ADMIN — SCOPALAMINE 1 PATCH: 1 PATCH, EXTENDED RELEASE TRANSDERMAL at 11:21

## 2021-01-01 RX ADMIN — Medication 10 ML: at 10:03

## 2021-01-01 RX ADMIN — POTASSIUM CHLORIDE 10 MEQ: 7.46 INJECTION, SOLUTION INTRAVENOUS at 16:45

## 2021-01-01 RX ADMIN — ONDANSETRON 4 MG: 2 INJECTION INTRAMUSCULAR; INTRAVENOUS at 18:18

## 2021-01-01 RX ADMIN — Medication 500 MG: at 22:37

## 2021-01-01 RX ADMIN — OCTREOTIDE ACETATE 100 MCG: 100 INJECTION, SOLUTION INTRAVENOUS; SUBCUTANEOUS at 10:21

## 2021-01-01 RX ADMIN — DICYCLOMINE HYDROCHLORIDE 20 MG: 10 CAPSULE ORAL at 20:48

## 2021-01-01 RX ADMIN — EPINEPHRINE 1 MG: 0.1 INJECTION, SOLUTION ENDOTRACHEAL; INTRACARDIAC; INTRAVENOUS at 20:14

## 2021-01-01 RX ADMIN — SODIUM BICARBONATE 50 MEQ: 84 INJECTION, SOLUTION INTRAVENOUS at 21:16

## 2021-01-01 RX ADMIN — PIPERACILLIN AND TAZOBACTAM 3.38 G: 3; .375 INJECTION, POWDER, LYOPHILIZED, FOR SOLUTION INTRAVENOUS at 04:39

## 2021-01-01 RX ADMIN — PHENYLEPHRINE HYDROCHLORIDE 100 MCG: 10 INJECTION INTRAVENOUS at 11:50

## 2021-01-01 RX ADMIN — PREGABALIN 150 MG: 75 CAPSULE ORAL at 09:59

## 2021-01-01 RX ADMIN — ROSUVASTATIN CALCIUM 10 MG: 10 TABLET, FILM COATED ORAL at 22:02

## 2021-01-01 RX ADMIN — DIPHENOXYLATE HYDROCHLORIDE AND ATROPINE SULFATE 2 TABLET: 2.5; .025 TABLET ORAL at 08:30

## 2021-01-01 RX ADMIN — PANTOPRAZOLE SODIUM 80 MG: 40 INJECTION, POWDER, FOR SOLUTION INTRAVENOUS at 19:30

## 2021-01-01 RX ADMIN — DIPHENHYDRAMINE HYDROCHLORIDE AND LIDOCAINE HYDROCHLORIDE AND ALUMINUM HYDROXIDE AND MAGNESIUM HYDRO 5 ML: KIT at 17:30

## 2021-01-01 RX ADMIN — SODIUM CHLORIDE 3 G: 900 INJECTION INTRAVENOUS at 09:52

## 2021-01-01 RX ADMIN — OCTREOTIDE ACETATE 100 MCG: 100 INJECTION, SOLUTION INTRAVENOUS; SUBCUTANEOUS at 11:21

## 2021-01-07 NOTE — DISCHARGE INSTRUCTIONS
A responsible adult should stay with you and you should rest quietly for the rest of the day.    Do not drink alcohol, drive, operate any heavy machinery or power tools or make any legal/important decisions for the next 24 hours.     Progress your diet as tolerated.  If you begin to experience severe pain, increased shortness of breath, racing heartbeat or a fever above 101 F, seek immediate medical attention.     Follow up with MD as instructed. Call office for results in 3 to 5 days if needed.    1. Follow-up with the final cytology/biopsy report and staining.  2. Follow-up with Dr. Brink in the office as scheduled.  3. Patient will be advised to stay on a liquid diet today and low-fat diet in the morning.  4. Resume other p.o. medication.

## 2021-01-07 NOTE — OP NOTE
ESOPHAGOGASTRODUODENOSCOPY WITH ULTRASOUND AND FINE NEEDLE ASPIRATION Procedure Report    Patient Name:  Clifford Weber  YOB: 1945    Date of Surgery:  1/7/2021     Pre-Op Diagnosis:  Pancreatic mass [K86.89]  Abnormal finding on GI tract imaging [R93.3]       Post-Op Diagnosis Codes:     * Pancreatic mass [K86.89]     * Abnormal finding on GI tract imaging [R93.3]      Procedure/CPT® Codes:      Procedure(s):  ESOPHAGOGASTRODUODENOSCOPY WITH endoscopic ULTRASOUND AND FINE NEEDLE ASPIRATION    Staff:  Surgeon(s):  David Fierro MD      Anesthesia: General    Description of Procedure:  A description of the procedure as well as risks, benefits and alternative methods were explained to the patient who voiced understanding and signed the corresponding consent form. A physical exam was performed and vital signs were monitored throughout the procedure.    Initially Pentax radial scope was advanced under direct realization from oropharynx, esophagus stomach and the second part duodenum.  Limited evaluation of the esophageal gastric antral mucosa looks normal.    Scanning of the body and the tail of the pancreas showed normal looking pancreatic parenchyma without any obvious changes of chronic pancreatitis mass or lesion.  No significant dilation of the pancreatic duct was noticed.  Scanning of the head of the pancreas showed normal diameter of the common bile duct, however D2 level, infiltrative mass was seen which is close to 2.4 cm at the uncinate process of the pancreas.  There was also one small peripancreatic lymph node noticed around 4 to 5 mm.  No obvious involvement of the vasculature was seen.    At this stage scope was switched to linear array and 3 passes of fine-needle biopsy was performed using 25-gauge needle.  Initial cytology did show few atypical cells and second 1 did confirm atypical cell suggestive of possible malignancy.  Final cytology is pending      Impression:  1.  Almost close to  2.3 cm infiltrative mass around head of the pancreas at uncinate process status post a fine-needle biopsy with initial cytology showing highly atypical cell suggestive of malignancy.  Final cytology and report with staining is pending.    2.  Common bile duct was normal in diameter.  Pancreatic was also normal diameter.  3.  Gallbladder was well visualized and normal.  4.  Limited evaluation of the gastric duodenal mucosa and esophageal mucosa looks normal.     Recommendations:  1. Follow-up with the final cytology/biopsy report and staining.  2. Follow-up with Dr. Brink in the office as scheduled.  3. Patient will be advised to stay on a liquid diet today and low-fat diet in the morning.  4. Resume other p.o. medication.        David Fierro MD     Date: 1/7/2021    Time: 11:52 EST

## 2021-01-07 NOTE — ANESTHESIA PROCEDURE NOTES
Airway  Urgency: elective    Date/Time: 1/7/2021 11:19 AM  Airway not difficult    General Information and Staff    Patient location during procedure: OR  Anesthesiologist: Catracho Mistry MD    Indications and Patient Condition  Indications for airway management: airway protection    Preoxygenated: yes  MILS not maintained throughout  Mask difficulty assessment: 2 - vent by mask + OA or adjuvant +/- NMBA    Final Airway Details  Final airway type: endotracheal airway      Successful airway: ETT  Cuffed: yes   Successful intubation technique: direct laryngoscopy  Facilitating devices/methods: Bougie  Endotracheal tube insertion site: oral  Blade: Qiana  Blade size: 4  ETT size (mm): 7.5  Cormack-Lehane Classification: grade III - view of epiglottis only  Placement verified by: chest auscultation and capnometry   Measured from: teeth  ETT/EBT  to teeth (cm): 22  Number of attempts at approach: 1  Assessment: lips, teeth, and gum same as pre-op and atraumatic intubation

## 2021-01-07 NOTE — ANESTHESIA PREPROCEDURE EVALUATION
Anesthesia Evaluation     Patient summary reviewed   NPO Solid Status: > 8 hours  NPO Liquid Status: > 8 hours           Airway   Mallampati: II  TM distance: >3 FB  Neck ROM: full  No difficulty expected  Dental - normal exam     Pulmonary - normal exam   (+) shortness of breath, sleep apnea on CPAP,   Cardiovascular - normal exam  Exercise tolerance: poor (<4 METS)    ECG reviewed    (+) hypertension, past MI , CAD, angina, hyperlipidemia,       Neuro/Psych  (+) headaches, numbness,     GI/Hepatic/Renal/Endo    (+)  GERD,      Musculoskeletal     Abdominal  - normal exam    Bowel sounds: normal.   Substance History      OB/GYN          Other   arthritis,      ROS/Med Hx Other: 2019 ECHO  Impression  Minimal mitral regurgitation  Normal echo Doppler study.  Left ventricular ejection fraction is 60%.    Lexiscan stress--no ischemia                Anesthesia Plan    ASA 3     general   (GETA rec since patient very nauseous prior to surgery)  intravenous induction     Anesthetic plan, all risks, benefits, and alternatives have been provided, discussed and informed consent has been obtained with: patient.

## 2021-01-07 NOTE — ANESTHESIA POSTPROCEDURE EVALUATION
Patient: Clifford Weber    Procedure Summary     Date: 01/07/21 Room / Location: [facility] ENDOSCOPY 2 / [facility] ENDOSCOPY    Anesthesia Start: 1105 Anesthesia Stop: 1203    Procedure: ESOPHAGOGASTRODUODENOSCOPY WITH endoscopic ULTRASOUND AND FINE NEEDLE ASPIRATION (N/A ) Diagnosis:       Pancreatic mass      Abnormal finding on GI tract imaging      (Pancreatic mass [K86.89])      (Abnormal finding on GI tract imaging [R93.3])    Surgeon: David Fierro MD Provider: Catracho Mistry MD    Anesthesia Type: MAC ASA Status: 3          Anesthesia Type: MAC    Vitals  Vitals Value Taken Time   /94 01/07/21 1213   Temp     Pulse 78 01/07/21 1213   Resp 12 01/07/21 1213   SpO2 93 % 01/07/21 1213           Post Anesthesia Care and Evaluation    Patient location during evaluation: PACU  Patient participation: complete - patient participated  Level of consciousness: awake  Pain scale: See nurse's notes for pain score.  Pain management: adequate  Airway patency: patent  Anesthetic complications: No anesthetic complications  PONV Status: none  Cardiovascular status: acceptable  Respiratory status: acceptable  Hydration status: acceptable    Comments: Patient seen and examined postoperatively; vital signs stable; SpO2 greater than or equal to 90%; cardiopulmonary status stable; nausea/vomiting adequately controlled; pain adequately controlled; no apparent anesthesia complications; patient discharged from anesthesia care when discharge criteria were met

## 2021-01-07 NOTE — H&P
Patient Care Team:  Gris Muro APRN as PCP - General (Nurse Practitioner)      Subjective .     History of present illness:    Clifford Weber is a 75 y.o. male who presents today for Procedure(s):  ESOPHAGOGASTRODUODENOSCOPY WITH ULTRASOUND AND FINE NEEDLE ASPIRATION for the indications listed below.     The updated Patient Profile was reviewed prior to the procedure, in conjunction with the Physical Exam, including medical conditions, surgical procedures, medications, allergies, family history and social history.     Pre-operatively, I reviewed the indication(s) for the procedure, the risks of the procedure [including but not limited to: unexpected bleeding possibly requiring hospitalization and/or unplanned repeat procedures, perforation possibly requiring surgical treatment, missed lesions and complications of sedation/MAC (also explained by anesthesia staff)].     I have evaluated the patient for risks associated with the planned anesthesia and the procedure to be performed and find the patient an acceptable candidate for IV sedation.    Multiple opportunities were provided for any questions or concerns, and all questions were answered satisfactorily before any anesthesia was administered. We will proceed with the planned procedure.    ROS. Weight loss and epi pain others negative    ASSESSMENT/PLAN:  Abnormal CT  EUS      Past Medical History:  Past Medical History:   Diagnosis Date   • Abnormal cardiovascular stress test 11/01/2016    Abnormal Cardiolite Stress Test. Abstracted from [facility].   • Anxiety 03/15/2012    Uses this PRN, has stress due to caring for elderly parents. Abstracted from [facility].   • Atherosclerotic heart disease 03/15/2012   • Borderline hypertension 10/19/2015   • Bronchitis, acute 05/16/2016   • Chest pain 10/15/2014   • Chronic foot pain 10/16/2012    Will see his podiatrist. Abstracted from [facility].   • Constipation 01/09/2018   • Coronary artery disease 10/24/2016   •  DDD (degenerative disc disease), lumbar 02/13/2013   • Depression 08/10/2017   • Depression    • Diverticulosis    • Dyslipidemia    • Erectile dysfunction of organic origin 12/07/2012    Samples of cialis 20mg, voucher for the 5mg, samples of levitra 20mg and voucher for viagra 100mg. Call with preference. Order total T. Abstracted from [facility].   • GERD (gastroesophageal reflux disease) 12/07/2012   • H/O myocardial perfusion scan 11/01/2016    With Stress Test, abnormal. Abstracted from [facility].   • Hand pain 04/24/2014   • Headache 01/18/2016   • Hyperlipidemia 02/13/2018   • Hypertension 08/10/2017   • Hypogonadism, male 12/07/2012   • Hypothyroidism 02/08/2018   • Impacted cerumen, left ear 04/03/2019   • Influenza vaccination ordered 10/19/2016   • Insomnia 03/15/2012   • Lateral epicondylitis, left elbow 02/07/2017   • Left knee sprain 01/24/2013   • Leg pain, bilateral 04/02/2019   • Lumbar disc herniation with radiculopathy 09/08/2016   • Lumbar radiculitis 08/30/2016   • Mild memory disturbance 01/18/2016   • Myocardial infarction (CMS/[facility]) 11/01/2016   • Osteoarthritis of sacroiliac joint (CMS/[facility]) 03/12/2014   • Pain in right lower leg 07/11/2016   • Paresthesia 08/30/2016   • Paresthesia of both legs     Lower legs   • Postherpetic neuralgia 03/15/2016   • Preoperative cardiovascular examination 10/24/2016   • Preventative health care 02/07/2017   • S/P cardiac catheterization 01/01/2000    S/P cardiac cath, left heart-- Heart catheterization 2000 at [facility] in [place], [place]. No records available. Abstracted from [facility].   • Sciatica, right side 06/04/2013   • Shingles    • Shortness of breath 10/15/2014   • Spinal stenosis, lumbar 02/13/2013    TENS unit evidently not covered by Medicare. Abstracted from [facility].   • Vitamin B12 deficiency 08/10/2017   • Vitamin D deficiency 01/09/2018       Past Surgical History:  Past Surgical History:   Procedure Laterality Date   • CARDIAC  CATHETERIZATION  2000   • COLONOSCOPY  10/28/2009   • KNEE SURGERY Bilateral    • OTHER SURGICAL HISTORY      Lapscopic surgery, both knees. Abstracted by [facility].   • OTHER SURGICAL HISTORY      Kneww injections. Abstracted from [facility].   • OTHER SURGICAL HISTORY      Shot in lower back for bulging disc. Abstracted from [facility].       Social History:  Social History     Tobacco Use   • Smoking status: Never Smoker   • Smokeless tobacco: Never Used   Substance Use Topics   • Alcohol use: No     Frequency: Never   • Drug use: No       Family History:  Family History   Problem Relation Age of Onset   • Alzheimer's disease Mother    • Hyperlipidemia Father    • Heart disease Father    • Hypertension Father    • Hyperlipidemia Brother    • Diabetes Brother    • Heart disease Brother    • Hypertension Brother    • Heart disease Maternal Grandfather    • Alzheimer's disease Paternal Grandmother    • Cancer Cousin    • Cancer Other    • Heart disease Brother    • Hypertension Brother        Medications:  Medications Prior to Admission   Medication Sig Dispense Refill Last Dose   • aspirin (ASPIR) 81 MG EC tablet Take 81 mg by mouth Daily.   1/5/2021   • Biotin 1000 MCG chewable tablet Chew 1 tablet Daily.   1/5/2021   • omeprazole (priLOSEC) 20 MG capsule TAKE 2 CAPSULES BY MOUTH EVERY  capsule 2 1/5/2021   • polyethylene glycol (MIRALAX) powder Take 17 g by mouth Daily As Needed. Dissolve 17 grams in 8 oz. Liquid and drink daily. Abstracted from [facility].    Past Week at Unknown time   • pregabalin (Lyrica) 150 MG capsule Take 1 capsule by mouth 3 (Three) Times a Day. 90 capsule 1 1/5/2021   • promethazine (PHENERGAN) 25 MG tablet Take 1 tablet by mouth Every 6 (Six) Hours As Needed for Nausea or Vomiting. 15 tablet 0 Past Month at Unknown time   • rosuvastatin (CRESTOR) 10 MG tablet TAKE ONE TABLET BY MOUTH AT BEDTIME 90 tablet 1 1/5/2021   • Synthroid 75 MCG tablet TAKE ONE TABLET BY MOUTH EVERY DAY  90 tablet 0 1/7/2021 at Unknown time   • tadalafil (CIALIS) 20 MG tablet TAKE 1 TABLET BY MOUTH AS DIRECTED AS NEEDED  11 Past Week at Unknown time   • vitamin D (ERGOCALCIFEROL) 1.25 MG (02914 UT) capsule capsule TAKE 1 CAPSULE BY MOUTH ONCE WEEKLY 12 capsule 0 1/5/2021   • Cyanocobalamin (B-12) 1000 MCG tablet Take 100 mcg by mouth Daily.   1/5/2021   • gabapentin (NEURONTIN) 800 MG tablet TAKE ONE TABLET BY MOUTH THREE TIMES DAILY 90 tablet 5 1/5/2021   • nitroglycerin (NITROSTAT) 0.4 MG SL tablet 0.4 mg. Dissolve 1 tablet under tongue as needed for chest pain. May repeat dose every 5 min as needed for total of doses. Abstracted from [facility].   More than a month at Unknown time   • Omega-3 Fatty Acids (FISH OIL) 1000 MG capsule capsule Take 1,000 mg by mouth Daily With Breakfast.   More than a month at Unknown time       Scheduled Meds:  Continuous Infusions:No current facility-administered medications for this encounter.     PRN Meds:.    ALLERGIES:  Niacin        Objective     Vital Signs:   Temp:  [97.7 °F (36.5 °C)] 97.7 °F (36.5 °C)  Heart Rate:  [64] 64  Resp:  [19] 19  BP: (152)/(92) 152/92    Physical Exam:      General Appearance:    Awake and alert, in no acute distress   Lungs:     Clear to auscultation bilaterally, respirations regular, even and unlabored    Heart:    Regular rhythm and normal rate, normal S1 and S2, no            murmur, no gallop, no rub   Abdomen:     Normal bowel sounds, soft, non-tender, no rebound or guarding, non-distended, no hepatosplenomegaly        Results Review:   I reviewed the patient's labs and imaging.  Lab Results (last 24 hours)     ** No results found for the last 24 hours. **          Imaging Results (Last 24 Hours)     ** No results found for the last 24 hours. **             I discussed the patients findings and my recommendations with the patient.  David Fierro MD  01/07/21  10:53 EST

## 2021-01-10 NOTE — ED NOTES
"Pt states he had a \"gallbladder and pancreas check with biopsy\" on Thursday. Pt states pain in Right upper quadrant and \"right side pain under ribs\". Pt c/o intermittent nausea since Thursday and 1 episode of vomiting today.     Gaby Graham, RN  01/09/21 [id number]    "

## 2021-01-10 NOTE — ED PROVIDER NOTES
Subjective   Patient is a 75-year-old male complaining of abdominal pain increasing over the past 2 days.  He states he had endoscopy and a pancreatic biopsy 2 days ago.  He denies fever vomiting diarrhea or other complaint for the pain is moderate and constant.          Review of Systems  For headache earache sore throat cough fever chest pain vomiting diarrhea dysuria case weight loss or other complaint  Past Medical History:   Diagnosis Date   • Abnormal cardiovascular stress test 11/01/2016    Abnormal Cardiolite Stress Test. Abstracted from [facility].   • Anxiety 03/15/2012    Uses this PRN, has stress due to caring for elderly parents. Abstracted from [facility].   • Atherosclerotic heart disease 03/15/2012   • Borderline hypertension 10/19/2015   • Bronchitis, acute 05/16/2016   • Chest pain 10/15/2014   • Chronic foot pain 10/16/2012    Will see his podiatrist. Abstracted from [facility].   • Constipation 01/09/2018   • Coronary artery disease 10/24/2016   • DDD (degenerative disc disease), lumbar 02/13/2013   • Depression 08/10/2017   • Depression    • Diverticulosis    • Dyslipidemia    • Erectile dysfunction of organic origin 12/07/2012    Samples of cialis 20mg, voucher for the 5mg, samples of levitra 20mg and voucher for viagra 100mg. Call with preference. Order total T. Abstracted from [facility].   • GERD (gastroesophageal reflux disease) 12/07/2012   • H/O myocardial perfusion scan 11/01/2016    With Stress Test, abnormal. Abstracted from [facility].   • Hand pain 04/24/2014   • Headache 01/18/2016   • Hyperlipidemia 02/13/2018   • Hypertension 08/10/2017   • Hypogonadism, male 12/07/2012   • Hypothyroidism 02/08/2018   • Impacted cerumen, left ear 04/03/2019   • Influenza vaccination ordered 10/19/2016   • Insomnia 03/15/2012   • Lateral epicondylitis, left elbow 02/07/2017   • Left knee sprain 01/24/2013   • Leg pain, bilateral 04/02/2019   • Lumbar disc herniation with radiculopathy 09/08/2016    • Lumbar radiculitis 08/30/2016   • Mild memory disturbance 01/18/2016   • Myocardial infarction (CMS/[facility]) 11/01/2016   • Osteoarthritis of sacroiliac joint (CMS/[facility]) 03/12/2014   • Pain in right lower leg 07/11/2016   • Paresthesia 08/30/2016   • Paresthesia of both legs     Lower legs   • Postherpetic neuralgia 03/15/2016   • Preoperative cardiovascular examination 10/24/2016   • Preventative health care 02/07/2017   • S/P cardiac catheterization 01/01/2000    S/P cardiac cath, left heart-- Heart catheterization 2000 at [facility] in [place], [place]. No records available. Abstracted from [facility].   • Sciatica, right side 06/04/2013   • Shingles    • Shortness of breath 10/15/2014   • Spinal stenosis, lumbar 02/13/2013    TENS unit evidently not covered by Medicare. Abstracted from [facility].   • Vitamin B12 deficiency 08/10/2017   • Vitamin D deficiency 01/09/2018       Allergies   Allergen Reactions   • Niacin Unknown (See Comments)     Abstracted from [facility].       Past Surgical History:   Procedure Laterality Date   • CARDIAC CATHETERIZATION  2000   • COLONOSCOPY  10/28/2009   • KNEE SURGERY Bilateral    • OTHER SURGICAL HISTORY      Lapscopic surgery, both knees. Abstracted by [facility].   • OTHER SURGICAL HISTORY      Kneww injections. Abstracted from [facility].   • OTHER SURGICAL HISTORY      Shot in lower back for bulging disc. Abstracted from [facility].   • UPPER ENDOSCOPIC ULTRASOUND W/ FNA N/A 1/7/2021    Procedure: ESOPHAGOGASTRODUODENOSCOPY WITH endoscopic ULTRASOUND AND FINE NEEDLE ASPIRATION;  Surgeon: David Fierro MD;  Location: [facility] ENDOSCOPY;  Service: Gastroenterology;  Laterality: N/A;  post op: pancreatic head mass       Family History   Problem Relation Age of Onset   • Alzheimer's disease Mother    • Hyperlipidemia Father    • Heart disease Father    • Hypertension Father    • Hyperlipidemia Brother    • Diabetes Brother    • Heart disease Brother    • Hypertension  Brother    • Heart disease Maternal Grandfather    • Alzheimer's disease Paternal Grandmother    • Cancer Cousin    • Cancer Other    • Heart disease Brother    • Hypertension Brother        Social History     Socioeconomic History   • Marital status:      Spouse name: Not on file   • Number of children: 1   • Years of education: Not on file   • Highest education level: Not on file   Tobacco Use   • Smoking status: Never Smoker   • Smokeless tobacco: Never Used   Substance and Sexual Activity   • Alcohol use: No     Frequency: Never   • Drug use: No   • Sexual activity: Defer           Objective   Physical Exam  HEENT exam shows TMs to be clear.  Oropharynx comers but sclera nonicteric.  Neck has no adenopathy JVD or bruits.  Lungs are clear.  Heart has regular rate rhythm without murmur rub gallop.  Chest is nontender.  Abdomen soft with mild to moderate diffuse epigastric tenderness.  Patient has normal bowel sounds without rebound or guarding.  Back has no CVA tenderness.  Procedures           ED Course      Results for orders placed or performed during the hospital encounter of 01/09/21   Lipase    Specimen: Arm, Left; Blood   Result Value Ref Range    Lipase 105 (H) 13 - 60 U/L   Comprehensive Metabolic Panel    Specimen: Arm, Left; Blood   Result Value Ref Range    Glucose 99 65 - 99 mg/dL    BUN 16 8 - 23 mg/dL    Creatinine 0.97 0.76 - 1.27 mg/dL    Sodium 136 136 - 145 mmol/L    Potassium 3.6 3.5 - 5.2 mmol/L    Chloride 99 98 - 107 mmol/L    CO2 23.0 22.0 - 29.0 mmol/L    Calcium 9.6 8.6 - 10.5 mg/dL    Total Protein 7.3 6.0 - 8.5 g/dL    Albumin 4.60 3.50 - 5.20 g/dL    ALT (SGPT) 20 1 - 41 U/L    AST (SGOT) 32 1 - 40 U/L    Alkaline Phosphatase 67 39 - 117 U/L    Total Bilirubin 0.8 0.0 - 1.2 mg/dL    eGFR Non African Amer 76 >60 mL/min/1.73    Globulin 2.7 gm/dL    A/G Ratio 1.7 g/dL    BUN/Creatinine Ratio 16.5 7.0 - 25.0    Anion Gap 14.0 5.0 - 15.0 mmol/L   CBC Auto Differential    Specimen:  Arm, Left; Blood   Result Value Ref Range    WBC 7.60 3.40 - 10.80 10*3/mm3    RBC 4.86 4.14 - 5.80 10*6/mm3    Hemoglobin 14.3 13.0 - 17.7 g/dL    Hematocrit 42.3 37.5 - 51.0 %    MCV 87.0 79.0 - 97.0 fL    MCH 29.4 26.6 - 33.0 pg    MCHC 33.8 31.5 - 35.7 g/dL    RDW 13.2 12.3 - 15.4 %    RDW-SD 39.8 37.0 - 54.0 fl    MPV 8.8 6.0 - 12.0 fL    Platelets 173 140 - 450 10*3/mm3    Neutrophil % 66.4 42.7 - 76.0 %    Lymphocyte % 23.6 19.6 - 45.3 %    Monocyte % 7.9 5.0 - 12.0 %    Eosinophil % 1.2 0.3 - 6.2 %    Basophil % 0.9 0.0 - 1.5 %    Neutrophils, Absolute 5.00 1.70 - 7.00 10*3/mm3    Lymphocytes, Absolute 1.80 0.70 - 3.10 10*3/mm3    Monocytes, Absolute 0.60 0.10 - 0.90 10*3/mm3    Eosinophils, Absolute 0.10 0.00 - 0.40 10*3/mm3    Basophils, Absolute 0.10 0.00 - 0.20 10*3/mm3    nRBC 0.0 0.0 - 0.2 /100 WBC   Gold Top - SST   Result Value Ref Range    Extra Tube Hold for add-ons.                                           MDM  Number of Diagnoses or Management Options  Diagnosis management comments: On reviewing the patient's laboratory data is noted the patient does have acute pancreatitis but there is no evidence of hepatitis.  Metabolic panel is at baseline.  There is no evidence of infectious process.  Patient will be given IV pain medications as well as prescription for Ultram.  We will see his MD for recheck.       Amount and/or Complexity of Data Reviewed  Clinical lab tests: reviewed    Risk of Complications, Morbidity, and/or Mortality  Presenting problems: high  Diagnostic procedures: high  Management options: high    Patient Progress  Patient progress: stable      Final diagnoses:   Epigastric pain   Acute pancreatitis, unspecified complication status, unspecified pancreatitis type            Ernesto Wick MD  01/09/21 [id number]

## 2021-02-02 NOTE — TELEPHONE ENCOUNTER
Spouse called in requesting a call back from the nurse, she wants to inform her that the patient was diagnosed with cancer.    The patient also needs a re-fill for Suz[phone]    Pt would like a re-fill today    Please call patient back     Best call back # [phone]

## 2021-03-26 NOTE — NURSING NOTE
30 minute dwell time done.  Good blood return noted from catheter.  Flushed with 30cc normal saline.  Than port heparinized per protocol.

## 2021-04-03 NOTE — ED PROVIDER NOTES
Subjective   History: Patient is a 76-year-old male complains of diarrhea over the last 2 weeks.  States he has had 3-5 times if not more a day.  Says it is a mustard color.  Reports he has had generalized abdominal cramping and it becomes worse right before he has diarrhea.  States he has nausea all the time currently has on a scopolamine patch and has had Zofran and Phenergan at home.  Denies fever chills vomiting chest pain shortness of breath.  Denies any recent antibiotics.  Reports he is currently doing chemo treatments every other Monday, this is his off week.  Wife at bedside reports they will continue treatments to the end of this month and then were discussing surgical intervention as patient has pancreatic and bladder cancer.      Onset: 2 weeks  Location:   Duration: Constant  Character: Diarrhea  Aggravating/Alleviating factors: None  Radiation not applicable  Severity: Moderate            Review of Systems   Constitutional: Negative for chills, fatigue and fever.   HENT: Negative for congestion, sore throat, tinnitus and trouble swallowing.    Eyes: Negative for photophobia, discharge and visual disturbance.   Respiratory: Negative for cough and shortness of breath.    Cardiovascular: Negative for chest pain.   Gastrointestinal: Positive for abdominal pain, diarrhea and nausea. Negative for vomiting.   Genitourinary: Negative for dysuria, frequency and urgency.   Musculoskeletal: Negative for back pain and myalgias.   Skin: Negative for rash.   Neurological: Negative for dizziness and headaches.   Psychiatric/Behavioral: Negative for confusion.       Past Medical History:   Diagnosis Date   • Abnormal cardiovascular stress test 11/01/2016    Abnormal Cardiolite Stress Test. Abstracted from [facility].   • Anxiety 03/15/2012    Uses this PRN, has stress due to caring for elderly parents. Abstracted from [facility].   • Atherosclerotic heart disease 03/15/2012   • Borderline hypertension 10/19/2015   •  Bronchitis, acute 05/16/2016   • Cancer (CMS/[facility])     PANCREATIC CANCER     • Chest pain 10/15/2014   • Chronic foot pain 10/16/2012    Will see his podiatrist. Abstracted from [facility].   • Constipation 01/09/2018   • Coronary artery disease 10/24/2016   • DDD (degenerative disc disease), lumbar 02/13/2013   • Depression 08/10/2017   • Depression    • Diverticulosis    • Dyslipidemia    • Erectile dysfunction of organic origin 12/07/2012    Samples of cialis 20mg, voucher for the 5mg, samples of levitra 20mg and voucher for viagra 100mg. Call with preference. Order total T. Abstracted from [facility].   • GERD (gastroesophageal reflux disease) 12/07/2012   • H/O myocardial perfusion scan 11/01/2016    With Stress Test, abnormal. Abstracted from [facility].   • Hand pain 04/24/2014   • Headache 01/18/2016   • Hyperlipidemia 02/13/2018   • Hypertension 08/10/2017   • Hypogonadism, male 12/07/2012   • Hypothyroidism 02/08/2018   • Impacted cerumen, left ear 04/03/2019   • Influenza vaccination ordered 10/19/2016   • Insomnia 03/15/2012   • Lateral epicondylitis, left elbow 02/07/2017   • Left knee sprain 01/24/2013   • Leg pain, bilateral 04/02/2019   • Lumbar disc herniation with radiculopathy 09/08/2016   • Lumbar radiculitis 08/30/2016   • Mild memory disturbance 01/18/2016   • Myocardial infarction (CMS/[facility]) 11/01/2016   • Osteoarthritis of sacroiliac joint (CMS/[facility]) 03/12/2014   • Pain in right lower leg 07/11/2016   • Paresthesia 08/30/2016   • Paresthesia of both legs     Lower legs   • Postherpetic neuralgia 03/15/2016   • Preoperative cardiovascular examination 10/24/2016   • Preventative health care 02/07/2017   • S/P cardiac catheterization 01/01/2000    S/P cardiac cath, left heart-- Heart catheterization 2000 at [facility] in [place], [place]. No records available. Abstracted from [facility]   • Sciatica, right side 06/04/2013   • Shingles    • Shortness of breath 10/15/2014   • Spinal stenosis,  lumbar 02/13/2013    TENS unit evidently not covered by Medicare. Abstracted from [facility].   • Vitamin B12 deficiency 08/10/2017   • Vitamin D deficiency 01/09/2018       Allergies   Allergen Reactions   • Niacin Unknown (See Comments)     Abstracted from [facility].       Past Surgical History:   Procedure Laterality Date   • CARDIAC CATHETERIZATION  2000   • COLONOSCOPY  10/28/2009   • KNEE SURGERY Bilateral    • OTHER SURGICAL HISTORY      Lapscopic surgery, both knees. Abstracted by [facility].   • OTHER SURGICAL HISTORY      Kneww injections. Abstracted from [facility].   • OTHER SURGICAL HISTORY      Shot in lower back for bulging disc. Abstracted from [facility].   • UPPER ENDOSCOPIC ULTRASOUND W/ FNA N/A 1/7/2021    Procedure: ESOPHAGOGASTRODUODENOSCOPY WITH endoscopic ULTRASOUND AND FINE NEEDLE ASPIRATION;  Surgeon: David Fierro MD;  Location: [facility] ENDOSCOPY;  Service: Gastroenterology;  Laterality: N/A;  post op: pancreatic head mass       Family History   Problem Relation Age of Onset   • Alzheimer's disease Mother    • Hyperlipidemia Father    • Heart disease Father    • Hypertension Father    • Hyperlipidemia Brother    • Diabetes Brother    • Heart disease Brother    • Hypertension Brother    • Heart disease Maternal Grandfather    • Alzheimer's disease Paternal Grandmother    • Cancer Cousin    • Cancer Other    • Heart disease Brother    • Hypertension Brother        Social History     Socioeconomic History   • Marital status:      Spouse name: Not on file   • Number of children: 1   • Years of education: Not on file   • Highest education level: Not on file   Tobacco Use   • Smoking status: Never Smoker   • Smokeless tobacco: Never Used   Substance and Sexual Activity   • Alcohol use: No   • Drug use: No   • Sexual activity: Defer           Objective   Physical Exam  Vitals reviewed.   Constitutional:       General: He is not in acute distress.     Appearance: Normal appearance. He is  "normal weight. He is not toxic-appearing.   HENT:      Head: Normocephalic and atraumatic.      Right Ear: Tympanic membrane normal.      Left Ear: Tympanic membrane normal.      Mouth/Throat:      Mouth: Mucous membranes are dry.      Pharynx: Oropharynx is clear.   Eyes:      Extraocular Movements: Extraocular movements intact.      Pupils: Pupils are equal, round, and reactive to light.   Cardiovascular:      Pulses: Normal pulses.      Heart sounds: No murmur heard.     Pulmonary:      Effort: Pulmonary effort is normal. No respiratory distress.      Breath sounds: Normal breath sounds. No wheezing, rhonchi or rales.   Abdominal:      General: Abdomen is flat. Bowel sounds are normal. There is no distension.      Palpations: Abdomen is soft.      Tenderness: There is generalized abdominal tenderness. There is no guarding or rebound. Negative signs include Gray's sign, Rovsing's sign and McBurney's sign.   Genitourinary:     Rectum: Guaiac result negative.   Musculoskeletal:         General: Normal range of motion.      Cervical back: Normal range of motion and neck supple.   Skin:     General: Skin is warm and dry.      Findings: No rash.   Neurological:      Mental Status: He is alert and oriented to person, place, and time.   Psychiatric:         Mood and Affect: Mood normal.         Behavior: Behavior normal.         Thought Content: Thought content normal.         Judgment: Judgment normal.         Procedures           ED Course      /83   Pulse 88   Temp 97.6 °F (36.4 °C) (Oral)   Resp 16   Ht 188 cm (74\")   Wt 76 kg (167 lb 8.8 oz)   SpO2 100%   BMI 21.51 kg/m²   Labs Reviewed   COMPREHENSIVE METABOLIC PANEL - Abnormal; Notable for the following components:       Result Value    Glucose 112 (*)     Creatinine 0.69 (*)     Sodium 133 (*)     Chloride 96 (*)     CO2 20.0 (*)     Anion Gap 17.0 (*)     All other components within normal limits    Narrative:     GFR Normal >60  Chronic Kidney " Disease <60  Kidney Failure <15     LIPASE - Abnormal; Notable for the following components:    Lipase 10 (*)     All other components within normal limits   CBC WITH AUTO DIFFERENTIAL - Abnormal; Notable for the following components:    RBC 3.79 (*)     Hemoglobin 10.7 (*)     Hematocrit 31.4 (*)     All other components within normal limits    Narrative:     The previously reported component NRBC is no longer being reported. Previous result was 0.1 /100 WBC (Reference Range: 0.0-0.2 /100 WBC) on 4/3/2021 at 1827 EDT.   MANUAL DIFFERENTIAL - Abnormal; Notable for the following components:    Neutrophil % 34.0 (*)     Monocyte % 25.0 (*)     Bands %  6.0 (*)     Neutrophils Absolute 1.56 (*)     Monocytes Absolute 0.98 (*)     All other components within normal limits   CLOSTRIDIUM DIFFICILE TOXIN    Narrative:     The following orders were created for panel order Clostridium Difficile Toxin - Stool, Per Rectum.  Procedure                               Abnormality         Status                     ---------                               -----------         ------                     Clostridium Difficile EI...[[id number]]                                                   Please view results for these tests on the individual orders.   CLOSTRIDIUM DIFFICILE EIA   SCAN SLIDE   CBC AND DIFFERENTIAL    Narrative:     The following orders were created for panel order CBC & Differential.  Procedure                               Abnormality         Status                     ---------                               -----------         ------                     Scan Slide[[id number]]                                       Final result               CBC Auto Differential[[id number]]        Abnormal            Final result                 Please view results for these tests on the individual orders.   EXTRA TUBES    Narrative:     The following orders were created for panel order Extra Tubes.  Procedure                                Abnormality         Status                     ---------                               -----------         ------                     Gold Top - SST[[id number]]                                   In process                   Please view results for these tests on the individual orders.   GOLD TOP - SST     Medications   sodium chloride 0.9 % flush 10 mL (has no administration in time range)   sodium chloride 0.9 % bolus 1,000 mL (1,000 mL Intravenous New Bag 4/3/21 1818)   ondansetron (ZOFRAN) injection 4 mg (4 mg Intravenous Given 4/3/21 1818)   Morphine sulfate (PF) injection 4 mg (4 mg Intravenous Given 4/3/21 1818)     No radiology results for the last day                                       MDM     I examined the patient using the appropriate personal protective equipment.      DISPOSITION:   Chart Review:  Comorbidity:  has a past medical history of Abnormal cardiovascular stress test (11/01/2016), Anxiety (03/15/2012), Atherosclerotic heart disease (03/15/2012), Borderline hypertension (10/19/2015), Bronchitis, acute (05/16/2016), Cancer (CMS/[facility]), Chest pain (10/15/2014), Chronic foot pain (10/16/2012), Constipation (01/09/2018), Coronary artery disease (10/24/2016), DDD (degenerative disc disease), lumbar (02/13/2013), Depression (08/10/2017), Depression, Diverticulosis, Dyslipidemia, Erectile dysfunction of organic origin (12/07/2012), GERD (gastroesophageal reflux disease) (12/07/2012), H/O myocardial perfusion scan (11/01/2016), Hand pain (04/24/2014), Headache (01/18/2016), Hyperlipidemia (02/13/2018), Hypertension (08/10/2017), Hypogonadism, male (12/07/2012), Hypothyroidism (02/08/2018), Impacted cerumen, left ear (04/03/2019), Influenza vaccination ordered (10/19/2016), Insomnia (03/15/2012), Lateral epicondylitis, left elbow (02/07/2017), Left knee sprain (01/24/2013), Leg pain, bilateral (04/02/2019), Lumbar disc herniation with radiculopathy (09/08/2016), Lumbar radiculitis (08/30/2016), Mild  memory disturbance (01/18/2016), Myocardial infarction (CMS/[facility]) (11/01/2016), Osteoarthritis of sacroiliac joint (CMS/[facility]) (03/12/2014), Pain in right lower leg (07/11/2016), Paresthesia (08/30/2016), Paresthesia of both legs, Postherpetic neuralgia (03/15/2016), Preoperative cardiovascular examination (10/24/2016), Preventative health care (02/07/2017), S/P cardiac catheterization (01/01/2000), Sciatica, right side (06/04/2013), Shingles, Shortness of breath (10/15/2014), Spinal stenosis, lumbar (02/13/2013), Vitamin B12 deficiency (08/10/2017), and Vitamin D deficiency (01/09/2018).  Differentials:this list is not all inclusive and does not constitute the entirety of considered causes --> adverse reaction to chemo, C. difficile, dehydration, viral syndrome, colitis  ECG: interpreted by ER physician and reviewed by myself:   Labs: CBC WBC 3.9 H&H 10.7 and 31.4 platelets 141.  Metabolic panel glucose 112 BUN 10 creatinine 0.69 sodium 133 potassium 3.5 chloride 96 CO2 20.  Lipase 10.  Unable to produce stool sample for C. difficile.    Imaging: Was interpreted by physician and reviewed by myself:  No radiology results for the last day    Disposition/Treatment:    IV established blood drawn 1 L normal saline given morphine Zofran.  Blood work is fairly unremarkable lipase is negative kidney function electrolytes are fairly unremarkable.  Patient had no vomiting or diarrhea while in ER.  His abdomen was soft patient was afebrile and without white count.  Described his diarrhea as mustard color with 3-5 sometimes more stools a day.  Denies bloody black tarry stool.  Occult blood was negative.  States he is due with Dr. Brink to have a colonoscopy last one being 2015.  Patient does not appear ill.  Discussed results with patient and family at bedside.  He does have Lomotil to take but he does not like it.  Prefers Imodium.  Discussed with patient he may take either one but I still feel he needs a stool sample to rule  out C. difficile.  Will give outpatient prescription for him to return stool sample.  Patient and family member agreeable to plan of care.  Follow-up with PCP    Prescription: None    Final diagnoses:   Diarrhea, unspecified type   Abdominal cramping       ED Disposition  ED Disposition     ED Disposition Condition Comment    Discharge Stable           Gris Muro, APRN  [address]  [place] [address]  [phone]               Medication List      No changes were made to your prescriptions during this visit.          Christina Love, APRN  04/03/21 1914

## 2021-04-03 NOTE — DISCHARGE INSTRUCTIONS
May take your Lomotil or over-the-counter Imodium for increased diarrhea.  Increase fluid intake.  Return stool sample to [facility] lab to check for C. difficile.

## 2021-04-16 PROBLEM — D64.81 ANEMIA DUE TO CHEMOTHERAPY: Chronic | Status: ACTIVE | Noted: 2021-01-01

## 2021-04-16 PROBLEM — C25.0 MALIGNANT NEOPLASM OF HEAD OF PANCREAS (HCC): Status: ACTIVE | Noted: 2021-01-01

## 2021-04-16 PROBLEM — T45.1X5A ANEMIA DUE TO CHEMOTHERAPY: Chronic | Status: ACTIVE | Noted: 2021-01-01

## 2021-04-16 PROBLEM — D72.829 LEUKOCYTOSIS: Status: ACTIVE | Noted: 2021-01-01

## 2021-04-16 PROBLEM — R11.2 NAUSEA AND VOMITING: Status: ACTIVE | Noted: 2021-01-01

## 2021-04-16 PROBLEM — E87.6 HYPOKALEMIA: Status: ACTIVE | Noted: 2021-01-01

## 2021-04-17 PROBLEM — D61.810 PANCYTOPENIA DUE TO CHEMOTHERAPY (HCC): Status: ACTIVE | Noted: 2021-01-01

## 2021-04-17 NOTE — ED PROVIDER NOTES
Subjective   76-year-old male complaining of increasing abdominal pain and weakness for the last 5 days.  The patient reports he has had subjective fever as well as chills within the last 24 hours.  He states the pain is been mostly left-sided.  He has been receiving chemotherapy for Dr. Davis.  The patient reports he had a PET scan a couple of weeks ago the results of which are unknown to the patient or his family.  The patient reports that he has had some diarrhea in the last 24 hours he reports no melena hematemesis or hematochezia.  He denies dysuria or hematuria.  He states that the pain does not radiate through to his back and he has no associated chest pain or shortness of breath.  He states if he tries to eat anything the discomfort gets worse          Review of Systems   Constitutional: Positive for appetite change, chills, fatigue and fever.   Eyes: Negative for photophobia and discharge.   Respiratory: Negative for chest tightness and shortness of breath.    Cardiovascular: Positive for leg swelling. Negative for palpitations.   Gastrointestinal: Positive for abdominal distention, abdominal pain, nausea and vomiting. Negative for anal bleeding and blood in stool.   Genitourinary: Negative for flank pain.   Musculoskeletal: Negative for joint swelling.   Neurological: Positive for dizziness, weakness and light-headedness.   All other systems reviewed and are negative.      Past Medical History:   Diagnosis Date   • Abnormal cardiovascular stress test 11/01/2016    Abnormal Cardiolite Stress Test. Abstracted from [facility].   • Anxiety 03/15/2012    Uses this PRN, has stress due to caring for elderly parents. [facility]   • Atherosclerotic heart disease 03/15/2012   • Borderline hypertension 10/19/2015   • Bronchitis, acute 05/16/2016   • Cancer (CMS/HCC)     PANCREATIC CANCER     • Chest pain 10/15/2014   • Chronic foot pain 10/16/2012    Will see his podiatrist. [facility]   • Constipation 01/09/2018   • Coronary artery disease 10/24/2016   • DDD (degenerative disc disease), lumbar 02/13/2013   • Depression 08/10/2017   • Depression    • Diverticulosis    • Dyslipidemia    • Erectile dysfunction of organic origin 12/07/2012    Samples of cialis 20mg, voucher for the 5mg, samples of levitra 20mg and voucher for viagra 100mg. Call with preference. Order total T. Abstracted from [email or web address].   • GERD (gastroesophageal reflux disease) 12/07/2012   • H/O myocardial perfusion scan 11/01/2016    With Stress Test, abnormal. Abstracted from [facility].   • Hand pain 04/24/2014   • Headache 01/18/2016   • Hyperlipidemia 02/13/2018   • Hypertension 08/10/2017   • Hypogonadism, male 12/07/2012   • Hypothyroidism 02/08/2018   • Impacted cerumen, left ear 04/03/2019   • Influenza vaccination ordered 10/19/2016   • Insomnia 03/15/2012   • Lateral epicondylitis, left elbow 02/07/2017   • Left knee sprain 01/24/2013   • Leg pain, bilateral 04/02/2019   • Lumbar disc herniation with radiculopathy 09/08/2016   • Lumbar radiculitis 08/30/2016   • Mild memory disturbance 01/18/2016   • Myocardial infarction (CMS/[facility]) 11/01/2016   • Osteoarthritis of sacroiliac joint (CMS/[facility]) 03/12/2014   • Pain in right lower leg 07/11/2016   • Paresthesia 08/30/2016   • Paresthesia of both legs     Lower legs   • Postherpetic neuralgia 03/15/2016   • Preoperative cardiovascular examination 10/24/2016   • Preventative health care 02/07/2017   • S/P cardiac catheterization 01/01/2000    S/P cardiac cath, left heart-- Heart catheterization 2000 at [facility] in [place], [place]. No records available. Abstracted from [facility].   • Sciatica, right side 06/04/2013   • Shingles    • Shortness of breath 10/15/2014   • Spinal stenosis, lumbar 02/13/2013    TENS unit evidently not covered by Medicare. Abstracted from [facility].   • Vitamin B12 deficiency 08/10/2017   • Vitamin D deficiency 01/09/2018        Allergies   Allergen Reactions   • Niacin Unknown (See Comments)     Abstracted from [facility].       Past Surgical History:   Procedure Laterality Date   • CARDIAC CATHETERIZATION  2000   • COLONOSCOPY  10/28/2009   • KNEE SURGERY Bilateral    • OTHER SURGICAL HISTORY      Lapscopic surgery, both knees. Abstracted by [facility].   • OTHER SURGICAL HISTORY      Kneww injections. Abstracted from [facility].   • OTHER SURGICAL HISTORY      Shot in lower back for bulging disc. Abstracted from [facility].   • UPPER ENDOSCOPIC ULTRASOUND W/ FNA N/A 1/7/2021    Procedure: ESOPHAGOGASTRODUODENOSCOPY WITH endoscopic ULTRASOUND AND FINE NEEDLE ASPIRATION;  Surgeon: Davdi Fierro MD;  Location: [facility] ENDOSCOPY;  Service: Gastroenterology;  Laterality: N/A;  post op: pancreatic head mass       Family History   Problem Relation Age of Onset   • Alzheimer's disease Mother    • Hyperlipidemia Father    • Heart disease Father    • Hypertension Father    • Hyperlipidemia Brother    • Diabetes Brother    • Heart disease Brother    • Hypertension Brother    • Heart disease Maternal Grandfather    • Alzheimer's disease Paternal Grandmother    • Cancer Cousin    • Cancer Other    • Heart disease Brother    • Hypertension Brother        Social History     Socioeconomic History   • Marital status:      Spouse name: Not on file   • Number of children: 1   • Years of education: Not on file   • Highest education level: Not on file   Tobacco Use   • Smoking status: Never Smoker   • Smokeless tobacco: Never Used   Substance and Sexual Activity   • Alcohol use: No   • Drug use: No   • Sexual activity: Defer   Reports poor appetite but no unusual food water travel or activity        Objective   Physical Exam  Alert Josiane Coma Scale 15   HEENT: Pupils equal and reactive to light. Conjunctivae are not injected. normal tympanic membranes. Oropharynx and nares are normal.   Neck: Supple. Midline trachea. No JVD. No goiter.    Chest: Clear and equal breath sounds bilaterally regular rate and rhythm without murmur or rub.   Abdomen: Positive bowel sounds tender right upper quadrant and right lower quadrant with the upper quadrant pain more significant.  The abdomen is flat and nondistended. No rebound or peritoneal signs. No CVA tenderness.  Negative Gray sign no Metzger's sign  Extremities no clubbing cyanosis or edema motor sensory exam is normal the full range of motion is intact   skin: Warm and dry, no rashes or petechia.   Lymphatic: No regional lymphadenopathy. No calf pain, swelling or Fabian's sign    Procedures           ED Course                                 Labs Reviewed   BLOOD CULTURE - Abnormal; Notable for the following components:       Result Value    Blood Culture Staphylococcus, coagulase negative (*)     Gram Stain Anaerobic Bottle Gram positive cocci in clusters (*)     Gram Stain Aerobic Bottle Gram positive cocci in clusters (*)     All other components within normal limits   BLOOD CULTURE ID - Abnormal; Notable for the following components:    BCID, PCR   (*)     Value: Staphylococcus spp, not aureus. Identification by BCID PCR.    All other components within normal limits   COMPREHENSIVE METABOLIC PANEL - Abnormal; Notable for the following components:    Glucose 127 (*)     Potassium 3.3 (*)     CO2 21.0 (*)     Alkaline Phosphatase 152 (*)     Anion Gap 17.0 (*)     All other components within normal limits    Narrative:     GFR Normal >60  Chronic Kidney Disease <60  Kidney Failure <15     LIPASE - Abnormal; Notable for the following components:    Lipase 8 (*)     All other components within normal limits   CBC WITH AUTO DIFFERENTIAL - Abnormal; Notable for the following components:    WBC 1.40 (*)     RBC 3.87 (*)     Hemoglobin 10.8 (*)     Hematocrit 32.4 (*)     RDW 15.7 (*)     Platelets 91 (*)     All other components within normal limits    Narrative:     The previously reported component NRBC is no  "longer being reported. Previous result was 0.1 /100 WBC (Reference Range: 0.0-0.2 /100 WBC) on 4/16/2021 at 1910 EDT.   MANUAL DIFFERENTIAL - Abnormal; Notable for the following components:    Neutrophil % 8.0 (*)     Lymphocyte % 56.0 (*)     Monocyte % 31.0 (*)     Neutrophils Absolute 0.17 (*)     All other components within normal limits   LACTIC ACID, PLASMA - Abnormal; Notable for the following components:    Lactate 2.2 (*)     All other components within normal limits   POTASSIUM - Abnormal; Notable for the following components:    Potassium 3.3 (*)     All other components within normal limits   PROCALCITONIN - Abnormal; Notable for the following components:    Procalcitonin 3.52 (*)     All other components within normal limits    Narrative:     As a Marker for Sepsis (Non-Neonates):     1. <0.5 ng/mL represents a low risk of severe sepsis and/or septic shock.  2. >2 ng/mL represents a high risk of severe sepsis and/or septic shock.    As a Marker for Lower Respiratory Tract Infections that require antibiotic therapy:  PCT on Admission     Antibiotic Therapy             6-12 Hrs later  >0.5                          Strongly Recommended            >0.25 - <0.5             Recommended  0.1 - 0.25                  Discouraged                       Remeasure/reassess PCT  <0.1                         Strongly Discouraged         Remeasure/reassess PCT      As 28 day mortality risk marker: \"Change in Procalcitonin Result\" (>80% or <=80%) if Day 0 (or Day 1) and Day 4 values are available. Refer to http://www.[employer]-pct-calculator.com/    Change in PCT <=80 %   A decrease of PCT levels below or equal to 80% defines a positive change in PCT test result representing a higher risk for 28-day all-cause mortality of patients diagnosed with severe sepsis or septic shock.    Change in PCT >80 %   A decrease of PCT levels of more than 80% defines a negative change in PCT result representing a lower risk for 28-day " all-cause mortality of patients diagnosed with severe sepsis or septic shock.              Results may be falsely decreased if patient taking Biotin.    LACTIC ACID, REFLEX - Abnormal; Notable for the following components:    Lactate 4.5 (*)     All other components within normal limits   VITAMIN B12 - Abnormal; Notable for the following components:    Vitamin B-12 1,190 (*)     All other components within normal limits    Narrative:     Results may be falsely increased if patient taking Biotin.     BASIC METABOLIC PANEL - Abnormal; Notable for the following components:    Glucose 145 (*)     Potassium 3.4 (*)     Chloride 97 (*)     CO2 20.0 (*)     Anion Gap 19.0 (*)     All other components within normal limits    Narrative:     GFR Normal >60  Chronic Kidney Disease <60  Kidney Failure <15     CBC WITH AUTO DIFFERENTIAL - Abnormal; Notable for the following components:    WBC 1.10 (*)     RBC 3.76 (*)     Hemoglobin 10.8 (*)     Hematocrit 31.4 (*)     RDW 15.7 (*)     Platelets 97 (*)     All other components within normal limits    Narrative:     The previously reported component NRBC is no longer being reported. Previous result was 0.2 /100 WBC (Reference Range: 0.0-0.2 /100 WBC) on 4/17/2021 at 0444 EDT.   POTASSIUM - Abnormal; Notable for the following components:    Potassium 3.4 (*)     All other components within normal limits   LACTIC ACID, PLASMA - Abnormal; Notable for the following components:    Lactate 2.5 (*)     All other components within normal limits   MANUAL DIFFERENTIAL - Abnormal; Notable for the following components:    Neutrophil % 15.0 (*)     Lymphocyte % 55.0 (*)     Monocyte % 19.0 (*)     Bands %  11.0 (*)     Neutrophils Absolute 0.29 (*)     Lymphocytes Absolute 0.61 (*)     All other components within normal limits   FECAL LACTOFERRIN - Abnormal; Notable for the following components:    Lactoferrin, Qual Positive (*)     All other components within normal limits   LACTIC ACID,  PLASMA - Abnormal; Notable for the following components:    Lactate 2.3 (*)     All other components within normal limits   FERRITIN - Abnormal; Notable for the following components:    Ferritin 2,417.00 (*)     All other components within normal limits    Narrative:     Results may be falsely decreased if patient taking Biotin.     HAPTOGLOBIN - Abnormal; Notable for the following components:    Haptoglobin 308 (*)     All other components within normal limits   IRON PROFILE - Abnormal; Notable for the following components:    Iron 17 (*)     Iron Saturation 7 (*)     Transferrin 153 (*)     TIBC 228 (*)     All other components within normal limits   RETICULOCYTES - Abnormal; Notable for the following components:    Reticulocyte % 2.36 (*)     All other components within normal limits   CANCER ANTIGEN 19-9 - Abnormal; Notable for the following components:    CA 19-9 54.7 (*)     All other components within normal limits    Narrative:     Results may be falsely decreased if patient taking Biotin.    MAGNESIUM - Abnormal; Notable for the following components:    Magnesium 2.5 (*)     All other components within normal limits   COMPREHENSIVE METABOLIC PANEL - Abnormal; Notable for the following components:    Glucose 112 (*)     Potassium 3.2 (*)     Total Protein 5.9 (*)     Albumin 3.10 (*)     All other components within normal limits    Narrative:     GFR Normal >60  Chronic Kidney Disease <60  Kidney Failure <15     CBC WITH AUTO DIFFERENTIAL - Abnormal; Notable for the following components:    RBC 3.28 (*)     Hemoglobin 9.3 (*)     Hematocrit 27.5 (*)     RDW 15.7 (*)     Platelets 109 (*)     All other components within normal limits    Narrative:     The previously reported component NRBC is no longer being reported. Previous result was 0.3 /100 WBC (Reference Range: 0.0-0.2 /100 WBC) on 4/18/2021 at 0204 EDT.   MANUAL DIFFERENTIAL - Abnormal; Notable for the following components:    Neutrophil % 10.0 (*)      Lymphocyte % 56.0 (*)     Monocyte % 19.0 (*)     Bands %  14.0 (*)     Neutrophils Absolute 0.98 (*)     All other components within normal limits   COMPREHENSIVE METABOLIC PANEL - Abnormal; Notable for the following components:    Potassium 3.1 (*)     CO2 21.0 (*)     Calcium 8.5 (*)     Total Protein 5.2 (*)     Albumin 2.70 (*)     All other components within normal limits    Narrative:     GFR Normal >60  Chronic Kidney Disease <60  Kidney Failure <15     CBC WITH AUTO DIFFERENTIAL - Abnormal; Notable for the following components:    WBC 13.70 (*)     RBC 3.06 (*)     Hemoglobin 8.6 (*)     Hematocrit 26.3 (*)     RDW 16.5 (*)     Platelets 119 (*)     All other components within normal limits    Narrative:     The previously reported component NRBC is no longer being reported. Previous result was 0.3 /100 WBC (Reference Range: 0.0-0.2 /100 WBC) on 4/19/2021 at 0418 EDT.   OCCULT BLOOD, FECAL BY IMMUNOASSAY - Abnormal; Notable for the following components:    Occult Blood, Fecal by Immunoassay Positive (*)     All other components within normal limits   MANUAL DIFFERENTIAL - Abnormal; Notable for the following components:    Neutrophil % 20.0 (*)     Lymphocyte % 18.0 (*)     Monocyte % 1.0 (*)     Bands %  28.0 (*)     Metamyelocyte % 10.0 (*)     Myelocyte % 5.0 (*)     Promyelocyte % 4.0 (*)     Blasts % 3.0 (*)     Other Cells % 11.0 (*)     nRBC 5.0 (*)     All other components within normal limits   POTASSIUM - Abnormal; Notable for the following components:    Potassium 3.3 (*)     All other components within normal limits   COMPREHENSIVE METABOLIC PANEL - Abnormal; Notable for the following components:    Potassium 3.3 (*)     Calcium 8.5 (*)     Total Protein 5.2 (*)     Albumin 2.80 (*)     AST (SGOT) 43 (*)     Alkaline Phosphatase 126 (*)     All other components within normal limits    Narrative:     GFR Normal >60  Chronic Kidney Disease <60  Kidney Failure <15     CBC WITH AUTO DIFFERENTIAL -  Abnormal; Notable for the following components:    WBC 34.50 (*)     RBC 3.12 (*)     Hemoglobin 8.6 (*)     Hematocrit 26.0 (*)     RDW 16.8 (*)     All other components within normal limits    Narrative:     The previously reported component NRBC is no longer being reported. Previous result was 0.1 /100 WBC (Reference Range: 0.0-0.2 /100 WBC) on 4/20/2021 at 0457 EDT.   MANUAL DIFFERENTIAL - Abnormal; Notable for the following components:    Neutrophil % 23.0 (*)     Monocyte % 16.0 (*)     Bands %  32.0 (*)     Metamyelocyte % 3.0 (*)     Myelocyte % 3.0 (*)     Promyelocyte % 1.0 (*)     Neutrophils Absolute 18.98 (*)     Lymphocytes Absolute 6.90 (*)     Monocytes Absolute 5.52 (*)     Eosinophils Absolute 0.69 (*)     All other components within normal limits    Narrative:     Reviewed by Pathologist within the past 30 days on 4.19.21 .   BASIC METABOLIC PANEL - Abnormal; Notable for the following components:    Glucose 104 (*)     Creatinine 0.75 (*)     Sodium 146 (*)     Potassium 3.2 (*)     Calcium 8.2 (*)     All other components within normal limits    Narrative:     GFR Normal >60  Chronic Kidney Disease <60  Kidney Failure <15     T3, FREE - Abnormal; Notable for the following components:    T3, Free 1.15 (*)     All other components within normal limits    Narrative:     Results may be falsely increased if patient taking Biotin.     BASIC METABOLIC PANEL - Abnormal; Notable for the following components:    Glucose 116 (*)     Creatinine 0.72 (*)     Potassium 3.3 (*)     Calcium 7.7 (*)     All other components within normal limits    Narrative:     GFR Normal >60  Chronic Kidney Disease <60  Kidney Failure <15     BASIC METABOLIC PANEL - Abnormal; Notable for the following components:    Glucose 104 (*)     Creatinine 0.61 (*)     Sodium 146 (*)     Calcium 8.3 (*)     All other components within normal limits    Narrative:     GFR Normal >60  Chronic Kidney Disease <60  Kidney Failure <15     CBC  WITH AUTO DIFFERENTIAL - Abnormal; Notable for the following components:    WBC 14.20 (*)     RBC 3.17 (*)     Hemoglobin 8.9 (*)     Hematocrit 26.7 (*)     RDW 17.1 (*)     All other components within normal limits    Narrative:     The previously reported component NRBC is no longer being reported. Previous result was 0.2 /100 WBC (Reference Range: 0.0-0.2 /100 WBC) on 4/23/2021 at 1121 EDT.   MANUAL DIFFERENTIAL - Abnormal; Notable for the following components:    Monocyte % 17.0 (*)     Metamyelocyte % 3.0 (*)     Myelocyte % 2.0 (*)     Promyelocyte % 1.0 (*)     Neutrophils Absolute 7.95 (*)     Monocytes Absolute 2.41 (*)     All other components within normal limits    Narrative:     Reviewed by Pathologist within the past 30 days on 4/19/2021 .     HEPATIC FUNCTION PANEL - Abnormal; Notable for the following components:    Total Protein 4.8 (*)     Albumin 2.70 (*)     All other components within normal limits   COVID-19,APTIMA RACHELL VELEZ IN-HOUSE,NP/OP SWAB IN UTM/VTM/[facility] TRANSPORT MEDIA,24 HR TAT - Normal    Narrative:     Fact sheet for providers: https://www.fda.gov/media/602811/download     Fact sheet for patients: https://www.fda.gov/media/211696/download    Test performed by RT PCR.   GASTROINTESTINAL PANEL, PCR - Normal    Narrative:     If Aeromonas, Staphylococcus aureus or Bacillus cereus are suspected, please order [id number]: Stool Culture, Aeromonas, S aureus, B Cereus.   CLOSTRIDIUM DIFFICILE EIA - Normal   TSH - Normal   MAGNESIUM - Normal   BNP (IN-HOUSE) - Normal    Narrative:     Among patients with dyspnea, NT-proBNP is highly sensitive for the detection of acute congestive heart failure. In addition NT-proBNP of <300 pg/ml effectively rules out acute congestive heart failure with 99% negative predictive value.    Results may be falsely decreased if patient taking Biotin.     FOLATE - Normal    Narrative:     Results may be falsely increased if patient taking Biotin.     MAGNESIUM -  Normal   MAGNESIUM - Normal   POTASSIUM - Normal   MAGNESIUM - Normal   TSH - Normal   T4, FREE - Normal    Narrative:     Results may be falsely increased if patient taking Biotin.     AMMONIA - Normal   MAGNESIUM - Normal   MAGNESIUM - Normal   BLOOD CULTURE   COVID PRE-OP / PRE-PROCEDURE SCREENING ORDER (NO ISOLATION)    Narrative:     The following orders were created for panel order COVID PRE-OP / PRE-PROCEDURE SCREENING ORDER (NO ISOLATION) - Swab, Nasopharynx.  Procedure                               Abnormality         Status                     ---------                               -----------         ------                     COVID-19,APTIMA PANTHER,...[[id number]]  Normal              Final result                 Please view results for these tests on the individual orders.   CLOSTRIDIUM DIFFICILE TOXIN    Narrative:     The following orders were created for panel order Clostridium Difficile Toxin - Stool, Per Rectum.  Procedure                               Abnormality         Status                     ---------                               -----------         ------                     Clostridium Difficile EI...[[id number]]  Normal              Final result                 Please view results for these tests on the individual orders.   BLOOD CULTURE   BLOOD CULTURE   SCAN SLIDE   LIPID PANEL    Narrative:     Cholesterol Reference Ranges  (U.S. Department of Health and Human Services ATP III Classifications)    Desirable          <200 mg/dL  Borderline High    200-239 mg/dL  High Risk          >240 mg/dL      Triglyceride Reference Ranges  (U.S. Department of Health and Human Services ATP III Classifications)    Normal           <150 mg/dL  Borderline High  150-199 mg/dL  High             200-499 mg/dL  Very High        >500 mg/dL    HDL Reference Ranges  (U.S. Department of Health and Human Services ATP III Classifcations)    Low     <40 mg/dl (major risk factor for CHD)  High    >60 mg/dl  ('negative' risk factor for CHD)        LDL Reference Ranges  (U.S. Department of Health and Human Services ATP III Classifcations)    Optimal          <100 mg/dL  Near Optimal     100-129 mg/dL  Borderline High  130-159 mg/dL  High             160-189 mg/dL  Very High        >189 mg/dL   SCAN SLIDE   SCAN SLIDE   SCAN SLIDE   PATH CONSULT REFLEX   SCAN SLIDE   SCAN SLIDE   PATHOLOGY CONSULTATION   CBC AND DIFFERENTIAL    Narrative:     The following orders were created for panel order CBC & Differential.  Procedure                               Abnormality         Status                     ---------                               -----------         ------                     Scan Slide[[id number]]                                       Final result               CBC Auto Differential[[id number]]        Abnormal            Final result                 Please view results for these tests on the individual orders.   EXTRA TUBES    Narrative:     The following orders were created for panel order Extra Tubes.  Procedure                               Abnormality         Status                     ---------                               -----------         ------                     Light Blue Top[[id number]]                                   Final result               Gold Top - SST[[id number]]                                   Final result                 Please view results for these tests on the individual orders.   LIGHT BLUE TOP   GOLD TOP - SST   CBC AND DIFFERENTIAL    Narrative:     The following orders were created for panel order CBC & Differential.  Procedure                               Abnormality         Status                     ---------                               -----------         ------                     Scan Slide[[id number]]                                       Final result               CBC Auto Differential[[id number]]        Abnormal            Final result                 Please view results  for these tests on the individual orders.   CBC AND DIFFERENTIAL    Narrative:     The following orders were created for panel order CBC & Differential.  Procedure                               Abnormality         Status                     ---------                               -----------         ------                     Scan Slide[[id number]]                                       Final result               CBC Auto Differential[[id number]]        Abnormal            Final result                 Please view results for these tests on the individual orders.   CBC AND DIFFERENTIAL    Narrative:     The following orders were created for panel order CBC & Differential.  Procedure                               Abnormality         Status                     ---------                               -----------         ------                     Scan Slide[[id number]]                                       Final result               CBC Auto Differential[[id number]]        Abnormal            Final result                 Please view results for these tests on the individual orders.   CBC AND DIFFERENTIAL    Narrative:     The following orders were created for panel order CBC & Differential.  Procedure                               Abnormality         Status                     ---------                               -----------         ------                     Scan Slide[[id number]]                                       Final result               CBC Auto Differential[[id number]]        Abnormal            Final result                 Please view results for these tests on the individual orders.   EXTRA TUBES    Narrative:     The following orders were created for panel order Extra Tubes.  Procedure                               Abnormality         Status                     ---------                               -----------         ------                     Lavender Top[[id number]]                                     Final  result                 Please view results for these tests on the individual orders.   LAVENDER TOP   EXTRA TUBES    Narrative:     The following orders were created for panel order Extra Tubes.  Procedure                               Abnormality         Status                     ---------                               -----------         ------                     Lavender Top[[id number]]                                     Final result                 Please view results for these tests on the individual orders.   LAVENDER TOP   CBC AND DIFFERENTIAL    Narrative:     The following orders were created for panel order CBC & Differential.  Procedure                               Abnormality         Status                     ---------                               -----------         ------                     Scan Slide[[id number]]                                       Final result               CBC Auto Differential[[id number]]        Abnormal            Final result                 Please view results for these tests on the individual orders.     Medications   lactated ringers infusion (150 mL/hr Intravenous Currently Infusing 4/17/21 1310)   Morphine sulfate (PF) injection 2 mg (2 mg Intravenous Given 4/17/21 0454)   lactated ringers infusion (0 mL/hr Intravenous Stopped 4/18/21 1909)   lactated ringers infusion (0 mL/hr Intravenous Stopped 4/23/21 0820)   pantoprazole (PROTONIX) injection 40 mg (40 mg Intravenous Given 4/16/21 1849)   lactated ringers bolus 1,000 mL (0 mL Intravenous Stopped 4/16/21 2036)   HYDROmorphone (DILAUDID) injection 0.5 mg (0.5 mg Intravenous Given 4/16/21 1840)   ondansetron (ZOFRAN) injection 8 mg (8 mg Intravenous Given 4/16/21 1840)   iopamidol (ISOVUE-370) 76 % injection 100 mL (100 mL Intravenous Given 4/16/21 2031)   lactated ringers bolus 1,000 mL (0 mL Intravenous Stopped 4/16/21 2300)   ampicillin-sulbactam (UNASYN) 3 g in sodium chloride 0.9 % 100 mL IVPB-MBP (0 g Intravenous  Stopped 4/16/21 2251)   dexamethasone sodium phosphate injection 6 mg (6 mg Intravenous Given 4/16/21 2223)   lactated ringers bolus 500 mL (0 mL Intravenous Stopped 4/17/21 0835)   piperacillin-tazobactam (ZOSYN) IVPB 3.375 g in 100 mL NS (CD) (3.375 g Intravenous New Bag 4/17/21 1449)   iron sucrose 300 mg in 250 mL NS (300 mg Intravenous Given 4/20/21 1024)   HYDROmorphone (DILAUDID) injection 1 mg (1 mg Intravenous Given 4/18/21 0959)     No radiology results for the last day            MDM  Number of Diagnoses or Management Options  Risk of Complications, Morbidity, and/or Mortality  Presenting problems: high  Diagnostic procedures: high        Final diagnoses:   Malignant neoplasm of head of pancreas (CMS/HCC)   Dilation of pancreatic duct   Enteritis   Chemotherapy-induced neutropenia (CMS/HCC)       ED Disposition  ED Disposition     ED Disposition Condition Comment    Decision to Admit  Level of Care: Telemetry [5]   Diagnosis: Malignant neoplasm of head of pancreas (CMS/HCC) [157.0.ICD-9-CM]   Admitting Physician: JULIETA UMANA [[id number]]   Attending Physician: JULIETA UMANA [[id number]]            Gris Muro APRN  [address]  [address]  [place] [facility]  [phone]               Medication List      ASK your doctor about these medications    omeprazole 40 MG capsule  Commonly known as: priLOSEC  Ask about: Which instructions should I use?             Nic Ochoa MD  04/17/21 9656       Nic Ochoa MD  05/04/21 0827

## 2021-04-17 NOTE — CONSULTS
GI CONSULT  NOTE:    Referring Provider: Dr. Oliveira    Chief complaint: Nausea, vomiting, abdominal pain in the context of pancreatic cancer    Subjective .     History of present illness: Clifford Weber is a 76 y.o. male with history of pancreatic cancer (chemotherapy managed by Dr. Davis), colon polyps, esophagitis concerning for Sales's, CAD, and chronic pain who presents to the hospital complaining of some abdominal pain for the past 3 months that has worsened over the past 5 days.  He has had pain in the left upper quadrant but this pain is different.  He reports the pain is across his lower abdomen.  Pain waxes and wanes.  He cannot identify alleviating or aggravating factors.  He is often nauseous related to chemotherapy but he has been vomiting this past week.  He has had constant diarrhea for the past 2 weeks that is a very black color.  He denies BRBPR, hematemesis, or coffee-ground emesis.  He has reported subjective fever and chills and not feeling well.  He has a decreased appetite and has lost about 100 pounds since prior to cancer diagnosis.  He denies reflux, heartburn, or difficulty swallowing.  He has a history of colon polyps and is overdue for surveillance colonoscopy.  He has a history of esophagitis that was concerning for Sales's and he has been told that he should get a repeat upper scope.  He had a PET scan recently to check on the status of his cancer and does not know the results.    Labs -K 3.4, lactate 2.5, WBC 1.1 with low neutrophil count, Hgb 10.8, PLT 97.  Hgb was last checked in January and was 14 at that time.    4/16/2021 CTAP -2 cm mass and uncinate process of the pancreas, mild dilation of pancreatic duct, thickened ileum (infectious versus inflammatory etiology), mild gastric distention with fluid in colon and rectum      Endo History:  1/2021 EUS with FNA (Dr. Fierro)-2.3 cm mass around head of pancreas at use an 8 process with biopsy consistent with adenocarcinoma.  CBD  and PD normal diameters.  Normal-appearing gallbladder  2015 EGD/colonoscopy (Dr. Tellez)-mucosa suggestive of Sales's esophagus with biopsies negative for metaplasia, small hiatal hernia, colon polyp (TA), mild diverticulosis    Past Medical History:  Past Medical History:   Diagnosis Date   • Abnormal cardiovascular stress test 11/01/2016    Abnormal Cardiolite Stress Test. Abstracted from [facility].   • Anxiety 03/15/2012    Uses this PRN, has stress due to caring for elderly parents. Abstracted from [facility].   • Atherosclerotic heart disease 03/15/2012   • Borderline hypertension 10/19/2015   • Bronchitis, acute 05/16/2016   • Cancer (CMS/[facility])     PANCREATIC CANCER     • Chest pain 10/15/2014   • Chronic foot pain 10/16/2012    Will see his podiatrist. Abstracted from [facility].   • Constipation 01/09/2018   • Coronary artery disease 10/24/2016   • DDD (degenerative disc disease), lumbar 02/13/2013   • Depression 08/10/2017   • Depression    • Diverticulosis    • Dyslipidemia    • Erectile dysfunction of organic origin 12/07/2012    Samples of cialis 20mg, voucher for the 5mg, samples of levitra 20mg and voucher for viagra 100mg. Call with preference. Order total T. Abstracted from [facility].   • GERD (gastroesophageal reflux disease) 12/07/2012   • H/O myocardial perfusion scan 11/01/2016    With Stress Test, abnormal. Abstracted from [facility].   • Hand pain 04/24/2014   • Headache 01/18/2016   • Hyperlipidemia 02/13/2018   • Hypertension 08/10/2017   • Hypogonadism, male 12/07/2012   • Hypothyroidism 02/08/2018   • Impacted cerumen, left ear 04/03/2019   • Influenza vaccination ordered 10/19/2016   • Insomnia 03/15/2012   • Lateral epicondylitis, left elbow 02/07/2017   • Left knee sprain 01/24/2013   • Leg pain, bilateral 04/02/2019   • Lumbar disc herniation with radiculopathy 09/08/2016   • Lumbar radiculitis 08/30/2016   • Mild memory disturbance 01/18/2016   • Myocardial infarction (CMS/[facility])  11/01/2016   • Osteoarthritis of sacroiliac joint (CMS/HCC) 03/12/2014   • Pain in right lower leg 07/11/2016   • Paresthesia 08/30/2016   • Paresthesia of both legs     Lower legs   • Postherpetic neuralgia 03/15/2016   • Preoperative cardiovascular examination 10/24/2016   • Preventative health care 02/07/2017   • S/P cardiac catheterization 01/01/2000    S/P cardiac cath, left heart-- Heart catheterization 2000 at [facility] in [place], [place]. No records available. Abstracted from [facility].   • Sciatica, right side 06/04/2013   • Shingles    • Shortness of breath 10/15/2014   • Spinal stenosis, lumbar 02/13/2013    TENS unit evidently not covered by Medicare. Abstracted from [facility].   • Vitamin B12 deficiency 08/10/2017   • Vitamin D deficiency 01/09/2018       Past Surgical History:  Past Surgical History:   Procedure Laterality Date   • CARDIAC CATHETERIZATION  2000   • COLONOSCOPY  10/28/2009   • KNEE SURGERY Bilateral    • OTHER SURGICAL HISTORY      Lapscopic surgery, both knees. Abstracted by [facility].   • OTHER SURGICAL HISTORY      Kneww injections. Abstracted from [facility].   • OTHER SURGICAL HISTORY      Shot in lower back for bulging disc. Abstracted from [facility].   • UPPER ENDOSCOPIC ULTRASOUND W/ FNA N/A 1/7/2021    Procedure: ESOPHAGOGASTRODUODENOSCOPY WITH endoscopic ULTRASOUND AND FINE NEEDLE ASPIRATION;  Surgeon: David Fierro MD;  Location: [facility];  Service: Gastroenterology;  Laterality: N/A;  post op: pancreatic head mass       Social History:  Social History     Tobacco Use   • Smoking status: Never Smoker   • Smokeless tobacco: Never Used   Substance Use Topics   • Alcohol use: No   • Drug use: No       Family History:  Family History   Problem Relation Age of Onset   • Alzheimer's disease Mother    • Hyperlipidemia Father    • Heart disease Father    • Hypertension Father    • Hyperlipidemia Brother    • Diabetes Brother    • Heart disease Brother    •  Hypertension Brother    • Heart disease Maternal Grandfather    • Alzheimer's disease Paternal Grandmother    • Cancer Cousin    • Cancer Other    • Heart disease Brother    • Hypertension Brother        Medications:  Medications Prior to Admission   Medication Sig Dispense Refill Last Dose   • aspirin (ASPIR) 81 MG EC tablet Take 81 mg by mouth Daily.      • colestipol (COLESTID) 1 g tablet Take 1 g by mouth Every 8 (Eight) Hours As Needed.      • cyproheptadine (PERIACTIN) 4 MG tablet Take 4 mg by mouth 2 (two) times a day.      • diphenoxylate-atropine (LOMOTIL) 2.5-0.025 MG per tablet Take 1 tablet by mouth 4 (Four) Times a Day As Needed for Diarrhea.      • HYDROcodone-acetaminophen (NORCO) 5-325 MG per tablet Take 1 tablet by mouth Every 6 (Six) Hours As Needed.      • LORazepam (ATIVAN) 0.5 MG tablet Take 0.5 mg by mouth Every 8 (Eight) Hours As Needed (nausea).      • Octreotide Acetate (SANDOSTATIN IJ) Inject  as directed.      • omeprazole (priLOSEC) 40 MG capsule Take 40 mg by mouth Daily.      • ondansetron (ZOFRAN) 4 MG tablet Take 4 mg by mouth Every 6 (Six) Hours As Needed for Nausea or Vomiting.      • Pancrelipase, Lip-Prot-Amyl, (ZENPEP) 47270-288933 units capsule delayed-release particles capsule Take 40,000 Units by mouth 3 (Three) Times a Day.      • potassium chloride (K-DUR,KLOR-CON) 10 MEQ CR tablet Take 20 mEq by mouth 2 (Two) Times a Day.      • pregabalin (LYRICA) 150 MG capsule Take 1 capsule by mouth 3 (Three) Times a Day. 90 capsule 3    • prochlorperazine (COMPAZINE) 10 MG tablet Take 10 mg by mouth Every 8 (Eight) Hours As Needed for Nausea or Vomiting.      • rosuvastatin (CRESTOR) 10 MG tablet TAKE ONE TABLET BY MOUTH AT BEDTIME 90 tablet 1    • Scopolamine (Transderm-Scop, 1.5 MG,) 1.5 MG/3DAYS patch Place 1 patch on the skin as directed by provider Every 72 (Seventy-Two) Hours.      • Synthroid 75 MCG tablet TAKE ONE TABLET BY MOUTH EVERY DAY 90 tablet 0    • vitamin D  (ERGOCALCIFEROL) 1.25 MG (41795 UT) capsule capsule TAKE 1 CAPSULE BY MOUTH ONCE WEEKLY 12 capsule 0        Scheduled Meds:ampicillin-sulbactam, 3 g, Intravenous, Q6H  aspirin, 81 mg, Oral, Daily  cholestyramine light, 1 packet, Oral, Daily  levothyroxine, 75 mcg, Oral, Q AM  Pancrelipase (Lip-Prot-Amyl), 36,000 units of lipase, Oral, TID  pantoprazole, 40 mg, Oral, QAM  pregabalin, 150 mg, Oral, TID  rosuvastatin, 10 mg, Oral, Nightly  Scopolamine, 1 patch, Transdermal, Q72H  sodium chloride, 10 mL, Intravenous, Q12H      Continuous Infusions:lactated ringers, 150 mL/hr, Last Rate: 150 mL/hr (04/17/21 0745)      PRN Meds:.•  acetaminophen **OR** acetaminophen **OR** acetaminophen  •  Calcium Gluconate-NaCl **AND** calcium gluconate **AND** Calcium, Ionized  •  HYDROcodone-acetaminophen  •  LORazepam  •  magnesium sulfate **OR** magnesium sulfate **OR** magnesium sulfate  •  melatonin  •  nitroglycerin  •  ondansetron **OR** ondansetron  •  potassium & sodium phosphates **OR** potassium & sodium phosphates  •  potassium chloride  •  potassium chloride  •  prochlorperazine  •  sodium chloride    ALLERGIES:  Niacin    Review of Systems:  Review of Systems   Constitutional: Positive for chills, fatigue, fever and unexpected weight change.   HENT: Negative.    Eyes: Negative.    Respiratory: Negative.    Cardiovascular: Negative.    Gastrointestinal: Positive for abdominal pain, blood in stool, diarrhea, nausea and vomiting.   Endocrine: Negative.    Genitourinary: Negative.    Musculoskeletal:        Complains of bilateral leg pain   Skin: Negative.    Allergic/Immunologic: Negative.    Neurological: Positive for weakness.   Hematological: Negative.    Psychiatric/Behavioral: Negative.          Objective     Vital Signs:   Vitals:    04/16/21 2210 04/16/21 2300 04/17/21 0444 04/17/21 0802   BP: 90/61 97/61 113/73 99/66   BP Location:  Right arm Left arm Right arm   Patient Position:  Lying Lying Lying   Pulse: 90 92 92  "79   Resp: 16 18 16 16   Temp:  97.4 °F (36.3 °C) 98 °F (36.7 °C) 97.7 °F (36.5 °C)   TempSrc:  Oral Oral Oral   SpO2: 99% 95% 98% 97%   Weight:  72.8 kg (160 lb 7.9 oz)     Height:  188 cm (74\")         Physical Exam: Sitting up in bed    General Appearance:    Awake and alert, chronically ill-appearing, cachectic   Head:    Normocephalic, without obvious abnormality   Throat:   No oral lesions, no thrush, oral mucosa moist   Lungs:     Respirations regular, even and unlabored   Chest Wall:    No abnormalities observed   Abdomen:     Soft, tender across lower abdomen, non-distended, no rebound or guarding, no hepatosplenomegaly   Rectal:     Deferred   Extremities:   Moves all extremities, no edema, no cyanosis   Pulses:   Pulses palpable and equal bilaterally   Skin:   No bruising, no rash, no jaundice, normal palpation   Lymph nodes:   No cervical, supraclavicular or submandibular palpable adenopathy   Neurologic:   No asterixis       Results Review:   I reviewed the patient's labs and imaging.  Lab Results (last 24 hours)     Procedure Component Value Units Date/Time    Manual Differential [[id number]]  (Abnormal) Collected: 04/17/21 0426    Specimen: Blood Updated: 04/17/21 0801     Neutrophil % 15.0 %      Lymphocyte % 55.0 %      Monocyte % 19.0 %      Bands %  11.0 %      Neutrophils Absolute 0.29 10*3/mm3      Lymphocytes Absolute 0.61 10*3/mm3      Monocytes Absolute 0.21 10*3/mm3      Anisocytosis Slight/1+     Poikilocytes Slight/1+     WBC Morphology Normal     Platelet Estimate Decreased    CBC & Differential [[id number]]  (Abnormal) Collected: 04/17/21 0426    Specimen: Blood Updated: 04/17/21 0801    Narrative:      The following orders were created for panel order CBC & Differential.  Procedure                               Abnormality         Status                     ---------                               -----------         ------                     Scan Slide[[id number]]                          "              Final result               CBC Auto Differential[[id number]]        Abnormal            Final result                 Please view results for these tests on the individual orders.    Scan Slide [[id number]] Collected: 04/17/21 0426    Specimen: Blood Updated: 04/17/21 0801     Scan Slide --     Comment: See Manual Differential Results       CBC Auto Differential [[id number]]  (Abnormal) Collected: 04/17/21 0426    Specimen: Blood Updated: 04/17/21 0801     WBC 1.10 10*3/mm3      RBC 3.76 10*6/mm3      Hemoglobin 10.8 g/dL      Hematocrit 31.4 %      MCV 83.7 fL      MCH 28.8 pg      MCHC 34.4 g/dL      RDW 15.7 %      RDW-SD 45.1 fl      MPV 8.7 fL      Platelets 97 10*3/mm3     Narrative:      The previously reported component NRBC is no longer being reported. Previous result was 0.2 /100 WBC (Reference Range: 0.0-0.2 /100 WBC) on 4/17/2021 at [facility] EDT.    Lactic Acid, Plasma [[id number]]  (Abnormal) Collected: 04/17/21 0555    Specimen: Blood Updated: 04/17/21 0654     Lactate 2.5 mmol/L     BNP [[id number]]  (Normal) Collected: 04/17/21 0426    Specimen: Blood Updated: 04/17/21 0531     proBNP 634.4 pg/mL     Narrative:      Among patients with dyspnea, NT-proBNP is highly sensitive for the detection of acute congestive heart failure. In addition NT-proBNP of <300 pg/ml effectively rules out acute congestive heart failure with 99% negative predictive value.    Results may be falsely decreased if patient taking Biotin.      Timed Lactic Acid, Reflex [[id number]]  (Abnormal) Collected: 04/17/21 0426    Specimen: Blood Updated: 04/17/21 0501     Lactate 4.5 mmol/L     Basic Metabolic Panel [[id number]]  (Abnormal) Collected: 04/17/21 0426    Specimen: Blood Updated: 04/17/21 0457     Glucose 145 mg/dL      BUN 17 mg/dL      Creatinine 1.11 mg/dL      Sodium 136 mmol/L      Potassium 3.4 mmol/L      Chloride 97 mmol/L      CO2 20.0 mmol/L      Calcium 8.7 mg/dL      eGFR Non African Amer 65 mL/min/1.73       BUN/Creatinine Ratio 15.3     Anion Gap 19.0 mmol/L     Narrative:      GFR Normal >60  Chronic Kidney Disease <60  Kidney Failure <15      Lipid Panel [[id number]] Collected: 04/17/21 0426    Specimen: Blood Updated: 04/17/21 0457     Total Cholesterol 149 mg/dL      Triglycerides 130 mg/dL      HDL Cholesterol 57 mg/dL      LDL Cholesterol  69 mg/dL      VLDL Cholesterol 23 mg/dL      LDL/HDL Ratio 1.16    Narrative:      Cholesterol Reference Ranges  (U.S. Department of Health and Human Services ATP III Classifications)    Desirable          <200 mg/dL  Borderline High    200-239 mg/dL  High Risk          >240 mg/dL      Triglyceride Reference Ranges  (U.S. Department of Health and Human Services ATP III Classifications)    Normal           <150 mg/dL  Borderline High  150-199 mg/dL  High             200-499 mg/dL  Very High        >500 mg/dL    HDL Reference Ranges  (U.S. Department of Health and Human Services ATP III Classifcations)    Low     <40 mg/dl (major risk factor for CHD)  High    >60 mg/dl ('negative' risk factor for CHD)        LDL Reference Ranges  (U.S. Department of Health and Human Services ATP III Classifcations)    Optimal          <100 mg/dL  Near Optimal     100-129 mg/dL  Borderline High  130-159 mg/dL  High             160-189 mg/dL  Very High        >189 mg/dL    Magnesium [[id number]]  (Normal) Collected: 04/17/21 0426    Specimen: Blood Updated: 04/17/21 0457     Magnesium 1.7 mg/dL     Vitamin B12 [[id number]] Collected: 04/17/21 0426    Specimen: Blood Updated: 04/17/21 0434    Procalcitonin [[id number]]  (Abnormal) Collected: 04/16/21 2326    Specimen: Blood Updated: 04/17/21 0009     Procalcitonin 3.52 ng/mL     Narrative:      As a Marker for Sepsis (Non-Neonates):     1. <0.5 ng/mL represents a low risk of severe sepsis and/or septic shock.  2. >2 ng/mL represents a high risk of severe sepsis and/or septic shock.    As a Marker for Lower Respiratory Tract Infections that require  "antibiotic therapy:  PCT on Admission     Antibiotic Therapy             6-12 Hrs later  >0.5                          Strongly Recommended            >0.25 - <0.5             Recommended  0.1 - 0.25                  Discouraged                       Remeasure/reassess PCT  <0.1                         Strongly Discouraged         Remeasure/reassess PCT      As 28 day mortality risk marker: \"Change in Procalcitonin Result\" (>80% or <=80%) if Day 0 (or Day 1) and Day 4 values are available. Refer to [email or web address]    Change in PCT <=80 %   A decrease of PCT levels below or equal to 80% defines a positive change in PCT test result representing a higher risk for 28-day all-cause mortality of patients diagnosed with severe sepsis or septic shock.    Change in PCT >80 %   A decrease of PCT levels of more than 80% defines a negative change in PCT result representing a lower risk for 28-day all-cause mortality of patients diagnosed with severe sepsis or septic shock.              Results may be falsely decreased if patient taking Biotin.     TSH [[id number]]  (Normal) Collected: 04/16/21 2326    Specimen: Blood Updated: 04/17/21 0009     TSH 2.330 uIU/mL     Potassium [[id number]]  (Abnormal) Collected: 04/16/21 2326    Specimen: Blood Updated: 04/17/21 0003     Potassium 3.3 mmol/L     Lactic Acid, Plasma [[id number]]  (Abnormal) Collected: 04/16/21 2326    Specimen: Blood Updated: 04/16/21 2352     Lactate 2.2 mmol/L     COVID PRE-OP / PRE-PROCEDURE SCREENING ORDER (NO ISOLATION) - Swab, Nasopharynx [[id number]] Collected: 04/16/21 2230    Specimen: Swab from Nasopharynx Updated: 04/16/21 2232    Narrative:      The following orders were created for panel order COVID PRE-OP / PRE-PROCEDURE SCREENING ORDER (NO ISOLATION) - Swab, Nasopharynx.  Procedure                               Abnormality         Status                     ---------                               -----------         ------           "           COVID-19,APTIMA PANTHER,...[[id number]]                      In process                   Please view results for these tests on the individual orders.    COVID-19,APTIMA PANTHER,[facility] IN-HOUSE, NP/OP SWAB IN UTM/VTM/[facility] TRANSPORT MEDIA,24 HR TAT - Swab, Nasopharynx [[id number]] Collected: 04/16/21 2230    Specimen: Swab from Nasopharynx Updated: 04/16/21 2232    Blood Culture - Blood, Arm, Right [[id number]] Collected: 04/16/21 2229    Specimen: Blood from Arm, Right Updated: 04/16/21 2232    Blood Culture - Blood, Arm, Left [[id number]] Collected: 04/16/21 2228    Specimen: Blood from Arm, Left Updated: 04/16/21 2232    Extra Tubes [[id number]] Collected: 04/16/21 1837    Specimen: Blood, Venous Line Updated: 04/16/21 1945    Narrative:      The following orders were created for panel order Extra Tubes.  Procedure                               Abnormality         Status                     ---------                               -----------         ------                     Light Blue Top[[id number]]                                   Final result               Gold Top - [facility][[id number]]                                   Final result                 Please view results for these tests on the individual orders.    Light Blue Top [[id number]] Collected: 04/16/21 1837    Specimen: Blood Updated: 04/16/21 1945     Extra Tube hold for add-on     Comment: Auto resulted       CBC & Differential [[id number]]  (Abnormal) Collected: 04/16/21 1837    Specimen: Blood Updated: 04/16/21 1941    Narrative:      The following orders were created for panel order CBC & Differential.  Procedure                               Abnormality         Status                     ---------                               -----------         ------                     Scan Slide[[id number]]                                       Final result               CBC Auto Differential[[id number]]        Abnormal            Final result                  Please view results for these tests on the individual orders.    Scan Slide [[id number]] Collected: 04/16/21 1837    Specimen: Blood Updated: 04/16/21 1941     Scan Slide --     Comment: See Manual Differential Results       Manual Differential [[id number]]  (Abnormal) Collected: 04/16/21 1837    Specimen: Blood Updated: 04/16/21 1941     Neutrophil % 8.0 %      Lymphocyte % 56.0 %      Monocyte % 31.0 %      Eosinophil % 1.0 %      Bands %  4.0 %      Neutrophils Absolute 0.17 10*3/mm3      Lymphocytes Absolute 0.78 10*3/mm3      Monocytes Absolute 0.43 10*3/mm3      Eosinophils Absolute 0.01 10*3/mm3      Anisocytosis Slight/1+     Paddy Cells Slight/1+     Ovalocytes Slight/1+     Poikilocytes Mod/2+     WBC Morphology Normal     Platelet Estimate Decreased    CBC Auto Differential [[id number]]  (Abnormal) Collected: 04/16/21 1837    Specimen: Blood Updated: 04/16/21 1941     WBC 1.40 10*3/mm3      RBC 3.87 10*6/mm3      Hemoglobin 10.8 g/dL      Hematocrit 32.4 %      MCV 83.6 fL      MCH 27.9 pg      MCHC 33.3 g/dL      RDW 15.7 %      RDW-SD 45.1 fl      MPV 9.1 fL      Platelets 91 10*3/mm3     Narrative:      The previously reported component NRBC is no longer being reported. Previous result was 0.1 /100 WBC (Reference Range: 0.0-0.2 /100 WBC) on 4/16/2021 at [facility] EDT.    Comprehensive Metabolic Panel [[id number]]  (Abnormal) Collected: 04/16/21 1837    Specimen: Blood Updated: 04/16/21 1919     Glucose 127 mg/dL      BUN 18 mg/dL      Creatinine 1.00 mg/dL      Sodium 136 mmol/L      Potassium 3.3 mmol/L      Chloride 98 mmol/L      CO2 21.0 mmol/L      Calcium 8.7 mg/dL      Total Protein 7.0 g/dL      Albumin 3.60 g/dL      ALT (SGPT) 31 U/L      AST (SGOT) 40 U/L      Alkaline Phosphatase 152 U/L      Total Bilirubin 0.6 mg/dL      eGFR Non African Amer 73 mL/min/1.73      Globulin 3.4 gm/dL      A/G Ratio 1.1 g/dL      BUN/Creatinine Ratio 18.0     Anion Gap 17.0 mmol/L     Narrative:      GFR Normal  >60  Chronic Kidney Disease <60  Kidney Failure <15      Lipase [[id number]]  (Abnormal) Collected: 04/16/21 1837    Specimen: Blood Updated: 04/16/21 1919     Lipase 8 U/L     Gold Top - [facility] [[id number]] Collected: 04/16/21 1837    Specimen: Blood Updated: 04/16/21 1907          Imaging Results (Last 24 Hours)     Procedure Component Value Units Date/Time    CT Abdomen Pelvis With Contrast [[id number]] Collected: 04/16/21 2044     Updated: 04/16/21 2059    Narrative:      CT ABDOMEN PELVIS W CONTRAST-     Date of Exam: 4/16/2021 8:10 PM     Indication: Left lower quadrant pain.  History of pancreatic and bladder  cancer     Comparison: 12/20/2020     Technique: Contiguous axial CT images were obtained from the lung bases  to the superior iliac crests without contrast.  Following uneventful IV  administration of 100 cc Isovue-370 and oral contrast, contiguous axial  images obtained from lung bases to the pubic symphysis. Sagittal and  coronal reconstructions were performed.  Automated exposure control and  iterative reconstruction methods were used.     FINDINGS:  There is motion artifact. There is no significant lung base abnormality.  No abnormality is seen in the liver, gallbladder, kidneys, adrenals, or  spleen. There is moderate atherosclerotic vascular calcification.     There is a mass in the uncinate 8 process of the pancreatic head that  measures about 2 x 1.5 cm x 2.4, which is better defined than on the  prior exam. There is mild dilatation of the pancreatic duct which was  not previously present. No biliary dilatation is seen.      On the prior exam, a polypoid nodularity was reported along the  posterior wall of the urinary bladder. The patient reportedly has  bladder cancer. This is not well demonstrated today and hard to separate  from the mildly enlarged prostate.        There is a small hiatal hernia. There is fluid in the stomach. There is  thickening of the wall of the terminal ileum. There is a  short tubular  structure that is probably part of the appendix. There is mild  distention of the ascending and transverse colon, which contain gas and  fluid. The mid descending colon is nondistended. The sigmoid and rectum  are mildly distended and contain gas and fluid.     There is no free fluid or free air.     Bone windows show a mild lumbar levoscoliosis with multilevel  degenerative disc disease.       Impression:      1. There is a 2 cm mass in the uncinate process of the pancreas which is  consistent with pancreatic cancer. This is better defined today and  grossly to the prior exam. There is mild dilatation of the pancreatic  duct which was not previously identifiable.  2. On the prior exam, a polypoid abnormality in the posterior bladder  was reported. This is less well-defined today and is hard to separate  from the prostate. Clinical correlation recommended.  3. There is thickening of the wall of the distal ileum which is  consistent with an infectious or inflammatory ileitis.  4. There is mild gaseous distention and fluid throughout much of the  colon and the rectum.     Electronically Signed By-Kailey Pennington MD On:4/16/2021 8:57 PM  This report was finalized on [id number] by  Kailey Pennington MD.             ASSESSMENT:  76-year-old male with history of pancreatic cancer s/p chemotherapy, colon polyps, Sales's esophagus, and CAD, complains of lower abdominal pain, black diarrhea, nausea/vomiting, and weakness.  CT consistent with pancreatic mass as well as colitis (infectious versus inflammatory).    Pancreatic cancer -s/p chemotherapy and repeat PET scan, results unknown  Colitis per CT (infectious versus inflammatory)  Dark diarrhea - ddx cdiff vs typhlitis vs other GI infection vs ugi bleed  Nausea/vomiting -chronic but worse in past week  Lower abdominal pain  Weakness  History of esophagitis concerning for Sales's but biopsies negative  History of colon polyps -overdue for surveillance  colonoscopy  CAD  Hiatal hernia  Pancytopenia, neutropenia  Hypokalemia  Weight loss -approximately 100 pounds in the past year        PLAN:  Stool studies ordered and pending collection  No plans for endoscopy at this time  Continue broad-spectrum antibiotics  Add probiotic  Continue current medications -Questran, scopolamine, pancreatic enzymes, PPI  Clear liquid diet  Monitor H&H, transfuse if Hgb less than 7  We will follow    We appreciate the referral    TESS Decker  04/17/21  09:31 EDT

## 2021-04-17 NOTE — PROGRESS NOTES
[facility] Medicine Services Daily Progress Note      Hospitalist Team  LOS 0 days      Patient Care Team:  Grsi Muro APRN as PCP - General (Nurse Practitioner)  Demian Mello MD as Consulting Physician (Hematology and Oncology)    Patient Location: 230/1      Subjective   Subjective     Chief Complaint / Subjective  Chief Complaint   Patient presents with   • Vomiting     N/V/D for 2 days.         Brief Synopsis of Hospital Course/HPI  Mr. Weber is a 76 y.o. male with past medical history of pancreatic cancer, hypothyroidism, hyperlipidemia, chronic pain, B12 deficiency and CAD who presents to [facility] complaining of epigastric pain.  Patient reports that for the past 2 to 3 months he has been having some abdominal pain which has become significantly worse over the past 5 days.  Pain is located primarily in the epigastric area described as sharp rated at 8/10 at its worst without obvious provoking and palliative factors.  Pain does have a waxing and waning intensity and some nausea with nonbloody vomiting as well as diarrhea are also reported.  Patient does have a history of pancreatic cancer and is currently undergoing chemotherapy.  He denies any other pain including chest pain, dyspnea, cough or fever, peripheral edema, syncope or near syncope.  He notes somewhat decreased p.o. intake as well as decreased urination.  He does not smoke or drink alcohol.  Patient also confirms compliance with all outpatient therapies.     In the ED patient had lab significant for potassium: 3.3, anion gap: 17.0 with a serum CO2 of 21.0, lipase: 8, WBCs: 1.40 with decreased absolute neutrophil count noted on differential, hemoglobin: 10.8, platelets: 91.  CT of abdomen and pelvis shows a 2 cm mass of the uncinate process of the pancreas consistent with pancreatic cancer which is noted is better defined today than on prior exam.  Mild dilation of the pancreatic duct which was not  "previously identified is also noted.  Polypoid abnormality in the posterior bladder is noted is less well-defined today and hard to separate from the prostate.  Thickening of the wall of the distal ileum consistent with an infectious or inflammatory ileitis as well as mild gaseous distention of fluid throughout much of the colon and rectum is noted.     4/17/2021  Patient seen and examined  Continue antibiotics  C. difficile ordered  Dietitian consulted  Neutropenic precautions     Review of Systems   Constitutional: Positive for decreased appetite and malaise/fatigue. Negative for chills and fever.   HENT: Negative.    Eyes: Negative.    Cardiovascular: Negative.    Respiratory: Negative.    Endocrine: Negative.    Skin: Negative.    Musculoskeletal: Negative.    Gastrointestinal: Positive for abdominal pain, diarrhea, nausea and vomiting. Negative for constipation, hematemesis and hematochezia.   Genitourinary: Negative.    Neurological: Negative.    Psychiatric/Behavioral: Negative.      ROS     I discussed patient finding and my recommendations with patient  Date::  4/17/2021    Objective   Objective      Vital Signs  Temp:  [97.4 °F (36.3 °C)-98 °F (36.7 °C)] 97.7 °F (36.5 °C)  Heart Rate:  [] 72  Resp:  [16-20] 16  BP: ()/(61-76) 95/65  Oxygen Therapy  SpO2: 97 %  Pulse Oximetry Type: Intermittent  Device (Oxygen Therapy): room air  Flowsheet Rows      First Filed Value   Admission Height  188 cm (74\") Documented at 04/16/2021 1759   Admission Weight  70.3 kg (155 lb) Documented at 04/16/2021 1759        Intake & Output (last 3 days)       04/14 0701 - 04/15 0700 04/15 0701 - 04/16 0700 04/16 0701 - 04/17 0700 04/17 0701 - 04/18 0700    P.O.    240    Total Intake(mL/kg)    240 (3.3)    Net    +240            Urine Unmeasured Occurrence   3 x     Stool Unmeasured Occurrence    4 x        Lines, Drains & Airways    Active LDAs     Name:   Placement date:   Placement time:   Site:   Days:    " Peripheral IV 04/16/21 1839 Right Wrist   04/16/21    1839    Wrist   less than 1    Peripheral IV 04/16/21 2222 Left Forearm   04/16/21    2222    Forearm   less than 1    Single Lumen Implantable Port 03/26/21 Left Chest   03/26/21    1536    Chest   21                  Physical Exam:  Vitals reviewed.   Constitutional:       General: He is not in acute distress.     Appearance: Normal appearance. He is normal weight. He is not ill-appearing or toxic-appearing.   HENT:      Head: Normocephalic and atraumatic.      Right Ear: External ear normal.      Left Ear: External ear normal.      Nose: Nose normal.      Mouth/Throat:      Mouth: Mucous membranes are moist.   Eyes:      Extraocular Movements: Extraocular movements intact.   Cardiovascular:      Rate and Rhythm: Regular rhythm. Tachycardia present.      Pulses: Normal pulses.      Heart sounds: Normal heart sounds.   Pulmonary:      Effort: Pulmonary effort is normal.      Breath sounds: Normal breath sounds.   Abdominal:      General: Bowel sounds are normal. There is no distension.      Tenderness: There is abdominal tenderness.   Musculoskeletal:         General: Normal range of motion.      Cervical back: Normal range of motion.   Skin:     General: Skin is warm and dry.   Neurological:      General: No focal deficit present.      Mental Status: He is alert and oriented to person, place, and time.   Psychiatric:         Mood and Affect: Mood normal.         Behavior: Behavior normal.         Thought Content: Thought content normal.         Judgment: Judgment normal.   Physical Exam          Procedures:              Results Review:     I reviewed the patient's new clinical results.      Lab Results (last 24 hours)     Procedure Component Value Units Date/Time    Ferritin [[id number]]  (Abnormal) Collected: 04/17/21 1101    Specimen: Blood Updated: 04/17/21 1406     Ferritin 2,417.00 ng/mL     Narrative:      Results may be falsely decreased if patient taking  Biotin.      Reticulocytes [[id number]]  (Abnormal) Collected: 04/17/21 1348    Specimen: Blood Updated: 04/17/21 1403     Reticulocyte % 2.36 %      Reticulocyte Absolute 0.0764 10*6/mm3     Haptoglobin [[id number]] Collected: 04/17/21 1348    Specimen: Blood Updated: 04/17/21 1357    Clostridium Difficile Toxin - Stool, Per Rectum [[id number]]  (Normal) Collected: 04/17/21 1222    Specimen: Stool from Per Rectum Updated: 04/17/21 1345    Narrative:      The following orders were created for panel order Clostridium Difficile Toxin - Stool, Per Rectum.  Procedure                               Abnormality         Status                     ---------                               -----------         ------                     Clostridium Difficile EI...[[id number]]  Normal              Final result                 Please view results for these tests on the individual orders.    Clostridium Difficile EIA - Stool, Per Rectum [[id number]]  (Normal) Collected: 04/17/21 1222    Specimen: Stool from Per Rectum Updated: 04/17/21 1345     C Diff GDH / Toxin Negative    Iron Profile [[id number]]  (Abnormal) Collected: 04/17/21 1101    Specimen: Blood Updated: 04/17/21 1340     Iron 17 mcg/dL      Iron Saturation 7 %      Transferrin 153 mg/dL      TIBC 228 mcg/dL     Vitamin B12 [[id number]]  (Abnormal) Collected: 04/17/21 0426    Specimen: Blood Updated: 04/17/21 1303     Vitamin B-12 1,190 pg/mL     Narrative:      Results may be falsely increased if patient taking Biotin.      Lactic Acid, Plasma [[id number]]  (Abnormal) Collected: 04/17/21 1101    Specimen: Blood Updated: 04/17/21 1147     Lactate 2.3 mmol/L     Gastrointestinal Panel, PCR - Stool, Per Rectum [[id number]]  (Normal) Collected: 04/17/21 1003    Specimen: Stool from Per Rectum Updated: 04/17/21 1139     Campylobacter Not Detected     Plesiomonas shigelloides Not Detected     Salmonella Not Detected     Vibrio Not Detected     Vibrio cholerae Not Detected     Yersinia  enterocolitica Not Detected     Enteroaggregative E. coli (EAEC) Not Detected     Enteropathogenic E. coli (EPEC) Not Detected     Enterotoxigenic E. coli (ETEC) lt/st Not Detected     Shiga-like toxin-producing E. coli (STEC) stx1/stx2 Not Detected     Shigella/Enteroinvasive E. coli (EIEC) Not Detected     Cryptosporidium Not Detected     Cyclospora cayetanensis Not Detected     Entamoeba histolytica Not Detected     Giardia lamblia Not Detected     Adenovirus F40/41 Not Detected     Astrovirus Not Detected     Norovirus GI/GII Not Detected     Rotavirus A Not Detected     Sapovirus (I, II, IV or V) Not Detected    Narrative:      If Aeromonas, Staphylococcus aureus or Bacillus cereus are suspected, please order [id number]: Stool Culture, Aeromonas, S aureus, B Cereus.    Potassium [[id number]]  (Abnormal) Collected: 04/17/21 1101    Specimen: Blood Updated: 04/17/21 1136     Potassium 3.4 mmol/L     Fecal Lactoferrin - Stool, Per Rectum [[id number]]  (Abnormal) Collected: 04/17/21 1003    Specimen: Stool from Per Rectum Updated: 04/17/21 1032     Lactoferrin, Qual Positive    Manual Differential [[id number]]  (Abnormal) Collected: 04/17/21 0426    Specimen: Blood Updated: 04/17/21 0801     Neutrophil % 15.0 %      Lymphocyte % 55.0 %      Monocyte % 19.0 %      Bands %  11.0 %      Neutrophils Absolute 0.29 10*3/mm3      Lymphocytes Absolute 0.61 10*3/mm3      Monocytes Absolute 0.21 10*3/mm3      Anisocytosis Slight/1+     Poikilocytes Slight/1+     WBC Morphology Normal     Platelet Estimate Decreased    CBC & Differential [[id number]]  (Abnormal) Collected: 04/17/21 0426    Specimen: Blood Updated: 04/17/21 0801    Narrative:      The following orders were created for panel order CBC & Differential.  Procedure                               Abnormality         Status                     ---------                               -----------         ------                     Scan Slide[[id number]]                                        Final result               CBC Auto Differential[[id number]]        Abnormal            Final result                 Please view results for these tests on the individual orders.    Scan Slide [[id number]] Collected: 04/17/21 0426    Specimen: Blood Updated: 04/17/21 0801     Scan Slide --     Comment: See Manual Differential Results       CBC Auto Differential [[id number]]  (Abnormal) Collected: 04/17/21 0426    Specimen: Blood Updated: 04/17/21 0801     WBC 1.10 10*3/mm3      RBC 3.76 10*6/mm3      Hemoglobin 10.8 g/dL      Hematocrit 31.4 %      MCV 83.7 fL      MCH 28.8 pg      MCHC 34.4 g/dL      RDW 15.7 %      RDW-SD 45.1 fl      MPV 8.7 fL      Platelets 97 10*3/mm3     Narrative:      The previously reported component NRBC is no longer being reported. Previous result was 0.2 /100 WBC (Reference Range: 0.0-0.2 /100 WBC) on 4/17/2021 at [facility] EDT.    Lactic Acid, Plasma [[id number]]  (Abnormal) Collected: 04/17/21 0555    Specimen: Blood Updated: 04/17/21 0654     Lactate 2.5 mmol/L     BNP [[id number]]  (Normal) Collected: 04/17/21 0426    Specimen: Blood Updated: 04/17/21 0531     proBNP 634.4 pg/mL     Narrative:      Among patients with dyspnea, NT-proBNP is highly sensitive for the detection of acute congestive heart failure. In addition NT-proBNP of <300 pg/ml effectively rules out acute congestive heart failure with 99% negative predictive value.    Results may be falsely decreased if patient taking Biotin.      Timed Lactic Acid, Reflex [[id number]]  (Abnormal) Collected: 04/17/21 0426    Specimen: Blood Updated: 04/17/21 0501     Lactate 4.5 mmol/L     Basic Metabolic Panel [[id number]]  (Abnormal) Collected: 04/17/21 0426    Specimen: Blood Updated: 04/17/21 0457     Glucose 145 mg/dL      BUN 17 mg/dL      Creatinine 1.11 mg/dL      Sodium 136 mmol/L      Potassium 3.4 mmol/L      Chloride 97 mmol/L      CO2 20.0 mmol/L      Calcium 8.7 mg/dL      eGFR Non African Amer 65 mL/min/1.73       BUN/Creatinine Ratio 15.3     Anion Gap 19.0 mmol/L     Narrative:      GFR Normal >60  Chronic Kidney Disease <60  Kidney Failure <15      Lipid Panel [[id number]] Collected: 04/17/21 0426    Specimen: Blood Updated: 04/17/21 0457     Total Cholesterol 149 mg/dL      Triglycerides 130 mg/dL      HDL Cholesterol 57 mg/dL      LDL Cholesterol  69 mg/dL      VLDL Cholesterol 23 mg/dL      LDL/HDL Ratio 1.16    Narrative:      Cholesterol Reference Ranges  (U.S. Department of Health and Human Services ATP III Classifications)    Desirable          <200 mg/dL  Borderline High    200-239 mg/dL  High Risk          >240 mg/dL      Triglyceride Reference Ranges  (U.S. Department of Health and Human Services ATP III Classifications)    Normal           <150 mg/dL  Borderline High  150-199 mg/dL  High             200-499 mg/dL  Very High        >500 mg/dL    HDL Reference Ranges  (U.S. Department of Health and Human Services ATP III Classifcations)    Low     <40 mg/dl (major risk factor for CHD)  High    >60 mg/dl ('negative' risk factor for CHD)        LDL Reference Ranges  (U.S. Department of Health and Human Services ATP III Classifcations)    Optimal          <100 mg/dL  Near Optimal     100-129 mg/dL  Borderline High  130-159 mg/dL  High             160-189 mg/dL  Very High        >189 mg/dL    Magnesium [[id number]]  (Normal) Collected: 04/17/21 0426    Specimen: Blood Updated: 04/17/21 0457     Magnesium 1.7 mg/dL     Procalcitonin [[id number]]  (Abnormal) Collected: 04/16/21 2326    Specimen: Blood Updated: 04/17/21 0009     Procalcitonin 3.52 ng/mL     Narrative:      As a Marker for Sepsis (Non-Neonates):     1. <0.5 ng/mL represents a low risk of severe sepsis and/or septic shock.  2. >2 ng/mL represents a high risk of severe sepsis and/or septic shock.    As a Marker for Lower Respiratory Tract Infections that require antibiotic therapy:  PCT on Admission     Antibiotic Therapy             6-12 Hrs later  >0.5        "                   Strongly Recommended            >0.25 - <0.5             Recommended  0.1 - 0.25                  Discouraged                       Remeasure/reassess PCT  <0.1                         Strongly Discouraged         Remeasure/reassess PCT      As 28 day mortality risk marker: \"Change in Procalcitonin Result\" (>80% or <=80%) if Day 0 (or Day 1) and Day 4 values are available. Refer to [email or web address]    Change in PCT <=80 %   A decrease of PCT levels below or equal to 80% defines a positive change in PCT test result representing a higher risk for 28-day all-cause mortality of patients diagnosed with severe sepsis or septic shock.    Change in PCT >80 %   A decrease of PCT levels of more than 80% defines a negative change in PCT result representing a lower risk for 28-day all-cause mortality of patients diagnosed with severe sepsis or septic shock.              Results may be falsely decreased if patient taking Biotin.     TSH [[id number]]  (Normal) Collected: 04/16/21 2326    Specimen: Blood Updated: 04/17/21 0009     TSH 2.330 uIU/mL     Potassium [[id number]]  (Abnormal) Collected: 04/16/21 2326    Specimen: Blood Updated: 04/17/21 0003     Potassium 3.3 mmol/L     Lactic Acid, Plasma [[id number]]  (Abnormal) Collected: 04/16/21 2326    Specimen: Blood Updated: 04/16/21 2352     Lactate 2.2 mmol/L     COVID PRE-OP / PRE-PROCEDURE SCREENING ORDER (NO ISOLATION) - Swab, Nasopharynx [[id number]] Collected: 04/16/21 2230    Specimen: Swab from Nasopharynx Updated: 04/16/21 2232    Narrative:      The following orders were created for panel order COVID PRE-OP / PRE-PROCEDURE SCREENING ORDER (NO ISOLATION) - Swab, Nasopharynx.  Procedure                               Abnormality         Status                     ---------                               -----------         ------                     COVID-19,APTIMA PANTHER,...[[id number]]                      In process               "     Please view results for these tests on the individual orders.    COVID-19,APTIMA PANTHER,[facility] IN-HOUSE, NP/OP SWAB IN UTM/VTM/[facility] TRANSPORT MEDIA,24 HR TAT - Swab, Nasopharynx [[id number]] Collected: 04/16/21 2230    Specimen: Swab from Nasopharynx Updated: 04/16/21 2232    Blood Culture - Blood, Arm, Right [[id number]] Collected: 04/16/21 2229    Specimen: Blood from Arm, Right Updated: 04/16/21 2232    Blood Culture - Blood, Arm, Left [[id number]] Collected: 04/16/21 2228    Specimen: Blood from Arm, Left Updated: 04/16/21 2232    Extra Tubes [[id number]] Collected: 04/16/21 1837    Specimen: Blood, Venous Line Updated: 04/16/21 1945    Narrative:      The following orders were created for panel order Extra Tubes.  Procedure                               Abnormality         Status                     ---------                               -----------         ------                     Light Blue Top[[id number]]                                   Final result               Gold Top - [facility][[id number]]                                   Final result                 Please view results for these tests on the individual orders.    Light Blue Top [[id number]] Collected: 04/16/21 1837    Specimen: Blood Updated: 04/16/21 1945     Extra Tube hold for add-on     Comment: Auto resulted       CBC & Differential [[id number]]  (Abnormal) Collected: 04/16/21 1837    Specimen: Blood Updated: 04/16/21 1941    Narrative:      The following orders were created for panel order CBC & Differential.  Procedure                               Abnormality         Status                     ---------                               -----------         ------                     Scan Slide[[id number]]                                       Final result               CBC Auto Differential[[id number]]        Abnormal            Final result                 Please view results for these tests on the individual orders.    Scan Slide [[id number]]  Collected: 04/16/21 1837    Specimen: Blood Updated: 04/16/21 1941     Scan Slide --     Comment: See Manual Differential Results       Manual Differential [[id number]]  (Abnormal) Collected: 04/16/21 1837    Specimen: Blood Updated: 04/16/21 1941     Neutrophil % 8.0 %      Lymphocyte % 56.0 %      Monocyte % 31.0 %      Eosinophil % 1.0 %      Bands %  4.0 %      Neutrophils Absolute 0.17 10*3/mm3      Lymphocytes Absolute 0.78 10*3/mm3      Monocytes Absolute 0.43 10*3/mm3      Eosinophils Absolute 0.01 10*3/mm3      Anisocytosis Slight/1+     Paddy Cells Slight/1+     Ovalocytes Slight/1+     Poikilocytes Mod/2+     WBC Morphology Normal     Platelet Estimate Decreased    CBC Auto Differential [[id number]]  (Abnormal) Collected: 04/16/21 1837    Specimen: Blood Updated: 04/16/21 1941     WBC 1.40 10*3/mm3      RBC 3.87 10*6/mm3      Hemoglobin 10.8 g/dL      Hematocrit 32.4 %      MCV 83.6 fL      MCH 27.9 pg      MCHC 33.3 g/dL      RDW 15.7 %      RDW-SD 45.1 fl      MPV 9.1 fL      Platelets 91 10*3/mm3     Narrative:      The previously reported component NRBC is no longer being reported. Previous result was 0.1 /100 WBC (Reference Range: 0.0-0.2 /100 WBC) on 4/16/2021 at [facility] EDT.    Comprehensive Metabolic Panel [[id number]]  (Abnormal) Collected: 04/16/21 1837    Specimen: Blood Updated: 04/16/21 1919     Glucose 127 mg/dL      BUN 18 mg/dL      Creatinine 1.00 mg/dL      Sodium 136 mmol/L      Potassium 3.3 mmol/L      Chloride 98 mmol/L      CO2 21.0 mmol/L      Calcium 8.7 mg/dL      Total Protein 7.0 g/dL      Albumin 3.60 g/dL      ALT (SGPT) 31 U/L      AST (SGOT) 40 U/L      Alkaline Phosphatase 152 U/L      Total Bilirubin 0.6 mg/dL      eGFR Non African Amer 73 mL/min/1.73      Globulin 3.4 gm/dL      A/G Ratio 1.1 g/dL      BUN/Creatinine Ratio 18.0     Anion Gap 17.0 mmol/L     Narrative:      GFR Normal >60  Chronic Kidney Disease <60  Kidney Failure <15      Lipase [[id number]]  (Abnormal)  Collected: 04/16/21 1837    Specimen: Blood Updated: 04/16/21 1919     Lipase 8 U/L     Gold Top - [facility] [[id number]] Collected: 04/16/21 1837    Specimen: Blood Updated: 04/16/21 1907        No results found for: HGBA1C            Lab Results   Component Value Date    LIPASE 8 (L) 04/16/2021     Lab Results   Component Value Date    CHOL 149 04/17/2021    TRIG 130 04/17/2021    HDL 57 04/17/2021    LDL 69 04/17/2021       Lab Results   Lab Value Date/Time    INTRAOP  01/07/2021 1139     FNA PASS #1: Atypical suspicious for malignancy.    FNA Pass #2: Positive for malignancy.      FINALDX  01/14/2021 0930     Bladder tumor, transurethral resection:    Inverted urothelial papilloma    No muscularis propria identified    No dysplasia or malignancy identified     JEFERSON/tkd       FINALDX  01/07/2021 1139     Pancreatic head mass, fine needle aspiration with smears and cell block:    Malignant cells present consistent with adenocarcinoma    JEFERSON/tkd          Microbiology Results (last 10 days)     Procedure Component Value - Date/Time    Clostridium Difficile Toxin - Stool, Per Rectum [[id number]]  (Normal) Collected: 04/17/21 1222    Lab Status: Final result Specimen: Stool from Per Rectum Updated: 04/17/21 1345    Narrative:      The following orders were created for panel order Clostridium Difficile Toxin - Stool, Per Rectum.  Procedure                               Abnormality         Status                     ---------                               -----------         ------                     Clostridium Difficile EI...[[id number]]  Normal              Final result                 Please view results for these tests on the individual orders.    Clostridium Difficile EIA - Stool, Per Rectum [[id number]]  (Normal) Collected: 04/17/21 1222    Lab Status: Final result Specimen: Stool from Per Rectum Updated: 04/17/21 1345     C Diff GDH / Toxin Negative    Gastrointestinal Panel, PCR - Stool, Per Rectum [[id number]]  (Normal)  Collected: 04/17/21 1003    Lab Status: Final result Specimen: Stool from Per Rectum Updated: 04/17/21 1139     Campylobacter Not Detected     Plesiomonas shigelloides Not Detected     Salmonella Not Detected     Vibrio Not Detected     Vibrio cholerae Not Detected     Yersinia enterocolitica Not Detected     Enteroaggregative E. coli (EAEC) Not Detected     Enteropathogenic E. coli (EPEC) Not Detected     Enterotoxigenic E. coli (ETEC) lt/st Not Detected     Shiga-like toxin-producing E. coli (STEC) stx1/stx2 Not Detected     Shigella/Enteroinvasive E. coli (EIEC) Not Detected     Cryptosporidium Not Detected     Cyclospora cayetanensis Not Detected     Entamoeba histolytica Not Detected     Giardia lamblia Not Detected     Adenovirus F40/41 Not Detected     Astrovirus Not Detected     Norovirus GI/GII Not Detected     Rotavirus A Not Detected     Sapovirus (I, II, IV or V) Not Detected    Narrative:      If Aeromonas, Staphylococcus aureus or Bacillus cereus are suspected, please order [id number]: Stool Culture, Aeromonas, S aureus, B Cereus.    Clostridium Difficile Toxin - Stool, Per Rectum [[id number]]  (Normal) Collected: 04/09/21 0400    Lab Status: Final result Specimen: Stool from Per Rectum Updated: 04/10/21 0708    Narrative:      The following orders were created for panel order Clostridium Difficile Toxin - Stool, Per Rectum.  Procedure                               Abnormality         Status                     ---------                               -----------         ------                     Clostridium Difficile EI...[[id number]]  Normal              Final result                 Please view results for these tests on the individual orders.    Clostridium Difficile EIA - Stool, Per Rectum [[id number]]  (Normal) Collected: 04/09/21 0400    Lab Status: Final result Specimen: Stool from Per Rectum Updated: 04/10/21 0708     C Diff GDH / Toxin Negative          ECG/EMG Results (most recent)     None               Results for orders placed during the hospital encounter of 08/19/19    Adult Transthoracic Echo Complete W/ Cont if Necessary Per Protocol    Interpretation Summary  Indications  Chest pain      Technically satisfactory study.  Mitral valve is structurally normal.  Minimal mitral regurgitation  Tricuspid valve is structurally normal.  Aortic valve is structurally normal.  Pulmonic valve could not be well visualized.  No evidence for mitral tricuspid or aortic regurgitation is seen by Doppler study.  Left atrium is normal in size.  Right atrium is normal in size.  Left ventricle is normal in size and contractility with ejection fraction of 60%.  Right ventricle is normal in size.  Atrial septum is intact.  Aorta is normal.  No pericardial effusion or intracardiac thrombus is seen.    Impression  Minimal mitral regurgitation  Normal echo Doppler study.  Left ventricular ejection fraction is 60%.      CT Abdomen Pelvis With Contrast    Result Date: 4/16/2021  1. There is a 2 cm mass in the uncinate process of the pancreas which is consistent with pancreatic cancer. This is better defined today and grossly to the prior exam. There is mild dilatation of the pancreatic duct which was not previously identifiable. 2. On the prior exam, a polypoid abnormality in the posterior bladder was reported. This is less well-defined today and is hard to separate from the prostate. Clinical correlation recommended. 3. There is thickening of the wall of the distal ileum which is consistent with an infectious or inflammatory ileitis. 4. There is mild gaseous distention and fluid throughout much of the colon and the rectum.  Electronically Signed By-Kaliey Pennington MD On:4/16/2021 8:57 PM This report was finalized on [id number] by  Kailey Pennington MD.          Xrays, labs reviewed personally by physician.    Medication Review:   I have reviewed the patient's current medication list      Scheduled Meds  aspirin, 81 mg, Oral,  Daily  cholestyramine light, 1 packet, Oral, Daily  levothyroxine, 75 mcg, Oral, Q AM  Pancrelipase (Lip-Prot-Amyl), 36,000 units of lipase, Oral, TID  pantoprazole, 40 mg, Oral, QAM  piperacillin-tazobactam, 3.375 g, Intravenous, Once  piperacillin-tazobactam, 3.375 g, Intravenous, Q8H  pregabalin, 150 mg, Oral, TID  rosuvastatin, 10 mg, Oral, Nightly  saccharomyces boulardii, 500 mg, Oral, BID  Scopolamine, 1 patch, Transdermal, Q72H  sodium chloride, 10 mL, Intravenous, Q12H        Meds Infusions  lactated ringers, 150 mL/hr, Last Rate: 150 mL/hr (04/17/21 1310)        Meds PRN  •  acetaminophen **OR** acetaminophen **OR** acetaminophen  •  Calcium Gluconate-NaCl **AND** calcium gluconate **AND** Calcium, Ionized  •  HYDROcodone-acetaminophen  •  LORazepam  •  magnesium sulfate **OR** magnesium sulfate **OR** magnesium sulfate  •  melatonin  •  nitroglycerin  •  ondansetron **OR** ondansetron  •  potassium & sodium phosphates **OR** potassium & sodium phosphates  •  potassium chloride  •  potassium chloride  •  prochlorperazine  •  sodium chloride    I personally reviewed patient's x-ray films     I personally reviewed patient's EKG strips     Assessment/Plan   Assessment/Plan     Active Hospital Problems    Diagnosis  POA   • **Malignant neoplasm of head of pancreas (CMS/HCC) [C25.0]  Yes   • Pancytopenia due to chemotherapy (CMS/HCC) [D61.810]  Yes   • Nausea and vomiting [R11.2]  Yes   • Hypokalemia [E87.6]  Yes   • Hyperlipidemia [E78.5]  Yes   • Hypothyroidism [E03.9]  Yes   • Cobalamin deficiency [E53.8]  Yes   • Degeneration of lumbar or lumbosacral intervertebral disc [M51.37]  Yes   • Atherosclerotic heart disease of native coronary artery with other forms of angina pectoris (CMS/HCC) [I25.118]  Yes      Resolved Hospital Problems   No resolved problems to display.       MEDICAL DECISION MAKING COMPLEXITY BY PROBLEM:     Nausea and vomiting in a patient with a history of malignant neoplasm of the head of  the pancreas  -Patient presents with sudden worsening of chronic epigastric pain over approximately the past 5 days with several episodes of nausea and nonbloody vomiting  -CT of abdomen and pelvis shows a 2 cm mass of the uncinate process of the pancreas consistent with pancreatic cancer which is noted is better defined today than on prior exam.  Mild dilation of the pancreatic duct which was not previously identified is also noted.  Polypoid abnormality in the posterior bladder is noted is less well-defined today and hard to separate from the prostate.  Thickening of the wall of the distal ileum consistent with an infectious or inflammatory ileitis as well as mild gaseous distention of fluid throughout much of the colon and rectum is noted.  -Check lactic acid, procalcitonin and GI panel  -Blood cultures ordered and pending  -Unasyn started in the ED, continue  -Patient received 2 L LR bolus in ED, continue LR at 100 mL/h  -Clear liquid diet advance as tolerated  -Zofran as needed  -Monitor I's and O's-cardiac monitoring  -GI consulted in ED     Pancytopenia/neutropenic fever  -WBCs: 1.40 with a decreased absolute neutrophil count noted on differential, hemoglobin: 10.8 and platelets: 91 in a patient currently undergoing chemotherapy  -Monitor CBC while admitted  -Unasyn as above  -Hematology consulted in ED     Hypokalemia  -Potassium: 3.3  -Check magnesium  -Replacement protocol ordered     CAD  -Continue aspirin and cardiac monitoring     Hyperlipidemia  -Check lipid panel  -Continue statin     Hypothyroidism  -Check TSH  -Continue levothyroxine     B12 deficiency  -Check B12  -Continue supplementation     Chronic pain/degenerative disc disease  -Continue Lyrica and Norco      Anxiety  -Continue Ativan     VTE Prophylaxis -SCDs      Mechanical Order History:      Ordered        04/16/21 2237  Place Sequential Compression Device  Once         04/16/21 2237  Maintain Sequential Compression Device  Continuous                  Pharmalogical Order History:     None            Code Status -   Code Status and Medical Interventions:   Ordered at: 04/16/21 2225     Code Status:    CPR     Medical Interventions (Level of Support Prior to Arrest):    Full       This patient has been examined wearing appropriate Personal Protective Equipment and discussed with hospital infection control department. 04/17/21        Discharge Planning          Continued Care and Services - Admitted Since 4/16/2021    Coordination has not been started for this encounter.           Electronically signed by Kajal Oliveira MD, 04/17/21, 14:37 EDT.  [facility] Hospitalist Team

## 2021-04-17 NOTE — CONSULTS
Hematology/Oncology Inpatient Consultation    Patient name: Clifford Weber  : 1945  MRN: [id number]  Referring Provider: Nic Ochoa MD   Reason for Consultation: Established patient with pancreatic adenocarcinoma    Chief complaint: Abdominal pain/Weakness     History of present illness:    76 y.o. male presented to [facility] emergency department on 2021 with complaints of increased abdominal pain and weakness for prior 5 days.  He started having diarrhea approximately 2 to 3 weeks ago and was started on daily Sandostatin last week.  After the third injection his diarrhea had stopped but then restarted on 2021.  He vomited on 2021 with dark green emesis which prompted him to come to the hospital. He was feeling poorly and was experiencing intermittent confusion.  Urine output was low.  He was having abdominal pain as well.  Patient reported a cough and subjective fever with chills prior 24 hours before coming to the hospital. Abdominal discomfort was worse with eating. Labs in the emergency department were as follows: White blood count 1.4, hemoglobin 10.8, MCV 83.6, platelets 91,000, . CMP significant for potassium 3.3, alkaline phosphatase 152, lipase 8 (13-60), lactate 2.2 (0.5-2.0), TSH 2.330 (0.270-4.200), pro calcitonin 3.52 (0.00-0.25). Bood cultures and COVID-19 screening were pending. A CT scan of the abdomen and pelvis with contrast in the emergency department showed a 2 cm mass in the uncinate process of the pancreas which is consistent with pancreatic cancer.  This was better defined than on prior exam.  There was mild dilatation of the pancreatic duct which was not previously identifiable.  A polypoid abnormality in posterior bladder was less defined on today's exam and was difficult to separate from the prostate.  There was thickening of the wall of the distal ileum consistent with infectious or inflammatory ileitis.  There was mild gaseous distention and  fluid throughout much of the colon and rectum.  Antibiotics with Unasyn were initiated.  He was admitted for further evaluation and treatment. Gastroenterology was consulted.  Fecal lactoferrin was positive, gastrointestinal panel was negative. C. difficile testing was pending. His Vitamin B-12 level was 1190 ([phone]).  Antibiotics were switched from Unasyn to Zosyn by GI 4/17/2021.    04/17/21  Hematology/Oncology was consulted by Dr. Nic Ochoa on this established patient followed for clinical stage IIb pancreatic adenocarcinoma.  He had  initially presented with abdominal pain and was hospitalized in December 2020.  At that time CT scan of the abdomen pelvis showed a hypoenhancing lesion in the uncinate process of the pancreas.  A polypoid nodularity was also seen along the posterior wall of the urinary bladder.  An MRI pancreatic protocol on 1/4/2021 showed a hypodense 2.1 cm mass suspicious for primary pancreatic malignancy.  On 1/7/2021 he underwent an EUS with FNA by Dr. Fierro.  There was a 2.4 cm at the uncinate process with one small peripancreatic lymph node that was 4 to 5 mm in size.  Dr. 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  The patient was seen by Dr. Diogenes Stapleton at [facility] on 1/21/2021 who recommended neoadjuvant chemotherapy with 4-6 cycles followed by a pancreatic protocol to determine eligibility for resection.  He started FOLFIRINOX on 2/8/2021 and received his fifth cycle on 4/5/2021.  Diarrhea started mid-March of 2021.  A CT chest on 3/16/2021 revealed small scattered non-- calcified pulmonary nodules measuring 5 mm or less in size and a low-density pancreatic uncinate process with some stranding around it.  A CT scan of thoracic and lumbar spine on same day showed degenerative changes throughout the spine, multilevel disc disease and degenerative facet change resulting in  multilevel canal stenosis and neural foraminal narrowing..  The patient was last seen in the office on 4/12/2021 and was scheduled for 3-week follow-up. Octreotide was started for diarrhea with initial improvement after the third injection.  Cycle 6 of FOLFIRINOX was due on 4/19/2021.       PCP: Gris Muro APRN    History:  Past Medical History:   Diagnosis Date   • Abnormal cardiovascular stress test 11/01/2016    Abnormal Cardiolite Stress Test. Abstracted from [employer]   • Anxiety 03/15/2012    Uses this PRN, has stress due to caring for elderly parents. Abstracted from [employer].   • Atherosclerotic heart disease 03/15/2012   • Borderline hypertension 10/19/2015   • Bronchitis, acute 05/16/2016   • Cancer (CMS/HCC)     PANCREATIC CANCER     • Chest pain 10/15/2014   • Chronic foot pain 10/16/2012    Will see his podiatrist. Abstracted from [employer].   • Constipation 01/09/2018   • Coronary artery disease 10/24/2016   • DDD (degenerative disc disease), lumbar 02/13/2013   • Depression 08/10/2017   • Depression    • Diverticulosis    • Dyslipidemia    • Erectile dysfunction of organic origin 12/07/2012    Samples of cialis 20mg, voucher for the 5mg, samples of levitra 20mg and voucher for viagra 100mg. Call with preference. Order total T. Abstracted from [employer]   • GERD (gastroesophageal reflux disease) 12/07/2012   • H/O myocardial perfusion scan 11/01/2016    With Stress Test, abnormal. Abstracted from [employer]   • Hand pain 04/24/2014   • Headache 01/18/2016   • Hyperlipidemia 02/13/2018   • Hypertension 08/10/2017   • Hypogonadism, male 12/07/2012   • Hypothyroidism 02/08/2018   • Impacted cerumen, left ear 04/03/2019   • Influenza vaccination ordered 10/19/2016   • Insomnia 03/15/2012   • Lateral epicondylitis, left elbow 02/07/2017   • Left knee sprain 01/24/2013   • Leg pain, bilateral 04/02/2019   • Lumbar disc herniation with radiculopathy 09/08/2016   • Lumbar radiculitis 08/30/2016    • Mild memory disturbance 01/18/2016   • Myocardial infarction (CMS/HCC) 11/01/2016   • Osteoarthritis of sacroiliac joint (CMS/HCC) 03/12/2014   • Pain in right lower leg 07/11/2016   • Paresthesia 08/30/2016   • Paresthesia of both legs     Lower legs   • Postherpetic neuralgia 03/15/2016   • Preoperative cardiovascular examination 10/24/2016   • Preventative health care 02/07/2017   • S/P cardiac catheterization 01/01/2000    S/P cardiac cath, left heart-- Heart catheterization 2000 at [facility] in [place], [place]. No records available. Abstracted from [employer].   • Sciatica, right side 06/04/2013   • Shingles    • Shortness of breath 10/15/2014   • Spinal stenosis, lumbar 02/13/2013    TENS unit evidently not covered by Medicare. Abstracted from [employer].   • Vitamin B12 deficiency 08/10/2017   • Vitamin D deficiency 01/09/2018   ,   Past Surgical History:   Procedure Laterality Date   • CARDIAC CATHETERIZATION  2000   • COLONOSCOPY  10/28/2009   • KNEE SURGERY Bilateral    • OTHER SURGICAL HISTORY      Lapscopic surgery, both knees. Abstracted by [employer].   • OTHER SURGICAL HISTORY      Kneww injections. Abstracted from [employer].   • OTHER SURGICAL HISTORY      Shot in lower back for bulging disc. Abstracted from [employer].   • UPPER ENDOSCOPIC ULTRASOUND W/ FNA N/A 1/7/2021    Procedure: ESOPHAGOGASTRODUODENOSCOPY WITH endoscopic ULTRASOUND AND FINE NEEDLE ASPIRATION;  Surgeon: David Fierro MD;  Location: [facility];  Service: Gastroenterology;  Laterality: N/A;  post op: pancreatic head mass   ,   Family History   Problem Relation Age of Onset   • Alzheimer's disease Mother    • Hyperlipidemia Father    • Heart disease Father    • Hypertension Father    • Hyperlipidemia Brother    • Diabetes Brother    • Heart disease Brother    • Hypertension Brother    • Heart disease Maternal Grandfather    • Alzheimer's disease Paternal Grandmother    • Cancer Cousin    • Cancer Other    •  Heart disease Brother    • Hypertension Brother    ,   Social History     Tobacco Use   • Smoking status: Never Smoker   • Smokeless tobacco: Never Used   Substance Use Topics   • Alcohol use: No   • Drug use: No   ,   Medications Prior to Admission   Medication Sig Dispense Refill Last Dose   • aspirin (ASPIR) 81 MG EC tablet Take 81 mg by mouth Daily.      • colestipol (COLESTID) 1 g tablet Take 1 g by mouth Every 8 (Eight) Hours As Needed.      • cyproheptadine (PERIACTIN) 4 MG tablet Take 4 mg by mouth 2 (two) times a day.      • diphenoxylate-atropine (LOMOTIL) 2.5-0.025 MG per tablet Take 1 tablet by mouth 4 (Four) Times a Day As Needed for Diarrhea.      • HYDROcodone-acetaminophen (NORCO) 5-325 MG per tablet Take 1 tablet by mouth Every 6 (Six) Hours As Needed.      • LORazepam (ATIVAN) 0.5 MG tablet Take 0.5 mg by mouth Every 8 (Eight) Hours As Needed (nausea).      • Octreotide Acetate (SANDOSTATIN IJ) Inject  as directed.      • omeprazole (priLOSEC) 40 MG capsule Take 40 mg by mouth Daily.      • ondansetron (ZOFRAN) 4 MG tablet Take 4 mg by mouth Every 6 (Six) Hours As Needed for Nausea or Vomiting.      • Pancrelipase, Lip-Prot-Amyl, (ZENPEP) 63140-773553 units capsule delayed-release particles capsule Take 40,000 Units by mouth 3 (Three) Times a Day.      • potassium chloride (K-DUR,KLOR-CON) 10 MEQ CR tablet Take 20 mEq by mouth 2 (Two) Times a Day.      • pregabalin (LYRICA) 150 MG capsule Take 1 capsule by mouth 3 (Three) Times a Day. 90 capsule 3    • prochlorperazine (COMPAZINE) 10 MG tablet Take 10 mg by mouth Every 8 (Eight) Hours As Needed for Nausea or Vomiting.      • rosuvastatin (CRESTOR) 10 MG tablet TAKE ONE TABLET BY MOUTH AT BEDTIME 90 tablet 1    • Scopolamine (Transderm-Scop, 1.5 MG,) 1.5 MG/3DAYS patch Place 1 patch on the skin as directed by provider Every 72 (Seventy-Two) Hours.      • Synthroid 75 MCG tablet TAKE ONE TABLET BY MOUTH EVERY DAY 90 tablet 0    • vitamin D  (ERGOCALCIFEROL) 1.25 MG (35929 UT) capsule capsule TAKE 1 CAPSULE BY MOUTH ONCE WEEKLY 12 capsule 0    , Scheduled Meds:  ampicillin-sulbactam, 3 g, Intravenous, Q6H  aspirin, 81 mg, Oral, Daily  cholestyramine light, 1 packet, Oral, Daily  levothyroxine, 75 mcg, Oral, Q AM  Pancrelipase (Lip-Prot-Amyl), 36,000 units of lipase, Oral, TID  pantoprazole, 40 mg, Oral, QAM  pregabalin, 150 mg, Oral, TID  rosuvastatin, 10 mg, Oral, Nightly  saccharomyces boulardii, 500 mg, Oral, BID  Scopolamine, 1 patch, Transdermal, Q72H  sodium chloride, 10 mL, Intravenous, Q12H    , Continuous Infusions:  lactated ringers, 150 mL/hr, Last Rate: 150 mL/hr (04/17/21 1209)    , PRN Meds:  •  acetaminophen **OR** acetaminophen **OR** acetaminophen  •  Calcium Gluconate-NaCl **AND** calcium gluconate **AND** Calcium, Ionized  •  HYDROcodone-acetaminophen  •  LORazepam  •  magnesium sulfate **OR** magnesium sulfate **OR** magnesium sulfate  •  melatonin  •  nitroglycerin  •  ondansetron **OR** ondansetron  •  potassium & sodium phosphates **OR** potassium & sodium phosphates  •  potassium chloride  •  potassium chloride  •  prochlorperazine  •  sodium chloride   Allergies:  Niacin    ROS:  Review of Systems   Constitutional: Positive for fatigue and fever. Negative for activity change, chills and unexpected weight change.        Malaise   HENT: Negative for congestion, dental problem, hearing loss, mouth sores, nosebleeds, sore throat and trouble swallowing.    Eyes: Negative for photophobia and visual disturbance.   Respiratory: Negative for cough, chest tightness and shortness of breath.    Cardiovascular: Negative for chest pain, palpitations and leg swelling.   Gastrointestinal: Positive for abdominal pain, diarrhea and vomiting ( yesterday). Negative for abdominal distention, blood in stool, constipation and nausea.   Endocrine: Negative for cold intolerance and heat intolerance.   Genitourinary: Positive for decreased urine volume.  "Negative for difficulty urinating, frequency, hematuria and urgency.   Musculoskeletal: Negative for arthralgias and gait problem.   Skin: Negative for rash and wound.   Neurological: Negative for dizziness, tremors, weakness, light-headedness, numbness and headaches.   Hematological: Negative for adenopathy. Does not bruise/bleed easily.   Psychiatric/Behavioral: Positive for confusion. Negative for hallucinations. The patient is not nervous/anxious.    All other systems reviewed and are negative.     Objective     Vital Signs:   BP 95/65 (BP Location: Right arm, Patient Position: Lying)   Pulse 72   Temp 97.7 °F (36.5 °C) (Oral)   Resp 16   Ht 188 cm (74\")   Wt 72.8 kg (160 lb 7.9 oz)   SpO2 97%   BMI 20.61 kg/m²     Physical Exam:  Physical Exam  Vitals and nursing note reviewed.   Constitutional:       General: He is not in acute distress.     Appearance: Normal appearance. He is well-developed. He is not diaphoretic.   HENT:      Head: Normocephalic and atraumatic.      Right Ear: External ear normal.      Left Ear: External ear normal.      Nose: Nose normal.      Mouth/Throat:      Mouth: Mucous membranes are moist.      Dentition: Abnormal dentition ( Dental fillings).      Pharynx: Oropharynx is clear. No oropharyngeal exudate or posterior oropharyngeal erythema.   Eyes:      General: No scleral icterus.     Extraocular Movements: Extraocular movements intact.      Conjunctiva/sclera: Conjunctivae normal.      Pupils: Pupils are equal, round, and reactive to light.      Comments: Wears corrective lenses.    Cardiovascular:      Rate and Rhythm: Normal rate and regular rhythm.      Heart sounds: Normal heart sounds. No murmur heard.        Comments: Cardiac monitor leads.   Pulmonary:      Effort: Pulmonary effort is normal. No respiratory distress.      Breath sounds: Normal breath sounds. No wheezing or rales.   Chest:      Comments: Left chest wall port.  Abdominal:      General: Bowel sounds are " normal. There is no distension.      Palpations: Abdomen is soft. There is no mass.      Tenderness: There is abdominal tenderness ( mild ) in the right lower quadrant and left lower quadrant. There is no guarding.   Genitourinary:     Comments: Deferred   Musculoskeletal:         General: No swelling, tenderness or deformity. Normal range of motion.      Cervical back: Normal range of motion and neck supple.      Right lower leg: No edema.      Left lower leg: No edema.      Comments: RUE IV and LUE IV.    Lymphadenopathy:      Cervical: No cervical adenopathy.      Upper Body:      Right upper body: No supraclavicular adenopathy.      Left upper body: No supraclavicular adenopathy.   Skin:     General: Skin is warm and dry.      Coloration: Skin is pale.      Findings: No bruising, erythema or rash.   Neurological:      General: No focal deficit present.      Mental Status: He is alert and oriented to person, place, and time.      Coordination: Coordination normal.   Psychiatric:         Mood and Affect: Mood normal.         Behavior: Behavior normal.         Thought Content: Thought content normal.        Results Review:  Lab Results (last 48 hours)     Procedure Component Value Units Date/Time    Lactic Acid, Plasma [[id number]]  (Abnormal) Collected: 04/17/21 1101    Specimen: Blood Updated: 04/17/21 1147     Lactate 2.3 mmol/L     Gastrointestinal Panel, PCR - Stool, Per Rectum [[id number]]  (Normal) Collected: 04/17/21 1003    Specimen: Stool from Per Rectum Updated: 04/17/21 1139     Campylobacter Not Detected     Plesiomonas shigelloides Not Detected     Salmonella Not Detected     Vibrio Not Detected     Vibrio cholerae Not Detected     Yersinia enterocolitica Not Detected     Enteroaggregative E. coli (EAEC) Not Detected     Enteropathogenic E. coli (EPEC) Not Detected     Enterotoxigenic E. coli (ETEC) lt/st Not Detected     Shiga-like toxin-producing E. coli (STEC) stx1/stx2 Not Detected      Shigella/Enteroinvasive E. coli (EIEC) Not Detected     Cryptosporidium Not Detected     Cyclospora cayetanensis Not Detected     Entamoeba histolytica Not Detected     Giardia lamblia Not Detected     Adenovirus F40/41 Not Detected     Astrovirus Not Detected     Norovirus GI/GII Not Detected     Rotavirus A Not Detected     Sapovirus (I, II, IV or V) Not Detected    Narrative:      If Aeromonas, Staphylococcus aureus or Bacillus cereus are suspected, please order [id number]: Stool Culture, Aeromonas, S aureus, B Cereus.    Potassium [[id number]]  (Abnormal) Collected: 04/17/21 1101    Specimen: Blood Updated: 04/17/21 1136     Potassium 3.4 mmol/L     Fecal Lactoferrin - Stool, Per Rectum [[id number]]  (Abnormal) Collected: 04/17/21 1003    Specimen: Stool from Per Rectum Updated: 04/17/21 1032     Lactoferrin, Qual Positive    Manual Differential [[id number]]  (Abnormal) Collected: 04/17/21 0426    Specimen: Blood Updated: 04/17/21 0801     Neutrophil % 15.0 %      Lymphocyte % 55.0 %      Monocyte % 19.0 %      Bands %  11.0 %      Neutrophils Absolute 0.29 10*3/mm3      Lymphocytes Absolute 0.61 10*3/mm3      Monocytes Absolute 0.21 10*3/mm3      Anisocytosis Slight/1+     Poikilocytes Slight/1+     WBC Morphology Normal     Platelet Estimate Decreased    CBC & Differential [[id number]]  (Abnormal) Collected: 04/17/21 0426    Specimen: Blood Updated: 04/17/21 0801    Narrative:      The following orders were created for panel order CBC & Differential.  Procedure                               Abnormality         Status                     ---------                               -----------         ------                     Scan Slide[[id number]]                                       Final result               CBC Auto Differential[[id number]]        Abnormal            Final result                 Please view results for these tests on the individual orders.    Scan Slide [[id number]] Collected: 04/17/21 0426     Specimen: Blood Updated: 04/17/21 0801     Scan Slide --     Comment: See Manual Differential Results       CBC Auto Differential [[id number]]  (Abnormal) Collected: 04/17/21 0426    Specimen: Blood Updated: 04/17/21 0801     WBC 1.10 10*3/mm3      RBC 3.76 10*6/mm3      Hemoglobin 10.8 g/dL      Hematocrit 31.4 %      MCV 83.7 fL      MCH 28.8 pg      MCHC 34.4 g/dL      RDW 15.7 %      RDW-SD 45.1 fl      MPV 8.7 fL      Platelets 97 10*3/mm3     Narrative:      The previously reported component NRBC is no longer being reported. Previous result was 0.2 /100 WBC (Reference Range: 0.0-0.2 /100 WBC) on 4/17/2021 at [facility] EDT.    Lactic Acid, Plasma [[id number]]  (Abnormal) Collected: 04/17/21 0555    Specimen: Blood Updated: 04/17/21 0654     Lactate 2.5 mmol/L     BNP [[id number]]  (Normal) Collected: 04/17/21 0426    Specimen: Blood Updated: 04/17/21 0531     proBNP 634.4 pg/mL     Narrative:      Among patients with dyspnea, NT-proBNP is highly sensitive for the detection of acute congestive heart failure. In addition NT-proBNP of <300 pg/ml effectively rules out acute congestive heart failure with 99% negative predictive value.    Results may be falsely decreased if patient taking Biotin.      Timed Lactic Acid, Reflex [[id number]]  (Abnormal) Collected: 04/17/21 0426    Specimen: Blood Updated: 04/17/21 0501     Lactate 4.5 mmol/L     Basic Metabolic Panel [[id number]]  (Abnormal) Collected: 04/17/21 0426    Specimen: Blood Updated: 04/17/21 0457     Glucose 145 mg/dL      BUN 17 mg/dL      Creatinine 1.11 mg/dL      Sodium 136 mmol/L      Potassium 3.4 mmol/L      Chloride 97 mmol/L      CO2 20.0 mmol/L      Calcium 8.7 mg/dL      eGFR Non African Amer 65 mL/min/1.73      BUN/Creatinine Ratio 15.3     Anion Gap 19.0 mmol/L     Narrative:      GFR Normal >60  Chronic Kidney Disease <60  Kidney Failure <15      Lipid Panel [[id number]] Collected: 04/17/21 0426    Specimen: Blood Updated: 04/17/21 0457     Total  Cholesterol 149 mg/dL      Triglycerides 130 mg/dL      HDL Cholesterol 57 mg/dL      LDL Cholesterol  69 mg/dL      VLDL Cholesterol 23 mg/dL      LDL/HDL Ratio 1.16    Narrative:      Cholesterol Reference Ranges  (U.S. Department of Health and Human Services ATP III Classifications)    Desirable          <200 mg/dL  Borderline High    200-239 mg/dL  High Risk          >240 mg/dL      Triglyceride Reference Ranges  (U.S. Department of Health and Human Services ATP III Classifications)    Normal           <150 mg/dL  Borderline High  150-199 mg/dL  High             200-499 mg/dL  Very High        >500 mg/dL    HDL Reference Ranges  (U.S. Department of Health and Human Services ATP III Classifcations)    Low     <40 mg/dl (major risk factor for CHD)  High    >60 mg/dl ('negative' risk factor for CHD)        LDL Reference Ranges  (U.S. Department of Health and Human Services ATP III Classifcations)    Optimal          <100 mg/dL  Near Optimal     100-129 mg/dL  Borderline High  130-159 mg/dL  High             160-189 mg/dL  Very High        >189 mg/dL    Magnesium [[id number]]  (Normal) Collected: 04/17/21 0426    Specimen: Blood Updated: 04/17/21 0457     Magnesium 1.7 mg/dL     Vitamin B12 [[id number]] Collected: 04/17/21 0426    Specimen: Blood Updated: 04/17/21 0434    Procalcitonin [[id number]]  (Abnormal) Collected: 04/16/21 2326    Specimen: Blood Updated: 04/17/21 0009     Procalcitonin 3.52 ng/mL     Narrative:      As a Marker for Sepsis (Non-Neonates):     1. <0.5 ng/mL represents a low risk of severe sepsis and/or septic shock.  2. >2 ng/mL represents a high risk of severe sepsis and/or septic shock.    As a Marker for Lower Respiratory Tract Infections that require antibiotic therapy:  PCT on Admission     Antibiotic Therapy             6-12 Hrs later  >0.5                          Strongly Recommended            >0.25 - <0.5             Recommended  0.1 - 0.25                  Discouraged                    "    Remeasure/reassess PCT  <0.1                         Strongly Discouraged         Remeasure/reassess PCT      As 28 day mortality risk marker: \"Change in Procalcitonin Result\" (>80% or <=80%) if Day 0 (or Day 1) and Day 4 values are available. Refer to [email or web address]    Change in PCT <=80 %   A decrease of PCT levels below or equal to 80% defines a positive change in PCT test result representing a higher risk for 28-day all-cause mortality of patients diagnosed with severe sepsis or septic shock.    Change in PCT >80 %   A decrease of PCT levels of more than 80% defines a negative change in PCT result representing a lower risk for 28-day all-cause mortality of patients diagnosed with severe sepsis or septic shock.              Results may be falsely decreased if patient taking Biotin.     TSH [[id number]]  (Normal) Collected: 04/16/21 2326    Specimen: Blood Updated: 04/17/21 0009     TSH 2.330 uIU/mL     Potassium [[id number]]  (Abnormal) Collected: 04/16/21 2326    Specimen: Blood Updated: 04/17/21 0003     Potassium 3.3 mmol/L     Lactic Acid, Plasma [[id number]]  (Abnormal) Collected: 04/16/21 2326    Specimen: Blood Updated: 04/16/21 2352     Lactate 2.2 mmol/L     COVID PRE-OP / PRE-PROCEDURE SCREENING ORDER (NO ISOLATION) - Swab, Nasopharynx [[id number]] Collected: 04/16/21 2230    Specimen: Swab from Nasopharynx Updated: 04/16/21 2232    Narrative:      The following orders were created for panel order COVID PRE-OP / PRE-PROCEDURE SCREENING ORDER (NO ISOLATION) - Swab, Nasopharynx.  Procedure                               Abnormality         Status                     ---------                               -----------         ------                     COVID-19,APTIMA PANTHER,...[[id number]]                      In process                   Please view results for these tests on the individual orders.    COVID-19,APTIMA PANTHER,[facility] IN-HOUSE, NP/OP SWAB IN UTM/VTM/SALINE TRANSPORT " MEDIA,24 HR TAT - Swab, Nasopharynx [[id number]] Collected: 04/16/21 2230    Specimen: Swab from Nasopharynx Updated: 04/16/21 2232    Blood Culture - Blood, Arm, Right [[id number]] Collected: 04/16/21 2229    Specimen: Blood from Arm, Right Updated: 04/16/21 2232    Blood Culture - Blood, Arm, Left [[id number]] Collected: 04/16/21 2228    Specimen: Blood from Arm, Left Updated: 04/16/21 2232    Extra Tubes [[id number]] Collected: 04/16/21 1837    Specimen: Blood, Venous Line Updated: 04/16/21 1945    Narrative:      The following orders were created for panel order Extra Tubes.  Procedure                               Abnormality         Status                     ---------                               -----------         ------                     Light Blue Top[[id number]]                                   Final result               Gold Top - [facility][[id number]]                                   Final result                 Please view results for these tests on the individual orders.    Light Blue Top [[id number]] Collected: 04/16/21 1837    Specimen: Blood Updated: 04/16/21 1945     Extra Tube hold for add-on     Comment: Auto resulted       CBC & Differential [[id number]]  (Abnormal) Collected: 04/16/21 1837    Specimen: Blood Updated: 04/16/21 1941    Narrative:      The following orders were created for panel order CBC & Differential.  Procedure                               Abnormality         Status                     ---------                               -----------         ------                     Scan Slide[[id number]]                                       Final result               CBC Auto Differential[[id number]]        Abnormal            Final result                 Please view results for these tests on the individual orders.    Scan Slide [[id number]] Collected: 04/16/21 1837    Specimen: Blood Updated: 04/16/21 1941     Scan Slide --     Comment: See Manual Differential Results       Manual Differential  [[id number]]  (Abnormal) Collected: 04/16/21 1837    Specimen: Blood Updated: 04/16/21 1941     Neutrophil % 8.0 %      Lymphocyte % 56.0 %      Monocyte % 31.0 %      Eosinophil % 1.0 %      Bands %  4.0 %      Neutrophils Absolute 0.17 10*3/mm3      Lymphocytes Absolute 0.78 10*3/mm3      Monocytes Absolute 0.43 10*3/mm3      Eosinophils Absolute 0.01 10*3/mm3      Anisocytosis Slight/1+     Paddy Cells Slight/1+     Ovalocytes Slight/1+     Poikilocytes Mod/2+     WBC Morphology Normal     Platelet Estimate Decreased    CBC Auto Differential [[id number]]  (Abnormal) Collected: 04/16/21 1837    Specimen: Blood Updated: 04/16/21 1941     WBC 1.40 10*3/mm3      RBC 3.87 10*6/mm3      Hemoglobin 10.8 g/dL      Hematocrit 32.4 %      MCV 83.6 fL      MCH 27.9 pg      MCHC 33.3 g/dL      RDW 15.7 %      RDW-SD 45.1 fl      MPV 9.1 fL      Platelets 91 10*3/mm3     Narrative:      The previously reported component NRBC is no longer being reported. Previous result was 0.1 /100 WBC (Reference Range: 0.0-0.2 /100 WBC) on 4/16/2021 at [facility] EDT.    Comprehensive Metabolic Panel [[id number]]  (Abnormal) Collected: 04/16/21 1837    Specimen: Blood Updated: 04/16/21 1919     Glucose 127 mg/dL      BUN 18 mg/dL      Creatinine 1.00 mg/dL      Sodium 136 mmol/L      Potassium 3.3 mmol/L      Chloride 98 mmol/L      CO2 21.0 mmol/L      Calcium 8.7 mg/dL      Total Protein 7.0 g/dL      Albumin 3.60 g/dL      ALT (SGPT) 31 U/L      AST (SGOT) 40 U/L      Alkaline Phosphatase 152 U/L      Total Bilirubin 0.6 mg/dL      eGFR Non African Amer 73 mL/min/1.73      Globulin 3.4 gm/dL      A/G Ratio 1.1 g/dL      BUN/Creatinine Ratio 18.0     Anion Gap 17.0 mmol/L     Narrative:      GFR Normal >60  Chronic Kidney Disease <60  Kidney Failure <15      Lipase [[id number]]  (Abnormal) Collected: 04/16/21 1837    Specimen: Blood Updated: 04/16/21 1919     Lipase 8 U/L     Gold Top - [facility] [[id number]] Collected: 04/16/21 1837    Specimen: Blood  Updated: 04/16/21 1907         Pending Results: Blood cultures, Covid-19 screening, vitamin B-12 1190 (211-286), PET/CT scan,  CA 19-9 (office lab on 4/12/2021), occult blood stool, reticulocytes,  haptoglobin,  iron, iron saturation, transferrin, TIBC, ferritin, folate, stool for C. difficile.     Imaging Reviewed:   CT Abdomen Pelvis With Contrast    Result Date: 4/16/2021  1. There is a 2 cm mass in the uncinate process of the pancreas which is consistent with pancreatic cancer. This is better defined today and grossly to the prior exam. There is mild dilatation of the pancreatic duct which was not previously identifiable. 2. On the prior exam, a polypoid abnormality in the posterior bladder was reported. This is less well-defined today and is hard to separate from the prostate. Clinical correlation recommended. 3. There is thickening of the wall of the distal ileum which is consistent with an infectious or inflammatory ileitis. 4. There is mild gaseous distention and fluid throughout much of the colon and the rectum.  Electronically Signed By-Kailey Pennington MD On:4/16/2021 8:57 PM This report was finalized on [id number] by  Kailey Pennington MD.    I have reviewed the patient's labs, imaging, reports, and other clinician documentation.         Assessment/Plan   ASSESSMENT  1. Clinical stage IIb pancreatic adenocarcinoma: S/p 5 cycles neoadjuvant FOLFIRINOX. Cycle 6 due 4/19/2021.   2. Pancytopenia/Neutropenia: Likely secondary to chemotherapay.  Anemia work-up ordered.  Has history of B12 deficiency.  Vitamin B-12 level normal.   3. Diarrhea/Nausea/Vomiting/Colitis: Diarrhea ongoing since mid-March 2021, possibly chemotherapy-induced.  Started on octreotide week of 4/5/2021 with initial improvement after 3 injections.  CT A/P 4/16/2021-thickening of terminal ileum GI consulted.  Blood cultures pending.  Gastrointestinal panel negative. C. difficile pending.  Received Unasyn and switched to Zosyn by GI.  Probiotic  started in addition to home meds - Questran, scopolamine, pancreatic enzymes, and PPI.  Clear liquid diet started.  No plans for endoscopy at this time.  4. Anorexia/Unintentional weight loss: On cyproheptadine and Zenpep as outpatient  5. Chronic pain/Degenerative disc disease/Anxiety CAD/HLD/Hypothyroidism/Hypokalemia: management per Primary team.     PLAN  1. Follow CBC  2. Anemia work-up.   3. Daily Neupogen.  4. CA 19-9.  5. IV hydration.  6. Continue IV antibiotics.  7. Hold outpatient chemotherapy.     Electronically signed by TESS Millan, 04/17/21, 1:32 PM EDT.    I have personally performed a face-to-face diagnostic evaluation on this patient.  I have discussed the case with Blanka Gimenez NP, have edited/reviewed the note, and agree with the care plan.  The patient was admitted with diarrhea and vomiting.  On examination, he is pale with low abdominal tenderness.  Labs are significant for pancytopenia and high lactate level.  Lipase level is low.  He has been started on IV fluids and antibiotics.  Stool C. difficile has been requested.  We will start Neupogen to help improve WBC.  Will check CA 19-9 and follow.        I discussed the patients findings and my recommendations with patient.    Thank you for this consult.  We will be happy to follow along in the care of this patient.     Electronically signed by Demian Mello MD, 04/17/21, 2:43 PM EDT.

## 2021-04-17 NOTE — H&P
[facility] Medicine Services      Patient Name: Clifford Weber  : 1945  MRN: [id number]  Primary Care Physician: Gris Muro APRN  Date of admission: 2021    Patient Care Team:  Gris Muro APRN as PCP - General (Nurse Practitioner)          Subjective   History Present Illness     Chief Complaint:   Chief Complaint   Patient presents with   • Vomiting     N/V/D for 2 days.         Mr. Weber is a 76 y.o. male with past medical history of pancreatic cancer, hypothyroidism, hyperlipidemia, chronic pain, B12 deficiency and CAD who presents to [facility] complaining of epigastric pain.  Patient reports that for the past 2 to 3 months he has been having some abdominal pain which has become significantly worse over the past 5 days.  Pain is located primarily in the epigastric area described as sharp rated at 8/10 at its worst without obvious provoking and palliative factors.  Pain does have a waxing and waning intensity and some nausea with nonbloody vomiting as well as diarrhea are also reported.  Patient does have a history of pancreatic cancer and is currently undergoing chemotherapy.  He denies any other pain including chest pain, dyspnea, cough or fever, peripheral edema, syncope or near syncope.  He notes somewhat decreased p.o. intake as well as decreased urination.  He does not smoke or drink alcohol.  Patient also confirms compliance with all outpatient therapies.    In the ED patient had lab significant for potassium: 3.3, anion gap: 17.0 with a serum CO2 of 21.0, lipase: 8, WBCs: 1.40 with decreased absolute neutrophil count noted on differential, hemoglobin: 10.8, platelets: 91.  CT of abdomen and pelvis shows a 2 cm mass of the uncinate process of the pancreas consistent with pancreatic cancer which is noted is better defined today than on prior exam.  Mild dilation of the pancreatic duct which was not previously identified is also noted.  Polypoid  abnormality in the posterior bladder is noted is less well-defined today and hard to separate from the prostate.  Thickening of the wall of the distal ileum consistent with an infectious or inflammatory ileitis as well as mild gaseous distention of fluid throughout much of the colon and rectum is noted.    History of Present Illness    Review of Systems   Constitutional: Positive for decreased appetite and malaise/fatigue. Negative for chills and fever.   HENT: Negative.    Eyes: Negative.    Cardiovascular: Negative.    Respiratory: Negative.    Endocrine: Negative.    Skin: Negative.    Musculoskeletal: Negative.    Gastrointestinal: Positive for abdominal pain, diarrhea, nausea and vomiting. Negative for constipation, hematemesis and hematochezia.   Genitourinary: Negative.    Neurological: Negative.    Psychiatric/Behavioral: Negative.            Personal History     Past Medical History:   Past Medical History:   Diagnosis Date   • Abnormal cardiovascular stress test 11/01/2016    Abnormal Cardiolite Stress Test. Abstracted from [facility].   • Anxiety 03/15/2012    Uses this PRN, has stress due to caring for elderly parents. Abstracted from [facility].   • Atherosclerotic heart disease 03/15/2012   • Borderline hypertension 10/19/2015   • Bronchitis, acute 05/16/2016   • Cancer (CMS/[facility])     PANCREATIC CANCER     • Chest pain 10/15/2014   • Chronic foot pain 10/16/2012    Will see his podiatrist. Abstracted from [facility].   • Constipation 01/09/2018   • Coronary artery disease 10/24/2016   • DDD (degenerative disc disease), lumbar 02/13/2013   • Depression 08/10/2017   • Depression    • Diverticulosis    • Dyslipidemia    • Erectile dysfunction of organic origin 12/07/2012    Samples of cialis 20mg, voucher for the 5mg, samples of levitra 20mg and voucher for viagra 100mg. Call with preference. Order total T. Abstracted from [facility].   • GERD (gastroesophageal reflux disease) 12/07/2012   • H/O myocardial  perfusion scan 11/01/2016    With Stress Test, abnormal. Abstracted from [email or web address].   • Hand pain 04/24/2014   • Headache 01/18/2016   • Hyperlipidemia 02/13/2018   • Hypertension 08/10/2017   • Hypogonadism, male 12/07/2012   • Hypothyroidism 02/08/2018   • Impacted cerumen, left ear 04/03/2019   • Influenza vaccination ordered 10/19/2016   • Insomnia 03/15/2012   • Lateral epicondylitis, left elbow 02/07/2017   • Left knee sprain 01/24/2013   • Leg pain, bilateral 04/02/2019   • Lumbar disc herniation with radiculopathy 09/08/2016   • Lumbar radiculitis 08/30/2016   • Mild memory disturbance 01/18/2016   • Myocardial infarction (CMS/[facility]) 11/01/2016   • Osteoarthritis of sacroiliac joint (CMS/[facility]) 03/12/2014   • Pain in right lower leg 07/11/2016   • Paresthesia 08/30/2016   • Paresthesia of both legs     Lower legs   • Postherpetic neuralgia 03/15/2016   • Preoperative cardiovascular examination 10/24/2016   • Preventative health care 02/07/2017   • S/P cardiac catheterization 01/01/2000    S/P cardiac cath, left heart-- Heart catheterization 2000 at [facility] in [place], [place]. No records available. Abstracted from [email or web address].   • Sciatica, right side 06/04/2013   • Shingles    • Shortness of breath 10/15/2014   • Spinal stenosis, lumbar 02/13/2013    TENS unit evidently not covered by Medicare. Abstracted from [email or web address].   • Vitamin B12 deficiency 08/10/2017   • Vitamin D deficiency 01/09/2018       Surgical History:      Past Surgical History:   Procedure Laterality Date   • CARDIAC CATHETERIZATION  2000   • COLONOSCOPY  10/28/2009   • KNEE SURGERY Bilateral    • OTHER SURGICAL HISTORY      Lapscopic surgery, both knees. Abstracted by [email or web address].   • OTHER SURGICAL HISTORY      Kneww injections. Abstracted from [email or web address].   • OTHER SURGICAL HISTORY      Shot in lower back for bulging disc. Abstracted from [email or web address].   • UPPER ENDOSCOPIC ULTRASOUND W/ FNA N/A 1/7/2021    Procedure:  ESOPHAGOGASTRODUODENOSCOPY WITH endoscopic ULTRASOUND AND FINE NEEDLE ASPIRATION;  Surgeon: David Fierro MD;  Location: [facility] ENDOSCOPY;  Service: Gastroenterology;  Laterality: N/A;  post op: pancreatic head mass           Family History: family history includes Alzheimer's disease in his mother and paternal grandmother; Cancer in his cousin and another family member; Diabetes in his brother; Heart disease in his brother, brother, father, and maternal grandfather; Hyperlipidemia in his brother and father; Hypertension in his brother, brother, and father. Otherwise pertinent FHx was reviewed and unremarkable.     Social History:  reports that he has never smoked. He has never used smokeless tobacco. He reports that he does not drink alcohol and does not use drugs.      Medications:  Prior to Admission medications    Medication Sig Start Date End Date Taking? Authorizing Provider   aspirin (ASPIR) 81 MG EC tablet Take 81 mg by mouth Daily. 5/22/12  Yes Beena Siddiqui APRN   colestipol (COLESTID) 1 g tablet Take 1 g by mouth Every 8 (Eight) Hours As Needed.   Yes Maritza Henley MD   cyproheptadine (PERIACTIN) 4 MG tablet Take 4 mg by mouth 2 (two) times a day.   Yes Maritza Henley MD   diphenoxylate-atropine (LOMOTIL) 2.5-0.025 MG per tablet Take 1 tablet by mouth 4 (Four) Times a Day As Needed for Diarrhea.   Yes Maritza Henley MD   HYDROcodone-acetaminophen (NORCO) 5-325 MG per tablet Take 1 tablet by mouth Every 6 (Six) Hours As Needed.   Yes Maritza Henley MD   LORazepam (ATIVAN) 0.5 MG tablet Take 0.5 mg by mouth Every 8 (Eight) Hours As Needed (nausea).   Yes Maritza Henley MD   Octreotide Acetate (SANDOSTATIN IJ) Inject  as directed.   Yes Maritza Henley MD   omeprazole (priLOSEC) 40 MG capsule Take 40 mg by mouth Daily.   Yes Maritza Henley MD   ondansetron (ZOFRAN) 4 MG tablet Take 4 mg by mouth Every 6 (Six) Hours As Needed for Nausea or Vomiting.   Yes  Maritza Henley MD   Pancrelipase, Lip-Prot-Amyl, (ZENPEP) 49192-779803 units capsule delayed-release particles capsule Take 40,000 Units by mouth 3 (Three) Times a Day.   Yes Maritza Henley MD   potassium chloride (K-DUR,KLOR-CON) 10 MEQ CR tablet Take 20 mEq by mouth 2 (Two) Times a Day.   Yes Maritza Henley MD   pregabalin (LYRICA) 150 MG capsule Take 1 capsule by mouth 3 (Three) Times a Day. 3/24/21  Yes Adali Gil MD   prochlorperazine (COMPAZINE) 10 MG tablet Take 10 mg by mouth Every 8 (Eight) Hours As Needed for Nausea or Vomiting.   Yes Maritza Henley MD   rosuvastatin (CRESTOR) 10 MG tablet TAKE ONE TABLET BY MOUTH AT BEDTIME 1/4/21  Yes Gris Muro APRN   Scopolamine (Transderm-Scop, 1.5 MG,) 1.5 MG/3DAYS patch Place 1 patch on the skin as directed by provider Every 72 (Seventy-Two) Hours.   Yes Maritza Henley MD   Synthroid 75 MCG tablet TAKE ONE TABLET BY MOUTH EVERY DAY 3/17/21  Yes Gris Muro APRN   vitamin D (ERGOCALCIFEROL) 1.25 MG (77748 UT) capsule capsule TAKE 1 CAPSULE BY MOUTH ONCE WEEKLY 2/1/21  Yes Gris Muro APRN   Biotin 1000 MCG chewable tablet Chew 1 tablet Daily.  4/16/21  Maritza Henley MD   Cyanocobalamin (B-12) 1000 MCG tablet Take 100 mcg by mouth Daily.  4/16/21  Maritza Henley MD   gabapentin (NEURONTIN) 800 MG tablet TAKE ONE TABLET BY MOUTH THREE TIMES DAILY 9/24/20 4/16/21  Gris Muro APRN   nitroglycerin (NITROSTAT) 0.4 MG SL tablet DISSOLVE 1 TABLET UNDER TONGUE AS NEEDED FOR CHEST PAIN. MAY REPEAT DOSE EVERY 5 MINUTES AS NEEDED FOR A TOTAL OF 3 DOSES. 3/10/21 4/16/21  Cordelia Jacobo MD   Omega-3 Fatty Acids (FISH OIL) 1000 MG capsule capsule Take 1,000 mg by mouth Daily With Breakfast.  4/16/21  Maritza Henley MD   omeprazole (priLOSEC) 20 MG capsule TAKE 2 CAPSULES BY MOUTH EVERY DAY 10/5/20 4/16/21  Deborah Zacarias MD   ondansetron ODT (ZOFRAN-ODT) 4 MG disintegrating tablet  Place 1 tablet on the tongue Every 8 (Eight) Hours As Needed for Vomiting. 1/9/21 4/16/21  Ernesto Wick MD   polyethylene glycol (MIRALAX) powder Take 17 g by mouth Daily As Needed. Dissolve 17 grams in 8 oz. Liquid and drink daily. Abstracted from [employer]   4/16/21  Maritza Henley MD   promethazine (PHENERGAN) 25 MG tablet Take 1 tablet by mouth Every 6 (Six) Hours As Needed for Nausea or Vomiting. 12/20/20 4/16/21  Mike Laguerre MD   tadalafil (CIALIS) 20 MG tablet TAKE 1 TABLET BY MOUTH AS DIRECTED AS NEEDED 8/2/19 4/16/21  ProviderMaritza MD   traMADol (ULTRAM) 50 MG tablet Take 1 tablet by mouth Every 6 (Six) Hours As Needed for Severe Pain . 1/9/21 4/16/21  Ernesto Wick MD       Allergies:    Allergies   Allergen Reactions   • Niacin Unknown (See Comments)     Abstracted from [facility].       Objective   Objective     Vital Signs  Temp:  [97.5 °F (36.4 °C)] 97.5 °F (36.4 °C)  Heart Rate:  [] 96  Resp:  [16-20] 18  BP: ()/(63-76) 97/63  SpO2:  [97 %-100 %] 97 %  on   ;      Body mass index is 19.9 kg/m².    Physical Exam  Vitals reviewed.   Constitutional:       General: He is not in acute distress.     Appearance: Normal appearance. He is normal weight. He is not ill-appearing or toxic-appearing.   HENT:      Head: Normocephalic and atraumatic.      Right Ear: External ear normal.      Left Ear: External ear normal.      Nose: Nose normal.      Mouth/Throat:      Mouth: Mucous membranes are moist.   Eyes:      Extraocular Movements: Extraocular movements intact.   Cardiovascular:      Rate and Rhythm: Regular rhythm. Tachycardia present.      Pulses: Normal pulses.      Heart sounds: Normal heart sounds.   Pulmonary:      Effort: Pulmonary effort is normal.      Breath sounds: Normal breath sounds.   Abdominal:      General: Bowel sounds are normal. There is no distension.      Tenderness: There is abdominal tenderness.   Musculoskeletal:         General: Normal range of  motion.      Cervical back: Normal range of motion.   Skin:     General: Skin is warm and dry.   Neurological:      General: No focal deficit present.      Mental Status: He is alert and oriented to person, place, and time.   Psychiatric:         Mood and Affect: Mood normal.         Behavior: Behavior normal.         Thought Content: Thought content normal.         Judgment: Judgment normal.           Results Review:  I have personally reviewed most recent lab results and radiology images and interpretations and agree with findings, most notably: CBC, CMP, lipase, CT of abdomen and pelvis.    Results from last 7 days   Lab Units 04/16/21  1837   WBC 10*3/mm3 1.40*   HEMOGLOBIN g/dL 10.8*   HEMATOCRIT % 32.4*   PLATELETS 10*3/mm3 91*     Results from last 7 days   Lab Units 04/16/21  1837   SODIUM mmol/L 136   POTASSIUM mmol/L 3.3*   CHLORIDE mmol/L 98   CO2 mmol/L 21.0*   BUN mg/dL 18   CREATININE mg/dL 1.00   GLUCOSE mg/dL 127*   CALCIUM mg/dL 8.7   ALT (SGPT) U/L 31   AST (SGOT) U/L 40     Estimated Creatinine Clearance: 64.4 mL/min (by C-G formula based on SCr of 1 mg/dL).  Brief Urine Lab Results  (Last result in the past 365 days)      Color   Clarity   Blood   Leuk Est   Nitrite   Protein   CREAT   Urine HCG        12/20/20 2012 Yellow Clear Negative Negative Negative Negative               Microbiology Results (last 10 days)     Procedure Component Value - Date/Time    Clostridium Difficile Toxin - Stool, Per Rectum [[id number]]  (Normal) Collected: 04/09/21 0400    Lab Status: Final result Specimen: Stool from Per Rectum Updated: 04/10/21 0708    Narrative:      The following orders were created for panel order Clostridium Difficile Toxin - Stool, Per Rectum.  Procedure                               Abnormality         Status                     ---------                               -----------         ------                     Clostridium Difficile EI...[[id number]]  Normal              Final result                  Please view results for these tests on the individual orders.    Clostridium Difficile EIA - Stool, Per Rectum [[id number]]  (Normal) Collected: 04/09/21 0400    Lab Status: Final result Specimen: Stool from Per Rectum Updated: 04/10/21 0708     C Diff GDH / Toxin Negative          ECG/EMG Results (most recent)     None              Results for orders placed during the hospital encounter of 08/19/19    Adult Transthoracic Echo Complete W/ Cont if Necessary Per Protocol    Interpretation Summary  Indications  Chest pain      Technically satisfactory study.  Mitral valve is structurally normal.  Minimal mitral regurgitation  Tricuspid valve is structurally normal.  Aortic valve is structurally normal.  Pulmonic valve could not be well visualized.  No evidence for mitral tricuspid or aortic regurgitation is seen by Doppler study.  Left atrium is normal in size.  Right atrium is normal in size.  Left ventricle is normal in size and contractility with ejection fraction of 60%.  Right ventricle is normal in size.  Atrial septum is intact.  Aorta is normal.  No pericardial effusion or intracardiac thrombus is seen.    Impression  Minimal mitral regurgitation  Normal echo Doppler study.  Left ventricular ejection fraction is 60%.      CT Abdomen Pelvis With Contrast    Result Date: 4/16/2021  1. There is a 2 cm mass in the uncinate process of the pancreas which is consistent with pancreatic cancer. This is better defined today and grossly to the prior exam. There is mild dilatation of the pancreatic duct which was not previously identifiable. 2. On the prior exam, a polypoid abnormality in the posterior bladder was reported. This is less well-defined today and is hard to separate from the prostate. Clinical correlation recommended. 3. There is thickening of the wall of the distal ileum which is consistent with an infectious or inflammatory ileitis. 4. There is mild gaseous distention and fluid throughout much of  the colon and the rectum.  Electronically Signed By-Kailey Pennington MD On:4/16/2021 8:57 PM This report was finalized on [id number] by  Kailey Pennington MD.        Estimated Creatinine Clearance: 64.4 mL/min (by C-G formula based on SCr of 1 mg/dL).    Assessment/Plan   Assessment/Plan       Active Hospital Problems    Diagnosis  POA   • **Malignant neoplasm of head of pancreas (CMS/HCC) [C25.0]  Yes     Priority: High   • Nausea and vomiting [R11.2]  Yes     Priority: High   • Leukocytosis [D72.829]  Yes     Priority: High   • Anemia due to chemotherapy [D64.81, T45.1X5A]  Yes     Priority: Medium   • Hypokalemia [E87.6]  Yes     Priority: Medium   • Atherosclerotic heart disease of native coronary artery with other forms of angina pectoris (CMS/HCC) [I25.118]  Yes     Priority: Medium   • Hyperlipidemia [E78.5]  Yes     Priority: Low   • Hypothyroidism [E03.9]  Yes     Priority: Low   • Cobalamin deficiency [E53.8]  Yes     Priority: Low   • Degeneration of lumbar or lumbosacral intervertebral disc [M51.37]  Yes     Priority: Low      Resolved Hospital Problems   No resolved problems to display.     Nausea and vomiting in a patient with a history of malignant neoplasm of the head of the pancreas  -Patient presents with sudden worsening of chronic epigastric pain over approximately the past 5 days with several episodes of nausea and nonbloody vomiting  -CT of abdomen and pelvis shows a 2 cm mass of the uncinate process of the pancreas consistent with pancreatic cancer which is noted is better defined today than on prior exam.  Mild dilation of the pancreatic duct which was not previously identified is also noted.  Polypoid abnormality in the posterior bladder is noted is less well-defined today and hard to separate from the prostate.  Thickening of the wall of the distal ileum consistent with an infectious or inflammatory ileitis as well as mild gaseous distention of fluid throughout much of the colon and rectum is  noted.  -Check lactic acid, procalcitonin and GI panel  -Blood cultures ordered and pending  -Unasyn started in the ED, continue  -Patient received 2 L LR bolus in ED, continue LR at 100 mL/h  -Clear liquid diet advance as tolerated  -Zofran as needed  -Monitor I's and O's-cardiac monitoring  -GI consulted in ED    Pancytopenia  -WBCs: 1.40 with a decreased absolute neutrophil count noted on differential, hemoglobin: 10.8 and platelets: 91 in a patient currently undergoing chemotherapy  -Monitor CBC while admitted  -Unasyn as above  -Hematology consulted in ED    Hypokalemia  -Potassium: 3.3  -Check magnesium  -Replacement protocol ordered    CAD  -Continue aspirin and cardiac monitoring    Hyperlipidemia  -Check lipid panel  -Continue statin    Hypothyroidism  -Check TSH  -Continue levothyroxine    B12 deficiency  -Check B12  -Continue supplementation    Chronic pain/degenerative disc disease  -Continue Lyrica and Norco     Anxiety  -Continue Ativan            VTE Prophylaxis -SCDs  Mechanical Order History:     None      Pharmalogical Order History:     None          CODE STATUS: Full  There are no questions and answers to display.         I discussed the patient's findings and my recommendations with patient and nursing staff.      Signature:Electronically signed by Derek Lester PA-C, 04/17/21, 3:01 AM EDT.    [facility] Hospitalist Team

## 2021-04-17 NOTE — NURSING NOTE
Pt still has not completed drinking his liquid potassium replacement.  Will inform night shift nurse to give second dose then order serum potassium.  Pt will only take small drinks.  Spouse continues to try and get pt to drink the liquid.

## 2021-04-17 NOTE — PLAN OF CARE
Goal Outcome Evaluation:  Plan of Care Reviewed With: patient, spouse  Progress: no change   Pt had had at least seven diarrhea stools.  All have been greenish brown and liquids.  All stool specimens sent to lab.  Spouse assisting pt with cares.  Pt cooperative most of the day.  This evening  he spit out his pills and nurse had to be firm for him to take them.  Tried to pull off heart monitor tonight also.  Protective precautions maintained.  C.Diff stool testing negative so contact spore precautions stopped.  Eating poorly.  Refused lunch.  No emesis.  Zofran given with some relief.  Fall precautions maintained.  In no acute apparent distress.

## 2021-04-17 NOTE — NURSING NOTE
Pts lactic elevated at 4.5. Derek Lester with the hospitalist group was notified. Orders put for  Redraw; No further orders at this time

## 2021-04-17 NOTE — NURSING NOTE
Patients lactic acid elevate. Derek Lester with the hospitalist group was notified. Nurse was given order to change rate of fluids to 125ml/hr. Will continue to monitor

## 2021-04-18 NOTE — PROGRESS NOTES
LOS: 0 days   Patient Care Team:  Gris Muro APRN as PCP - General (Nurse Practitioner)  Demian Mello MD as Consulting Physician (Hematology and Oncology)      Subjective     Interval History: continued to have frequent diarrhea episodes overnight    Subjective: patient complains of 10+ diarrhea BMs daily. He is eating poorly. Minimal appetite and nausea, no vomiting. He has some dysphagia with foods.     Labs - hgb 3.2, mag 2.5, wbc 4.1 (1.1), hgb 9.3, plt 109      ROS:   Review of Systems   Constitutional: Positive for appetite change, fatigue and unexpected weight change.   HENT: Negative.    Respiratory: Negative.    Cardiovascular: Negative.    Gastrointestinal: Positive for diarrhea and nausea. Negative for abdominal distention, abdominal pain, blood in stool, constipation and vomiting.   Genitourinary: Negative.    Musculoskeletal: Negative.    Neurological: Positive for weakness.   Psychiatric/Behavioral: Negative.           Medication Review:     Current Facility-Administered Medications:   •  acetaminophen (TYLENOL) tablet 650 mg, 650 mg, Oral, Q4H PRN **OR** acetaminophen (TYLENOL) 160 MG/5ML solution 650 mg, 650 mg, Oral, Q4H PRN **OR** acetaminophen (TYLENOL) suppository 650 mg, 650 mg, Rectal, Q4H PRN, Derek Lester PA-C  •  aspirin EC tablet 81 mg, 81 mg, Oral, Daily, Derek Lester PA-C, 81 mg at 04/18/21 0959  •  calcium gluconate 1g/50ml 0.675% NaCl IV SOLN, 1 g, Intravenous, PRN **AND** calcium gluconate 2-0.675 GM/100ML NACL IVPB, 2 g, Intravenous, PRN **AND** Calcium, Ionized, , , PRN, Derek Lester PA-C  •  chlorpheniramine (CHLOR-TRIMETON) tablet 4 mg, 4 mg, Oral, Q6H PRN, Kajal Oliveira MD  •  cholestyramine light packet 4 g, 1 packet, Oral, Q12H, Tigist Frankel APRN  •  dicyclomine (BENTYL) capsule 20 mg, 20 mg, Oral, TID, Tigist Frankel APRN  •  diphenoxylate-atropine (LOMOTIL) 2.5-0.025 MG per tablet 1 tablet, 1 tablet, Oral, 4x Daily PRN, Tee  Kajal ROONEY MD  •  Filgrastim (NEUPOGEN) injection 480 mcg, 480 mcg, Subcutaneous, Q PM, Demian Mello MD, 480 mcg at 04/17/21 1743  •  HYDROcodone-acetaminophen (NORCO) 5-325 MG per tablet 1 tablet, 1 tablet, Oral, Q6H PRN, Derek Lester PA-C, 1 tablet at 04/1947  •  iron sucrose 300 mg in 250 mL NS, 300 mg, Intravenous, Daily, Blanka Gimenez APRN  •  levothyroxine (SYNTHROID, LEVOTHROID) tablet 75 mcg, 75 mcg, Oral, Q AM, Derek Lester PA-C, 75 mcg at 04/18/21 0504  •  loperamide (IMODIUM) capsule 2 mg, 2 mg, Oral, 4x Daily PRN, Kajal Oliveira MD, 2 mg at 04/18/21 1001  •  LORazepam (ATIVAN) tablet 0.5 mg, 0.5 mg, Oral, Q8H PRN, Derek Lester PA-C  •  Magnesium Sulfate 2 gram Bolus, followed by 8 gram infusion (total Mg dose 10 grams)- Mg less than or equal to 1mg/dL, 2 g, Intravenous, PRN **OR** Magnesium Sulfate 2 gram / 50mL Infusion (GIVE X 3 BAGS TO EQUAL 6GM TOTAL DOSE) - Mg 1.1 - 1.5 mg/dl, 2 g, Intravenous, PRN **OR** Magnesium Sulfate 4 gram infusion- Mg 1.6-1.9 mg/dL, 4 g, Intravenous, PRN, Derek Lester PA-C, Stopped at 04/17/21 1445  •  melatonin tablet 5 mg, 5 mg, Oral, Nightly PRN, Derek Lester PA-C  •  nitroglycerin (NITROSTAT) SL tablet 0.4 mg, 0.4 mg, Sublingual, Q5 Min PRN, Derek Lester PA-C  •  octreotide (sandoSTATIN) injection 100 mcg, 100 mcg, Subcutaneous, Q12H, Kajal Oliveira MD, 100 mcg at 04/18/21 1121  •  ondansetron (ZOFRAN) tablet 4 mg, 4 mg, Oral, Q6H PRN **OR** ondansetron (ZOFRAN) injection 4 mg, 4 mg, Intravenous, Q6H PRN, Derek Lester PA-C, 4 mg at 04/18/21 1146  •  Pancrelipase (Lip-Prot-Amyl) (CREON) capsule 36,000 units of lipase, 36,000 units of lipase, Oral, TID, Derek Lester PA-C, 36,000 units of lipase at 04/18/21 0933  •  pantoprazole (PROTONIX) EC tablet 40 mg, 40 mg, Oral, QAM, Derek Lester PA-C, 40 mg at 04/18/21 0504  •  piperacillin-tazobactam (ZOSYN) IVPB 3.375 g in 100 mL NS  (CD), 3.375 g, Intravenous, Q8H, Sharan Tellez MD, 3.375 g at 04/18/21 0959  •  potassium & sodium phosphates (PHOS-NAK) 280-160-250 MG packet - for Phosphorus less than 1.25 mg/dL, 2 packet, Oral, Q6H PRN **OR** potassium & sodium phosphates (PHOS-NAK) 280-160-250 MG packet - for Phosphorus 1.25 - 2.5 mg/dL, 2 packet, Oral, Q6H PRN, Derek Lester PA-C  •  potassium chloride (K-DUR,KLOR-CON) CR tablet 20 mEq, 20 mEq, Oral, Daily, Kajal Oliveira MD  •  potassium chloride (K-DUR,KLOR-CON) CR tablet 40 mEq, 40 mEq, Oral, PRN, Derek Lester PA-C, 40 mEq at 04/1947  •  potassium chloride (KLOR-CON) packet 40 mEq, 40 mEq, Oral, PRN, Derek Lester PA-C, 40 mEq at 04/18/21 0633  •  pregabalin (LYRICA) capsule 150 mg, 150 mg, Oral, TID, Derek Lester PA-C, 150 mg at 04/18/21 0959  •  prochlorperazine (COMPAZINE) injection 10 mg, 10 mg, Intravenous, Q6H PRN, Derek Lester PA-C, 10 mg at 04/17/21 0502  •  prochlorperazine (COMPAZINE) tablet 10 mg, 10 mg, Oral, Q8H PRN, Kajal Oliveira MD  •  rosuvastatin (CRESTOR) tablet 10 mg, 10 mg, Oral, Nightly, Derek Lester PA-C, 10 mg at 04/17/21 1948  •  saccharomyces boulardii (FLORASTOR) capsule 500 mg, 500 mg, Oral, BID, Tigist Frankel, APRN, 500 mg at 04/18/21 0959  •  Scopolamine (TRANSDERM-SCOP) 1.5 MG/3DAYS patch 1 patch, 1 patch, Transdermal, Q72H, Derek Lester PA-C, 1 patch at 04/17/21 1220  •  sodium chloride 0.9 % flush 10 mL, 10 mL, Intravenous, Q12H, Derek Lester PA-C, 10 mL at 04/18/21 1003  •  sodium chloride 0.9 % flush 10 mL, 10 mL, Intravenous, PRN, Derek Lester PA-C      Objective     Vital Signs  Vitals:    04/17/21 1746 04/17/21 1930 04/18/21 0440 04/18/21 1117   BP: 102/61 126/76 117/73 95/61   BP Location: Left arm Left arm Left arm Left arm   Patient Position: Lying Sitting Lying Lying   Pulse:  75 76 58   Resp:  16 16 18   Temp:  97.7 °F (36.5 °C) 97.2 °F (36.2 °C) 97.6  °F (36.4 °C)   TempSrc:  Oral Oral Oral   SpO2:  99% 100% 98%   Weight:   74.6 kg (164 lb 8 oz)    Height:           Physical Exam: resting in bed, family present    General Appearance:    Awake and alert, in no acute distress, malnourished   Head:    Normocephalic, without obvious abnormality   Eyes:          Conjunctivae normal, anicteric sclera   Throat:   No oral lesions, no thrush, oral mucosa moist   Neck:   No adenopathy, supple, no JVD   Lungs:     Respirations regular, even and unlabored   Abdomen:     Soft, non-tender, non-distended, no rebound or guarding, no hepatosplenomegaly   Rectal:     Deferred   Extremities:   No edema, no cyanosis, moves equally bilaterally   Skin:   No bruising, no rash, no jaundice        Results Review:    Lab Results (last 24 hours)     Procedure Component Value Units Date/Time    Blood Culture - Blood, Hand, Right [[id number]] Collected: 04/18/21 1219    Specimen: Blood from Hand, Right Updated: 04/18/21 1236    Blood Culture - Blood, Hand, Left [[id number]] Collected: 04/18/21 1222    Specimen: Blood from Hand, Left Updated: 04/18/21 1235    Blood Culture - Blood, Arm, Right [[id number]]  (Abnormal) Collected: 04/16/21 2229    Specimen: Blood from Arm, Right Updated: 04/18/21 1153     Blood Culture Staphylococcus, coagulase negative     Comment: Probable contaminant requires clinical correlation, susceptibility not performed unless requested by physician.          Isolated from Aerobic and Anaerobic Bottles     Gram Stain Anaerobic Bottle Gram positive cocci in clusters      Aerobic Bottle Gram positive cocci in clusters    Manual Differential [[id number]]  (Abnormal) Collected: 04/18/21 0126    Specimen: Blood Updated: 04/18/21 0221     Neutrophil % 10.0 %      Lymphocyte % 56.0 %      Monocyte % 19.0 %      Eosinophil % 1.0 %      Bands %  14.0 %      Neutrophils Absolute 0.98 10*3/mm3      Lymphocytes Absolute 2.30 10*3/mm3      Monocytes Absolute 0.78 10*3/mm3       Eosinophils Absolute 0.04 10*3/mm3      Anisocytosis Slight/1+     Poikilocytes Slight/1+     WBC Morphology Normal     Platelet Estimate Decreased    CBC & Differential [[id number]]  (Abnormal) Collected: 04/18/21 0126    Specimen: Blood Updated: 04/18/21 0221    Narrative:      The following orders were created for panel order CBC & Differential.  Procedure                               Abnormality         Status                     ---------                               -----------         ------                     Scan Slide[[id number]]                                       Final result               CBC Auto Differential[[id number]]        Abnormal            Final result                 Please view results for these tests on the individual orders.    Scan Slide [[id number]] Collected: 04/18/21 0126    Specimen: Blood Updated: 04/18/21 0221     Scan Slide --     Comment: See Manual Differential Results       CBC Auto Differential [[id number]]  (Abnormal) Collected: 04/18/21 0126    Specimen: Blood Updated: 04/18/21 0221     WBC 4.10 10*3/mm3      RBC 3.28 10*6/mm3      Hemoglobin 9.3 g/dL      Hematocrit 27.5 %      MCV 84.0 fL      MCH 28.3 pg      MCHC 33.7 g/dL      RDW 15.7 %      RDW-SD 45.1 fl      MPV 9.0 fL      Platelets 109 10*3/mm3     Narrative:      The previously reported component NRBC is no longer being reported. Previous result was 0.3 /100 WBC (Reference Range: 0.0-0.2 /100 WBC) on 4/18/2021 at 0204 EDT.    Magnesium [[id number]]  (Abnormal) Collected: 04/18/21 0126    Specimen: Blood Updated: 04/18/21 0213     Magnesium 2.5 mg/dL      Comment: Result checked        Comprehensive Metabolic Panel [[id number]]  (Abnormal) Collected: 04/18/21 0126    Specimen: Blood Updated: 04/18/21 0210     Glucose 112 mg/dL      BUN 18 mg/dL      Creatinine 0.83 mg/dL      Sodium 140 mmol/L      Potassium 3.2 mmol/L      Chloride 104 mmol/L      CO2 23.0 mmol/L      Calcium 8.9 mg/dL      Total Protein 5.9 g/dL       Albumin 3.10 g/dL      ALT (SGPT) 31 U/L      AST (SGOT) 39 U/L      Alkaline Phosphatase 115 U/L      Total Bilirubin 0.6 mg/dL      eGFR Non African Amer 91 mL/min/1.73      Globulin 2.8 gm/dL      A/G Ratio 1.1 g/dL      BUN/Creatinine Ratio 21.7     Anion Gap 13.0 mmol/L     Narrative:      GFR Normal >60  Chronic Kidney Disease <60  Kidney Failure <15      Blood Culture - Blood, Arm, Left [[id number]] Collected: 04/16/21 2228    Specimen: Blood from Arm, Left Updated: 04/17/21 2245     Blood Culture No growth at 24 hours    Haptoglobin [[id number]]  (Abnormal) Collected: 04/17/21 1348    Specimen: Blood Updated: 04/17/21 1900     Haptoglobin 308 mg/dL     Blood Culture ID, PCR - Blood, Arm, Right [[id number]]  (Abnormal) Collected: 04/16/21 2229    Specimen: Blood from Arm, Right Updated: 04/17/21 1815     BCID, PCR Staphylococcus spp, not aureus. Identification by BCID PCR.     BOTTLE TYPE Anaerobic Bottle          Imaging Results (Last 24 Hours)     ** No results found for the last 24 hours. **              ASSESSMENT:  76-year-old male with history of pancreatic cancer s/p chemotherapy, colon polyps, Sales's esophagus, and CAD, complains of lower abdominal pain, black diarrhea, nausea/vomiting, and weakness.  CT consistent with pancreatic mass as well as colitis (infectious versus inflammatory).     Diarrhea - ddx Typhlitis vs. Other   Colitis per CT (infectious versus inflammatory)  Pancreatic cancer -s/p chemotherapy and repeat PET scan, results unknown  Nausea/vomiting -chronic but worse in past week  Lower abdominal pain  Weakness  History of esophagitis concerning for Sales's but biopsies negative  History of colon polyps -overdue for surveillance colonoscopy  CAD  Hiatal hernia  Pancytopenia, neutropenia - improved with Neupogen  Hypokalemia  Weight loss -approximately 100 pounds in the past year      PLAN:  Continue Zosyn  Stool studies negative other than lactoferrin+  Increase Questran to BID  Add  Dicyclomine 20mg TID  Continue pancreatic enzymes, Florastor, PPI  Oncology consulted, receiving Neupogen  Low residue diet, provide Ensure shakes  Will follow      TESS Decker  04/18/21  13:33 EDT

## 2021-04-18 NOTE — PLAN OF CARE
Goal Outcome Evaluation:         Patient resting in bed at this time with his eyes closed. Wife is at bedside. Patient had difficulty swallowing his oral medication at the beginning of the shift. His daughter was at bedside at that time. He was able to swallow 2 of the medications with multiple attempts and spitting the medication back out again. Medications were then placed in yogurt per patient request and he would not open his mouth to take them. NP on call was notified of difficulty swallowing his medications. NP also notified of blood culture results. No new orders received at that time.

## 2021-04-18 NOTE — PROGRESS NOTES
[facility] Medicine Services Daily Progress Note      Hospitalist Team  LOS 0 days      Patient Care Team:  Gris Muro APRN as PCP - General (Nurse Practitioner)  Demian Mello MD as Consulting Physician (Hematology and Oncology)    Patient Location: 230/1      Subjective   Subjective     Chief Complaint / Subjective  Chief Complaint   Patient presents with   • Vomiting     N/V/D for 2 days.         Brief Synopsis of Hospital Course/HPI  Mr. Weber is a 76 y.o. male with past medical history of pancreatic cancer, hypothyroidism, hyperlipidemia, chronic pain, B12 deficiency and CAD who presents to [facility] complaining of epigastric pain.  Patient reports that for the past 2 to 3 months he has been having some abdominal pain which has become significantly worse over the past 5 days.  Pain is located primarily in the epigastric area described as sharp rated at 8/10 at its worst without obvious provoking and palliative factors.  Pain does have a waxing and waning intensity and some nausea with nonbloody vomiting as well as diarrhea are also reported.  Patient does have a history of pancreatic cancer and is currently undergoing chemotherapy.  He denies any other pain including chest pain, dyspnea, cough or fever, peripheral edema, syncope or near syncope.  He notes somewhat decreased p.o. intake as well as decreased urination.  He does not smoke or drink alcohol.  Patient also confirms compliance with all outpatient therapies.     In the ED patient had lab significant for potassium: 3.3, anion gap: 17.0 with a serum CO2 of 21.0, lipase: 8, WBCs: 1.40 with decreased absolute neutrophil count noted on differential, hemoglobin: 10.8, platelets: 91.  CT of abdomen and pelvis shows a 2 cm mass of the uncinate process of the pancreas consistent with pancreatic cancer which is noted is better defined today than on prior exam.  Mild dilation of the pancreatic duct which was not  "previously identified is also noted.  Polypoid abnormality in the posterior bladder is noted is less well-defined today and hard to separate from the prostate.  Thickening of the wall of the distal ileum consistent with an infectious or inflammatory ileitis as well as mild gaseous distention of fluid throughout much of the colon and rectum is noted.     4/17/2021  Patient seen and examined  Continue antibiotics  C. difficile ordered  Dietitian consulted  Neutropenic precautions    4/18/2021  Patient seen and examined  Afebrile, white count improved with Neupogen  Continue antibiotics  Repeat blood cultures     (possible contaminated specimen in 1 bottle)  C. difficile negative      Review of Systems   Constitutional: Positive for decreased appetite and malaise/fatigue. Negative for chills and fever.   HENT: Negative.    Eyes: Negative.    Cardiovascular: Negative.    Respiratory: Negative.    Endocrine: Negative.    Skin: Negative.    Musculoskeletal: Negative.    Gastrointestinal: Positive for abdominal pain, diarrhea, nausea and vomiting. Negative for constipation, hematemesis and hematochezia.   Genitourinary: Negative.    Neurological: Negative.    Psychiatric/Behavioral: Negative.      ROS  Date:: 4/18/2021    Objective   Objective      Vital Signs  Temp:  [97.2 °F (36.2 °C)-97.9 °F (36.6 °C)] 97.6 °F (36.4 °C)  Heart Rate:  [58-76] 58  Resp:  [16-18] 18  BP: ()/(61-76) 95/61  Oxygen Therapy  SpO2: 98 %  Pulse Oximetry Type: Intermittent  Device (Oxygen Therapy): room air  Flowsheet Rows      First Filed Value   Admission Height  188 cm (74\") Documented at 04/16/2021 1759   Admission Weight  70.3 kg (155 lb) Documented at 04/16/2021 1759        Intake & Output (last 3 days)       04/15 0701 - 04/16 0700 04/16 0701 - 04/17 0700 04/17 0701 - 04/18 0700 04/18 0701 - 04/19 0700    P.O.   600 140    I.V. (mL/kg)   1800 (24.1)     Total Intake(mL/kg)   2400 (32.2) 140 (1.9)    Net   +2400 +140            Urine " Unmeasured Occurrence  3 x      Stool Unmeasured Occurrence   9 x 3 x        Lines, Drains & Airways    Active LDAs     Name:   Placement date:   Placement time:   Site:   Days:    Peripheral IV 04/16/21 1839 Right Wrist   04/16/21 1839    Wrist   1    Peripheral IV 04/16/21 2222 Left Forearm   04/16/21    2222    Forearm   1    Single Lumen Implantable Port 03/26/21 Left Chest   03/26/21    1536    Chest   22                  Physical Exam:  Vitals reviewed.   Constitutional:       General: He is not in acute distress.     Appearance: Normal appearance. He is normal weight. He is not ill-appearing or toxic-appearing.   HENT:      Head: Normocephalic and atraumatic.      Right Ear: External ear normal.      Left Ear: External ear normal.      Nose: Nose normal.      Mouth/Throat:      Mouth: Mucous membranes are moist.   Eyes:      Extraocular Movements: Extraocular movements intact.   Cardiovascular:      Rate and Rhythm: Regular rhythm. Tachycardia present.      Pulses: Normal pulses.      Heart sounds: Normal heart sounds.   Pulmonary:      Effort: Pulmonary effort is normal.      Breath sounds: Normal breath sounds.   Abdominal:      General: Bowel sounds are normal. There is no distension.      Tenderness: There is abdominal tenderness.   Musculoskeletal:         General: Normal range of motion.      Cervical back: Normal range of motion.   Skin:     General: Skin is warm and dry.   Neurological:      General: No focal deficit present.      Mental Status: He is alert and oriented to person, place, and time.   Psychiatric:         Mood and Affect: Mood normal.         Behavior: Behavior normal.         Thought Content: Thought content normal.         Judgment: Judgment normal.   Physical Exam          Procedures:              Results Review:     I reviewed the patient's new clinical results.      Lab Results (last 24 hours)     Procedure Component Value Units Date/Time    Manual Differential [[id number]]   (Abnormal) Collected: 04/18/21 0126    Specimen: Blood Updated: 04/18/21 0221     Neutrophil % 10.0 %      Lymphocyte % 56.0 %      Monocyte % 19.0 %      Eosinophil % 1.0 %      Bands %  14.0 %      Neutrophils Absolute 0.98 10*3/mm3      Lymphocytes Absolute 2.30 10*3/mm3      Monocytes Absolute 0.78 10*3/mm3      Eosinophils Absolute 0.04 10*3/mm3      Anisocytosis Slight/1+     Poikilocytes Slight/1+     WBC Morphology Normal     Platelet Estimate Decreased    CBC & Differential [[id number]]  (Abnormal) Collected: 04/18/21 0126    Specimen: Blood Updated: 04/18/21 0221    Narrative:      The following orders were created for panel order CBC & Differential.  Procedure                               Abnormality         Status                     ---------                               -----------         ------                     Scan Slide[[id number]]                                       Final result               CBC Auto Differential[[id number]]        Abnormal            Final result                 Please view results for these tests on the individual orders.    Scan Slide [[id number]] Collected: 04/18/21 0126    Specimen: Blood Updated: 04/18/21 0221     Scan Slide --     Comment: See Manual Differential Results       CBC Auto Differential [[id number]]  (Abnormal) Collected: 04/18/21 0126    Specimen: Blood Updated: 04/18/21 0221     WBC 4.10 10*3/mm3      RBC 3.28 10*6/mm3      Hemoglobin 9.3 g/dL      Hematocrit 27.5 %      MCV 84.0 fL      MCH 28.3 pg      MCHC 33.7 g/dL      RDW 15.7 %      RDW-SD 45.1 fl      MPV 9.0 fL      Platelets 109 10*3/mm3     Narrative:      The previously reported component NRBC is no longer being reported. Previous result was 0.3 /100 WBC (Reference Range: 0.0-0.2 /100 WBC) on 4/18/2021 at 0204 EDT.    Magnesium [[id number]]  (Abnormal) Collected: 04/18/21 0126    Specimen: Blood Updated: 04/18/21 0213     Magnesium 2.5 mg/dL      Comment: Result checked        Comprehensive  Metabolic Panel [[id number]]  (Abnormal) Collected: 04/18/21 0126    Specimen: Blood Updated: 04/18/21 0210     Glucose 112 mg/dL      BUN 18 mg/dL      Creatinine 0.83 mg/dL      Sodium 140 mmol/L      Potassium 3.2 mmol/L      Chloride 104 mmol/L      CO2 23.0 mmol/L      Calcium 8.9 mg/dL      Total Protein 5.9 g/dL      Albumin 3.10 g/dL      ALT (SGPT) 31 U/L      AST (SGOT) 39 U/L      Alkaline Phosphatase 115 U/L      Total Bilirubin 0.6 mg/dL      eGFR Non African Amer 91 mL/min/1.73      Globulin 2.8 gm/dL      A/G Ratio 1.1 g/dL      BUN/Creatinine Ratio 21.7     Anion Gap 13.0 mmol/L     Narrative:      GFR Normal >60  Chronic Kidney Disease <60  Kidney Failure <15      Blood Culture - Blood, Arm, Left [[id number]] Collected: 04/16/21 2228    Specimen: Blood from Arm, Left Updated: 04/17/21 2245     Blood Culture No growth at 24 hours    Haptoglobin [[id number]]  (Abnormal) Collected: 04/17/21 1348    Specimen: Blood Updated: 04/17/21 1900     Haptoglobin 308 mg/dL     Blood Culture ID, PCR - Blood, Arm, Right [[id number]]  (Abnormal) Collected: 04/16/21 2229    Specimen: Blood from Arm, Right Updated: 04/17/21 1815     BCID, PCR Staphylococcus spp, not aureus. Identification by BCID PCR.     BOTTLE TYPE Anaerobic Bottle    Blood Culture - Blood, Arm, Right [[id number]]  (Abnormal) Collected: 04/16/21 2229    Specimen: Blood from Arm, Right Updated: 04/17/21 1811     Blood Culture Abnormal Stain     Gram Stain Anaerobic Bottle Gram positive cocci in clusters      Aerobic Bottle Gram positive cocci in clusters    COVID PRE-OP / PRE-PROCEDURE SCREENING ORDER (NO ISOLATION) - Swab, Nasopharynx [[id number]]  (Normal) Collected: 04/16/21 2230    Specimen: Swab from Nasopharynx Updated: 04/17/21 1536    Narrative:      The following orders were created for panel order COVID PRE-OP / PRE-PROCEDURE SCREENING ORDER (NO ISOLATION) - Swab, Nasopharynx.  Procedure                               Abnormality         Status                      ---------                               -----------         ------                     COVID-19,APTIMA PANTHER,...[[id number]]  Normal              Final result                 Please view results for these tests on the individual orders.    COVID-19,APTIMA PANTHER,[facility] IN-HOUSE, NP/OP SWAB IN UTM/VTM/SALINE TRANSPORT MEDIA,24 HR TAT - Swab, Nasopharynx [[id number]]  (Normal) Collected: 04/16/21 2230    Specimen: Swab from Nasopharynx Updated: 04/17/21 1536     COVID19 Not Detected    Narrative:      Fact sheet for providers: https://www.fda.gov/media/688764/download     Fact sheet for patients: https://www.fda.gov/media/714986/download    Test performed by RT PCR.        No results found for: HGBA1C            Lab Results   Component Value Date    LIPASE 8 (L) 04/16/2021     Lab Results   Component Value Date    CHOL 149 04/17/2021    TRIG 130 04/17/2021    HDL 57 04/17/2021    LDL 69 04/17/2021       Lab Results   Lab Value Date/Time    INTRAOP  01/07/2021 1139     FNA PASS #1: Atypical suspicious for malignancy.    FNA Pass #2: Positive for malignancy.      FINALDX  01/14/2021 0930     Bladder tumor, transurethral resection:    Inverted urothelial papilloma    No muscularis propria identified    No dysplasia or malignancy identified     JEFERSON/tkd       FINALDX  01/07/2021 1139     Pancreatic head mass, fine needle aspiration with smears and cell block:    Malignant cells present consistent with adenocarcinoma    JEFERSON/tkd          Microbiology Results (last 10 days)     Procedure Component Value - Date/Time    Clostridium Difficile Toxin - Stool, Per Rectum [[id number]]  (Normal) Collected: 04/17/21 1222    Lab Status: Final result Specimen: Stool from Per Rectum Updated: 04/17/21 1345    Narrative:      The following orders were created for panel order Clostridium Difficile Toxin - Stool, Per Rectum.  Procedure                               Abnormality         Status                     ---------                                -----------         ------                     Clostridium Difficile EI...[[id number]]  Normal              Final result                 Please view results for these tests on the individual orders.    Clostridium Difficile EIA - Stool, Per Rectum [[id number]]  (Normal) Collected: 04/17/21 1222    Lab Status: Final result Specimen: Stool from Per Rectum Updated: 04/17/21 1345     C Diff GDH / Toxin Negative    Gastrointestinal Panel, PCR - Stool, Per Rectum [[id number]]  (Normal) Collected: 04/17/21 1003    Lab Status: Final result Specimen: Stool from Per Rectum Updated: 04/17/21 1139     Campylobacter Not Detected     Plesiomonas shigelloides Not Detected     Salmonella Not Detected     Vibrio Not Detected     Vibrio cholerae Not Detected     Yersinia enterocolitica Not Detected     Enteroaggregative E. coli (EAEC) Not Detected     Enteropathogenic E. coli (EPEC) Not Detected     Enterotoxigenic E. coli (ETEC) lt/st Not Detected     Shiga-like toxin-producing E. coli (STEC) stx1/stx2 Not Detected     Shigella/Enteroinvasive E. coli (EIEC) Not Detected     Cryptosporidium Not Detected     Cyclospora cayetanensis Not Detected     Entamoeba histolytica Not Detected     Giardia lamblia Not Detected     Adenovirus F40/41 Not Detected     Astrovirus Not Detected     Norovirus GI/GII Not Detected     Rotavirus A Not Detected     Sapovirus (I, II, IV or V) Not Detected    Narrative:      If Aeromonas, Staphylococcus aureus or Bacillus cereus are suspected, please order [id number]: Stool Culture, Aeromonas, S aureus, B Cereus.    COVID PRE-OP / PRE-PROCEDURE SCREENING ORDER (NO ISOLATION) - Swab, Nasopharynx [[id number]]  (Normal) Collected: 04/16/21 2230    Lab Status: Final result Specimen: Swab from Nasopharynx Updated: 04/17/21 1536    Narrative:      The following orders were created for panel order COVID PRE-OP / PRE-PROCEDURE SCREENING ORDER (NO ISOLATION) - Swab, Nasopharynx.  Procedure                                Abnormality         Status                     ---------                               -----------         ------                     COVID-19,APTIMA PANTHER,...[[id number]]  Normal              Final result                 Please view results for these tests on the individual orders.    COVID-19,APTIMA PANTHER,[facility]SE, NP/OP SWAB IN UTM/VTM/SALINE TRANSPORT MEDIA,24 HR TAT - Swab, Nasopharynx [[id number]]  (Normal) Collected: 04/16/21 2230    Lab Status: Final result Specimen: Swab from Nasopharynx Updated: 04/17/21 1536     COVID19 Not Detected    Narrative:      Fact sheet for providers: https://www.fda.gov/media/184655/download     Fact sheet for patients: https://www.fda.gov/media/027029/download    Test performed by RT PCR.    Blood Culture - Blood, Arm, Right [[id number]]  (Abnormal) Collected: 04/16/21 2229    Lab Status: Preliminary result Specimen: Blood from Arm, Right Updated: 04/17/21 1811     Blood Culture Abnormal Stain     Gram Stain Anaerobic Bottle Gram positive cocci in clusters      Aerobic Bottle Gram positive cocci in clusters    Blood Culture ID, PCR - Blood, Arm, Right [[id number]]  (Abnormal) Collected: 04/16/21 2229    Lab Status: Final result Specimen: Blood from Arm, Right Updated: 04/17/21 1815     BCID, PCR Staphylococcus spp, not aureus. Identification by BCID PCR.     BOTTLE TYPE Anaerobic Bottle    Blood Culture - Blood, Arm, Left [[id number]] Collected: 04/16/21 2228    Lab Status: Preliminary result Specimen: Blood from Arm, Left Updated: 04/17/21 2245     Blood Culture No growth at 24 hours    Clostridium Difficile Toxin - Stool, Per Rectum [[id number]]  (Normal) Collected: 04/09/21 0400    Lab Status: Final result Specimen: Stool from Per Rectum Updated: 04/10/21 0708    Narrative:      The following orders were created for panel order Clostridium Difficile Toxin - Stool, Per Rectum.  Procedure                               Abnormality         Status                      ---------                               -----------         ------                     Clostridium Difficile EI...[[id number]]  Normal              Final result                 Please view results for these tests on the individual orders.    Clostridium Difficile EIA - Stool, Per Rectum [[id number]]  (Normal) Collected: 04/09/21 0400    Lab Status: Final result Specimen: Stool from Per Rectum Updated: 04/10/21 0708     C Diff GDH / Toxin Negative          ECG/EMG Results (most recent)     None              Results for orders placed during the hospital encounter of 08/19/19    Adult Transthoracic Echo Complete W/ Cont if Necessary Per Protocol    Interpretation Summary  Indications  Chest pain      Technically satisfactory study.  Mitral valve is structurally normal.  Minimal mitral regurgitation  Tricuspid valve is structurally normal.  Aortic valve is structurally normal.  Pulmonic valve could not be well visualized.  No evidence for mitral tricuspid or aortic regurgitation is seen by Doppler study.  Left atrium is normal in size.  Right atrium is normal in size.  Left ventricle is normal in size and contractility with ejection fraction of 60%.  Right ventricle is normal in size.  Atrial septum is intact.  Aorta is normal.  No pericardial effusion or intracardiac thrombus is seen.    Impression  Minimal mitral regurgitation  Normal echo Doppler study.  Left ventricular ejection fraction is 60%.      CT Abdomen Pelvis With Contrast    Result Date: 4/16/2021  1. There is a 2 cm mass in the uncinate process of the pancreas which is consistent with pancreatic cancer. This is better defined today and grossly to the prior exam. There is mild dilatation of the pancreatic duct which was not previously identifiable. 2. On the prior exam, a polypoid abnormality in the posterior bladder was reported. This is less well-defined today and is hard to separate from the prostate. Clinical correlation recommended. 3. There is  thickening of the wall of the distal ileum which is consistent with an infectious or inflammatory ileitis. 4. There is mild gaseous distention and fluid throughout much of the colon and the rectum.  Electronically Signed By-Kailey Pennington MD On:4/16/2021 8:57 PM This report was finalized on [id number] by  Kailey Pennington MD.          Xrays, labs reviewed personally by physician.    Medication Review:   I have reviewed the patient's current medication list      Scheduled Meds  aspirin, 81 mg, Oral, Daily  cholestyramine light, 1 packet, Oral, Daily  filgrastim (NEUPOGEN) injection, 480 mcg, Subcutaneous, Q PM  iron sucrose, 300 mg, Intravenous, Daily  levothyroxine, 75 mcg, Oral, Q AM  octreotide, 100 mcg, Subcutaneous, Q12H  Pancrelipase (Lip-Prot-Amyl), 36,000 units of lipase, Oral, TID  pantoprazole, 40 mg, Oral, QAM  piperacillin-tazobactam, 3.375 g, Intravenous, Q8H  potassium chloride, 20 mEq, Oral, Daily  pregabalin, 150 mg, Oral, TID  rosuvastatin, 10 mg, Oral, Nightly  saccharomyces boulardii, 500 mg, Oral, BID  Scopolamine, 1 patch, Transdermal, Q72H  sodium chloride, 10 mL, Intravenous, Q12H        Meds Infusions       Meds PRN  •  acetaminophen **OR** acetaminophen **OR** acetaminophen  •  Calcium Gluconate-NaCl **AND** calcium gluconate **AND** Calcium, Ionized  •  chlorpheniramine  •  diphenoxylate-atropine  •  HYDROcodone-acetaminophen  •  loperamide  •  LORazepam  •  magnesium sulfate **OR** magnesium sulfate **OR** magnesium sulfate  •  melatonin  •  nitroglycerin  •  ondansetron **OR** ondansetron  •  potassium & sodium phosphates **OR** potassium & sodium phosphates  •  potassium chloride  •  potassium chloride  •  prochlorperazine  •  prochlorperazine  •  sodium chloride    I personally reviewed patient's x-ray films     I personally reviewed patient's EKG strips     Assessment/Plan   Assessment/Plan     Active Hospital Problems    Diagnosis  POA   • **Malignant neoplasm of head of pancreas  (CMS/HCC) [C25.0]  Yes   • Pancytopenia due to chemotherapy (CMS/HCC) [D61.810]  Yes   • Nausea and vomiting [R11.2]  Yes   • Hypokalemia [E87.6]  Yes   • Hyperlipidemia [E78.5]  Yes   • Hypothyroidism [E03.9]  Yes   • Cobalamin deficiency [E53.8]  Yes   • Degeneration of lumbar or lumbosacral intervertebral disc [M51.37]  Yes   • Atherosclerotic heart disease of native coronary artery with other forms of angina pectoris (CMS/HCC) [I25.118]  Yes      Resolved Hospital Problems   No resolved problems to display.       MEDICAL DECISION MAKING COMPLEXITY BY PROBLEM:     Nausea and vomiting in a patient with a history of malignant neoplasm of the head of the pancreas  -Patient presents with sudden worsening of chronic epigastric pain over approximately the past 5 days with several episodes of nausea and nonbloody vomiting  -CT of abdomen and pelvis shows a 2 cm mass of the uncinate process of the pancreas consistent with pancreatic cancer which is noted is better defined today than on prior exam.  Mild dilation of the pancreatic duct which was not previously identified is also noted.  Polypoid abnormality in the posterior bladder is noted is less well-defined today and hard to separate from the prostate.  Thickening of the wall of the distal ileum consistent with an infectious or inflammatory ileitis as well as mild gaseous distention of fluid throughout much of the colon and rectum is noted.  -Check lactic acid, procalcitonin and GI panel  -Blood cultures ordered and pending  -Unasyn started in the ED, continue  -Patient received 2 L LR bolus in ED, continue LR at 100 mL/h  -Clear liquid diet advance as tolerated  -Zofran as needed  -Monitor I's and O's-cardiac monitoring  -GI consulted in ED     Pancytopenia/neutropenic fever  -WBCs: 1.40 with a decreased absolute neutrophil count noted on differential, hemoglobin: 10.8 and platelets: 91 in a patient currently undergoing chemotherapy  -Monitor CBC while  admitted  -Unasyn as above  -Hematology consulted in ED     Hypokalemia  -Potassium: 3.3  -Check magnesium  -Replacement protocol ordered     CAD  -Continue aspirin and cardiac monitoring     Hyperlipidemia  -Check lipid panel  -Continue statin     Hypothyroidism  -Check TSH  -Continue levothyroxine     B12 deficiency  -Check B12  -Continue supplementation     Chronic pain/degenerative disc disease  -Continue Lyrica and Norco      Anxiety  -Continue Ativan     VTE Prophylaxis -SCDs    VTE Prophylaxis -   Mechanical Order History:      Ordered        04/16/21 2237  Place Sequential Compression Device  Once         04/16/21 2237  Maintain Sequential Compression Device  Continuous                 Pharmalogical Order History:     None            Code Status -   Code Status and Medical Interventions:   Ordered at: 04/16/21 2225     Code Status:    CPR     Medical Interventions (Level of Support Prior to Arrest):    Full       This patient has been examined wearing appropriate Personal Protective Equipment and discussed with hospital infection control department. 04/18/21        Discharge Planning          Continued Care and Services - Admitted Since 4/16/2021    Coordination has not been started for this encounter.           Electronically signed by Kajal Oliveira MD, 04/18/21, 11:23 EDT.  [facility] Hospitalist Team

## 2021-04-18 NOTE — PLAN OF CARE
Continues fall risk. Assist to bathroom. Medicating as needed for pain. Barrier cream applied for some redness to buttocks. Denies further needs.   Problem: Adult Inpatient Plan of Care  Goal: Plan of Care Review  Outcome: Ongoing, Progressing  Goal: Patient-Specific Goal (Individualized)  Outcome: Ongoing, Progressing  Goal: Absence of Hospital-Acquired Illness or Injury  Outcome: Ongoing, Progressing  Intervention: Identify and Manage Fall Risk  Recent Flowsheet Documentation  Taken 4/18/2021 1515 by Noemy Rodriguez RN  Safety Promotion/Fall Prevention:   assistive device/personal items within reach   clutter free environment maintained   fall prevention program maintained   nonskid shoes/slippers when out of bed   room organization consistent   safety round/check completed  Intervention: Prevent Infection  Recent Flowsheet Documentation  Taken 4/18/2021 1515 by Noemy Rodriguez, RN  Infection Prevention:   environmental surveillance performed   equipment surfaces disinfected   hand hygiene promoted   personal protective equipment utilized   rest/sleep promoted   single patient room provided   visitors restricted/screened  Goal: Optimal Comfort and Wellbeing  Outcome: Ongoing, Progressing  Goal: Readiness for Transition of Care  Outcome: Ongoing, Progressing   Goal Outcome Evaluation:

## 2021-04-18 NOTE — PROGRESS NOTES
Hematology/Oncology Inpatient Progress Note    PATIENT NAME: Clifford Weber  : 1945  MRN: [id number]    CHIEF COMPLAINT: Abdominal pain/Weakness     HISTORY OF PRESENT ILLNESS:   76 y.o. male presented to [facility] emergency department on 2021 with complaints of increased abdominal pain and weakness for prior 5 days.  He started having diarrhea approximately 2 to 3 weeks ago and was started on daily Sandostatin last week.  After the third injection his diarrhea had stopped but then restarted on 2021.  He vomited on 2021 with dark green emesis which prompted him to come to the hospital. He was feeling poorly and was experiencing intermittent confusion.  Urine output was low.  He was having abdominal pain as well.  Patient reported a cough and subjective fever with chills prior 24 hours before coming to the hospital. Abdominal discomfort was worse with eating. Labs in the emergency department were as follows: White blood count 1.4, hemoglobin 10.8, MCV 83.6, platelets 91,000, . CMP significant for potassium 3.3, alkaline phosphatase 152, lipase 8 (13-60), lactate 2.2 (0.5-2.0), TSH 2.330 (0.270-4.200), pro calcitonin 3.52 (0.00-0.25). Bood cultures and COVID-19 screening were pending. A CT scan of the abdomen and pelvis with contrast in the emergency department showed a 2 cm mass in the uncinate process of the pancreas which is consistent with pancreatic cancer.  This was better defined than on prior exam.  There was mild dilatation of the pancreatic duct which was not previously identifiable.  A polypoid abnormality in posterior bladder was less defined on today's exam and was difficult to separate from the prostate.  There was thickening of the wall of the distal ileum consistent with infectious or inflammatory ileitis.  There was mild gaseous distention and fluid throughout much of the colon and rectum.  Antibiotics with Unasyn were initiated.  He was admitted for further  evaluation and treatment. Gastroenterology was consulted.  Fecal lactoferrin was positive, gastrointestinal panel was negative. C. difficile testing was pending. His Vitamin B-12 level was 1190 (211-946).  Antibiotics were switched from Unasyn to Zosyn by GI 4/17/2021.     04/17/21  Hematology/Oncology was consulted by Dr. Nic Ochoa on this established patient followed for clinical stage IIb pancreatic adenocarcinoma.  He had  initially presented with abdominal pain and was hospitalized in December 2020.  At that time CT scan of the abdomen pelvis showed a hypoenhancing lesion in the uncinate process of the pancreas.  A polypoid nodularity was also seen along the posterior wall of the urinary bladder.  An MRI pancreatic protocol on 1/4/2021 showed a hypodense 2.1 cm mass suspicious for primary pancreatic malignancy.  On 1/7/2021 he underwent an EUS with FNA by Dr. Fierro.  There was a 2.4 cm at the uncinate process with one small peripancreatic lymph node that was 4 to 5 mm in size.  Dr. 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  The patient was seen by Dr. Diogenes Stapleton at [facility] on 1/21/2021 who recommended neoadjuvant chemotherapy with 4-6 cycles followed by a pancreatic protocol to determine eligibility for resection.  He started FOLFIRINOX on 2/8/2021 and received his fifth cycle on 4/5/2021.  Diarrhea started mid-March of 2021.  A CT chest on 3/16/2021 revealed small scattered non-- calcified pulmonary nodules measuring 5 mm or less in size and a low-density pancreatic uncinate process with some stranding around it.  A CT scan of thoracic and lumbar spine on same day showed degenerative changes throughout the spine, multilevel disc disease and degenerative facet change resulting in multilevel canal stenosis and neural foraminal narrowing..  The patient was last seen in the office on 4/12/2021  and was scheduled for 3-week follow-up. Octreotide was started for diarrhea with initial improvement after the third injection.  Cycle 6 of FOLFIRINOX was due on 4/19/2021.        PCP: Gris Muro APRN    INTERVAL HISTORY:  4/18/2021 - WBC 4.1, hemoglobin 9.3, platelets 109,000, ANC 0.98, reticulocytes 2.36 (0.),  haptoglobin 308 (),  iron 17 (59-1 58), iron saturation 7 (20-50), transferrin 153 (200-3 60), TIBC 228 (298-536), ferritin 2417 (), folate >20.0 (4.78-24.20), stool for C. Difficile negative (negative), Covid-19 screening negative (negative). Started daily Neupogen on 4/17/2021. Started Venofer 300 mg IV x 3 days.     History of present illness reviewed since last visit and changes noted on 04/18/21.    Subjective   Abdomen feels tight.  He has a slight cough.  Urine output has improved.  Unhappy about her diarrhea persistence.    ROS:  Review of Systems   Constitutional: Negative for activity change, appetite change, chills, fatigue, fever and unexpected weight change.   HENT: Negative for congestion, dental problem, hearing loss, mouth sores, nosebleeds, sore throat and trouble swallowing.    Eyes: Negative for photophobia and visual disturbance.   Respiratory: Positive for cough. Negative for chest tightness and shortness of breath.    Cardiovascular: Negative for chest pain, palpitations and leg swelling.   Gastrointestinal: Positive for diarrhea. Negative for abdominal distention, abdominal pain, blood in stool, constipation, nausea and vomiting.        Abdominal tightness.    Endocrine: Negative for cold intolerance and heat intolerance.   Genitourinary: Negative for decreased urine volume, difficulty urinating, dysuria, frequency, hematuria and urgency.        Urine output improved.   Musculoskeletal: Negative for arthralgias and gait problem.   Skin: Negative for rash and wound.   Neurological: Negative for dizziness, tremors, weakness, light-headedness, numbness and  "headaches.   Hematological: Negative for adenopathy. Does not bruise/bleed easily.   Psychiatric/Behavioral: Negative for confusion and hallucinations. The patient is not nervous/anxious.    All other systems reviewed and are negative.    MEDICATIONS:    Scheduled Meds:  aspirin, 81 mg, Oral, Daily  cholestyramine light, 1 packet, Oral, Daily  filgrastim (NEUPOGEN) injection, 480 mcg, Subcutaneous, Q PM  levothyroxine, 75 mcg, Oral, Q AM  Pancrelipase (Lip-Prot-Amyl), 36,000 units of lipase, Oral, TID  pantoprazole, 40 mg, Oral, QAM  piperacillin-tazobactam, 3.375 g, Intravenous, Q8H  pregabalin, 150 mg, Oral, TID  rosuvastatin, 10 mg, Oral, Nightly  saccharomyces boulardii, 500 mg, Oral, BID  Scopolamine, 1 patch, Transdermal, Q72H  sodium chloride, 10 mL, Intravenous, Q12H       Continuous Infusions:      PRN Meds:  •  acetaminophen **OR** acetaminophen **OR** acetaminophen  •  Calcium Gluconate-NaCl **AND** calcium gluconate **AND** Calcium, Ionized  •  HYDROcodone-acetaminophen  •  LORazepam  •  magnesium sulfate **OR** magnesium sulfate **OR** magnesium sulfate  •  melatonin  •  nitroglycerin  •  ondansetron **OR** ondansetron  •  potassium & sodium phosphates **OR** potassium & sodium phosphates  •  potassium chloride  •  potassium chloride  •  prochlorperazine  •  sodium chloride     ALLERGIES:    Allergies   Allergen Reactions   • Niacin Unknown (See Comments)     [facility]       Objective    VITALS:   /73 (BP Location: Left arm, Patient Position: Lying)   Pulse 76   Temp 97.2 °F (36.2 °C) (Oral)   Resp 16   Ht 188 cm (74\")   Wt 74.6 kg (164 lb 8 oz)   SpO2 100%   BMI 21.12 kg/m²     PHYSICAL EXAM:  Physical Exam  Vitals and nursing note reviewed.   Constitutional:       General: He is not in acute distress.     Appearance: Normal appearance. He is well-developed. He is not diaphoretic.   HENT:      Head: Normocephalic and atraumatic.      Right Ear: External ear normal.      " Left Ear: External ear normal.      Nose: Nose normal.      Mouth/Throat:      Mouth: Mucous membranes are dry.      Pharynx: Oropharynx is clear. No oropharyngeal exudate or posterior oropharyngeal erythema.      Comments: Dental fillings.   Eyes:      General: No scleral icterus.     Extraocular Movements: Extraocular movements intact.      Conjunctiva/sclera: Conjunctivae normal.      Pupils: Pupils are equal, round, and reactive to light.      Comments: Wears corrective lenses.   Cardiovascular:      Rate and Rhythm: Normal rate and regular rhythm.      Heart sounds: Normal heart sounds. No murmur heard.        Comments: Cardiac monitor leads.   Pulmonary:      Effort: Pulmonary effort is normal. No respiratory distress.      Breath sounds: Normal breath sounds. No wheezing or rales.   Chest:      Comments: Left chest wall port.  Abdominal:      General: Bowel sounds are normal. There is no distension.      Palpations: Abdomen is soft. There is no mass.      Tenderness: There is abdominal tenderness in the epigastric area. There is no guarding.   Genitourinary:     Comments: Deferred   Musculoskeletal:         General: No swelling, tenderness or deformity. Normal range of motion.      Cervical back: Normal range of motion and neck supple.      Right lower leg: No edema.      Left lower leg: No edema.      Comments: BUE IV.    Lymphadenopathy:      Cervical: No cervical adenopathy.      Upper Body:      Right upper body: No supraclavicular adenopathy.      Left upper body: No supraclavicular adenopathy.   Skin:     General: Skin is warm and dry.      Coloration: Skin is pale.      Findings: No bruising, erythema or rash.   Neurological:      General: No focal deficit present.      Mental Status: He is alert and oriented to person, place, and time.      Coordination: Coordination normal.   Psychiatric:         Mood and Affect: Mood normal.         Behavior: Behavior normal.         Thought Content: Thought content  normal.         RECENT LABS:  Lab Results (last 24 hours)     Procedure Component Value Units Date/Time    Manual Differential [[id number]]  (Abnormal) Collected: 04/18/21 0126    Specimen: Blood Updated: 04/18/21 0221     Neutrophil % 10.0 %      Lymphocyte % 56.0 %      Monocyte % 19.0 %      Eosinophil % 1.0 %      Bands %  14.0 %      Neutrophils Absolute 0.98 10*3/mm3      Lymphocytes Absolute 2.30 10*3/mm3      Monocytes Absolute 0.78 10*3/mm3      Eosinophils Absolute 0.04 10*3/mm3      Anisocytosis Slight/1+     Poikilocytes Slight/1+     WBC Morphology Normal     Platelet Estimate Decreased    CBC & Differential [[id number]]  (Abnormal) Collected: 04/18/21 0126    Specimen: Blood Updated: 04/18/21 0221    Narrative:      The following orders were created for panel order CBC & Differential.  Procedure                               Abnormality         Status                     ---------                               -----------         ------                     Scan Slide[[id number]]                                       Final result               CBC Auto Differential[[id number]]        Abnormal            Final result                 Please view results for these tests on the individual orders.    Scan Slide [[id number]] Collected: 04/18/21 0126    Specimen: Blood Updated: 04/18/21 0221     Scan Slide --     Comment: See Manual Differential Results       CBC Auto Differential [[id number]]  (Abnormal) Collected: 04/18/21 0126    Specimen: Blood Updated: 04/18/21 0221     WBC 4.10 10*3/mm3      RBC 3.28 10*6/mm3      Hemoglobin 9.3 g/dL      Hematocrit 27.5 %      MCV 84.0 fL      MCH 28.3 pg      MCHC 33.7 g/dL      RDW 15.7 %      RDW-SD 45.1 fl      MPV 9.0 fL      Platelets 109 10*3/mm3     Narrative:      The previously reported component NRBC is no longer being reported. Previous result was 0.3 /100 WBC (Reference Range: 0.0-0.2 /100 WBC) on 4/18/2021 at 0204 EDT.    Magnesium [[id number]]  (Abnormal)  Collected: 04/18/21 0126    Specimen: Blood Updated: 04/18/21 0213     Magnesium 2.5 mg/dL      Comment: Result checked        Comprehensive Metabolic Panel [[id number]]  (Abnormal) Collected: 04/18/21 0126    Specimen: Blood Updated: 04/18/21 0210     Glucose 112 mg/dL      BUN 18 mg/dL      Creatinine 0.83 mg/dL      Sodium 140 mmol/L      Potassium 3.2 mmol/L      Chloride 104 mmol/L      CO2 23.0 mmol/L      Calcium 8.9 mg/dL      Total Protein 5.9 g/dL      Albumin 3.10 g/dL      ALT (SGPT) 31 U/L      AST (SGOT) 39 U/L      Alkaline Phosphatase 115 U/L      Total Bilirubin 0.6 mg/dL      eGFR Non African Amer 91 mL/min/1.73      Globulin 2.8 gm/dL      A/G Ratio 1.1 g/dL      BUN/Creatinine Ratio 21.7     Anion Gap 13.0 mmol/L     Narrative:      GFR Normal >60  Chronic Kidney Disease <60  Kidney Failure <15      Blood Culture - Blood, Arm, Left [[id number]] Collected: 04/16/21 2228    Specimen: Blood from Arm, Left Updated: 04/17/21 2245     Blood Culture No growth at 24 hours    Haptoglobin [[id number]]  (Abnormal) Collected: 04/17/21 1348    Specimen: Blood Updated: 04/17/21 1900     Haptoglobin 308 mg/dL     Blood Culture ID, PCR - Blood, Arm, Right [[id number]]  (Abnormal) Collected: 04/16/21 2229    Specimen: Blood from Arm, Right Updated: 04/17/21 1815     BCID, PCR Staphylococcus spp, not aureus. Identification by BCID PCR.     BOTTLE TYPE Anaerobic Bottle    Blood Culture - Blood, Arm, Right [[id number]]  (Abnormal) Collected: 04/16/21 2229    Specimen: Blood from Arm, Right Updated: 04/17/21 1811     Blood Culture Abnormal Stain     Gram Stain Anaerobic Bottle Gram positive cocci in clusters      Aerobic Bottle Gram positive cocci in clusters    COVID PRE-OP / PRE-PROCEDURE SCREENING ORDER (NO ISOLATION) - Swab, Nasopharynx [[id number]]  (Normal) Collected: 04/16/21 2230    Specimen: Swab from Nasopharynx Updated: 04/17/21 1536    Narrative:      The following orders were created for panel order COVID  PRE-OP / PRE-PROCEDURE SCREENING ORDER (NO ISOLATION) - Swab, Nasopharynx.  Procedure                               Abnormality         Status                     ---------                               -----------         ------                     COVID-19,APTIMA PANTHER,...[[id number]]  Normal              Final result                 Please view results for these tests on the individual orders.    COVID-19,APTIMA PANTHER,[facility] IN-HOUSE, NP/OP SWAB IN UTM/VTM/[facility]ALINE TRANSPORT MEDIA,24 HR TAT - Swab, Nasopharynx [[id number]]  (Normal) Collected: 04/16/21 2230    Specimen: Swab from Nasopharynx Updated: 04/17/21 1536     COVID19 Not Detected    Narrative:      Fact sheet for providers: https://www.fda.gov/media/909789/download     Fact sheet for patients: https://www.fda.gov/media/352869/download    Test performed by RT PCR.    Folate [[id number]]  (Normal) Collected: 04/17/21 0426    Specimen: Blood Updated: 04/17/21 1508     Folate >20.00 ng/mL     Narrative:      Results may be falsely increased if patient taking Biotin.      Occult Blood X 1, Stool - Stool, [[id number]] Collected: 04/17/21 1450    Specimen: Stool Updated: 04/17/21 1504    Cancer Antigen 19-9 [[id number]] Collected: 04/17/21 1101    Specimen: Blood Updated: 04/17/21 1501    Ferritin [[id number]]  (Abnormal) Collected: 04/17/21 1101    Specimen: Blood Updated: 04/17/21 1406     Ferritin 2,417.00 ng/mL     Narrative:      Results may be falsely decreased if patient taking Biotin.      Reticulocytes [[id number]]  (Abnormal) Collected: 04/17/21 1348    Specimen: Blood Updated: 04/17/21 1403     Reticulocyte % 2.36 %      Reticulocyte Absolute 0.0764 10*6/mm3     Clostridium Difficile Toxin - Stool, Per Rectum [[id number]]  (Normal) Collected: 04/17/21 1222    Specimen: Stool from Per Rectum Updated: 04/17/21 1345    Narrative:      The following orders were created for panel order Clostridium Difficile Toxin - Stool, Per Rectum.  Procedure                                Abnormality         Status                     ---------                               -----------         ------                     Clostridium Difficile EI...[[id number]]  Normal              Final result                 Please view results for these tests on the individual orders.    Clostridium Difficile EIA - Stool, Per Rectum [[id number]]  (Normal) Collected: 04/17/21 1222    Specimen: Stool from Per Rectum Updated: 04/17/21 1345     C Diff GDH / Toxin Negative    Iron Profile [[id number]]  (Abnormal) Collected: 04/17/21 1101    Specimen: Blood Updated: 04/17/21 1340     Iron 17 mcg/dL      Iron Saturation 7 %      Transferrin 153 mg/dL      TIBC 228 mcg/dL         PENDING RESULTS: Blood cultures (final),  CA 19-9 (office lab on 4/12/2021), occult blood stool, CA 19-9.    IMAGING REVIEWED:  CT Abdomen Pelvis With Contrast    Result Date: 4/16/2021  1. There is a 2 cm mass in the uncinate process of the pancreas which is consistent with pancreatic cancer. This is better defined today and grossly to the prior exam. There is mild dilatation of the pancreatic duct which was not previously identifiable. 2. On the prior exam, a polypoid abnormality in the posterior bladder was reported. This is less well-defined today and is hard to separate from the prostate. Clinical correlation recommended. 3. There is thickening of the wall of the distal ileum which is consistent with an infectious or inflammatory ileitis. 4. There is mild gaseous distention and fluid throughout much of the colon and the rectum.  Electronically Signed By-Kailey Pennington MD On:4/16/2021 8:57 PM This report was finalized on [id number] by  Kailey Pennington MD.    I have reviewed the patient's labs, imaging, reports, and other clinician documentation.    Assessment/Plan   ASSESSMENT  1. Clinical stage IIb pancreatic adenocarcinoma: S/p 5 cycles neoadjuvant FOLFIRINOX. Cycle 6 due 4/19/2021.   2. Pancytopenia/Neutropenia: Likely secondary to  chemotherapay.  Anemia work-up  showed iron deficiency anemia.  Has history of B12 deficiency. Ferritin was elevated (actute phase reactant).  Vitamin B-12 level and folate normal. No evidence of hemolysis. Started Venofer 300 mg IV on 4/18/2021.    3. Diarrhea/Nausea/Vomiting/Colitis: Diarrhea ongoing since mid-March 2021, possibly chemotherapy-induced.  Started on octreotide week of 4/5/2021 with initial improvement after 3 injections.  Lipase level low. CT A/P 4/16/2021-thickening of terminal ileum-GI consulted.  Blood cultures are growing Staphylococcus not aureus, likely a contaminant. Gastrointestinal panel and  C. difficile were negative. Received Unasyn and switched to Zosyn by GI.  Probiotic started in addition to home meds - Questran, scopolamine, pancreatic enzymes, and PPI.  Clear liquid diet started. S/p 2L lactated ringers. No plans for endoscopy at this time.  4. Anorexia/Unintentional weight loss: On cyproheptadine and Zenpep as outpatient  5. Chronic pain/Degenerative disc disease/Anxiety CAD/HLD/Hypothyroidism/Hypokalemia: management per Primary team.      PLAN  1. Follow CBC.  2. Stool for occult blood is pending.   3. Follow CA 19-9 result.  4. Start Venofer 300 mg IV x 3 days, day 1/3.  5. Continue  Neupogen 480 mcg SQ once daily.   6. Continue IV antibiotics.  7. Consider starting Sandostatin.  8. Hold outpatient chemotherapy.      Electronically signed by TESS Millan, 04/18/21, 10:38 AM EDT.         I have personally performed a face-to-face diagnostic evaluation on this patient.  I have discussed the case with Blanka Gimenez NP, have edited/reviewed the note, and agree with the care plan.    The patient claims not to be feeling well with the tightness in his abdomen and continued diarrhea.  On examination he appears pale and has epigastric tenderness.  Labs are significant for neutropenia and hypokalemia.  He is on treatment for the neutropenia and diarrhea.  Would consider adding  Sandostatin injections if okay with GI.    I have personally performed a face-to-face diagnostic evaluation on this patient.  I have reviewed and agree with the care plan.    I discussed the patients findings and my recommendations with patient    Electronically signed by Demian Mello MD, 04/18/21, 11:12 AM EDT.

## 2021-04-19 NOTE — CASE MANAGEMENT/SOCIAL WORK
Discharge Planning Assessment  [facility]     Patient Name: Clifford Weber  MRN: [id number]  Today's Date: 4/19/2021    Admit Date: 4/16/2021    Discharge Needs Assessment     Row Name 04/19/21 1734       Living Environment    Lives With  spouse    Name(s) of Who Lives With Patient  exspouse Nicole is caregiver.    Current Living Arrangements  home/apartment/condo    Primary Care Provided by  spouse/significant other    Provides Primary Care For  no one, unable/limited ability to care for self    Family Caregiver if Needed  spouse    Family Caregiver Names  Nicole    Quality of Family Relationships  unable to assess    Able to Return to Prior Arrangements  yes       Resource/Environmental Concerns    Resource/Environmental Concerns  none       Transition Planning    Patient/Family Anticipates Transition to  home with family    Patient/Family Anticipated Services at Transition  durable medical equipment;home health care    Transportation Anticipated  family or friend will provide       Discharge Needs Assessment    Readmission Within the Last 30 Days  no previous admission in last 30 days    Equipment Currently Used at Home  none    Concerns to be Addressed  discharge planning    Concerns Comments  Ex spouse states no DME at home.    Anticipated Changes Related to Illness  inability to care for self    Discharge Coordination/Progress  DC Plan: From home with ex-spouse. No DME. IV antibiotics.        Discharge Plan     Row Name 04/19/21 1738       Plan    Plan  DC Plan: From home with ex-spouse. No DME. IV antibiotics.    Plan Comments  [facility]        Continued Care and Services - Admitted Since 4/16/2021    Coordination has not been started for this encounter.         Demographic Summary     Row Name 04/19/21 1736       General Information    Admission Type  observation    Arrived From  emergency department    Required Notices Provided  Observation Status Notice    Referral Source  admission list     Reason for Consult  discharge planning    Preferred Language  English     Used During This Interaction  no    General Information Comments  Spoke to exspghada Rivera in room, pt was not responsive to verbal stimuli.        Functional Status     Row Name 04/19/21 1732       Functional Status    Usual Activity Tolerance  fair    Current Activity Tolerance  poor       Functional Status, IADL    Medications  completely dependent    Meal Preparation  completely dependent    Laundry  completely dependent    Shopping  completely dependent       Mental Status    General Appearance WDL  -- Unkempt       Mental Status Summary    Recent Changes in Mental Status/Cognitive Functioning  unable to assess        Met with patient in room wearing PPE: mask, goggles.      Maintained distance greater than six feet and spent less than 15 minutes in the room.           Patient Forms     Row Name 04/19/21 1740       Patient Forms    Patient Observation Letter  Delivered 4/16/21 registration            Enedina Rodriguez RN

## 2021-04-19 NOTE — CONSULTS
Nutrition Services    Patient Name: Clifford Weber  YOB: 1945  MRN: [id number]  Admission date: 4/16/2021    Comments:  Low Residue diet with Boost plus TID to provide an additional 1080kcal and 42g protein and yogurt TID to provide an additional 210kcal and 15g protein if consumed.      Severe chronic disease malnutrition related to inadequate calorie intake with pancreatic cancer as evidenced by po intake providing less than 50% of nutrition needs for greater than 1 month PTA, weight loss of 19% in 3 months, and physical exam revealing moderate muscle/fat loss.    *See MSA at bottom of note for details.      PPE Documentation        PPE Worn By Provider mask, gloves and eye protection   PPE Worn By Patient  None, wife wore mask     CLINICAL NUTRITION ASSESSMENT      Reason for Assessment 4/19/21: Chronic poor intake consult, [facility] 3     H&P:      Past Medical History:   Diagnosis Date   • Abnormal cardiovascular stress test 11/01/2016    Abnormal Cardiolite Stress Test. Abstracted from [facility].   • Anxiety 03/15/2012    Uses this PRN, has stress due to caring for elderly parents. Abstracted from [facility].   • Atherosclerotic heart disease 03/15/2012   • Borderline hypertension 10/19/2015   • Bronchitis, acute 05/16/2016   • Cancer (CMS/HCC)     PANCREATIC CANCER     • Chest pain 10/15/2014   • Chronic foot pain 10/16/2012    Will see his podiatrist. Abstracted from [facility].   • Constipation 01/09/2018   • Coronary artery disease 10/24/2016   • DDD (degenerative disc disease), lumbar 02/13/2013   • Depression 08/10/2017   • Depression    • Diverticulosis    • Dyslipidemia    • Erectile dysfunction of organic origin 12/07/2012    Samples of cialis 20mg, voucher for the 5mg, samples of levitra 20mg and voucher for viagra 100mg. Call with preference. Order total T. Abstracted from [facility].   • GERD (gastroesophageal reflux disease) 12/07/2012   • H/O myocardial perfusion scan 11/01/2016    With  Stress Test, abnormal. Abstracted from [facility].   • Hand pain 04/24/2014   • Headache 01/18/2016   • Hyperlipidemia 02/13/2018   • Hypertension 08/10/2017   • Hypogonadism, male 12/07/2012   • Hypothyroidism 02/08/2018   • Impacted cerumen, left ear 04/03/2019   • Influenza vaccination ordered 10/19/2016   • Insomnia 03/15/2012   • Lateral epicondylitis, left elbow 02/07/2017   • Left knee sprain 01/24/2013   • Leg pain, bilateral 04/02/2019   • Lumbar disc herniation with radiculopathy 09/08/2016   • Lumbar radiculitis 08/30/2016   • Mild memory disturbance 01/18/2016   • Myocardial infarction (CMS/[facility]) 11/01/2016   • Osteoarthritis of sacroiliac joint (CMS/[facility]) 03/12/2014   • Pain in right lower leg 07/11/2016   • Paresthesia 08/30/2016   • Paresthesia of both legs     Lower legs   • Postherpetic neuralgia 03/15/2016   • Preoperative cardiovascular examination 10/24/2016   • Preventative health care 02/07/2017   • S/P cardiac catheterization 01/01/2000    S/P cardiac cath, left heart-- Heart catheterization 2000 at [facility] in [place], [place]. No records available. Abstracted from [facility].   • Sciatica, right side 06/04/2013   • Shingles    • Shortness of breath 10/15/2014   • Spinal stenosis, lumbar 02/13/2013    TENS unit evidently not covered by Medicare. Abstracted from [facility].   • Vitamin B12 deficiency 08/10/2017   • Vitamin D deficiency 01/09/2018       Past Surgical History:   Procedure Laterality Date   • CARDIAC CATHETERIZATION  2000   • COLONOSCOPY  10/28/2009   • KNEE SURGERY Bilateral    • OTHER SURGICAL HISTORY      Lapscopic surgery, both knees. Abstracted by [facility].   • OTHER SURGICAL HISTORY      Kneww injections. Abstracted from [facility].   • OTHER SURGICAL HISTORY      Shot in lower back for bulging disc. Abstracted from [facility].   • UPPER ENDOSCOPIC ULTRASOUND W/ FNA N/A 1/7/2021    Procedure: ESOPHAGOGASTRODUODENOSCOPY WITH endoscopic ULTRASOUND AND FINE NEEDLE  "ASPIRATION;  Surgeon: David Fierro MD;  Location: [facility] ENDOSCOPY;  Service: Gastroenterology;  Laterality: N/A;  post op: pancreatic head mass        Current Problems:   Nausea and vomiting in a patient with a history of malignant neoplasm of the head of the pancreas    Pancytopenia/neutropenic fever    Hypokalemia  -replacement ordered    CAD    HLD    B12 deficiency  -supplementation ordered       Nutrition/Diet History         Narrative     4/19: Visited patient and spouse in room. S/w pt's wife as patient was in and out of sleep and did not answer any questions. Wife reports patient has been eating poorly (next to nothing) for the last several months. States she will try to force him to eat, but it does not amount to much. Will take Ensure supplements here and there. Patient has had diarrhea, which has exacerbated his poor appetite, but wife states overall patient \"won't eat\". Reports pt takes all his meds exactly as directed, but nothing is helping.    NFPE completed this date, see below.     Functional Status  lives at home with wife, unsure of PLOF     Food Allergies NKFA   Factors Affecting   Nutritional Intake Pancreatic cancer, diarrhea     Anthropometrics        Current Height, Weight Height: 188 cm (74\")  Weight: 74.4 kg (164 lb 0.4 oz) (04/19/21 0347)        Admit Height, Weight -    Flowsheet Rows      First Filed Value   Admission Height  188 cm (74\") Documented at 04/16/2021 1759   Admission Weight  70.3 kg (155 lb) Documented at 04/16/2021 1759             Ideal Body Weight (IBW) 190lb   % Ideal Body Weight 86%       Usual Body Weight 200lb   % Usual Body Weight 82%   Wt Change Observation -19% weight loss x3 months   Weight Hx    Wt Readings from Last 30 Encounters:   04/19/21 0347 74.4 kg (164 lb 0.4 oz)   04/18/21 0440 74.6 kg (164 lb 8 oz)   04/16/21 2300 72.8 kg (160 lb 7.9 oz)   04/16/21 1759 70.3 kg (155 lb)   04/03/21 1722 76 kg (167 lb 8.8 oz)   01/09/21 2143 92 kg (202 lb 13.2 oz) "   01/07/21 1016 92.7 kg (204 lb 5.9 oz)   01/06/21 1529 96.6 kg (213 lb)   12/20/20 1913 97 kg (213 lb 13.5 oz)   09/02/20 1043 92.1 kg (203 lb)   09/30/19 1223 92.8 kg (204 lb 8 oz)   08/21/19 0408 92.6 kg (204 lb 3.2 oz)   08/20/19 0305 94.2 kg (207 lb 10.8 oz)   08/19/19 1711 87.2 kg (192 lb 3.9 oz)   08/19/19 1512 93.8 kg (206 lb 12.7 oz)   08/20/19 0843 94.2 kg (207 lb 10.8 oz)   04/03/19 1129 97.5 kg (215 lb)   04/02/19 0955 97.1 kg (214 lb)   02/21/19 1108 92.3 kg (203 lb 6.4 oz)   09/10/18 1109 94.7 kg (208 lb 12 oz)   07/17/18 1107 96.2 kg (212 lb)   02/13/18 1440 96.4 kg (212 lb 8 oz)   02/08/18 1105 94 kg (207 lb 3.2 oz)   01/09/18 1031 93.4 kg (206 lb)   08/10/17 0938 92.6 kg (204 lb 4 oz)   02/07/17 1121 93 kg (205 lb)   12/05/16 1022 91.5 kg (201 lb 12 oz)   11/01/16 1102 89.9 kg (198 lb 4 oz)   10/24/16 1203 90.3 kg (199 lb)   10/19/16 1131 88.4 kg (194 lb 12.8 oz)   09/10/16 0945 87.5 kg (193 lb)   08/30/16 1301 87.5 kg (193 lb)   07/11/16 1351 86.5 kg (190 lb 12.8 oz)   05/16/16 1410 88 kg (194 lb)   03/15/16 1041 87.1 kg (192 lb)   01/18/16 1131 90.7 kg (200 lb)        BMI kg/m2 Body mass index is 21.06 kg/m².       Labs/Medications         Pertinent Labs -hypokalemia   Results from last 7 days   Lab Units 04/19/21  0330 04/18/21  0126 04/17/21  1101 04/17/21  0426 04/16/21  1837   SODIUM mmol/L 141 140  --  136 136   POTASSIUM mmol/L 3.1* 3.2* 3.4* 3.4* 3.3*   CHLORIDE mmol/L 106 104  --  97* 98   CO2 mmol/L 21.0* 23.0  --  20.0* 21.0*   BUN mg/dL 20 18  --  17 18   CREATININE mg/dL 0.81 0.83  --  1.11 1.00   CALCIUM mg/dL 8.5* 8.9  --  8.7 8.7   BILIRUBIN mg/dL 0.5 0.6  --   --  0.6   ALK PHOS U/L 109 115  --   --  152*   ALT (SGPT) U/L 27 31  --   --  31   AST (SGOT) U/L 32 39  --   --  40   GLUCOSE mg/dL 93 112*  --  145* 127*     Results from last 7 days   Lab Units 04/19/21  0330 04/18/21  0126 04/17/21  0426   MAGNESIUM mg/dL 2.2 2.5* 1.7   HEMOGLOBIN g/dL 8.6* 9.3* 10.8*   HEMATOCRIT %  26.3* 27.5* 31.4*   TRIGLYCERIDES mg/dL  --   --  130     COVID19   Date Value Ref Range Status   04/16/2021 Not Detected Not Detected - Ref. Range Final     Lab Results   Component Value Date    HGBA1C 5.5 08/21/2019         Pertinent Medications -ASA, cholestyramine, Bentyl, Iron sucrose, Synthroid, Octreotide, Creon, Protonix, Zosyn, Florastor     Physical Findings        Overall Physical   Appearance, MSA 4/19: NFPE completed this date showing moderate muscle/fat loss. See MSA- pt meets criteria for severe malnutrition noting weight loss and po intake.   --  Edema  None documented     Gastrointestinal BM 4/19, diarrhea reported   Tubes none   Oral/Mouth Cavity No chewing/swallowing issues reported by wife   Skin No breakdown documented   --  Current Nutrition Orders & Evaluation of Intake       Oral Nutrition     Current PO Diet Diet Gastrointestinal; Low Residue   Supplement -Boost Plus TID   PO Evaluation     % PO Intake -0-50%   --  Clinical Course    Nutrition Course Details 4/16 CL  4/17 Low Residue     Nutritional Risk Screening        NRS-2002 Score   -       Nutrition Diagnosis         Nutrition Dx Problem 1 -Severe chronic disease malnutrition related to inadequate calorie intake with pancreatic cancer as evidenced by po intake providing less than 50% of nutrition needs for greater than 1 month PTA, weight loss of 19% in 3 months, and physical exam revealing moderate muscle/fat loss.      Nutrition Dx Problem 2 -       Intervention Goal         Intervention Goal(s) -Meal intake greater than 50% of meals  -Weight stable     Nutrition Intervention        RD/Tech Action -Continue Boost Plus TID  -Add strawberry yogurt to all trays     Nutrition Prescription          Diet Prescription -Low Residue   Supplement Prescription -Boost Plus TID, strawberry yogurt TID   --  Monitor/Evaluation        Monitor PO intake, Supplement intake, Pertinent labs, Weight, Skin status, GI status, POC/GOC         Malnutrition  Severity Assessment                  Completed 4/19/21 by Arlette Shelton RD      Patient meets criteria for : Severe Malnutrition     Malnutrition Type (last 8 hours)      Malnutrition Severity Assessment     Row Name 04/19/21 1319       Malnutrition Severity Assessment    Malnutrition Type  Chronic Disease - Related Malnutrition    Row Name 04/19/21 1319       Insufficient Energy Intake     Insufficient Energy Intake Findings  Severe    Insufficient Energy Intake   <50% of est. energy requirement for >or equal to 1 month    Row Name 04/19/21 1319       Unintentional Weight Loss     Unintentional Weight Loss Findings  Severe    Unintentional Weight Loss   Weight loss greater than 7.5% in three months    Row Name 04/19/21 1319       Muscle Loss    Loss of Muscle Mass Findings  Moderate    [place] Region  Moderate - slight depression    Clavicle Bone Region  Moderate - some protrusion in females, visible in males    Acromion Bone Region  Moderate - acromion may slightly protrude    Scapular Bone Region  -- AMELIA    Dorsal Hand Region  Moderate - slight depression    Patellar Region  Moderate - patella more prominent, less muscle definition around patella    Anterior Thigh Region  Moderate - mild depression on inner thigh    Posterior Calf Region  Moderate - some roundness, slight firmness    Row Name 04/19/21 1319       Fat Loss    Subcutaneous Fat Loss Findings  Moderate    Orbital Region   Moderate -  somewhat hollowness, slightly dark circles    Upper Arm Region  Moderate - some fat tissue, not ample    Thoracic & Lumbar Region  None    Row Name 04/19/21 1319       Criteria Met (Must meet criteria for severity in at least 2 of these categories: M Wasting, Fat Loss, Fluid, Secondary Signs, Wt. Status, Intake)    Patient meets criteria for   Severe Malnutrition               Electronically signed by:  Arlette Shelton RD  04/19/21 12:45 EDT

## 2021-04-19 NOTE — PROGRESS NOTES
Hematology/Oncology Inpatient Progress Note    PATIENT NAME: Clifford Weber  : 1945  MRN: [id number]    CHIEF COMPLAINT: Abdominal pain/Weakness     HISTORY OF PRESENT ILLNESS:   76 y.o. male presented to [facility] emergency department on 2021 with complaints of increased abdominal pain and weakness for prior 5 days.  He started having diarrhea approximately 2 to 3 weeks ago and was started on daily Sandostatin last week.  After the third injection his diarrhea had stopped but then restarted on 2021.  He vomited on 2021 with dark green emesis which prompted him to come to the hospital. He was feeling poorly and was experiencing intermittent confusion.  Urine output was low.  He was having abdominal pain as well.  Patient reported a cough and subjective fever with chills prior 24 hours before coming to the hospital. Abdominal discomfort was worse with eating. Labs in the emergency department were as follows: White blood count 1.4, hemoglobin 10.8, MCV 83.6, platelets 91,000, . CMP significant for potassium 3.3, alkaline phosphatase 152, lipase 8 (13-60), lactate 2.2 (0.5-2.0), TSH 2.330 (0.270-4.200), pro calcitonin 3.52 (0.00-0.25). Bood cultures and COVID-19 screening were pending. A CT scan of the abdomen and pelvis with contrast in the emergency department showed a 2 cm mass in the uncinate process of the pancreas which is consistent with pancreatic cancer.  This was better defined than on prior exam.  There was mild dilatation of the pancreatic duct which was not previously identifiable.  A polypoid abnormality in posterior bladder was less defined on today's exam and was difficult to separate from the prostate.  There was thickening of the wall of the distal ileum consistent with infectious or inflammatory ileitis.  There was mild gaseous distention and fluid throughout much of the colon and rectum.  Antibiotics with Unasyn were initiated.  He was admitted for further  evaluation and treatment. Gastroenterology was consulted.  Fecal lactoferrin was positive, gastrointestinal panel was negative. C. difficile testing was pending. His Vitamin B-12 level was 1190 ([phone]).  Antibiotics were switched from Unasyn to Zosyn by GI 4/17/2021.     04/17/21  Hematology/Oncology was consulted by Dr. Nic Ochoa on this established patient followed for clinical stage IIb pancreatic adenocarcinoma.  He had  initially presented with abdominal pain and was hospitalized in December 2020.  At that time CT scan of the abdomen pelvis showed a hypoenhancing lesion in the uncinate process of the pancreas.  A polypoid nodularity was also seen along the posterior wall of the urinary bladder.  An MRI pancreatic protocol on 1/4/2021 showed a hypodense 2.1 cm mass suspicious for primary pancreatic malignancy.  On 1/7/2021 he underwent an EUS with FNA by Dr. Fierro.  There was a 2.4 cm at the uncinate process with one small peripancreatic lymph node that was 4 to 5 mm in size.  Dr. 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  The patient was seen by Dr. Diogenes Stapleton at [facility] on 1/21/2021 who recommended neoadjuvant chemotherapy with 4-6 cycles followed by a pancreatic protocol to determine eligibility for resection.  He started FOLFIRINOX on 2/8/2021 and received his fifth cycle on 4/5/2021.  Diarrhea started mid-March of 2021.  A CT chest on 3/16/2021 revealed small scattered non-- calcified pulmonary nodules measuring 5 mm or less in size and a low-density pancreatic uncinate process with some stranding around it.  A CT scan of thoracic and lumbar spine on same day showed degenerative changes throughout the spine, multilevel disc disease and degenerative facet change resulting in multilevel canal stenosis and neural foraminal narrowing..  The patient was last seen in the office on 4/12/2021  and was scheduled for 3-week follow-up. Octreotide was started for diarrhea with initial improvement after the third injection.  Cycle 6 of FOLFIRINOX was due on 4/19/2021.        PCP: Gris Muro APRN    INTERVAL HISTORY:  4/18/2021 - WBC 4.1, hemoglobin 9.3, platelets 109,000, ANC 0.98, reticulocytes 2.36 (0.),  haptoglobin 308 (),  iron 17 (59-1 58), iron saturation 7 (20-50), transferrin 153 (200-360), TIBC 228 (298-536), ferritin 2417 (), folate >20.0 (4.78-24.20), stool for C. Difficile negative (negative), Covid-19 screening negative (negative). Started daily Neupogen on 4/17/2021. Started Venofer 300 mg IV x 3 days.  4/19/2021 WBC 13.7, hemoglobin 836, platelets 119,000, ANC 6.58, occult blood stool - positive.  Neupogen discontinued.    History of present illness reviewed since last visit and changes noted on 04/19/21.    Subjective   He is having less diarrhea.  Cough 7 AM.  He is nauseated and vomited a couple of times yesterday.     ROS:  Review of Systems   Constitutional: Negative for activity change, appetite change, chills, fatigue, fever and unexpected weight change.   HENT: Negative for congestion, dental problem, hearing loss, mouth sores, nosebleeds, sore throat and trouble swallowing.    Eyes: Negative for photophobia and visual disturbance.   Respiratory: Positive for cough. Negative for chest tightness and shortness of breath.    Cardiovascular: Negative for chest pain, palpitations and leg swelling.   Gastrointestinal: Positive for diarrhea ( improving), nausea and vomiting ( yesterday). Negative for abdominal distention, abdominal pain, blood in stool and constipation.        Abdominal tightness.    Endocrine: Negative for cold intolerance and heat intolerance.   Genitourinary: Negative for decreased urine volume, difficulty urinating, dysuria, frequency, hematuria and urgency.        Urine output improved.   Musculoskeletal: Negative for arthralgias and gait problem.    Skin: Negative for rash and wound.   Neurological: Negative for dizziness, tremors, seizures, weakness, light-headedness, numbness and headaches.   Hematological: Negative for adenopathy. Does not bruise/bleed easily.   Psychiatric/Behavioral: Negative for confusion and hallucinations. The patient is not nervous/anxious.    All other systems reviewed and are negative.    MEDICATIONS:    Scheduled Meds:  aspirin, 81 mg, Oral, Daily  cholestyramine light, 1 packet, Oral, Q12H  dicyclomine, 20 mg, Oral, TID  filgrastim (NEUPOGEN) injection, 480 mcg, Subcutaneous, Q PM  iron sucrose, 300 mg, Intravenous, Daily  levothyroxine, 75 mcg, Oral, Q AM  octreotide, 100 mcg, Subcutaneous, Q12H  Pancrelipase (Lip-Prot-Amyl), 36,000 units of lipase, Oral, TID  pantoprazole, 40 mg, Oral, QAM  piperacillin-tazobactam, 3.375 g, Intravenous, Q8H  potassium chloride, 20 mEq, Oral, Daily  pregabalin, 150 mg, Oral, TID  rosuvastatin, 10 mg, Oral, Nightly  saccharomyces boulardii, 500 mg, Oral, BID  Scopolamine, 1 patch, Transdermal, Q72H  sodium chloride, 10 mL, Intravenous, Q12H       Continuous Infusions:      PRN Meds:  •  acetaminophen **OR** acetaminophen **OR** acetaminophen  •  Calcium Gluconate-NaCl **AND** calcium gluconate **AND** Calcium, Ionized  •  chlorpheniramine  •  diphenoxylate-atropine  •  HYDROcodone-acetaminophen  •  loperamide  •  LORazepam  •  magnesium sulfate **OR** magnesium sulfate **OR** magnesium sulfate  •  melatonin  •  nitroglycerin  •  ondansetron **OR** ondansetron  •  potassium & sodium phosphates **OR** potassium & sodium phosphates  •  potassium chloride  •  potassium chloride  •  prochlorperazine  •  prochlorperazine  •  sodium chloride     ALLERGIES:    Allergies   Allergen Reactions   • Niacin Unknown (See Comments)     Abstracted from [employer].       Objective    VITALS:   /63 (BP Location: Left arm, Patient Position: Lying)   Pulse 54   Temp 97.9 °F (36.6 °C) (Oral)   Resp 16   Ht  "188 cm (74\")   Wt 74.4 kg (164 lb 0.4 oz)   SpO2 100%   BMI 21.06 kg/m²     PHYSICAL EXAM:  Physical Exam  Vitals and nursing note reviewed.   Constitutional:       General: He is not in acute distress.     Appearance: Normal appearance. He is well-developed. He is not diaphoretic.   HENT:      Head: Normocephalic and atraumatic.      Right Ear: External ear normal.      Left Ear: External ear normal.      Nose: Nose normal.      Mouth/Throat:      Mouth: Mucous membranes are moist.      Pharynx: Oropharynx is clear. No oropharyngeal exudate or posterior oropharyngeal erythema.      Comments: Dental fillings.   Eyes:      General: No scleral icterus.     Extraocular Movements: Extraocular movements intact.      Conjunctiva/sclera: Conjunctivae normal.      Pupils: Pupils are equal, round, and reactive to light.      Comments: Wears corrective lenses.   Cardiovascular:      Rate and Rhythm: Regular rhythm. Bradycardia present.      Heart sounds: Normal heart sounds. No murmur heard.        Comments: Cardiac monitor leads.   Pulmonary:      Effort: Pulmonary effort is normal. No respiratory distress.      Breath sounds: Normal breath sounds. No wheezing or rales.   Chest:      Comments: Left chest wall port.  Abdominal:      General: Bowel sounds are normal. There is no distension.      Palpations: Abdomen is soft. There is no mass.      Tenderness: There is no abdominal tenderness. There is no guarding.   Genitourinary:     Comments: Deferred   Musculoskeletal:         General: No swelling, tenderness or deformity. Normal range of motion.      Cervical back: Normal range of motion and neck supple.      Right lower leg: No edema.      Left lower leg: No edema.      Comments: BUE IV.    Lymphadenopathy:      Cervical: No cervical adenopathy.      Upper Body:      Right upper body: No supraclavicular adenopathy.      Left upper body: No supraclavicular adenopathy.   Skin:     General: Skin is warm and dry.      " Coloration: Skin is not pale.      Findings: No bruising, erythema or rash.   Neurological:      General: No focal deficit present.      Mental Status: He is alert and oriented to person, place, and time.      Coordination: Coordination normal.   Psychiatric:         Mood and Affect: Mood normal.         Behavior: Behavior normal.         Thought Content: Thought content normal.         RECENT LABS:  Lab Results (last 24 hours)     Procedure Component Value Units Date/Time    Pathology Consultation [[id number]] Collected: 04/19/21 0330    Specimen: Blood, Venous Line Updated: 04/19/21 0806    Path Consult Reflex [[id number]] Collected: 04/19/21 0330    Specimen: Blood Updated: 04/19/21 0757     Pathology Review Yes    Manual Differential [[id number]]  (Abnormal) Collected: 04/19/21 0330    Specimen: Blood Updated: 04/19/21 0757     Neutrophil % 20.0 %      Lymphocyte % 18.0 %      Monocyte % 1.0 %      Bands %  28.0 %      Metamyelocyte % 10.0 %      Myelocyte % 5.0 %      Promyelocyte % 4.0 %      Blasts % 3.0 %      Other Cells % 11.0 %      Neutrophils Absolute 6.58 10*3/mm3      Lymphocytes Absolute 2.47 10*3/mm3      Monocytes Absolute 0.14 10*3/mm3      nRBC 5.0 /100 WBC      Anisocytosis Slight/1+     Crenated RBC's Mod/2+     Poikilocytes Mod/2+     Toxic Granulation Slight/1+     Vacuolated Neutrophils Slight/1+     Platelet Estimate Decreased    CBC & Differential [[id number]]  (Abnormal) Collected: 04/19/21 0330    Specimen: Blood Updated: 04/19/21 0757    Narrative:      The following orders were created for panel order CBC & Differential.  Procedure                               Abnormality         Status                     ---------                               -----------         ------                     Scan Slide[[id number]]                                       Final result               CBC Auto Differential[[id number]]        Abnormal            Final result                 Please view results for  these tests on the individual orders.    Scan Slide [[id number]] Collected: 04/19/21 0330    Specimen: Blood Updated: 04/19/21 0757     Scan Slide --     Comment: See Manual Differential Results       CBC Auto Differential [[id number]]  (Abnormal) Collected: 04/19/21 0330    Specimen: Blood Updated: 04/19/21 0757     WBC 13.70 10*3/mm3      RBC 3.06 10*6/mm3      Hemoglobin 8.6 g/dL      Hematocrit 26.3 %      MCV 85.9 fL      MCH 28.3 pg      MCHC 32.9 g/dL      RDW 16.5 %      RDW-SD 50.3 fl      MPV 9.3 fL      Platelets 119 10*3/mm3     Narrative:      The previously reported component NRBC is no longer being reported. Previous result was 0.3 /100 WBC (Reference Range: 0.0-0.2 /100 WBC) on 4/19/2021 at 0418 EDT.    Occult Blood, Fecal By Immunoassay - Stool, Per Rectum [[id number]]  (Abnormal) Collected: 04/17/21 1450    Specimen: Stool from Per Rectum Updated: 04/19/21 0717     Occult Blood, Fecal by Immunoassay Positive    Comprehensive Metabolic Panel [[id number]]  (Abnormal) Collected: 04/19/21 0330    Specimen: Blood Updated: 04/19/21 0430     Glucose 93 mg/dL      BUN 20 mg/dL      Creatinine 0.81 mg/dL      Sodium 141 mmol/L      Potassium 3.1 mmol/L      Chloride 106 mmol/L      CO2 21.0 mmol/L      Calcium 8.5 mg/dL      Total Protein 5.2 g/dL      Albumin 2.70 g/dL      ALT (SGPT) 27 U/L      AST (SGOT) 32 U/L      Alkaline Phosphatase 109 U/L      Total Bilirubin 0.5 mg/dL      eGFR Non African Amer 93 mL/min/1.73      Globulin 2.5 gm/dL      A/G Ratio 1.1 g/dL      BUN/Creatinine Ratio 24.7     Anion Gap 14.0 mmol/L     Narrative:      GFR Normal >60  Chronic Kidney Disease <60  Kidney Failure <15      Magnesium [[id number]]  (Normal) Collected: 04/19/21 0330    Specimen: Blood Updated: 04/19/21 0430     Magnesium 2.2 mg/dL     Blood Culture - Blood, Arm, Left [[id number]] Collected: 04/16/21 2228    Specimen: Blood from Arm, Left Updated: 04/18/21 2248     Blood Culture No growth at 2 days        PENDING  RESULTS: Blood cultures,  CA 19-9 (office lab on 4/12/2021), CA 19-9, UA with reflux to culture.    IMAGING REVIEWED:  No radiology results for the last day  I have reviewed the patient's labs, imaging, reports, and other clinician documentation.    Assessment/Plan   ASSESSMENT  1. Clinical stage IIb pancreatic adenocarcinoma: S/p 5 cycles neoadjuvant FOLFIRINOX. Cycle 6 due 4/19/2021.   2. Pancytopenia/Neutropenia: Likely secondary to chemotherapay.  Anemia work-up  showed iron deficiency anemia.  Has history of B12 deficiency. Ferritin was elevated (actute phase reactant).  Vitamin B-12 level and folate normal. No evidence of hemolysis. Started Venofer 300 mg IV on 4/18/2021.  On Neupogen with response noted.   3. Diarrhea/Nausea/Vomiting/Colitis: Diarrhea ongoing since mid-March 2021, possibly chemotherapy-induced.  Started on octreotide week of 4/5/2021 as outpatient with initial improvement after 3 injections.  Lipase level low. CT A/P 4/16/2021-thickening of terminal ileum. Stool studies negative other than lactoferrin positive. GI consulted.  Blood cultures are growing Staphylococcus not aureus, likely a contaminant. Repeat blood culture is pending.  UA ordered. Gastrointestinal panel and C. difficile were negative. Received Unasyn and switched to Zosyn by GI.  Probiotic started in addition to home meds - Questran, scopolamine, pancreatic enzymes, and PPI.  Clear liquid diet started. S/p 2L lactated ringers. No plans for endoscopy at this time. Occult blood stool - positive. Diarrhea slightly improved.  On 4/18/2021 - octreotide started by primary team and GI increased Colestipol and added dicyclomine and Florastor and advance diet to low residue diet with Ensure.   4. Anorexia/Unintentional weight loss: On cyproheptadine and Zenpep as outpatient  5. Hypomagnesemia/Chronic pain/Degenerative disc. disease/Anxiety CAD/HLD/Hypothyroidism/Hypokalemia: management per Primary team.      PLAN  1. Follow  CBC.  2. Follow CA 19-9 result.  3. Continue Venofer 300 mg IV x 3 days, day 2/3.  4. Discontinue  Neupogen 480 mcg SQ once daily.   5. Continue IV antibiotics.  6. Continue Sandostatin 100 mcg SQ Q12H.   7. With switch to Depo Sandostatin as an outpatient.  8. Hold outpatient chemotherapy.      Electronically signed by TESS Millan, 04/19/21, 9:13 AM EDT.         I have personally performed a face-to-face diagnostic evaluation on this patient.  I have discussed the case with Blanka Gimenez NP, have edited/reviewed the note, and agree with the care plan.    The patient claims to be feeling better but still has nausea and vomiting.  On examination, he has bilateral IVs in upper extremities and left-sided [id number].  Labs are significant for further rise in WBCs.  He seems to be recovering from the colitis and will plan on changing to Depot Sandostatin as outpatient.  Will stop Neupogen with rise in WBC.    I discussed the patients findings and my recommendations with patient    Electronically signed by Demian Mello MD, 04/19/21, 12:31 PM EDT.

## 2021-04-19 NOTE — PLAN OF CARE
Goal Outcome Evaluation:  Plan of Care Reviewed With: patient, spouse  Progress: improving     Patient had a much better night and was able to rest.  Less diarrhea.  Forgetful at times.  Complained of abdominal pain and nausea one time and medicated with relief.

## 2021-04-19 NOTE — PROGRESS NOTES
LOS: 0 days   Patient Care Team:  Gris Muro APRN as PCP - General (Nurse Practitioner)  Demian Mello MD as Consulting Physician (Hematology and Oncology)      Subjective     Interval History:     Subjective: Patient reports that he is feeling some better today.  Diarrhea has begun to slow somewhat.  He has had 5-6 bowel movement so far today, but none overnight which is an improvement.  Abdominal pain is also improving.  No bright red blood per rectum or melena.      ROS:   No chest pain, shortness of breath, or cough.        Medication Review:     Current Facility-Administered Medications:   •  acetaminophen (TYLENOL) tablet 650 mg, 650 mg, Oral, Q4H PRN **OR** acetaminophen (TYLENOL) 160 MG/5ML solution 650 mg, 650 mg, Oral, Q4H PRN **OR** acetaminophen (TYLENOL) suppository 650 mg, 650 mg, Rectal, Q4H PRN, Derek Lester PA-C  •  aspirin EC tablet 81 mg, 81 mg, Oral, Daily, Derek Lester PA-C, 81 mg at 04/19/21 0925  •  calcium gluconate 1g/50ml 0.675% NaCl IV SOLN, 1 g, Intravenous, PRN **AND** calcium gluconate 2-0.675 GM/100ML NACL IVPB, 2 g, Intravenous, PRN **AND** Calcium, Ionized, , , PRN, Derek Lester PA-C  •  chlorpheniramine (CHLOR-TRIMETON) tablet 4 mg, 4 mg, Oral, Q6H PRN, Kajal Oliveira MD  •  cholestyramine light packet 4 g, 1 packet, Oral, Q12H, Tigist Frankel APRN, 4 g at 04/19/21 0924  •  dicyclomine (BENTYL) capsule 20 mg, 20 mg, Oral, TID, Tigist Frankel APRN, 20 mg at 04/19/21 0925  •  diphenoxylate-atropine (LOMOTIL) 2.5-0.025 MG per tablet 1 tablet, 1 tablet, Oral, 4x Daily PRN, Kajal Oliveira MD  •  HYDROcodone-acetaminophen (NORCO) 5-325 MG per tablet 1 tablet, 1 tablet, Oral, Q6H PRN, Derek Lester PA-C, 1 tablet at 04/19/21 0448  •  iron sucrose 300 mg in 250 mL NS, 300 mg, Intravenous, Daily, Blanka Gimenez APRN, 300 mg at 04/19/21 0924  •  levothyroxine (SYNTHROID, LEVOTHROID) tablet 75 mcg, 75 mcg, Oral, Q AM, Trevon  NATHEN Reed, 75 mcg at 04/19/21 0517  •  loperamide (IMODIUM) capsule 2 mg, 2 mg, Oral, 4x Daily PRN, Kajal Oliveira MD, 2 mg at 04/19/21 0517  •  LORazepam (ATIVAN) tablet 0.5 mg, 0.5 mg, Oral, Q8H PRN, Derek Lester PA-C  •  Magnesium Sulfate 2 gram Bolus, followed by 8 gram infusion (total Mg dose 10 grams)- Mg less than or equal to 1mg/dL, 2 g, Intravenous, PRN **OR** Magnesium Sulfate 2 gram / 50mL Infusion (GIVE X 3 BAGS TO EQUAL 6GM TOTAL DOSE) - Mg 1.1 - 1.5 mg/dl, 2 g, Intravenous, PRN **OR** Magnesium Sulfate 4 gram infusion- Mg 1.6-1.9 mg/dL, 4 g, Intravenous, PRN, Derek Lester PA-C, Stopped at 04/17/21 1445  •  melatonin tablet 5 mg, 5 mg, Oral, Nightly PRN, Derek Lester PA-C  •  nitroglycerin (NITROSTAT) SL tablet 0.4 mg, 0.4 mg, Sublingual, Q5 Min PRN, Derek Lester PA-C  •  octreotide (sandoSTATIN) injection 100 mcg, 100 mcg, Subcutaneous, Q12H, Kajal Oliveira MD, 100 mcg at 04/19/21 1119  •  ondansetron (ZOFRAN) tablet 4 mg, 4 mg, Oral, Q6H PRN **OR** ondansetron (ZOFRAN) injection 4 mg, 4 mg, Intravenous, Q6H PRN, Derek Lester PA-C, 4 mg at 04/19/21 1118  •  Pancrelipase (Lip-Prot-Amyl) (CREON) capsule 36,000 units of lipase, 36,000 units of lipase, Oral, TID, Derek Lester PA-C, 36,000 units of lipase at 04/19/21 0924  •  pantoprazole (PROTONIX) EC tablet 40 mg, 40 mg, Oral, QAM, Derek Lester PA-C, 40 mg at 04/19/21 0604  •  piperacillin-tazobactam (ZOSYN) IVPB 3.375 g in 100 mL NS (CD), 3.375 g, Intravenous, Q8H, Sharan Tellez MD, 3.375 g at 04/19/21 0448  •  potassium & sodium phosphates (PHOS-NAK) 280-160-250 MG packet - for Phosphorus less than 1.25 mg/dL, 2 packet, Oral, Q6H PRN **OR** potassium & sodium phosphates (PHOS-NAK) 280-160-250 MG packet - for Phosphorus 1.25 - 2.5 mg/dL, 2 packet, Oral, Q6H PRN, Derek Lester PA-C  •  potassium chloride (K-DUR,KLOR-CON) CR tablet 20 mEq, 20 mEq, Oral, Daily,  Kajal Oliveira MD, 20 mEq at 04/19/21 0925  •  potassium chloride (K-DUR,KLOR-CON) CR tablet 40 mEq, 40 mEq, Oral, PRN, Derek Lester PA-C, 40 mEq at 04/1945  •  potassium chloride (KLOR-CON) packet 40 mEq, 40 mEq, Oral, PRN, Derek Lester PA-C, 40 mEq at 04/18/21 0633  •  potassium chloride 10 mEq in 100 mL IVPB, 10 mEq, Intravenous, Q1H PRN, Autumn Green MD  •  pregabalin (LYRICA) capsule 150 mg, 150 mg, Oral, TID, Derek Lester PA-C, 150 mg at 04/19/21 0925  •  prochlorperazine (COMPAZINE) injection 10 mg, 10 mg, Intravenous, Q6H PRN, Derek Lester PA-C, 10 mg at 04/17/21 0502  •  prochlorperazine (COMPAZINE) tablet 10 mg, 10 mg, Oral, Q8H PRN, Kajal Oliveira MD  •  rosuvastatin (CRESTOR) tablet 10 mg, 10 mg, Oral, Nightly, Derek Lester PA-C, 10 mg at 04/18/21 2048  •  saccharomyces boulardii (FLORASTOR) capsule 500 mg, 500 mg, Oral, BID, Tigist Frankel, APRN, 500 mg at 04/19/21 0924  •  Scopolamine (TRANSDERM-SCOP) 1.5 MG/3DAYS patch 1 patch, 1 patch, Transdermal, Q72H, Derek Lester PA-C, 1 patch at 04/17/21 1220  •  sodium chloride 0.9 % flush 10 mL, 10 mL, Intravenous, Q12H, Derek Lester PA-C, 10 mL at 04/19/21 0929  •  sodium chloride 0.9 % flush 10 mL, 10 mL, Intravenous, PRN, Derek Lester PA-C      Objective     Vital Signs  Vitals:    04/18/21 0440 04/18/21 1117 04/18/21 2000 04/19/21 0347   BP: 117/73 95/61 114/66 102/63   BP Location: Left arm Left arm Left arm Left arm   Patient Position: Lying Lying Lying Lying   Pulse: 76 58 52 54   Resp: 16 18 16 16   Temp: 97.2 °F (36.2 °C) 97.6 °F (36.4 °C) 97.7 °F (36.5 °C) 97.9 °F (36.6 °C)   TempSrc: Oral Oral Oral Oral   SpO2: 100% 98% 98% 100%   Weight: 74.6 kg (164 lb 8 oz)   74.4 kg (164 lb 0.4 oz)   Height:           Physical Exam:     General Appearance:    Awake and alert, in no acute distress, drowsy   Head:    Normocephalic, without obvious abnormality   Eyes:           Conjunctivae normal, anicteric sclera   Throat:   No oral lesions, no thrush, oral mucosa moist   Neck:   No adenopathy, supple, no JVD   Lungs:     respirations regular, even and unlabored   Abdomen:     Soft, generalized tenderness, no rebound or guarding, non-distended   Rectal:     Deferred   Extremities:   No edema, no cyanosis   Skin:   No bruising or rash, no jaundice        Results Review:    CBC    Results from last 7 days   Lab Units 04/19/21  0330 04/18/21  0126 04/17/21  0426 04/16/21  1837   WBC 10*3/mm3 13.70* 4.10 1.10* 1.40*   HEMOGLOBIN g/dL 8.6* 9.3* 10.8* 10.8*   PLATELETS 10*3/mm3 119* 109* 97* 91*     CMP   Results from last 7 days   Lab Units 04/19/21  0330 04/18/21  0126 04/17/21  1101 04/17/21  0426 04/16/21  2326 04/16/21  1837   SODIUM mmol/L 141 140  --  136  --  136   POTASSIUM mmol/L 3.1* 3.2* 3.4* 3.4* 3.3* 3.3*   CHLORIDE mmol/L 106 104  --  97*  --  98   CO2 mmol/L 21.0* 23.0  --  20.0*  --  21.0*   BUN mg/dL 20 18  --  17  --  18   CREATININE mg/dL 0.81 0.83  --  1.11  --  1.00   GLUCOSE mg/dL 93 112*  --  145*  --  127*   ALBUMIN g/dL 2.70* 3.10*  --   --   --  3.60   BILIRUBIN mg/dL 0.5 0.6  --   --   --  0.6   ALK PHOS U/L 109 115  --   --   --  152*   AST (SGOT) U/L 32 39  --   --   --  40   ALT (SGPT) U/L 27 31  --   --   --  31   MAGNESIUM mg/dL 2.2 2.5*  --  1.7  --   --    LIPASE U/L  --   --   --   --   --  8*     Cr Clearance Estimated Creatinine Clearance: 84.2 mL/min (by C-G formula based on SCr of 0.81 mg/dL).  Coag     HbA1C   Lab Results   Component Value Date    HGBA1C 5.5 08/21/2019     Blood Glucose No results found for: POCGLU  Infection   Results from last 7 days   Lab Units 04/16/21  2326 04/16/21  2229 04/16/21  2228   BLOODCX   --  Staphylococcus, coagulase negative* No growth at 2 days   BCIDPCR   --  Staphylococcus spp, not aureus. Identification by BCID PCR.*  --    PROCALCITONIN ng/mL 3.52*  --   --      UA      Radiology(recent) No radiology results for  the last day       Assessment/Plan     ASSESSMENT:  76-year-old male with history of pancreatic cancer s/p chemotherapy, colon polyps, Sales's esophagus, and CAD, complains of lower abdominal pain, black diarrhea, nausea/vomiting, and weakness.  CT consistent with pancreatic mass as well as colitis (infectious versus inflammatory).     -Diarrhea - ddx Typhlitis vs. Other   -Colitis per CT (infectious versus inflammatory)  -Pancreatic cancer -s/p chemotherapy and repeat PET scan, results unknown  -Nausea/vomiting -chronic but worse in past week  -Lower abdominal pain  -Weakness  -History of esophagitis concerning for Sales's but biopsies negative  -History of colon polyps -overdue for surveillance colonoscopy  -CAD  -Hiatal hernia  -Pancytopenia, neutropenia - improved with Neupogen  -Hypokalemia  -Weight loss -approximately 100 pounds in the past year     PLAN:  Patient reports that diarrhea has slowed somewhat.  He has had 5-6 loose bowel movements today, but had none overnight.  Stool studies this admission have been negative for enteric pathogens and C. Difficile.  WBC count is up to 13.7.  The patient did receive Neupogen this admission.  Blood cultures were positive for coag negative staph.  Continue antibiotics.  Continue colestipol, octreotide twice daily, dicyclomine, and Florastor.  Patient is also on pancreatic enzymes.  Continue Ensure supplementation and low residue diet.  Continue supportive care.    TESS Bolivar  04/19/21  11:30 EDT

## 2021-04-20 NOTE — PROGRESS NOTES
Hematology/Oncology Inpatient Progress Note    PATIENT NAME: Clifford Weber  : 1945  MRN: [id number]    CHIEF COMPLAINT: Abdominal pain/Weakness     HISTORY OF PRESENT ILLNESS:   76 y.o. male presented to [facility] emergency department on 2021 with complaints of increased abdominal pain and weakness for prior 5 days.  He started having diarrhea approximately 2 to 3 weeks ago and was started on daily Sandostatin last week.  After the third injection his diarrhea had stopped but then restarted on 2021.  He vomited on 2021 with dark green emesis which prompted him to come to the hospital. He was feeling poorly and was experiencing intermittent confusion.  Urine output was low.  He was having abdominal pain as well.  Patient reported a cough and subjective fever with chills prior 24 hours before coming to the hospital. Abdominal discomfort was worse with eating. Labs in the emergency department were as follows: White blood count 1.4, hemoglobin 10.8, MCV 83.6, platelets 91,000, . CMP significant for potassium 3.3, alkaline phosphatase 152, lipase 8 (13-60), lactate 2.2 (0.5-2.0), TSH 2.330 (0.270-4.200), pro calcitonin 3.52 (0.00-0.25). Bood cultures and COVID-19 screening were pending. A CT scan of the abdomen and pelvis with contrast in the emergency department showed a 2 cm mass in the uncinate process of the pancreas which is consistent with pancreatic cancer.  This was better defined than on prior exam.  There was mild dilatation of the pancreatic duct which was not previously identifiable.  A polypoid abnormality in posterior bladder was less defined on today's exam and was difficult to separate from the prostate.  There was thickening of the wall of the distal ileum consistent with infectious or inflammatory ileitis.  There was mild gaseous distention and fluid throughout much of the colon and rectum.  Antibiotics with Unasyn were initiated.  He was admitted for further  evaluation and treatment. Gastroenterology was consulted.  Fecal lactoferrin was positive, gastrointestinal panel was negative. C. difficile testing was pending. His Vitamin B-12 level was 1190 ([phone]).  Antibiotics were switched from Unasyn to Zosyn by GI 4/17/2021.     04/17/21  Hematology/Oncology was consulted by Dr. Nic Ochao on this established patient followed for clinical stage IIb pancreatic adenocarcinoma.  He had  initially presented with abdominal pain and was hospitalized in December 2020.  At that time CT scan of the abdomen pelvis showed a hypoenhancing lesion in the uncinate process of the pancreas.  A polypoid nodularity was also seen along the posterior wall of the urinary bladder.  An MRI pancreatic protocol on 1/4/2021 showed a hypodense 2.1 cm mass suspicious for primary pancreatic malignancy.  On 1/7/2021 he underwent an EUS with FNA by Dr. Fierro.  There was a 2.4 cm at the uncinate process with one small peripancreatic lymph node that was 4 to 5 mm in size.  Dr. 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  The patient was seen by Dr. Diogenes Stapleton at [facility] on 1/21/2021 who recommended neoadjuvant chemotherapy with 4-6 cycles followed by a pancreatic protocol to determine eligibility for resection.  He started FOLFIRINOX on 2/8/2021 and received his fifth cycle on 4/5/2021.  Diarrhea started mid-March of 2021.  A CT chest on 3/16/2021 revealed small scattered non-- calcified pulmonary nodules measuring 5 mm or less in size and a low-density pancreatic uncinate process with some stranding around it.  A CT scan of thoracic and lumbar spine on same day showed degenerative changes throughout the spine, multilevel disc disease and degenerative facet change resulting in multilevel canal stenosis and neural foraminal narrowing..  The patient was last seen in the office on 4/12/2021  and was scheduled for 3-week follow-up. Octreotide was started for diarrhea with initial improvement after the third injection.  Cycle 6 of FOLFIRINOX was due on 4/19/2021.        PCP: Gris Muro APRN    INTERVAL HISTORY:  4/18/2021 - WBC 4.1, hemoglobin 9.3, platelets 109,000, ANC 0.98, reticulocytes 2.36 (0.),  haptoglobin 308 (),  iron 17 (59-1 58), iron saturation 7 (20-50), transferrin 153 (200-360), TIBC 228 (298-536), ferritin 2417 (), folate >20.0 (4.78-24.20), stool for C. Difficile negative (negative), Covid-19 screening negative (negative). Started daily Neupogen on 4/17/2021. Started Venofer 300 mg IV x 3 days.  4/19/2021 - WBC 13.7, hemoglobin 8.6, platelets 119,000, ANC 6.58, occult blood stool - positive.  Neupogen discontinued.  4/20/2021 - WBC 34.5, hemoglobin 8.6, platelets 151,000.     History of present illness reviewed since last visit and changes noted on 04/20/21.    Subjective   Patient's wife is unhappy because he is still having diarrhea.  He is not eating well.  He is sleepy and has had some nausea.    ROS:  Review of Systems   Constitutional: Positive for appetite change. Negative for activity change, chills, fatigue, fever and unexpected weight change.   HENT: Negative for mouth sores, sore throat and tinnitus.    Eyes: Negative for photophobia, pain, redness and visual disturbance.   Respiratory: Negative for cough, choking and shortness of breath.    Cardiovascular: Negative for chest pain, palpitations and leg swelling.   Gastrointestinal: Positive for diarrhea and nausea. Negative for abdominal pain, constipation and vomiting.   Genitourinary: Negative for dysuria and hematuria.   Musculoskeletal: Negative for arthralgias, back pain, myalgias and neck pain.   Skin: Negative for rash and wound.   Allergic/Immunologic: Negative for environmental allergies.   Neurological: Negative for dizziness, weakness, light-headedness, numbness and headaches.        Sleepy.  "  Hematological: Negative for adenopathy. Does not bruise/bleed easily.   Psychiatric/Behavioral: Negative for dysphoric mood. The patient is not nervous/anxious.    All other systems reviewed and are negative.    MEDICATIONS:    Scheduled Meds:  aspirin, 81 mg, Oral, Daily  cholestyramine light, 1 packet, Oral, Q12H  dicyclomine, 20 mg, Oral, TID  iron sucrose, 300 mg, Intravenous, Daily  levothyroxine, 75 mcg, Oral, Q AM  octreotide, 100 mcg, Subcutaneous, Q12H  Pancrelipase (Lip-Prot-Amyl), 36,000 units of lipase, Oral, TID  pantoprazole, 40 mg, Oral, QAM  piperacillin-tazobactam, 3.375 g, Intravenous, Q8H  potassium chloride, 20 mEq, Oral, Daily  pregabalin, 150 mg, Oral, TID  rosuvastatin, 10 mg, Oral, Nightly  saccharomyces boulardii, 500 mg, Oral, BID  Scopolamine, 1 patch, Transdermal, Q72H  sodium chloride, 10 mL, Intravenous, Q12H       Continuous Infusions:      PRN Meds:  •  acetaminophen **OR** acetaminophen **OR** acetaminophen  •  Calcium Gluconate-NaCl **AND** calcium gluconate **AND** Calcium, Ionized  •  chlorpheniramine  •  diphenoxylate-atropine  •  HYDROcodone-acetaminophen  •  loperamide  •  LORazepam  •  magnesium sulfate **OR** magnesium sulfate **OR** magnesium sulfate  •  melatonin  •  nitroglycerin  •  ondansetron **OR** ondansetron  •  potassium & sodium phosphates **OR** potassium & sodium phosphates  •  potassium chloride  •  potassium chloride  •  potassium chloride  •  prochlorperazine  •  prochlorperazine  •  sodium chloride     ALLERGIES:    Allergies   Allergen Reactions   • Niacin Unknown (See Comments)     Abstracted from [facility].       Objective    VITALS:   /61 (BP Location: Right arm, Patient Position: Lying)   Pulse 51   Temp 97.9 °F (36.6 °C) (Oral)   Resp 18   Ht 188 cm (74\")   Wt 73.5 kg (162 lb 0.6 oz)   SpO2 97%   BMI 20.80 kg/m²     PHYSICAL EXAM:  Physical Exam  Vitals and nursing note reviewed.   Constitutional:       General: He is not in acute " distress.     Appearance: Normal appearance. He is well-developed. He is not diaphoretic.   HENT:      Head: Normocephalic and atraumatic.      Right Ear: External ear normal.      Left Ear: External ear normal.      Nose: Nose normal.      Mouth/Throat:      Mouth: Mucous membranes are moist.      Pharynx: Oropharynx is clear. No oropharyngeal exudate or posterior oropharyngeal erythema.      Comments: Dental fillings.   Eyes:      General: No scleral icterus.     Extraocular Movements: Extraocular movements intact.      Conjunctiva/sclera: Conjunctivae normal.      Pupils: Pupils are equal, round, and reactive to light.      Comments: Wears corrective lenses.   Cardiovascular:      Rate and Rhythm: Regular rhythm. Bradycardia present.      Heart sounds: Normal heart sounds. No murmur heard.        Comments: Cardiac monitor leads.   Pulmonary:      Effort: Pulmonary effort is normal. No respiratory distress.      Breath sounds: Normal breath sounds. No wheezing or rales.   Chest:      Comments: Left chest wall port (unaccessed).   Abdominal:      General: Bowel sounds are normal. There is no distension.      Palpations: Abdomen is soft. There is no mass.      Tenderness: There is abdominal tenderness in the epigastric area. There is no guarding.   Genitourinary:     Comments: Deferred   Musculoskeletal:         General: No swelling, tenderness or deformity. Normal range of motion.      Cervical back: Normal range of motion and neck supple.      Right lower leg: No edema.      Left lower leg: No edema.      Comments: RUE IV.    Lymphadenopathy:      Cervical: No cervical adenopathy.      Upper Body:      Right upper body: No supraclavicular adenopathy.      Left upper body: No supraclavicular adenopathy.   Skin:     General: Skin is warm and dry.      Coloration: Skin is not jaundiced or pale.      Findings: No bruising, erythema or rash.   Neurological:      General: No focal deficit present.      Mental Status: He  is alert and oriented to person, place, and time.      Coordination: Coordination normal.      Comments: Somnolent.    Psychiatric:         Mood and Affect: Mood normal.         Behavior: Behavior normal.         Thought Content: Thought content normal.         RECENT LABS:  Lab Results (last 24 hours)     Procedure Component Value Units Date/Time    Manual Differential [[id number]]  (Abnormal) Collected: 04/20/21 0348    Specimen: Blood Updated: 04/20/21 0544     Neutrophil % 23.0 %      Lymphocyte % 20.0 %      Monocyte % 16.0 %      Eosinophil % 2.0 %      Bands %  32.0 %      Metamyelocyte % 3.0 %      Myelocyte % 3.0 %      Promyelocyte % 1.0 %      Neutrophils Absolute 18.98 10*3/mm3      Lymphocytes Absolute 6.90 10*3/mm3      Monocytes Absolute 5.52 10*3/mm3      Eosinophils Absolute 0.69 10*3/mm3      Acanthocytes Slight/1+     Anisocytosis Slight/1+     Paddy Cells Slight/1+     Elliptocytes Slight/1+     Poikilocytes Mod/2+     WBC Morphology Normal     Platelet Morphology Normal    Narrative:      Reviewed by Pathologist within the past 30 days on 4.19.21 .    CBC & Differential [[id number]]  (Abnormal) Collected: 04/20/21 0348    Specimen: Blood Updated: 04/20/21 0544    Narrative:      The following orders were created for panel order CBC & Differential.  Procedure                               Abnormality         Status                     ---------                               -----------         ------                     Scan Slide[[id number]]                                       Final result               CBC Auto Differential[[id number]]        Abnormal            Final result                 Please view results for these tests on the individual orders.    Scan Slide [[id number]] Collected: 04/20/21 0348    Specimen: Blood Updated: 04/20/21 0544     Scan Slide --     Comment: See Manual Differential Results       CBC Auto Differential [[id number]]  (Abnormal) Collected: 04/20/21 0348    Specimen:  Blood Updated: 04/20/21 0544     WBC 34.50 10*3/mm3      RBC 3.12 10*6/mm3      Hemoglobin 8.6 g/dL      Hematocrit 26.0 %      MCV 83.3 fL      MCH 27.5 pg      MCHC 33.0 g/dL      RDW 16.8 %      RDW-SD 48.6 fl      MPV 9.1 fL      Platelets 151 10*3/mm3     Narrative:      The previously reported component NRBC is no longer being reported. Previous result was 0.1 /100 WBC (Reference Range: 0.0-0.2 /100 WBC) on 4/20/2021 at 0457 EDT.    Comprehensive Metabolic Panel [[id number]]  (Abnormal) Collected: 04/20/21 0348    Specimen: Blood Updated: 04/20/21 0526     Glucose 86 mg/dL      BUN 17 mg/dL      Creatinine 0.85 mg/dL      Sodium 144 mmol/L      Potassium 3.3 mmol/L      Chloride 106 mmol/L      CO2 23.0 mmol/L      Calcium 8.5 mg/dL      Total Protein 5.2 g/dL      Albumin 2.80 g/dL      ALT (SGPT) 29 U/L      AST (SGOT) 43 U/L      Alkaline Phosphatase 126 U/L      Total Bilirubin 0.4 mg/dL      eGFR Non African Amer 88 mL/min/1.73      Globulin 2.4 gm/dL      A/G Ratio 1.2 g/dL      BUN/Creatinine Ratio 20.0     Anion Gap 15.0 mmol/L     Narrative:      GFR Normal >60  Chronic Kidney Disease <60  Kidney Failure <15      Magnesium [[id number]]  (Normal) Collected: 04/20/21 0348    Specimen: Blood Updated: 04/20/21 0526     Magnesium 2.1 mg/dL     Blood Culture - Blood, Arm, Left [[id number]] Collected: 04/16/21 2228    Specimen: Blood from Arm, Left Updated: 04/19/21 2245     Blood Culture No growth at 3 days    Potassium [[id number]]  (Abnormal) Collected: 04/19/21 2118    Specimen: Blood Updated: 04/19/21 2139     Potassium 3.3 mmol/L     Blood Culture - Blood, Hand, Right [[id number]] Collected: 04/18/21 1219    Specimen: Blood from Hand, Right Updated: 04/19/21 1245     Blood Culture No growth at 24 hours    Blood Culture - Blood, Hand, Left [[id number]] Collected: 04/18/21 1222    Specimen: Blood from Hand, Left Updated: 04/19/21 1245     Blood Culture No growth at 24 hours        PENDING RESULTS: Blood  cultures,  CA 19-9 (office lab on 4/12/2021), CA 19-9    IMAGING REVIEWED:  No radiology results for the last day  I have reviewed the patient's labs, imaging, reports, and other clinician documentation.    Assessment/Plan   ASSESSMENT  1. Clinical stage IIb pancreatic adenocarcinoma: S/p 5 cycles neoadjuvant FOLFIRINOX. Cycle 6 due 4/19/2021.   2. Pancytopenia/Neutropenia: Likely secondary to chemotherapay.  Anemia work-up  showed iron deficiency anemia.  Has history of B12 deficiency. Ferritin was elevated (actute phase reactant).  Vitamin B-12 level and folate normal. No evidence of hemolysis. Started Venofer 300 mg IV on 4/18/2021.  Received Neupogen 4/17/2021 and 4/18/2021 with response noted.   3. Diarrhea/Nausea/Vomiting/Colitis: Diarrhea ongoing since mid-March 2021, possibly chemotherapy-induced.  Started on octreotide week of 4/5/2021 as outpatient with initial improvement after 3 injections.  Lipase level low. CT A/P 4/16/2021-thickening of terminal ileum. Stool studies negative other than lactoferrin positive. GI consulted - suspects typhlitis.   Blood cultures grew Staphylococcus not aureus, likely a contaminant. Repeat blood culture NGTD.  Gastrointestinal panel and C. difficile were negative. Received Unasyn and switched to Zosyn by GI.  Probiotic started in addition to home meds - Questran, scopolamine, pancreatic enzymes, and PPI.  Clear liquid diet started. S/p 2L lactated ringers. No plans for endoscopy at this time. Occult blood stool - positive.  On 4/18/2021 - octreotide started by primary team and GI increased Colestipol and added dicyclomine and Florastor and advanced diet to low residue diet with Ensure.   4. Anorexia/Unintentional weight loss: On cyproheptadine and Zenpep as outpatient  5. Hypomagnesemia/Hypolakemia/Chronic pain/Degenerative disc. disease/Anxiety CAD/HLD/Hypothyroidism/Hypokalemia: management per Primary team.      PLAN  1. Follow CBC.  2. Follow CA 19-9  result.  3. Continue Venofer 300 mg IV x 3 days, day 3/3.  4. Continue IV antibiotics.  5. Continue Sandostatin 100 mcg SQ Q12H.   6. With switch to Depo Sandostatin as an outpatient.  7. GI f/u.  8. Hold outpatient chemotherapy.      Electronically signed by TESS Millan, 04/20/21, 8:33 AM EDT.         I have personally performed a face-to-face diagnostic evaluation on this patient.  I have discussed the case with Blanka Gimenez NP, have edited/reviewed the note, and agree with the care plan.    The patient continues to do poorly with recurrent diarrhea.  On examination this morning he is somewhat lethargic.  His labs are significant for increase in WBC and there is bandemia present.  He is on multiple treatment for the diarrhea and GI is actively involved in the care.  Neupogen has been discontinued and will observe how his white cell count responds.    I discussed the patients findings and my recommendations with patient    Electronically signed by Demian Mello MD, 04/20/21, 11:43 AM EDT.

## 2021-04-20 NOTE — PLAN OF CARE
Goal Outcome Evaluation:     Progress: no change  Outcome Summary: Pt's WBC elevated at 34.5. Pt receiving IV antibiotics. C/o diarrhea and nausea. See MAR for interventions. Will continue to monitor.

## 2021-04-20 NOTE — PLAN OF CARE
Goal Outcome Evaluation:     Progress: improving   Pt resting comfortably with no complaints. Will continue to monitor...

## 2021-04-20 NOTE — PROGRESS NOTES
[facility] Medicine Services Daily Progress Note      Hospitalist Team  LOS 0 days      Patient Care Team:  Gris Muro APRN as PCP - General (Nurse Practitioner)  Demian Mello MD as Consulting Physician (Hematology and Oncology)    Patient Location: 230/1      Subjective   Subjective     Chief Complaint / Subjective  Chief Complaint   Patient presents with   • Vomiting     N/V/D for 2 days.         Brief Synopsis of Hospital Course/HPI  Mr. Weber is a 76 y.o. male with past medical history of pancreatic cancer, hypothyroidism, hyperlipidemia, chronic pain, B12 deficiency and CAD who presents to [facility] complaining of epigastric pain.  Patient reports that for the past 2 to 3 months he has been having some abdominal pain which has become significantly worse over the past 5 days.  Pain is located primarily in the epigastric area described as sharp rated at 8/10 at its worst without obvious provoking and palliative factors.  Pain does have a waxing and waning intensity and some nausea with nonbloody vomiting as well as diarrhea are also reported.  Patient does have a history of pancreatic cancer and is currently undergoing chemotherapy.  He denies any other pain including chest pain, dyspnea, cough or fever, peripheral edema, syncope or near syncope.  He notes somewhat decreased p.o. intake as well as decreased urination.  He does not smoke or drink alcohol.  Patient also confirms compliance with all outpatient therapies.     In the ED patient had lab significant for potassium: 3.3, anion gap: 17.0 with a serum CO2 of 21.0, lipase: 8, WBCs: 1.40 with decreased absolute neutrophil count noted on differential, hemoglobin: 10.8, platelets: 91.  CT of abdomen and pelvis shows a 2 cm mass of the uncinate process of the pancreas consistent with pancreatic cancer which is noted is better defined today than on prior exam.  Mild dilation of the pancreatic duct which was not  "previously identified is also noted.  Polypoid abnormality in the posterior bladder is noted is less well-defined today and hard to separate from the prostate.  Thickening of the wall of the distal ileum consistent with an infectious or inflammatory ileitis as well as mild gaseous distention of fluid throughout much of the colon and rectum is noted.     4/17/2021  Patient seen and examined  Continue antibiotics  C. difficile ordered  Dietitian consulted  Neutropenic precautions    4/18/2021  Patient seen and examined  Afebrile, white count improved with Neupogen  Continue antibiotics  Repeat blood cultures     (possible contaminated specimen in 1 bottle)  C. difficile negative    4/19/2021: decrease in diarrhea, still having nausea/vomiting    Review of Systems   Constitutional: Positive for decreased appetite and malaise/fatigue. Negative for chills and fever.   HENT: Negative.    Eyes: Negative.    Cardiovascular: Negative.    Respiratory: Negative.    Endocrine: Negative.    Skin: Negative.    Musculoskeletal: Negative.    Gastrointestinal: Positive for abdominal pain, diarrhea, nausea and vomiting. Negative for constipation, hematemesis and hematochezia.   Genitourinary: Negative.    Neurological: Negative.    Psychiatric/Behavioral: Negative.      Review of Systems   All other systems reviewed and are negative.    Date:: 4/18/2021    Objective   Objective      Vital Signs  Temp:  [97.6 °F (36.4 °C)-97.9 °F (36.6 °C)] 97.6 °F (36.4 °C)  Heart Rate:  [54-58] 58  Resp:  [16-18] 18  BP: (102-110)/(63-68) 110/68  Oxygen Therapy  SpO2: 99 %  Pulse Oximetry Type: Intermittent  Device (Oxygen Therapy): room air  Flowsheet Rows      First Filed Value   Admission Height  188 cm (74\") Documented at 04/16/2021 1759   Admission Weight  70.3 kg (155 lb) Documented at 04/16/2021 1759        Intake & Output (last 3 days)       04/17 0701 - 04/18 0700 04/18 0701 - 04/19 0700 04/19 0701 - 04/20 0700    P.O. 600 560 480    I.V. " (mL/kg) 1800 (24.1)      Total Intake(mL/kg) 2400 (32.2) 560 (7.5) 480 (6.5)    Net +2400 +560 +480           Urine Unmeasured Occurrence  3 x 2 x    Stool Unmeasured Occurrence 9 x 9 x         Lines, Drains & Airways    Active LDAs     Name:   Placement date:   Placement time:   Site:   Days:    Peripheral IV 04/16/21 1839 Right Wrist   04/16/21    1839    Wrist   1    Peripheral IV 04/16/21 2222 Left Forearm   04/16/21    2222    Forearm   1    Single Lumen Implantable Port 03/26/21 Left Chest   03/26/21    1536    Chest   22                  Physical Exam:  Vitals reviewed.   Constitutional:       General: He is not in acute distress.     Appearance: Normal appearance. He is normal weight. He is not ill-appearing or toxic-appearing.   HENT:      Head: Normocephalic and atraumatic.      Right Ear: External ear normal.      Left Ear: External ear normal.      Nose: Nose normal.      Mouth/Throat:      Mouth: Mucous membranes are moist.   Eyes:      Extraocular Movements: Extraocular movements intact.   Cardiovascular:      Rate and Rhythm: Regular rhythm. Tachycardia present.      Pulses: Normal pulses.      Heart sounds: Normal heart sounds.   Pulmonary:      Effort: Pulmonary effort is normal.      Breath sounds: Normal breath sounds.   Abdominal:      General: Bowel sounds are normal. There is no distension.      Tenderness: There is abdominal tenderness.   Musculoskeletal:         General: Normal range of motion.      Cervical back: Normal range of motion.   Skin:     General: Skin is warm and dry.   Neurological:      General: No focal deficit present.      Mental Status: He is alert and oriented to person, place, and time.   Psychiatric:         Mood and Affect: Mood normal.         Behavior: Behavior normal.         Thought Content: Thought content normal.         Judgment: Judgment normal.   Physical Exam          Procedures:              Results Review:     I reviewed the patient's new clinical  results.      Lab Results (last 24 hours)     Procedure Component Value Units Date/Time    Blood Culture - Blood, Hand, Right [[id number]] Collected: 04/18/21 1219    Specimen: Blood from Hand, Right Updated: 04/19/21 1245     Blood Culture No growth at 24 hours    Blood Culture - Blood, Hand, Left [[id number]] Collected: 04/18/21 1222    Specimen: Blood from Hand, Left Updated: 04/19/21 1245     Blood Culture No growth at 24 hours    Pathology Consultation [[id number]] Collected: 04/19/21 0330    Specimen: Blood, Venous Line Updated: 04/19/21 1059     Final Diagnosis --     Leukocytosis  Normocytic anemia  Anisopoikilocytosis  Thrombocytopenia  No blasts identified       Case Report --     Surgical Pathology Report                         Case: [id number]                                  Authorizing Provider:  Kajal Oliveira MD      Collected:           04/19/2021 03:30 AM          Ordering Location:     [address]    Received:            04/19/2021 08:06 AM                                 MEDICAL INPATIENT                                                            Pathologist:           Rowdy Victor MD                                                             Specimen:    Blood, Venous Line                                                                         Path Consult Reflex [[id number]] Collected: 04/19/21 0330    Specimen: Blood Updated: 04/19/21 0757     Pathology Review Yes    Manual Differential [[id number]]  (Abnormal) Collected: 04/19/21 0330    Specimen: Blood Updated: 04/19/21 0757     Neutrophil % 20.0 %      Lymphocyte % 18.0 %      Monocyte % 1.0 %      Bands %  28.0 %      Metamyelocyte % 10.0 %      Myelocyte % 5.0 %      Promyelocyte % 4.0 %      Blasts % 3.0 %      Other Cells % 11.0 %      Neutrophils Absolute 6.58 10*3/mm3      Lymphocytes Absolute 2.47 10*3/mm3      Monocytes Absolute 0.14 10*3/mm3      nRBC 5.0 /100 WBC      Anisocytosis Slight/1+     Crenated RBC's Mod/2+      Poikilocytes Mod/2+     Toxic Granulation Slight/1+     Vacuolated Neutrophils Slight/1+     Platelet Estimate Decreased    CBC & Differential [[id number]]  (Abnormal) Collected: 04/19/21 0330    Specimen: Blood Updated: 04/19/21 0757    Narrative:      The following orders were created for panel order CBC & Differential.  Procedure                               Abnormality         Status                     ---------                               -----------         ------                     Scan Slide[[id number]]                                       Final result               CBC Auto Differential[[id number]]        Abnormal            Final result                 Please view results for these tests on the individual orders.    Scan Slide [[id number]] Collected: 04/19/21 0330    Specimen: Blood Updated: 04/19/21 0757     Scan Slide --     Comment: See Manual Differential Results       CBC Auto Differential [[id number]]  (Abnormal) Collected: 04/19/21 0330    Specimen: Blood Updated: 04/19/21 0757     WBC 13.70 10*3/mm3      RBC 3.06 10*6/mm3      Hemoglobin 8.6 g/dL      Hematocrit 26.3 %      MCV 85.9 fL      MCH 28.3 pg      MCHC 32.9 g/dL      RDW 16.5 %      RDW-SD 50.3 fl      MPV 9.3 fL      Platelets 119 10*3/mm3     Narrative:      The previously reported component NRBC is no longer being reported. Previous result was 0.3 /100 WBC (Reference Range: 0.0-0.2 /100 WBC) on 4/19/2021 at 0418 EDT.    Occult Blood, Fecal By Immunoassay - Stool, Per Rectum [[id number]]  (Abnormal) Collected: 04/17/21 1450    Specimen: Stool from Per Rectum Updated: 04/19/21 0717     Occult Blood, Fecal by Immunoassay Positive    Comprehensive Metabolic Panel [[id number]]  (Abnormal) Collected: 04/19/21 0330    Specimen: Blood Updated: 04/19/21 0430     Glucose 93 mg/dL      BUN 20 mg/dL      Creatinine 0.81 mg/dL      Sodium 141 mmol/L      Potassium 3.1 mmol/L      Chloride 106 mmol/L      CO2 21.0 mmol/L      Calcium 8.5 mg/dL       Total Protein 5.2 g/dL      Albumin 2.70 g/dL      ALT (SGPT) 27 U/L      AST (SGOT) 32 U/L      Alkaline Phosphatase 109 U/L      Total Bilirubin 0.5 mg/dL      eGFR Non African Amer 93 mL/min/1.73      Globulin 2.5 gm/dL      A/G Ratio 1.1 g/dL      BUN/Creatinine Ratio 24.7     Anion Gap 14.0 mmol/L     Narrative:      GFR Normal >60  Chronic Kidney Disease <60  Kidney Failure <15      Magnesium [[id number]]  (Normal) Collected: 04/19/21 0330    Specimen: Blood Updated: 04/19/21 0430     Magnesium 2.2 mg/dL         No results found for: HGBA1C            Lab Results   Component Value Date    LIPASE 8 (L) 04/16/2021     Lab Results   Component Value Date    CHOL 149 04/17/2021    TRIG 130 04/17/2021    HDL 57 04/17/2021    LDL 69 04/17/2021       Lab Results   Lab Value Date/Time    INTRAOP  01/07/2021 1139     FNA PASS #1: Atypical suspicious for malignancy.    FNA Pass #2: Positive for malignancy.      FINALDX  04/19/2021 0330     Leukocytosis  Normocytic anemia  Anisopoikilocytosis  Thrombocytopenia  No blasts identified      FINALDX  01/14/2021 0930     Bladder tumor, transurethral resection:    Inverted urothelial papilloma    No muscularis propria identified    No dysplasia or malignancy identified     JEFERSON/tkd          Microbiology Results (last 10 days)     Procedure Component Value - Date/Time    Blood Culture - Blood, Hand, Left [[id number]] Collected: 04/18/21 1222    Lab Status: Preliminary result Specimen: Blood from Hand, Left Updated: 04/19/21 1245     Blood Culture No growth at 24 hours    Blood Culture - Blood, Hand, Right [[id number]] Collected: 04/18/21 1219    Lab Status: Preliminary result Specimen: Blood from Hand, Right Updated: 04/19/21 1245     Blood Culture No growth at 24 hours    Clostridium Difficile Toxin - Stool, Per Rectum [[id number]]  (Normal) Collected: 04/17/21 1222    Lab Status: Final result Specimen: Stool from Per Rectum Updated: 04/17/21 1345    Narrative:      The following  orders were created for panel order Clostridium Difficile Toxin - Stool, Per Rectum.  Procedure                               Abnormality         Status                     ---------                               -----------         ------                     Clostridium Difficile EI...[[id number]]  Normal              Final result                 Please view results for these tests on the individual orders.    Clostridium Difficile EIA - Stool, Per Rectum [[id number]]  (Normal) Collected: 04/17/21 1222    Lab Status: Final result Specimen: Stool from Per Rectum Updated: 04/17/21 1345     C Diff GDH / Toxin Negative    Gastrointestinal Panel, PCR - Stool, Per Rectum [[id number]]  (Normal) Collected: 04/17/21 1003    Lab Status: Final result Specimen: Stool from Per Rectum Updated: 04/17/21 1139     Campylobacter Not Detected     Plesiomonas shigelloides Not Detected     Salmonella Not Detected     Vibrio Not Detected     Vibrio cholerae Not Detected     Yersinia enterocolitica Not Detected     Enteroaggregative E. coli (EAEC) Not Detected     Enteropathogenic E. coli (EPEC) Not Detected     Enterotoxigenic E. coli (ETEC) lt/st Not Detected     Shiga-like toxin-producing E. coli (STEC) stx1/stx2 Not Detected     Shigella/Enteroinvasive E. coli (EIEC) Not Detected     Cryptosporidium Not Detected     Cyclospora cayetanensis Not Detected     Entamoeba histolytica Not Detected     Giardia lamblia Not Detected     Adenovirus F40/41 Not Detected     Astrovirus Not Detected     Norovirus GI/GII Not Detected     Rotavirus A Not Detected     Sapovirus (I, II, IV or V) Not Detected    Narrative:      If Aeromonas, Staphylococcus aureus or Bacillus cereus are suspected, please order [id number]: Stool Culture, Aeromonas, S aureus, B Cereus.    COVID PRE-OP / PRE-PROCEDURE SCREENING ORDER (NO ISOLATION) - Swab, Nasopharynx [[id number]]  (Normal) Collected: 04/16/21 2230    Lab Status: Final result Specimen: Swab from Nasopharynx  Updated: 04/17/21 1536    Narrative:      The following orders were created for panel order COVID PRE-OP / PRE-PROCEDURE SCREENING ORDER (NO ISOLATION) - Swab, Nasopharynx.  Procedure                               Abnormality         Status                     ---------                               -----------         ------                     COVID-19,APTIMA PANTHER,...[[id number]]  Normal              Final result                 Please view results for these tests on the individual orders.    COVID-19,APTIMA PANTHER,[facility] IN-HOUSE, NP/OP SWAB IN UTM/VTM/[facility] TRANSPORT MEDIA,24 HR TAT - Swab, Nasopharynx [[id number]]  (Normal) Collected: 04/16/21 2230    Lab Status: Final result Specimen: Swab from Nasopharynx Updated: 04/17/21 1536     COVID19 Not Detected    Narrative:      Fact sheet for providers: https://www.fda.gov/media/835107/download     Fact sheet for patients: https://www.fda.gov/media/624475/download    Test performed by RT PCR.    Blood Culture - Blood, Arm, Right [[id number]]  (Abnormal) Collected: 04/16/21 2229    Lab Status: Final result Specimen: Blood from Arm, Right Updated: 04/18/21 1153     Blood Culture Staphylococcus, coagulase negative     Comment: Probable contaminant requires clinical correlation, susceptibility not performed unless requested by physician.          Isolated from Aerobic and Anaerobic Bottles     Gram Stain Anaerobic Bottle Gram positive cocci in clusters      Aerobic Bottle Gram positive cocci in clusters    Blood Culture ID, PCR - Blood, Arm, Right [[id number]]  (Abnormal) Collected: 04/16/21 2229    Lab Status: Final result Specimen: Blood from Arm, Right Updated: 04/17/21 1815     BCID, PCR Staphylococcus spp, not aureus. Identification by BCID PCR.     BOTTLE TYPE Anaerobic Bottle    Blood Culture - Blood, Arm, Left [[id number]] Collected: 04/16/21 2228    Lab Status: Preliminary result Specimen: Blood from Arm, Left Updated: 04/18/21 2245     Blood Culture No growth at  2 days          ECG/EMG Results (most recent)     None              Results for orders placed during the hospital encounter of 08/19/19    Adult Transthoracic Echo Complete W/ Cont if Necessary Per Protocol    Interpretation Summary  Indications  Chest pain      Technically satisfactory study.  Mitral valve is structurally normal.  Minimal mitral regurgitation  Tricuspid valve is structurally normal.  Aortic valve is structurally normal.  Pulmonic valve could not be well visualized.  No evidence for mitral tricuspid or aortic regurgitation is seen by Doppler study.  Left atrium is normal in size.  Right atrium is normal in size.  Left ventricle is normal in size and contractility with ejection fraction of 60%.  Right ventricle is normal in size.  Atrial septum is intact.  Aorta is normal.  No pericardial effusion or intracardiac thrombus is seen.    Impression  Minimal mitral regurgitation  Normal echo Doppler study.  Left ventricular ejection fraction is 60%.      CT Abdomen Pelvis With Contrast    Result Date: 4/16/2021  1. There is a 2 cm mass in the uncinate process of the pancreas which is consistent with pancreatic cancer. This is better defined today and grossly to the prior exam. There is mild dilatation of the pancreatic duct which was not previously identifiable. 2. On the prior exam, a polypoid abnormality in the posterior bladder was reported. This is less well-defined today and is hard to separate from the prostate. Clinical correlation recommended. 3. There is thickening of the wall of the distal ileum which is consistent with an infectious or inflammatory ileitis. 4. There is mild gaseous distention and fluid throughout much of the colon and the rectum.  Electronically Signed By-Kailey Pennington MD On:4/16/2021 8:57 PM This report was finalized on [id number] by  Kailey Pennington MD.          Xrays, labs reviewed personally by physician.    Medication Review:   I have reviewed the patient's current  medication list      Scheduled Meds  aspirin, 81 mg, Oral, Daily  cholestyramine light, 1 packet, Oral, Q12H  dicyclomine, 20 mg, Oral, TID  iron sucrose, 300 mg, Intravenous, Daily  levothyroxine, 75 mcg, Oral, Q AM  octreotide, 100 mcg, Subcutaneous, Q12H  Pancrelipase (Lip-Prot-Amyl), 36,000 units of lipase, Oral, TID  pantoprazole, 40 mg, Oral, QAM  piperacillin-tazobactam, 3.375 g, Intravenous, Q8H  potassium chloride, 20 mEq, Oral, Daily  pregabalin, 150 mg, Oral, TID  rosuvastatin, 10 mg, Oral, Nightly  saccharomyces boulardii, 500 mg, Oral, BID  Scopolamine, 1 patch, Transdermal, Q72H  sodium chloride, 10 mL, Intravenous, Q12H        Meds Infusions       Meds PRN  •  acetaminophen **OR** acetaminophen **OR** acetaminophen  •  Calcium Gluconate-NaCl **AND** calcium gluconate **AND** Calcium, Ionized  •  chlorpheniramine  •  diphenoxylate-atropine  •  HYDROcodone-acetaminophen  •  loperamide  •  LORazepam  •  magnesium sulfate **OR** magnesium sulfate **OR** magnesium sulfate  •  melatonin  •  nitroglycerin  •  ondansetron **OR** ondansetron  •  potassium & sodium phosphates **OR** potassium & sodium phosphates  •  potassium chloride  •  potassium chloride  •  potassium chloride  •  prochlorperazine  •  prochlorperazine  •  sodium chloride    I personally reviewed patient's x-ray films     I personally reviewed patient's EKG strips     Assessment/Plan   Assessment/Plan     Active Hospital Problems    Diagnosis  POA   • **Malignant neoplasm of head of pancreas (CMS/HCC) [C25.0]  Yes   • Pancytopenia due to chemotherapy (CMS/HCC) [D61.810]  Yes   • Nausea and vomiting [R11.2]  Yes   • Hypokalemia [E87.6]  Yes   • Hyperlipidemia [E78.5]  Yes   • Hypothyroidism [E03.9]  Yes   • Cobalamin deficiency [E53.8]  Yes   • Degeneration of lumbar or lumbosacral intervertebral disc [M51.37]  Yes   • Atherosclerotic heart disease of native coronary artery with other forms of angina pectoris (CMS/HCC) [I25.118]  Yes       Resolved Hospital Problems   No resolved problems to display.       MEDICAL DECISION MAKING COMPLEXITY BY PROBLEM:     Nausea and vomiting in a patient with a history of malignant neoplasm of the head of the pancreas  -Patient presents with sudden worsening of chronic epigastric pain over approximately the past 5 days with several episodes of nausea and nonbloody vomiting  -CT of abdomen and pelvis shows a 2 cm mass of the uncinate process of the pancreas consistent with pancreatic cancer which is noted is better defined today than on prior exam.  Mild dilation of the pancreatic duct which was not previously identified is also noted.  Polypoid abnormality in the posterior bladder is noted is less well-defined today and hard to separate from the prostate.  Thickening of the wall of the distal ileum consistent with an infectious or inflammatory ileitis as well as mild gaseous distention of fluid throughout much of the colon and rectum is noted.  -Check lactic acid, procalcitonin and GI panel  -Blood cultures ordered and pending  -Unasyn started in the ED, continue  -Patient received 2 L LR bolus in ED, continue LR at 100 mL/h  -Clear liquid diet advance as tolerated  -Zofran as needed  -Monitor I's and O's-cardiac monitoring  -GI consulted in ED   --continue antibiotics for possible typhlitis     Pancytopenia/neutropenic fever  -WBCs: 1.40 with a decreased absolute neutrophil count noted on differential, hemoglobin: 10.8 and platelets: 91 in a patient currently undergoing chemotherapy  -Monitor CBC while admitted  -Unasyn as above  -Hematology consulted in ED   --Venofer 300mg for 3 days (4/18-4/20)   --responding to Neupogen, will stop given rise in WBC     Hypokalemia  -Potassium: 3.3  -Check magnesium  -Replacement protocol ordered     CAD  -Continue aspirin and cardiac monitoring     Hyperlipidemia  -Check lipid panel  -Continue statin     Hypothyroidism  -Check TSH  -Continue levothyroxine     B12  deficiency  -Check B12  -Continue supplementation     Chronic pain/degenerative disc disease  -Continue Lyrica and Norco      Anxiety  -Continue Ativan     VTE Prophylaxis -SCDs    VTE Prophylaxis -   Mechanical Order History:      Ordered        04/16/21 2237  Place Sequential Compression Device  Once         04/16/21 2237  Maintain Sequential Compression Device  Continuous                 Pharmalogical Order History:     None            Code Status -   Code Status and Medical Interventions:   Ordered at: 04/16/21 2225     Code Status:    CPR     Medical Interventions (Level of Support Prior to Arrest):    Full       This patient has been examined wearing appropriate Personal Protective Equipment. 04/19/21        Discharge Planning      Pending clinical course    Continued Care and Services - Admitted Since 4/16/2021    Coordination has not been started for this encounter.           Electronically signed by Autumn Green MD, 04/19/21, 20:30 EDT.  [facility] Hospitalist Team

## 2021-04-20 NOTE — NURSING NOTE
Pt's spouse is at bedside and has been refusing bed alarm. As she takes care of him at home. I educated her on the need for the bed alarm so we can help assist him to the bathroom. She insisted that she will be by his side and refused bed alarm once again.

## 2021-04-20 NOTE — PROGRESS NOTES
LOS: 1 day   Patient Care Team:  Gris Muro APRN as PCP - General (Nurse Practitioner)  Demian Mello MD as Consulting Physician (Hematology and Oncology)      Subjective     Interval History:     Subjective: Patient confused today.  The patient's wife who is present at bedside reports persistent diarrhea overnight.  Patient also continues to have tenderness to palpation.  No nausea/vomiting.  He has had very poor oral intake.      ROS:   Unobtainable secondary to confusion.       Medication Review:     Current Facility-Administered Medications:   •  acetaminophen (TYLENOL) tablet 650 mg, 650 mg, Oral, Q4H PRN **OR** acetaminophen (TYLENOL) 160 MG/5ML solution 650 mg, 650 mg, Oral, Q4H PRN **OR** acetaminophen (TYLENOL) suppository 650 mg, 650 mg, Rectal, Q4H PRN, Derek Lester PA-C  •  aspirin EC tablet 81 mg, 81 mg, Oral, Daily, Derek Letser PA-C, 81 mg at 04/19/21 0925  •  calcium gluconate 1g/50ml 0.675% NaCl IV SOLN, 1 g, Intravenous, PRN **AND** calcium gluconate 2-0.675 GM/100ML NACL IVPB, 2 g, Intravenous, PRN **AND** Calcium, Ionized, , , PRN, Derek Lester PA-C  •  chlorpheniramine (CHLOR-TRIMETON) tablet 4 mg, 4 mg, Oral, Q6H PRN, Kajal Oliveira MD  •  cholestyramine light packet 4 g, 1 packet, Oral, Q12H, Tigist Frankel APRN, 4 g at 04/19/21 2202  •  dicyclomine (BENTYL) capsule 20 mg, 20 mg, Oral, TID, Tigist Frankel APRN, 20 mg at 04/19/21 2202  •  diphenoxylate-atropine (LOMOTIL) 2.5-0.025 MG per tablet 1 tablet, 1 tablet, Oral, 4x Daily PRN, Kajal Oliveira MD  •  HYDROcodone-acetaminophen (NORCO) 5-325 MG per tablet 1 tablet, 1 tablet, Oral, Q6H PRN, Derek Lester PA-C, 1 tablet at 04/19/21 2202  •  iron sucrose 300 mg in 250 mL NS, 300 mg, Intravenous, Daily, Blanka Gimenez APRN, 300 mg at 04/19/21 0924  •  levothyroxine (SYNTHROID, LEVOTHROID) tablet 75 mcg, 75 mcg, Oral, Q AM, Derek Lester PA-C, 75 mcg at 04/20/21 0557  •   loperamide (IMODIUM) capsule 2 mg, 2 mg, Oral, 4x Daily PRN, Kajal Oliveira MD, 2 mg at 04/19/21 0517  •  LORazepam (ATIVAN) tablet 0.5 mg, 0.5 mg, Oral, Q8H PRN, Derek Lester PA-C  •  Magnesium Sulfate 2 gram Bolus, followed by 8 gram infusion (total Mg dose 10 grams)- Mg less than or equal to 1mg/dL, 2 g, Intravenous, PRN **OR** Magnesium Sulfate 2 gram / 50mL Infusion (GIVE X 3 BAGS TO EQUAL 6GM TOTAL DOSE) - Mg 1.1 - 1.5 mg/dl, 2 g, Intravenous, PRN **OR** Magnesium Sulfate 4 gram infusion- Mg 1.6-1.9 mg/dL, 4 g, Intravenous, PRN, Derek Lester PA-C, Stopped at 04/17/21 1445  •  melatonin tablet 5 mg, 5 mg, Oral, Nightly PRN, Derek Lester PA-C  •  nitroglycerin (NITROSTAT) SL tablet 0.4 mg, 0.4 mg, Sublingual, Q5 Min PRN, Derek Lester PA-C  •  octreotide (sandoSTATIN) injection 100 mcg, 100 mcg, Subcutaneous, Q12H, Kajal Oliveira MD, 100 mcg at 04/19/21 2203  •  ondansetron (ZOFRAN) tablet 4 mg, 4 mg, Oral, Q6H PRN **OR** ondansetron (ZOFRAN) injection 4 mg, 4 mg, Intravenous, Q6H PRN, Derek Lester PA-C, 4 mg at 04/19/21 1118  •  Pancrelipase (Lip-Prot-Amyl) (CREON) capsule 36,000 units of lipase, 36,000 units of lipase, Oral, TID, Derek Lester PA-C, 36,000 units of lipase at 04/19/21 2202  •  pantoprazole (PROTONIX) EC tablet 40 mg, 40 mg, Oral, QAM, Derek Lester PA-C, 40 mg at 04/19/21 0604  •  piperacillin-tazobactam (ZOSYN) IVPB 3.375 g in 100 mL NS (CD), 3.375 g, Intravenous, Q8H, Sharan Tellez MD, 3.375 g at 04/20/21 0439  •  potassium & sodium phosphates (PHOS-NAK) 280-160-250 MG packet - for Phosphorus less than 1.25 mg/dL, 2 packet, Oral, Q6H PRN **OR** potassium & sodium phosphates (PHOS-NAK) 280-160-250 MG packet - for Phosphorus 1.25 - 2.5 mg/dL, 2 packet, Oral, Q6H PRN, Derek Lester PA-C  •  potassium chloride (K-DUR,KLOR-CON) CR tablet 20 mEq, 20 mEq, Oral, Daily, Kajal Oliveira MD, 20 mEq at 04/19/21 0633  •   potassium chloride (K-DUR,KLOR-CON) CR tablet 40 mEq, 40 mEq, Oral, PRN, Derek Lester PA-C, 40 mEq at 04/19/21 1735  •  potassium chloride (KLOR-CON) packet 40 mEq, 40 mEq, Oral, PRN, Derek Lester PA-C, 40 mEq at 04/18/21 0633  •  potassium chloride 10 mEq in 100 mL IVPB, 10 mEq, Intravenous, Q1H PRN, Autumn Green MD, Last Rate: 100 mL/hr at 04/19/21 1645, 10 mEq at 04/19/21 1645  •  pregabalin (LYRICA) capsule 150 mg, 150 mg, Oral, TID, Derek Lester PA-C, 150 mg at 04/19/21 2202  •  prochlorperazine (COMPAZINE) injection 10 mg, 10 mg, Intravenous, Q6H PRN, Derek Lester PA-C, 10 mg at 04/17/21 0502  •  prochlorperazine (COMPAZINE) tablet 10 mg, 10 mg, Oral, Q8H PRN, Kajal Oliveira MD  •  rosuvastatin (CRESTOR) tablet 10 mg, 10 mg, Oral, Nightly, Derek Lester PA-C, 10 mg at 04/19/21 2202  •  saccharomyces boulardii (FLORASTOR) capsule 500 mg, 500 mg, Oral, BID, Tigist Frankel, APRN, 500 mg at 04/19/21 2202  •  Scopolamine (TRANSDERM-SCOP) 1.5 MG/3DAYS patch 1 patch, 1 patch, Transdermal, Q72H, Derek Lester PA-C, 1 patch at 04/17/21 1220  •  sodium chloride 0.9 % flush 10 mL, 10 mL, Intravenous, Q12H, Derek Lester PA-C, 10 mL at 04/19/21 2203  •  sodium chloride 0.9 % flush 10 mL, 10 mL, Intravenous, PRN, Derek Lester PA-C, 10 mL at 04/20/21 0440      Objective     Vital Signs  Vitals:    04/18/21 2000 04/19/21 0347 04/19/21 1927 04/20/21 0337   BP: 114/66 102/63 110/68 100/61   BP Location: Left arm Left arm Left arm Right arm   Patient Position: Lying Lying Lying Lying   Pulse: 52 54 58 51   Resp: 16 16 18 18   Temp: 97.7 °F (36.5 °C) 97.9 °F (36.6 °C) 97.6 °F (36.4 °C) 97.9 °F (36.6 °C)   TempSrc: Oral Oral Oral Oral   SpO2: 98% 100% 99% 97%   Weight:  74.4 kg (164 lb 0.4 oz)  73.5 kg (162 lb 0.6 oz)   Height:           Physical Exam:     General Appearance:    Awake and alert, in no acute distress, confused   Head:    Normocephalic,  without obvious abnormality   Eyes:          Conjunctivae normal, anicteric sclera   Throat:   No oral lesions, no thrush, oral mucosa moist   Neck:   No adenopathy, supple, no JVD   Lungs:     respirations regular, even and unlabored   Abdomen:     Soft, generalized tenderness, no rebound or guarding, non-distended   Rectal:     Deferred   Extremities:   No edema, no cyanosis   Skin:   No bruising or rash, no jaundice        Results Review:    CBC    Results from last 7 days   Lab Units 04/20/21 0348 04/19/21  0330 04/18/21  0126 04/17/21  0426 04/16/21  1837   WBC 10*3/mm3 34.50* 13.70* 4.10 1.10* 1.40*   HEMOGLOBIN g/dL 8.6* 8.6* 9.3* 10.8* 10.8*   PLATELETS 10*3/mm3 151 119* 109* 97* 91*     CMP   Results from last 7 days   Lab Units 04/20/21 0348 04/19/21  2118 04/19/21  0330 04/18/21  0126 04/17/21  1101 04/17/21  0426 04/16/21  2326 04/16/21  1837   SODIUM mmol/L 144  --  141 140  --  136  --  136   POTASSIUM mmol/L 3.3* 3.3* 3.1* 3.2* 3.4* 3.4* 3.3* 3.3*   CHLORIDE mmol/L 106  --  106 104  --  97*  --  98   CO2 mmol/L 23.0  --  21.0* 23.0  --  20.0*  --  21.0*   BUN mg/dL 17  --  20 18  --  17  --  18   CREATININE mg/dL 0.85  --  0.81 0.83  --  1.11  --  1.00   GLUCOSE mg/dL 86  --  93 112*  --  145*  --  127*   ALBUMIN g/dL 2.80*  --  2.70* 3.10*  --   --   --  3.60   BILIRUBIN mg/dL 0.4  --  0.5 0.6  --   --   --  0.6   ALK PHOS U/L 126*  --  109 115  --   --   --  152*   AST (SGOT) U/L 43*  --  32 39  --   --   --  40   ALT (SGPT) U/L 29  --  27 31  --   --   --  31   MAGNESIUM mg/dL 2.1  --  2.2 2.5*  --  1.7  --   --    LIPASE U/L  --   --   --   --   --   --   --  8*     Cr Clearance Estimated Creatinine Clearance: 79.3 mL/min (by C-G formula based on SCr of 0.85 mg/dL).  Coag     HbA1C   Lab Results   Component Value Date    HGBA1C 5.5 08/21/2019     Blood Glucose No results found for: POCGLU  Infection   Results from last 7 days   Lab Units 04/18/21  1222 04/18/21  5989 04/16/21  3996  04/16/21  2229 04/16/21  2228   BLOODCX  No growth at 24 hours No growth at 24 hours  --  Staphylococcus, coagulase negative* No growth at 3 days   BCIDPCR   --   --   --  Staphylococcus spp, not aureus. Identification by BCID PCR.*  --    PROCALCITONIN ng/mL  --   --  3.52*  --   --      UA      Radiology(recent) No radiology results for the last day       Assessment/Plan     ASSESSMENT:  76-year-old male with history of pancreatic cancer s/p chemotherapy, colon polyps, Sales's esophagus, and CAD, complains of lower abdominal pain, black diarrhea, nausea/vomiting, and weakness.  CT consistent with pancreatic mass as well as colitis (infectious versus inflammatory).     -Diarrhea - ddx Typhlitis vs. Other   -Colitis per CT (infectious versus inflammatory)  -Pancreatic cancer -s/p chemotherapy and repeat PET scan, results unknown  -Nausea/vomiting -chronic but worse in past week  -Lower abdominal pain  -Weakness  -History of esophagitis concerning for Sales's but biopsies negative  -History of colon polyps -overdue for surveillance colonoscopy  -CAD  -Hiatal hernia  -Pancytopenia, neutropenia - improved with Neupogen  -Hypokalemia  -Weight loss -approximately 100 pounds in the past year     PLAN:  Patient confused today.  Patient's wife was present at bedside reports persistent diarrhea along with abdominal pain.  Stool studies this admission have been negative for enteric pathogens and C. Difficile.  WBC count is up to 34.5 following Neupogen.  Blood cultures were positive for coag negative staph.  Continue antibiotics.  Continue colestipol, octreotide twice daily, dicyclomine, and Florastor.  Patient is also on pancreatic enzymes.  Will add Lomotil.  Continue Ensure supplementation and low residue diet.  If patient is unable to increase oral intake, may need to consider Dobbhoff tube placement for enteral feeds.  Continue supportive care.    TESS Bolivar  04/20/21  09:50 EDT

## 2021-04-21 NOTE — PROGRESS NOTES
Hematology/Oncology Inpatient Progress Note    PATIENT NAME: Clifford Weber  : 1945  MRN: [id number]    CHIEF COMPLAINT: Abdominal pain/Weakness     HISTORY OF PRESENT ILLNESS:   76 y.o. male presented to [facility] emergency department on 2021 with complaints of increased abdominal pain and weakness for prior 5 days.  He started having diarrhea approximately 2 to 3 weeks ago and was started on daily Sandostatin last week.  After the third injection his diarrhea had stopped but then restarted on 2021.  He vomited on 2021 with dark green emesis which prompted him to come to the hospital. He was feeling poorly and was experiencing intermittent confusion.  Urine output was low.  He was having abdominal pain as well.  Patient reported a cough and subjective fever with chills prior 24 hours before coming to the hospital. Abdominal discomfort was worse with eating. Labs in the emergency department were as follows: White blood count 1.4, hemoglobin 10.8, MCV 83.6, platelets 91,000, . CMP significant for potassium 3.3, alkaline phosphatase 152, lipase 8 (13-60), lactate 2.2 (0.5-2.0), TSH 2.330 (0.270-4.200), pro calcitonin 3.52 (0.00-0.25). Bood cultures and COVID-19 screening were pending. A CT scan of the abdomen and pelvis with contrast in the emergency department showed a 2 cm mass in the uncinate process of the pancreas which is consistent with pancreatic cancer.  This was better defined than on prior exam.  There was mild dilatation of the pancreatic duct which was not previously identifiable.  A polypoid abnormality in posterior bladder was less defined on today's exam and was difficult to separate from the prostate.  There was thickening of the wall of the distal ileum consistent with infectious or inflammatory ileitis.  There was mild gaseous distention and fluid throughout much of the colon and rectum.  Antibiotics with Unasyn were initiated.  He was admitted for further  evaluation and treatment. Gastroenterology was consulted.  Fecal lactoferrin was positive, gastrointestinal panel was negative. C. difficile testing was pending. His Vitamin B-12 level was 1190 ([phone]).  Antibiotics were switched from Unasyn to Zosyn by GI 4/17/2021.     04/17/21  Hematology/Oncology was consulted by Dr. Nic Ochoa on this established patient followed for clinical stage IIb pancreatic adenocarcinoma.  He had  initially presented with abdominal pain and was hospitalized in December 2020.  At that time CT scan of the abdomen pelvis showed a hypoenhancing lesion in the uncinate process of the pancreas.  A polypoid nodularity was also seen along the posterior wall of the urinary bladder.  An MRI pancreatic protocol on 1/4/2021 showed a hypodense 2.1 cm mass suspicious for primary pancreatic malignancy.  On 1/7/2021 he underwent an EUS with FNA by Dr. Fierro.  There was a 2.4 cm at the uncinate process with one small peripancreatic lymph node that was 4 to 5 mm in size.  Dr. 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  The patient was seen by Dr. Diogenes Stapleton at [facility] on 1/21/2021 who recommended neoadjuvant chemotherapy with 4-6 cycles followed by a pancreatic protocol to determine eligibility for resection.  He started FOLFIRINOX on 2/8/2021 and received his fifth cycle on 4/5/2021.  Diarrhea started mid-March of 2021.  A CT chest on 3/16/2021 revealed small scattered non-- calcified pulmonary nodules measuring 5 mm or less in size and a low-density pancreatic uncinate process with some stranding around it.  A CT scan of thoracic and lumbar spine on same day showed degenerative changes throughout the spine, multilevel disc disease and degenerative facet change resulting in multilevel canal stenosis and neural foraminal narrowing..  The patient was last seen in the office on 4/12/2021  and was scheduled for 3-week follow-up. Octreotide was started for diarrhea with initial improvement after the third injection.  Cycle 6 of FOLFIRINOX was due on 4/19/2021.        PCP: Gris Muro APRN    INTERVAL HISTORY:  4/18/2021 - WBC 4.1, hemoglobin 9.3, platelets 109,000, ANC 0.98, reticulocytes 2.36 (0.),  haptoglobin 308 (),  iron 17 (59-1 58), iron saturation 7 (20-50), transferrin 153 (200-360), TIBC 228 (298-536), ferritin 2417 (), folate >20.0 (4.78-24.20), stool for C. Difficile negative (negative), Covid-19 screening negative (negative). Started daily Neupogen on 4/17/2021. Started Venofer 300 mg IV x 3 days.  4/19/2021 - WBC 13.7, hemoglobin 8.6, platelets 119,000, ANC 6.58, occult blood stool - positive.  Neupogen discontinued.  4/20/2021 - WBC 34.5, hemoglobin 8.6, platelets 151,000.  4/21/2021-remains sleepy.  Patient does not want feeding tube placed yet.    History of present illness reviewed since last visit and changes noted on 04/21/21.    Subjective   The patient's family feels he is sleeping too much.  He is complaining of some abdominal pain and nausea.  He is not eating well.  He is not ready to have a feeding tube placed.    ROS:  Review of Systems   Constitutional: Positive for appetite change ( Poor appetite). Negative for activity change, chills, fatigue, fever and unexpected weight change.   HENT: Negative for mouth sores, sore throat and tinnitus.    Eyes: Negative for photophobia, pain, redness and visual disturbance.   Respiratory: Negative for cough, choking and shortness of breath.    Cardiovascular: Negative for chest pain, palpitations and leg swelling.   Gastrointestinal: Positive for abdominal pain and nausea. Negative for constipation, diarrhea and vomiting.   Genitourinary: Negative for dysuria and hematuria.   Musculoskeletal: Negative for arthralgias, back pain, myalgias and neck pain.   Skin: Negative for rash and wound.   Allergic/Immunologic:  "Negative for environmental allergies.   Neurological: Negative for dizziness, speech difficulty, weakness, light-headedness, numbness and headaches.        Sleepy.   Hematological: Negative for adenopathy. Does not bruise/bleed easily.   Psychiatric/Behavioral: Negative for dysphoric mood. The patient is not nervous/anxious.    All other systems reviewed and are negative.    MEDICATIONS:    Scheduled Meds:  aspirin, 81 mg, Oral, Daily  cholestyramine light, 1 packet, Oral, Q12H  dicyclomine, 20 mg, Oral, TID  diphenoxylate-atropine, 2 tablet, Oral, 4x Daily  levothyroxine, 75 mcg, Oral, Q AM  octreotide, 100 mcg, Subcutaneous, Q12H  Pancrelipase (Lip-Prot-Amyl), 36,000 units of lipase, Oral, TID  pantoprazole, 40 mg, Oral, QAM  piperacillin-tazobactam, 3.375 g, Intravenous, Q8H  potassium chloride, 20 mEq, Oral, QAM  pregabalin, 150 mg, Oral, TID  rosuvastatin, 10 mg, Oral, Nightly  saccharomyces boulardii, 500 mg, Oral, BID  Scopolamine, 1 patch, Transdermal, Q72H  sodium chloride, 10 mL, Intravenous, Q12H       Continuous Infusions:      PRN Meds:  •  acetaminophen **OR** acetaminophen **OR** acetaminophen  •  Calcium Gluconate-NaCl **AND** calcium gluconate **AND** Calcium, Ionized  •  chlorpheniramine  •  diphenoxylate-atropine  •  HYDROcodone-acetaminophen  •  loperamide  •  LORazepam  •  magnesium sulfate **OR** magnesium sulfate **OR** magnesium sulfate  •  melatonin  •  nitroglycerin  •  ondansetron **OR** ondansetron  •  potassium & sodium phosphates **OR** potassium & sodium phosphates  •  potassium chloride  •  potassium chloride  •  potassium chloride  •  prochlorperazine  •  prochlorperazine  •  sodium chloride     ALLERGIES:    Allergies   Allergen Reactions   • Niacin Unknown (See Comments)     Abstracted from [employer].       Objective    VITALS:   /74 (BP Location: Left arm, Patient Position: Lying)   Pulse 51   Temp 97.1 °F (36.2 °C) (Oral)   Resp 17   Ht 188 cm (74\")   Wt 75.9 kg (167 " lb 4.8 oz)   SpO2 97%   BMI 21.48 kg/m²     PHYSICAL EXAM:  Physical Exam  Vitals and nursing note reviewed.   Constitutional:       General: He is not in acute distress.     Appearance: Normal appearance. He is well-developed. He is not diaphoretic.   HENT:      Head: Normocephalic and atraumatic.      Comments: Bitemporal wasting     Right Ear: External ear normal.      Left Ear: External ear normal.      Nose: Nose normal.      Mouth/Throat:      Mouth: Mucous membranes are moist.      Pharynx: Oropharynx is clear. No oropharyngeal exudate or posterior oropharyngeal erythema.      Comments: Dental fillings  Eyes:      General: No scleral icterus.     Extraocular Movements: Extraocular movements intact.      Conjunctiva/sclera: Conjunctivae normal.      Pupils: Pupils are equal, round, and reactive to light.   Cardiovascular:      Rate and Rhythm: Normal rate and regular rhythm.      Heart sounds: Normal heart sounds. No murmur heard.        Comments: Monitor leads  Pulmonary:      Effort: Pulmonary effort is normal. No respiratory distress.      Breath sounds: Normal breath sounds. No wheezing or rales.   Abdominal:      General: Bowel sounds are normal. There is no distension.      Palpations: Abdomen is soft. There is no mass.      Tenderness: There is no abdominal tenderness. There is no guarding.   Genitourinary:     Comments: Deferred   Musculoskeletal:         General: No swelling, tenderness or deformity. Normal range of motion.      Cervical back: Normal range of motion and neck supple.      Right lower leg: No edema.      Left lower leg: No edema.      Comments: RUE IV   Lymphadenopathy:      Cervical: No cervical adenopathy.      Upper Body:      Right upper body: No supraclavicular adenopathy.      Left upper body: No supraclavicular adenopathy.   Skin:     General: Skin is warm and dry.      Coloration: Skin is not pale.      Findings: No bruising, erythema or rash.   Neurological:      General: No  focal deficit present.      Mental Status: He is alert and oriented to person, place, and time.      Coordination: Coordination normal.   Psychiatric:         Mood and Affect: Mood normal.         Behavior: Behavior normal.         Thought Content: Thought content normal.         RECENT LABS:  Lab Results (last 24 hours)     Procedure Component Value Units Date/Time    Blood Culture - Blood, Hand, Left [[id number]] Collected: 04/18/21 1222    Specimen: Blood from Hand, Left Updated: 04/21/21 1245     Blood Culture No growth at 3 days    Blood Culture - Blood, Hand, Right [[id number]] Collected: 04/18/21 1219    Specimen: Blood from Hand, Right Updated: 04/21/21 1245     Blood Culture No growth at 3 days    T4, Free [[id number]]  (Normal) Collected: 04/21/21 0512    Specimen: Blood Updated: 04/21/21 0945     Free T4 1.01 ng/dL     Narrative:      Results may be falsely increased if patient taking Biotin.      TSH [[id number]]  (Normal) Collected: 04/21/21 0512    Specimen: Blood Updated: 04/21/21 0727     TSH 1.460 uIU/mL     Basic Metabolic Panel [[id number]]  (Abnormal) Collected: 04/21/21 0512    Specimen: Blood Updated: 04/21/21 0706     Glucose 104 mg/dL      BUN 13 mg/dL      Creatinine 0.75 mg/dL      Sodium 146 mmol/L      Potassium 3.2 mmol/L      Chloride 107 mmol/L      CO2 24.0 mmol/L      Calcium 8.2 mg/dL      eGFR Non African Amer 102 mL/min/1.73      BUN/Creatinine Ratio 17.3     Anion Gap 15.0 mmol/L     Narrative:      GFR Normal >60  Chronic Kidney Disease <60  Kidney Failure <15      Magnesium [[id number]]  (Normal) Collected: 04/21/21 0512    Specimen: Blood Updated: 04/21/21 0706     Magnesium 2.0 mg/dL     T3, Free [[id number]] Collected: 04/21/21 0512    Specimen: Blood Updated: 04/21/21 0705    Extra Tubes [[id number]] Collected: 04/21/21 0512    Specimen: Blood, Venous Line Updated: 04/21/21 0645    Narrative:      The following orders were created for panel order Extra Tubes.  Procedure                                Abnormality         Status                     ---------                               -----------         ------                     Lavender Top[[id number]]                                     Final result                 Please view results for these tests on the individual orders.    Lavender Top [[id number]] Collected: 04/21/21 0512    Specimen: Blood Updated: 04/21/21 0645     Extra Tube hold for add-on     Comment: Auto resulted       Blood Culture - Blood, Arm, Left [[id number]] Collected: 04/16/21 2228    Specimen: Blood from Arm, Left Updated: 04/20/21 2245     Blood Culture No growth at 4 days    Potassium [[id number]]  (Normal) Collected: 04/20/21 1711    Specimen: Blood Updated: 04/20/21 1756     Potassium 3.5 mmol/L         PENDING RESULTS: Blood cultures,  CA 19-9 (office lab on 4/12/2021), CA 19-9, CT head, ammonia    IMAGING REVIEWED:  No radiology results for the last day  I have reviewed the patient's labs, imaging, reports, and other clinician documentation.    Assessment/Plan   ASSESSMENT  1. Clinical stage IIb pancreatic adenocarcinoma: S/p 5 cycles neoadjuvant FOLFIRINOX. Cycle 6 was due 4/19/2021.   2. Pancytopenia/Neutropenia/CHANDRA: Chemotherapy effect contributing.  Anemia work-up showed concomitant CHANDRA and completed Venofer course.  Ferritin was elevated (actute phase reactant).  Vitamin B-12 level and folate normal. No evidence of hemolysis.  Completed Venofer course.  Received Neupogen 4/17/2021 and 4/18/2021 resolution of neutropenia.  3. Diarrhea/Nausea/Vomiting/Colitis: Diarrhea ongoing since mid-March 2021, possibly chemotherapy-induced.  Started on octreotide week of 4/5/2021 as outpatient with initial improvement after 3 injections.  Lipase level low. CT A/P 4/16/2021-thickening of terminal ileum. Stool studies negative other than lactoferrin positive. GI consulted - suspects typhlitis.   Blood cultures grew Staphylococcus not aureus, likely a contaminant. Repeat blood  culture NGTD.  Gastrointestinal panel and C. difficile were negative. Received Unasyn and switched to Zosyn by GI.  Probiotic started in addition to home meds - Questran, scopolamine, pancreatic enzymes, and PPI.  Clear liquid diet started. S/p 2L lactated ringers. No plans for endoscopy at this time. Occult blood stool - positive.  On 4/18/2021 - octreotide started by primary team and GI increased Colestipol and added dicyclomine and Florastor and advanced diet to low residue diet with Ensure.   4. Anorexia/Unintentional weight loss: On cyproheptadine and Zenpep as outpatient.  Patient continues to decline feeding tube.  5. Hypomagnesemia/Hypolakemia/Chronic pain/Degenerative disc. disease/Anxiety CAD/HLD/Hypothyroidism/Hypokalemia: management per Primary team.   6. Lethargy -Meds reviewed and no recent increased dosing, only 1 dose pain medicine yesterday, no recent Ativan or antiemetic/Phenergan dosing, and stable scopolamine patch for weeks.  No increased LFTs but will check ammonia level and CT head.     PLAN  1. CT head.  2. Ammonia level  3. Follow CBC.  4. CA 19-9 remains pending.  5. Continue Sandostatin 100 mcg SQ Q12H, scheduled Lomotil, and as needed cholestyramine.   6. With switch to Depo Sandostatin as an outpatient.  7. Discussed feeding tube and patient states he is not ready for this yet.  8. GI f/u.  9. Hold outpatient chemotherapy.      Note prepared by SARAH Villarreal.  Patient seen and examined by Demian Mello MD.  Electronically signed by SHAKEEL Carpenter, 04/21/21, 1:46 PM EDT.         I have personally performed a face-to-face diagnostic evaluation on this patient.  I have discussed the case with Clara Mas NP, have edited/reviewed the note, and agree with the care plan.  Patient continues to sleep a lot and has had abdominal pain with decreased oral intake.  On examination, he does have bitemporal wasting.  Labs are significant for hypokalemia.  Not sure about the reason for his  lethargy.  Will check CT head.            I discussed the patients findings and my recommendations with patient and family.    Electronically signed by Demian Mello MD, 04/21/21, 5:02 PM EDT.

## 2021-04-21 NOTE — PLAN OF CARE
Goal Outcome Evaluation:        Outcome Summary: Patient still having diarrhea today. He was able to eat a small amount at his meals. No complaints during shift.

## 2021-04-21 NOTE — PROGRESS NOTES
LOS: 2 days   Patient Care Team:  Gris Muro APRN as PCP - General (Nurse Practitioner)  Demian Mello MD as Consulting Physician (Hematology and Oncology)      Subjective     Interval History:     Subjective: Patient reports that he is feeling some better today.  Continues to have some mild right-sided abdominal pain, but overall significantly improved.  He reports no diarrhea overnight.  One episode of loose stool this morning.  No bright red blood per rectum or melena.      ROS:   No chest pain, shortness of breath, or cough.        Medication Review:     Current Facility-Administered Medications:   •  acetaminophen (TYLENOL) tablet 650 mg, 650 mg, Oral, Q4H PRN **OR** acetaminophen (TYLENOL) 160 MG/5ML solution 650 mg, 650 mg, Oral, Q4H PRN **OR** acetaminophen (TYLENOL) suppository 650 mg, 650 mg, Rectal, Q4H PRN, Derek Lester PA-C  •  aspirin EC tablet 81 mg, 81 mg, Oral, Daily, Derek Lester PA-C, 81 mg at 04/21/21 0928  •  calcium gluconate 1g/50ml 0.675% NaCl IV SOLN, 1 g, Intravenous, PRN **AND** calcium gluconate 2-0.675 GM/100ML NACL IVPB, 2 g, Intravenous, PRN **AND** Calcium, Ionized, , , PRN, Derek Lester PA-C  •  chlorpheniramine (CHLOR-TRIMETON) tablet 4 mg, 4 mg, Oral, Q6H PRN, Kajal Oliveira MD  •  cholestyramine light packet 4 g, 1 packet, Oral, Q12H, Tigist Frankel APRN, 4 g at 04/20/21 2204  •  dicyclomine (BENTYL) capsule 20 mg, 20 mg, Oral, TID, Tigist Frankel APRN, 20 mg at 04/21/21 0928  •  diphenoxylate-atropine (LOMOTIL) 2.5-0.025 MG per tablet 1 tablet, 1 tablet, Oral, 4x Daily PRN, Kajal Oliveira MD  •  diphenoxylate-atropine (LOMOTIL) 2.5-0.025 MG per tablet 2 tablet, 2 tablet, Oral, 4x Daily, Christi Lau APRN, 2 tablet at 04/21/21 0927  •  HYDROcodone-acetaminophen (NORCO) 5-325 MG per tablet 1 tablet, 1 tablet, Oral, Q6H PRN, Derek Lester PA-C, 1 tablet at 04/20/21 2220  •  levothyroxine (SYNTHROID, LEVOTHROID) tablet 75  mcg, 75 mcg, Oral, Q AM, Derek Lester PA-C, 75 mcg at 04/21/21 0537  •  loperamide (IMODIUM) capsule 2 mg, 2 mg, Oral, 4x Daily PRN, Kajal Oliveira MD, 2 mg at 04/19/21 0517  •  LORazepam (ATIVAN) tablet 0.5 mg, 0.5 mg, Oral, Q8H PRN, Derek Lester PA-C  •  Magnesium Sulfate 2 gram Bolus, followed by 8 gram infusion (total Mg dose 10 grams)- Mg less than or equal to 1mg/dL, 2 g, Intravenous, PRN **OR** Magnesium Sulfate 2 gram / 50mL Infusion (GIVE X 3 BAGS TO EQUAL 6GM TOTAL DOSE) - Mg 1.1 - 1.5 mg/dl, 2 g, Intravenous, PRN **OR** Magnesium Sulfate 4 gram infusion- Mg 1.6-1.9 mg/dL, 4 g, Intravenous, PRN, Derek Lester PA-C, Stopped at 04/17/21 1445  •  melatonin tablet 5 mg, 5 mg, Oral, Nightly PRN, Derek Lester PA-C  •  nitroglycerin (NITROSTAT) SL tablet 0.4 mg, 0.4 mg, Sublingual, Q5 Min PRN, Derek Lester PA-C  •  octreotide (sandoSTATIN) injection 100 mcg, 100 mcg, Subcutaneous, Q12H, Kajal Oliveira MD, 100 mcg at 04/21/21 0927  •  ondansetron (ZOFRAN) tablet 4 mg, 4 mg, Oral, Q6H PRN **OR** ondansetron (ZOFRAN) injection 4 mg, 4 mg, Intravenous, Q6H PRN, Derek Lester PA-C, 4 mg at 04/20/21 1022  •  Pancrelipase (Lip-Prot-Amyl) (CREON) capsule 36,000 units of lipase, 36,000 units of lipase, Oral, TID, Derek Lester PA-C, 36,000 units of lipase at 04/21/21 0927  •  pantoprazole (PROTONIX) EC tablet 40 mg, 40 mg, Oral, QAM, Derek Lester PA-C, 40 mg at 04/21/21 0537  •  piperacillin-tazobactam (ZOSYN) IVPB 3.375 g in 100 mL NS (CD), 3.375 g, Intravenous, Q8H, Sharan Tellez MD, 3.375 g at 04/21/21 0543  •  potassium & sodium phosphates (PHOS-NAK) 280-160-250 MG packet - for Phosphorus less than 1.25 mg/dL, 2 packet, Oral, Q6H PRN **OR** potassium & sodium phosphates (PHOS-NAK) 280-160-250 MG packet - for Phosphorus 1.25 - 2.5 mg/dL, 2 packet, Oral, Q6H PRN, Derek Lester PA-C  •  potassium chloride (K-DUR,KLOR-CON) CR tablet  40 mEq, 40 mEq, Oral, PRN, Derek Lester PA-C, 40 mEq at 04/19/21 1735  •  potassium chloride (KLOR-CON) packet 40 mEq, 40 mEq, Oral, PRN, Derek Lester PA-C, 40 mEq at 04/18/21 0633  •  potassium chloride (MICRO-K) CR capsule 20 mEq, 20 mEq, Oral, QANorma BLANCO Shefali A, MD, 20 mEq at 04/21/21 0936  •  potassium chloride 10 mEq in 100 mL IVPB, 10 mEq, Intravenous, Q1H PRN, Autumn Green MD, Last Rate: 100 mL/hr at 04/19/21 1645, 10 mEq at 04/19/21 1645  •  pregabalin (LYRICA) capsule 150 mg, 150 mg, Oral, TID, Derek Lester PA-C, 150 mg at 04/21/21 0928  •  prochlorperazine (COMPAZINE) injection 10 mg, 10 mg, Intravenous, Q6H PRN, Derek Lester PA-C, 10 mg at 04/17/21 0502  •  prochlorperazine (COMPAZINE) tablet 10 mg, 10 mg, Oral, Q8H PRN, Kajal Oliveira MD  •  rosuvastatin (CRESTOR) tablet 10 mg, 10 mg, Oral, Nightly, Derek Lester PA-C, 10 mg at 04/20/21 2151  •  saccharomyces boulardii (FLORASTOR) capsule 500 mg, 500 mg, Oral, BID, Tigist Frankel APRN, 500 mg at 04/21/21 0928  •  Scopolamine (TRANSDERM-SCOP) 1.5 MG/3DAYS patch 1 patch, 1 patch, Transdermal, Q72H, Derek Lester PA-C, 1 patch at 04/20/21 1121  •  sodium chloride 0.9 % flush 10 mL, 10 mL, Intravenous, Q12H, Derek Lester PA-C, 10 mL at 04/21/21 0928  •  sodium chloride 0.9 % flush 10 mL, 10 mL, Intravenous, PRN, Derek Lester PA-C, 10 mL at 04/20/21 0440      Objective     Vital Signs  Vitals:    04/20/21 1304 04/20/21 2037 04/21/21 0300 04/21/21 0600   BP: 129/77 142/79 129/74    BP Location:  Left arm Left arm    Patient Position:  Lying Lying    Pulse: 56 55 51    Resp: 18 18 17    Temp: 98.3 °F (36.8 °C) 97.6 °F (36.4 °C) 97.1 °F (36.2 °C)    TempSrc: Oral Oral Oral    SpO2: 98% 98% 97%    Weight:   75.9 kg (167 lb 4.8 oz) 75.9 kg (167 lb 4.8 oz)   Height:           Physical Exam:     General Appearance:    Awake and alert, in no acute distress   Head:    Normocephalic,  without obvious abnormality   Eyes:          Conjunctivae normal, anicteric sclera   Throat:   No oral lesions, no thrush, oral mucosa moist   Neck:   No adenopathy, supple, no JVD   Lungs:     respirations regular, even and unlabored   Abdomen:     Soft, mild right-sided tenderness, no rebound or guarding, non-distended   Rectal:     Deferred   Extremities:   No edema, no cyanosis   Skin:   No bruising or rash, no jaundice        Results Review:    CBC    Results from last 7 days   Lab Units 04/20/21  0348 04/19/21  0330 04/18/21  0126 04/17/21  0426 04/16/21  1837   WBC 10*3/mm3 34.50* 13.70* 4.10 1.10* 1.40*   HEMOGLOBIN g/dL 8.6* 8.6* 9.3* 10.8* 10.8*   PLATELETS 10*3/mm3 151 119* 109* 97* 91*     CMP   Results from last 7 days   Lab Units 04/21/21  0512 04/20/21  1711 04/20/21  0348 04/19/21  2118 04/19/21  0330 04/18/21  0126 04/17/21  1101 04/17/21  0426 04/16/21  1837   SODIUM mmol/L 146*  --  144  --  141 140  --  136 136   POTASSIUM mmol/L 3.2* 3.5 3.3* 3.3* 3.1* 3.2* 3.4* 3.4* 3.3*   CHLORIDE mmol/L 107  --  106  --  106 104  --  97* 98   CO2 mmol/L 24.0  --  23.0  --  21.0* 23.0  --  20.0* 21.0*   BUN mg/dL 13  --  17  --  20 18  --  17 18   CREATININE mg/dL 0.75*  --  0.85  --  0.81 0.83  --  1.11 1.00   GLUCOSE mg/dL 104*  --  86  --  93 112*  --  145* 127*   ALBUMIN g/dL  --   --  2.80*  --  2.70* 3.10*  --   --  3.60   BILIRUBIN mg/dL  --   --  0.4  --  0.5 0.6  --   --  0.6   ALK PHOS U/L  --   --  126*  --  109 115  --   --  152*   AST (SGOT) U/L  --   --  43*  --  32 39  --   --  40   ALT (SGPT) U/L  --   --  29  --  27 31  --   --  31   MAGNESIUM mg/dL 2.0  --  2.1  --  2.2 2.5*  --  1.7  --    LIPASE U/L  --   --   --   --   --   --   --   --  8*     Cr Clearance Estimated Creatinine Clearance: 87 mL/min (A) (by C-G formula based on SCr of 0.75 mg/dL (L)).  Coag     HbA1C   Lab Results   Component Value Date    HGBA1C 5.5 08/21/2019     Blood Glucose No results found for: POCGLU  Infection    Results from last 7 days   Lab Units 04/18/21  1222 04/18/21  1219 04/16/21  2326 04/16/21  2229 04/16/21  2228   BLOODCX  No growth at 2 days No growth at 2 days  --  Staphylococcus, coagulase negative* No growth at 4 days   BCIDPCR   --   --   --  Staphylococcus spp, not aureus. Identification by BCID PCR.*  --    PROCALCITONIN ng/mL  --   --  3.52*  --   --      UA      Radiology(recent) No radiology results for the last day       Assessment/Plan     ASSESSMENT:  76-year-old male with history of pancreatic cancer s/p chemotherapy, colon polyps, Sales's esophagus, and CAD, complains of lower abdominal pain, black diarrhea, nausea/vomiting, and weakness.  CT consistent with pancreatic mass as well as colitis (infectious versus inflammatory).     -Diarrhea - ddx Typhlitis vs. Other   -Colitis per CT (infectious versus inflammatory)  -Pancreatic cancer -s/p chemotherapy and repeat PET scan, results unknown  -Nausea/vomiting -chronic but worse in past week  -Lower abdominal pain  -Weakness  -History of esophagitis concerning for Sales's but biopsies negative  -History of colon polyps -overdue for surveillance colonoscopy  -CAD  -Hiatal hernia  -Pancytopenia, neutropenia - improved with Neupogen  -Hypokalemia  -Weight loss -approximately 100 pounds in the past year     PLAN:  Patient reports that he is feeling much better today.  Continues to have some mild right-sided abdominal pain, but overall his pain has significantly improved.  No diarrhea overnight.  1 loose bowel movement so far this morning.  Stool studies this admission have been negative for enteric pathogens and C. Difficile.  Repeat CBC is pending today.  Await results.  Blood cultures were positive for coag negative staph.  Continue antibiotics.  Continue colestipol, octreotide twice daily, dicyclomine, Lomotil, and Florastor. Patient is also on pancreatic enzymes.  Continue Ensure supplementation and low residue diet.  Not much else to add from a  GI standpoint at this time.  We will see as needed.    Christi Lau, APRN  04/21/21  09:37 EDT

## 2021-04-21 NOTE — PROGRESS NOTES
[facility] Medicine Services Daily Progress Note      Hospitalist Team  LOS 1 days      Patient Care Team:  Gris Muro APRN as PCP - General (Nurse Practitioner)  Demian Mello MD as Consulting Physician (Hematology and Oncology)    Patient Location: 230/1      Subjective   Subjective     Chief Complaint / Subjective  Chief Complaint   Patient presents with   • Vomiting     N/V/D for 2 days.         Brief Synopsis of Hospital Course/HPI  Mr. Weber is a 76 y.o. male with past medical history of pancreatic cancer, hypothyroidism, hyperlipidemia, chronic pain, B12 deficiency and CAD who presents to [facility] complaining of epigastric pain.  Patient reports that for the past 2 to 3 months he has been having some abdominal pain which has become significantly worse over the past 5 days.  Pain is located primarily in the epigastric area described as sharp rated at 8/10 at its worst without obvious provoking and palliative factors.  Pain does have a waxing and waning intensity and some nausea with nonbloody vomiting as well as diarrhea are also reported.  Patient does have a history of pancreatic cancer and is currently undergoing chemotherapy.  He denies any other pain including chest pain, dyspnea, cough or fever, peripheral edema, syncope or near syncope.  He notes somewhat decreased p.o. intake as well as decreased urination.  He does not smoke or drink alcohol.  Patient also confirms compliance with all outpatient therapies.     In the ED patient had lab significant for potassium: 3.3, anion gap: 17.0 with a serum CO2 of 21.0, lipase: 8, WBCs: 1.40 with decreased absolute neutrophil count noted on differential, hemoglobin: 10.8, platelets: 91.  CT of abdomen and pelvis shows a 2 cm mass of the uncinate process of the pancreas consistent with pancreatic cancer which is noted is better defined today than on prior exam.  Mild dilation of the pancreatic duct which was not  "previously identified is also noted.  Polypoid abnormality in the posterior bladder is noted is less well-defined today and hard to separate from the prostate.  Thickening of the wall of the distal ileum consistent with an infectious or inflammatory ileitis as well as mild gaseous distention of fluid throughout much of the colon and rectum is noted.     4/17/2021  Patient seen and examined  Continue antibiotics  C. difficile ordered  Dietitian consulted  Neutropenic precautions    4/18/2021  Patient seen and examined  Afebrile, white count improved with Neupogen  Continue antibiotics  Repeat blood cultures     (possible contaminated specimen in 1 bottle)  C. difficile negative    4/19/2021: decrease in diarrhea, still having nausea/vomiting  4/20/2021: poor appetite, diarrhea persists, lethargic/confused at times    Review of Systems   Constitutional: Positive for decreased appetite and malaise/fatigue. Negative for chills and fever.   HENT: Negative.    Eyes: Negative.    Cardiovascular: Negative.    Respiratory: Negative.    Endocrine: Negative.    Skin: Negative.    Musculoskeletal: Negative.    Gastrointestinal: Positive for abdominal pain, diarrhea, nausea and vomiting. Negative for constipation, hematemesis and hematochezia.   Genitourinary: Negative.    Neurological: Negative.    Psychiatric/Behavioral: Negative.      Review of Systems   All other systems reviewed and are negative.    Date:: 4/18/2021    Objective   Objective      Vital Signs  Temp:  [97.9 °F (36.6 °C)-98.3 °F (36.8 °C)] 98.3 °F (36.8 °C)  Heart Rate:  [51-56] 56  Resp:  [18] 18  BP: (100-129)/(61-77) 129/77  Oxygen Therapy  SpO2: 98 %  Pulse Oximetry Type: Intermittent  Device (Oxygen Therapy): room air  Flowsheet Rows      First Filed Value   Admission Height  188 cm (74\") Documented at 04/16/2021 1759   Admission Weight  70.3 kg (155 lb) Documented at 04/16/2021 1759        Intake & Output (last 3 days)       04/18 0701 - 04/19 0700 04/19 " 0701 - 04/20 0700 04/20 0701 - 04/21 0700    P.O. 560 480 480    I.V. (mL/kg)       Total Intake(mL/kg) 560 (7.5) 480 (6.5) 480 (6.5)    Net +560 +480 +480           Urine Unmeasured Occurrence 3 x 2 x     Stool Unmeasured Occurrence 9 x          Lines, Drains & Airways    Active LDAs     Name:   Placement date:   Placement time:   Site:   Days:    Peripheral IV 04/16/21 1839 Right Wrist   04/16/21 1839    Wrist   1    Peripheral IV 04/16/21 2222 Left Forearm   04/16/21    2222    Forearm   1    Single Lumen Implantable Port 03/26/21 Left Chest   03/26/21    1536    Chest   22                  Physical Exam:  Vitals reviewed.   Constitutional:       General: He is not in acute distress.     Appearance: Normal appearance. He is normal weight. He is not ill-appearing or toxic-appearing.   HENT:      Head: Normocephalic and atraumatic.      Right Ear: External ear normal.      Left Ear: External ear normal.      Nose: Nose normal.      Mouth/Throat:      Mouth: Mucous membranes are moist.   Eyes:      Extraocular Movements: Extraocular movements intact.   Cardiovascular:      Rate and Rhythm: Regular rhythm. Tachycardia present.      Pulses: Normal pulses.      Heart sounds: Normal heart sounds.   Pulmonary:      Effort: Pulmonary effort is normal.      Breath sounds: Normal breath sounds.   Abdominal:      General: Bowel sounds are normal. There is no distension.      Tenderness: There is abdominal tenderness.   Musculoskeletal:         General: Normal range of motion.      Cervical back: Normal range of motion.   Skin:     General: Skin is warm and dry.   Neurological:      General: No focal deficit present.      Mental Status: He is alert and oriented to person, place, and time.   Psychiatric:         Mood and Affect: Mood normal.         Behavior: Behavior normal.         Thought Content: Thought content normal.         Judgment: Judgment normal.   Physical Exam          Procedures:              Results Review:      I reviewed the patient's new clinical results.      Lab Results (last 24 hours)     Procedure Component Value Units Date/Time    Potassium [[id number]]  (Normal) Collected: 04/20/21 1711    Specimen: Blood Updated: 04/20/21 1756     Potassium 3.5 mmol/L     Blood Culture - Blood, Hand, Left [[id number]] Collected: 04/18/21 1222    Specimen: Blood from Hand, Left Updated: 04/20/21 1246     Blood Culture No growth at 2 days    Blood Culture - Blood, Hand, Right [[id number]] Collected: 04/18/21 1219    Specimen: Blood from Hand, Right Updated: 04/20/21 1246     Blood Culture No growth at 2 days    Manual Differential [[id number]]  (Abnormal) Collected: 04/20/21 0348    Specimen: Blood Updated: 04/20/21 0544     Neutrophil % 23.0 %      Lymphocyte % 20.0 %      Monocyte % 16.0 %      Eosinophil % 2.0 %      Bands %  32.0 %      Metamyelocyte % 3.0 %      Myelocyte % 3.0 %      Promyelocyte % 1.0 %      Neutrophils Absolute 18.98 10*3/mm3      Lymphocytes Absolute 6.90 10*3/mm3      Monocytes Absolute 5.52 10*3/mm3      Eosinophils Absolute 0.69 10*3/mm3      Acanthocytes Slight/1+     Anisocytosis Slight/1+     [place] Cells Slight/1+     Elliptocytes Slight/1+     Poikilocytes Mod/2+     WBC Morphology Normal     Platelet Morphology Normal    Narrative:      Reviewed by Pathologist within the past 30 days on 4.19.21 .    CBC & Differential [[id number]]  (Abnormal) Collected: 04/20/21 0348    Specimen: Blood Updated: 04/20/21 0544    Narrative:      The following orders were created for panel order CBC & Differential.  Procedure                               Abnormality         Status                     ---------                               -----------         ------                     Scan Slide[[id number]]                                       Final result               CBC Auto Differential[[id number]]        Abnormal            Final result                 Please view results for these tests on the individual orders.     Scan Slide [[id number]] Collected: 04/20/21 0348    Specimen: Blood Updated: 04/20/21 0544     Scan Slide --     Comment: See Manual Differential Results       CBC Auto Differential [[id number]]  (Abnormal) Collected: 04/20/21 0348    Specimen: Blood Updated: 04/20/21 0544     WBC 34.50 10*3/mm3      RBC 3.12 10*6/mm3      Hemoglobin 8.6 g/dL      Hematocrit 26.0 %      MCV 83.3 fL      MCH 27.5 pg      MCHC 33.0 g/dL      RDW 16.8 %      RDW-SD 48.6 fl      MPV 9.1 fL      Platelets 151 10*3/mm3     Narrative:      The previously reported component NRBC is no longer being reported. Previous result was 0.1 /100 WBC (Reference Range: 0.0-0.2 /100 WBC) on 4/20/2021 at 0457 EDT.    Comprehensive Metabolic Panel [[id number]]  (Abnormal) Collected: 04/20/21 0348    Specimen: Blood Updated: 04/20/21 0526     Glucose 86 mg/dL      BUN 17 mg/dL      Creatinine 0.85 mg/dL      Sodium 144 mmol/L      Potassium 3.3 mmol/L      Chloride 106 mmol/L      CO2 23.0 mmol/L      Calcium 8.5 mg/dL      Total Protein 5.2 g/dL      Albumin 2.80 g/dL      ALT (SGPT) 29 U/L      AST (SGOT) 43 U/L      Alkaline Phosphatase 126 U/L      Total Bilirubin 0.4 mg/dL      eGFR Non African Amer 88 mL/min/1.73      Globulin 2.4 gm/dL      A/G Ratio 1.2 g/dL      BUN/Creatinine Ratio 20.0     Anion Gap 15.0 mmol/L     Narrative:      GFR Normal >60  Chronic Kidney Disease <60  Kidney Failure <15      Magnesium [[id number]]  (Normal) Collected: 04/20/21 0348    Specimen: Blood Updated: 04/20/21 0526     Magnesium 2.1 mg/dL         No results found for: HGBA1C            Lab Results   Component Value Date    LIPASE 8 (L) 04/16/2021     Lab Results   Component Value Date    CHOL 149 04/17/2021    TRIG 130 04/17/2021    HDL 57 04/17/2021    LDL 69 04/17/2021       Lab Results   Lab Value Date/Time    INTRAOP  01/07/2021 1139     FNA PASS #1: Atypical suspicious for malignancy.    FNA Pass #2: Positive for malignancy.      FINALDX  04/19/2021 033      Leukocytosis  Normocytic anemia  Anisopoikilocytosis  Thrombocytopenia  No blasts identified      FINALDX  01/14/2021 0930     Bladder tumor, transurethral resection:    Inverted urothelial papilloma    No muscularis propria identified    No dysplasia or malignancy identified     JEFERSON/tkd          Microbiology Results (last 10 days)     Procedure Component Value - Date/Time    Blood Culture - Blood, Hand, Left [[id number]] Collected: 04/18/21 1222    Lab Status: Preliminary result Specimen: Blood from Hand, Left Updated: 04/20/21 1246     Blood Culture No growth at 2 days    Blood Culture - Blood, Hand, Right [[id number]] Collected: 04/18/21 1219    Lab Status: Preliminary result Specimen: Blood from Hand, Right Updated: 04/20/21 1246     Blood Culture No growth at 2 days    Clostridium Difficile Toxin - Stool, Per Rectum [[id number]]  (Normal) Collected: 04/17/21 1222    Lab Status: Final result Specimen: Stool from Per Rectum Updated: 04/17/21 1345    Narrative:      The following orders were created for panel order Clostridium Difficile Toxin - Stool, Per Rectum.  Procedure                               Abnormality         Status                     ---------                               -----------         ------                     Clostridium Difficile EI...[[id number]]  Normal              Final result                 Please view results for these tests on the individual orders.    Clostridium Difficile EIA - Stool, Per Rectum [[id number]]  (Normal) Collected: 04/17/21 1222    Lab Status: Final result Specimen: Stool from Per Rectum Updated: 04/17/21 1345     C Diff GDH / Toxin Negative    Gastrointestinal Panel, PCR - Stool, Per Rectum [[id number]]  (Normal) Collected: 04/17/21 1003    Lab Status: Final result Specimen: Stool from Per Rectum Updated: 04/17/21 1139     Campylobacter Not Detected     Plesiomonas shigelloides Not Detected     Salmonella Not Detected     Vibrio Not Detected     Vibrio cholerae Not  Detected     Yersinia enterocolitica Not Detected     Enteroaggregative E. coli (EAEC) Not Detected     Enteropathogenic E. coli (EPEC) Not Detected     Enterotoxigenic E. coli (ETEC) lt/st Not Detected     Shiga-like toxin-producing E. coli (STEC) stx1/stx2 Not Detected     Shigella/Enteroinvasive E. coli (EIEC) Not Detected     Cryptosporidium Not Detected     Cyclospora cayetanensis Not Detected     Entamoeba histolytica Not Detected     Giardia lamblia Not Detected     Adenovirus F40/41 Not Detected     Astrovirus Not Detected     Norovirus GI/GII Not Detected     Rotavirus A Not Detected     Sapovirus (I, II, IV or V) Not Detected    Narrative:      If Aeromonas, Staphylococcus aureus or Bacillus cereus are suspected, please order [id number]: Stool Culture, Aeromonas, S aureus, B Cereus.    COVID PRE-OP / PRE-PROCEDURE SCREENING ORDER (NO ISOLATION) - Swab, Nasopharynx [[id number]]  (Normal) Collected: 04/16/21 2230    Lab Status: Final result Specimen: Swab from Nasopharynx Updated: 04/17/21 1536    Narrative:      The following orders were created for panel order COVID PRE-OP / PRE-PROCEDURE SCREENING ORDER (NO ISOLATION) - Swab, Nasopharynx.  Procedure                               Abnormality         Status                     ---------                               -----------         ------                     COVID-19,APTIMA PANTHER,...[[id number]]  Normal              Final result                 Please view results for these tests on the individual orders.    COVID-19,APTIMA PANTHERRACHELL IN-HOUSE, NP/OP SWAB IN UTM/VTM/SALINE TRANSPORT MEDIA,24 HR TAT - Swab, Nasopharynx [[id number]]  (Normal) Collected: 04/16/21 2230    Lab Status: Final result Specimen: Swab from Nasopharynx Updated: 04/17/21 1536     COVID19 Not Detected    Narrative:      Fact sheet for providers: https://www.fda.gov/media/705307/download     Fact sheet for patients: https://www.fda.gov/media/317575/download    Test performed by RT PCR.     Blood Culture - Blood, Arm, Right [[id number]]  (Abnormal) Collected: 04/16/21 2229    Lab Status: Final result Specimen: Blood from Arm, Right Updated: 04/18/21 1153     Blood Culture Staphylococcus, coagulase negative     Comment: Probable contaminant requires clinical correlation, susceptibility not performed unless requested by physician.          Isolated from Aerobic and Anaerobic Bottles     Gram Stain Anaerobic Bottle Gram positive cocci in clusters      Aerobic Bottle Gram positive cocci in clusters    Blood Culture ID, PCR - Blood, Arm, Right [[id number]]  (Abnormal) Collected: 04/16/21 2229    Lab Status: Final result Specimen: Blood from Arm, Right Updated: 04/17/21 1815     BCID, PCR Staphylococcus spp, not aureus. Identification by BCID PCR.     BOTTLE TYPE Anaerobic Bottle    Blood Culture - Blood, Arm, Left [[id number]] Collected: 04/16/21 2228    Lab Status: Preliminary result Specimen: Blood from Arm, Left Updated: 04/19/21 2245     Blood Culture No growth at 3 days          ECG/EMG Results (most recent)     None              Results for orders placed during the hospital encounter of 08/19/19    Adult Transthoracic Echo Complete W/ Cont if Necessary Per Protocol    Interpretation Summary  Indications  Chest pain      Technically satisfactory study.  Mitral valve is structurally normal.  Minimal mitral regurgitation  Tricuspid valve is structurally normal.  Aortic valve is structurally normal.  Pulmonic valve could not be well visualized.  No evidence for mitral tricuspid or aortic regurgitation is seen by Doppler study.  Left atrium is normal in size.  Right atrium is normal in size.  Left ventricle is normal in size and contractility with ejection fraction of 60%.  Right ventricle is normal in size.  Atrial septum is intact.  Aorta is normal.  No pericardial effusion or intracardiac thrombus is seen.    Impression  Minimal mitral regurgitation  Normal echo Doppler study.  Left ventricular ejection  fraction is 60%.      CT Abdomen Pelvis With Contrast    Result Date: 4/16/2021  1. There is a 2 cm mass in the uncinate process of the pancreas which is consistent with pancreatic cancer. This is better defined today and grossly to the prior exam. There is mild dilatation of the pancreatic duct which was not previously identifiable. 2. On the prior exam, a polypoid abnormality in the posterior bladder was reported. This is less well-defined today and is hard to separate from the prostate. Clinical correlation recommended. 3. There is thickening of the wall of the distal ileum which is consistent with an infectious or inflammatory ileitis. 4. There is mild gaseous distention and fluid throughout much of the colon and the rectum.  Electronically Signed By-Kailey Pennington MD On:4/16/2021 8:57 PM This report was finalized on [id number] by  Kailey Pennington MD.          Xrays, labs reviewed personally by physician.    Medication Review:   I have reviewed the patient's current medication list      Scheduled Meds  aspirin, 81 mg, Oral, Daily  cholestyramine light, 1 packet, Oral, Q12H  dicyclomine, 20 mg, Oral, TID  diphenoxylate-atropine, 2 tablet, Oral, 4x Daily  levothyroxine, 75 mcg, Oral, Q AM  octreotide, 100 mcg, Subcutaneous, Q12H  Pancrelipase (Lip-Prot-Amyl), 36,000 units of lipase, Oral, TID  pantoprazole, 40 mg, Oral, QAM  piperacillin-tazobactam, 3.375 g, Intravenous, Q8H  [START ON 4/21/2021] potassium chloride, 20 mEq, Oral, QAM  pregabalin, 150 mg, Oral, TID  rosuvastatin, 10 mg, Oral, Nightly  saccharomyces boulardii, 500 mg, Oral, BID  Scopolamine, 1 patch, Transdermal, Q72H  sodium chloride, 10 mL, Intravenous, Q12H        Meds Infusions       Meds PRN  •  acetaminophen **OR** acetaminophen **OR** acetaminophen  •  Calcium Gluconate-NaCl **AND** calcium gluconate **AND** Calcium, Ionized  •  chlorpheniramine  •  diphenoxylate-atropine  •  HYDROcodone-acetaminophen  •  loperamide  •  LORazepam  •   magnesium sulfate **OR** magnesium sulfate **OR** magnesium sulfate  •  melatonin  •  nitroglycerin  •  ondansetron **OR** ondansetron  •  potassium & sodium phosphates **OR** potassium & sodium phosphates  •  potassium chloride  •  potassium chloride  •  potassium chloride  •  prochlorperazine  •  prochlorperazine  •  sodium chloride    I personally reviewed patient's x-ray films     I personally reviewed patient's EKG strips     Assessment/Plan   Assessment/Plan     Active Hospital Problems    Diagnosis  POA   • **Malignant neoplasm of head of pancreas (CMS/HCC) [C25.0]  Yes   • Pancytopenia due to chemotherapy (CMS/HCC) [D61.810]  Yes   • Nausea and vomiting [R11.2]  Yes   • Hypokalemia [E87.6]  Yes   • Hyperlipidemia [E78.5]  Yes   • Hypothyroidism [E03.9]  Yes   • Cobalamin deficiency [E53.8]  Yes   • Degeneration of lumbar or lumbosacral intervertebral disc [M51.37]  Yes   • Atherosclerotic heart disease of native coronary artery with other forms of angina pectoris (CMS/HCC) [I25.118]  Yes      Resolved Hospital Problems   No resolved problems to display.       MEDICAL DECISION MAKING COMPLEXITY BY PROBLEM:     Nausea and vomiting in a patient with a history of malignant neoplasm of the head of the pancreas  -Patient presents with sudden worsening of chronic epigastric pain over approximately the past 5 days with several episodes of nausea and nonbloody vomiting  -CT of abdomen and pelvis shows a 2 cm mass of the uncinate process of the pancreas consistent with pancreatic cancer which is noted is better defined today than on prior exam.  Mild dilation of the pancreatic duct which was not previously identified is also noted.  Polypoid abnormality in the posterior bladder is noted is less well-defined today and hard to separate from the prostate.  Thickening of the wall of the distal ileum consistent with an infectious or inflammatory ileitis as well as mild gaseous distention of fluid throughout much of the  colon and rectum is noted.  -Check lactic acid, procalcitonin and GI panel  -Blood cultures ordered and pending  -Unasyn started in the ED, continue  -Patient received 2 L LR bolus in ED, continue LR at 100 mL/h  -Clear liquid diet advance as tolerated  -Zofran as needed  -Monitor I's and O's-cardiac monitoring  -GI consulted in ED   --continue antibiotics for possible typhlitis   --continue colestipol, octreotide BID, dicyclomine, Florastor, pancreatic enzymes   --stool studies negative, will start Lomotil   --suspect diarrhea due to recent chemo     Pancytopenia/neutropenic fever  -WBCs: 1.40 with a decreased absolute neutrophil count noted on differential, hemoglobin: 10.8 and platelets: 91 in a patient currently undergoing chemotherapy  -Monitor CBC while admitted  -Unasyn as above  -Hematology consulted in ED   --Venofer 300mg for 3 days (4/18-4/20)   --responding to Neupogen, stopped 4/19/2021 given rise in WBC     Hypokalemia  -Potassium: 3.3  -Check magnesium  -Replacement protocol ordered     CAD  -Continue aspirin and cardiac monitoring     Hyperlipidemia  -Check lipid panel  -Continue statin     Hypothyroidism  -Check TSH  -Continue levothyroxine     B12 deficiency  -Check B12  -Continue supplementation     Chronic pain/degenerative disc disease  -Continue Lyrica and Norco      Anxiety  -Continue Ativan     VTE Prophylaxis -SCDs    VTE Prophylaxis -   Mechanical Order History:      Ordered        04/16/21 2237  Place Sequential Compression Device  Once         04/16/21 2237  Maintain Sequential Compression Device  Continuous                 Pharmalogical Order History:     None            Code Status -   Code Status and Medical Interventions:   Ordered at: 04/16/21 2225     Code Status:    CPR     Medical Interventions (Level of Support Prior to Arrest):    Full       This patient has been examined wearing appropriate Personal Protective Equipment. 04/20/21      Discharge Planning    Pending clinical  course      Continued Care and Services - Admitted Since 4/16/2021    Coordination has not been started for this encounter.           Electronically signed by Autumn Green MD, 04/20/21, 20:16 EDT.  [facility] Hospitalist Team

## 2021-04-21 NOTE — CASE MANAGEMENT/SOCIAL WORK
Continued Stay Note  TINY Trivedi     Patient Name: Clifford Weber  MRN: [id number]  Today's Date: 4/21/2021    Admit Date: 4/16/2021    Discharge Plan     Row Name 04/21/21 1454       Plan    Plan  DC Plan: From home with ex-spouse. No DME, IV antibiotics.    Plan Comments  Blood cultures +, IV antibiotics. Pt doesn't want tube feeding yet per onc.        Chart review only.           Enedina Rodriguez RN

## 2021-04-21 NOTE — PLAN OF CARE
Pt presented with no new acute issues this shift, family to remain at bedside, pt takes pills slowly one at a time.  Pt has been resting well in bed, no further acute issues, will continue to monitor and observe.    Problem: Adult Inpatient Plan of Care  Goal: Plan of Care Review  Flowsheets (Taken 4/21/2021 2164)  Progress: no change  Plan of Care Reviewed With:   patient   daughter     Problem: Fall Injury Risk  Goal: Absence of Fall and Fall-Related Injury  Outcome: Ongoing, Progressing     Problem: Pain Acute  Goal: Optimal Pain Control  Outcome: Ongoing, Progressing     Problem: Skin Injury Risk Increased  Goal: Skin Health and Integrity  Outcome: Ongoing, Progressing     Problem: Malnutrition  Goal: Improved Nutritional Intake  Outcome: Ongoing, Not Progressing

## 2021-04-21 NOTE — PROGRESS NOTES
Nutrition Services    Patient Name: Clifford Weber  YOB: 1945  MRN: [id number]  Admission date: 4/16/2021    PPE Documentation        PPE Worn By Provider Did not enter room on this encounter    PPE Worn By Patient  None      PROGRESS NOTE      Encounter Information: Review of chart to monitor PO intakes        PO Diet: Diet Gastrointestinal; Low Residue   PO Supplements: Boost Plus TID  Yogurt TID    PO Intake:  Since last full review eating 50-75% of meals        Nutrition support orders: -    Nutrition support review: -       Labs (reviewed below): Reviewed-management per attending         GI Function:  + BM 4/20        Nutrition Intervention: Continue with oral nutritional supplements as ordered r/t malnutrition present upon admission. Will continue to follow.        Results from last 7 days   Lab Units 04/21/21  0512 04/20/21  1711 04/20/21  0348 04/19/21  0330 04/18/21  0126   SODIUM mmol/L 146*  --  144 141 140   POTASSIUM mmol/L 3.2* 3.5 3.3* 3.1* 3.2*   CHLORIDE mmol/L 107  --  106 106 104   CO2 mmol/L 24.0  --  23.0 21.0* 23.0   BUN mg/dL 13  --  17 20 18   CREATININE mg/dL 0.75*  --  0.85 0.81 0.83   CALCIUM mg/dL 8.2*  --  8.5* 8.5* 8.9   BILIRUBIN mg/dL  --   --  0.4 0.5 0.6   ALK PHOS U/L  --   --  126* 109 115   ALT (SGPT) U/L  --   --  29 27 31   AST (SGOT) U/L  --   --  43* 32 39   GLUCOSE mg/dL 104*  --  86 93 112*     Results from last 7 days   Lab Units 04/21/21  0512 04/20/21  0348 04/19/21  0330 04/17/21  0426   MAGNESIUM mg/dL 2.0 2.1 2.2 1.7   HEMOGLOBIN g/dL  --  8.6* 8.6* 10.8*   HEMATOCRIT %  --  26.0* 26.3* 31.4*   TRIGLYCERIDES mg/dL  --   --   --  130     COVID19   Date Value Ref Range Status   04/16/2021 Not Detected Not Detected - Ref. Range Final     Lab Results   Component Value Date    HGBA1C 5.5 08/21/2019       RD to follow up per protocol.    Electronically signed by:  Tricia Grissom RD  04/21/21 13:46 EDT

## 2021-04-22 NOTE — PLAN OF CARE
Pt continues to present with increased lethargy and confusion this shift, family to remain at bedside, pt alert to name, date of birth with slowed response to questions, pt slow to take oral medications one at a time, pt continues with decreased appetite.  Family at bedside, pt initiated on Q2 turning r/t to weakness.  Family refusing bed alarms at this time, family to remain at bedside.  Pt remains on IV antibiotics with no adverse side effects.  Will continue to monitor and observe.    Problem: Adult Inpatient Plan of Care  Goal: Plan of Care Review  Outcome: Ongoing, Progressing  Flowsheets (Taken 4/22/2021 3937)  Plan of Care Reviewed With:   patient   daughter

## 2021-04-22 NOTE — PROGRESS NOTES
Hematology/Oncology Inpatient Progress Note    PATIENT NAME: Clifford Weber  : 1945  MRN: [id number]    CHIEF COMPLAINT: Abdominal pain/Weakness     HISTORY OF PRESENT ILLNESS:   76 y.o. male presented to [facility] emergency department on 2021 with complaints of increased abdominal pain and weakness for prior 5 days.  He started having diarrhea approximately 2 to 3 weeks ago and was started on daily Sandostatin last week.  After the third injection his diarrhea had stopped but then restarted on 2021.  He vomited on 2021 with dark green emesis which prompted him to come to the hospital. He was feeling poorly and was experiencing intermittent confusion.  Urine output was low.  He was having abdominal pain as well.  Patient reported a cough and subjective fever with chills prior 24 hours before coming to the hospital. Abdominal discomfort was worse with eating. Labs in the emergency department were as follows: White blood count 1.4, hemoglobin 10.8, MCV 83.6, platelets 91,000, . CMP significant for potassium 3.3, alkaline phosphatase 152, lipase 8 (13-60), lactate 2.2 (0.5-2.0), TSH 2.330 (0.270-4.200), pro calcitonin 3.52 (0.00-0.25). Bood cultures and COVID-19 screening were pending. A CT scan of the abdomen and pelvis with contrast in the emergency department showed a 2 cm mass in the uncinate process of the pancreas which is consistent with pancreatic cancer.  This was better defined than on prior exam.  There was mild dilatation of the pancreatic duct which was not previously identifiable.  A polypoid abnormality in posterior bladder was less defined on today's exam and was difficult to separate from the prostate.  There was thickening of the wall of the distal ileum consistent with infectious or inflammatory ileitis.  There was mild gaseous distention and fluid throughout much of the colon and rectum.  Antibiotics with Unasyn were initiated.  He was admitted for further  evaluation and treatment. Gastroenterology was consulted.  Fecal lactoferrin was positive, gastrointestinal panel was negative. C. difficile testing was pending. His Vitamin B-12 level was 1190 ([phone]).  Antibiotics were switched from Unasyn to Zosyn by GI 4/17/2021.     04/17/21  Hematology/Oncology was consulted by Dr. Nic Ochoa on this established patient followed for clinical stage IIb pancreatic adenocarcinoma.  He had  initially presented with abdominal pain and was hospitalized in December 2020.  At that time CT scan of the abdomen pelvis showed a hypoenhancing lesion in the uncinate process of the pancreas.  A polypoid nodularity was also seen along the posterior wall of the urinary bladder.  An MRI pancreatic protocol on 1/4/2021 showed a hypodense 2.1 cm mass suspicious for primary pancreatic malignancy.  On 1/7/2021 he underwent an EUS with FNA by Dr. Fierro.  There was a 2.4 cm at the uncinate process with one small peripancreatic lymph node that was 4 to 5 mm in size.  Dr. 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  The patient was seen by Dr. Diogenes Stapleton at [facility] on 1/21/2021 who recommended neoadjuvant chemotherapy with 4-6 cycles followed by a pancreatic protocol to determine eligibility for resection.  He started FOLFIRINOX on 2/8/2021 and received his fifth cycle on 4/5/2021.  Diarrhea started mid-March of 2021.  A CT chest on 3/16/2021 revealed small scattered non-- calcified pulmonary nodules measuring 5 mm or less in size and a low-density pancreatic uncinate process with some stranding around it.  A CT scan of thoracic and lumbar spine on same day showed degenerative changes throughout the spine, multilevel disc disease and degenerative facet change resulting in multilevel canal stenosis and neural foraminal narrowing..  The patient was last seen in the office on 4/12/2021  and was scheduled for 3-week follow-up. Octreotide was started for diarrhea with initial improvement after the third injection.  Cycle 6 of FOLFIRINOX was due on 4/19/2021.        PCP: Gris Muro APRN    INTERVAL HISTORY:  4/18/2021 - WBC 4.1, hemoglobin 9.3, platelets 109,000, ANC 0.98, reticulocytes 2.36 (0.),  haptoglobin 308 (),  iron 17 (59-1 58), iron saturation 7 (20-50), transferrin 153 (200-360), TIBC 228 (298-536), ferritin 2417 (), folate >20.0 (4.78-24.20), stool for C. Difficile negative (negative), Covid-19 screening negative (negative). Started daily Neupogen on 4/17/2021. Started Venofer 300 mg IV x 3 days.  4/19/2021 - WBC 13.7, hemoglobin 8.6, platelets 119,000, ANC 6.58, occult blood stool - positive.  Neupogen discontinued.  4/20/2021 - WBC 34.5, hemoglobin 8.6, platelets 151,000.  4/21/2021-remains sleepy.  Patient does not want feeding tube placed yet.  CT head without contrast with some atrophy.  4/22/2021-alert today with less nausea and diarrhea.  Wants to go home.  Ammonia 23 (16-60).    History of present illness reviewed since last visit and changes noted on 04/22/21.    Subjective   Patient claims to be feeling all right with less diarrhea and nausea.  Feels like he has yeimy in his mouth.    ROS:  Review of Systems   Constitutional: Positive for appetite change ( Poor appetite). Negative for activity change, chills, fatigue, fever and unexpected weight change.   HENT: Negative for mouth sores, sore throat and tinnitus.         Feels like he has cotton balls in his mouth.   Eyes: Negative for photophobia, pain, redness and visual disturbance.   Respiratory: Negative for cough, choking and shortness of breath.    Cardiovascular: Negative for chest pain, palpitations and leg swelling.   Gastrointestinal: Positive for abdominal pain and nausea. Negative for constipation, diarrhea and vomiting.   Genitourinary: Negative for dysuria and hematuria.   Musculoskeletal:  Negative for arthralgias, back pain, myalgias and neck pain.   Skin: Negative for rash and wound.   Allergic/Immunologic: Negative for environmental allergies.   Neurological: Negative for dizziness, speech difficulty, weakness, light-headedness, numbness and headaches.        Sleepy.   Hematological: Negative for adenopathy. Does not bruise/bleed easily.   Psychiatric/Behavioral: Negative for dysphoric mood. The patient is not nervous/anxious.    All other systems reviewed and are negative.    MEDICATIONS:    Scheduled Meds:  aspirin, 81 mg, Oral, Daily  cholestyramine light, 1 packet, Oral, Q12H  dicyclomine, 20 mg, Oral, TID  diphenoxylate-atropine, 2 tablet, Oral, 4x Daily  levothyroxine, 75 mcg, Oral, Q AM  octreotide, 100 mcg, Subcutaneous, Q12H  Pancrelipase (Lip-Prot-Amyl), 36,000 units of lipase, Oral, TID  pantoprazole, 40 mg, Oral, QAM  piperacillin-tazobactam, 3.375 g, Intravenous, Q8H  potassium chloride, 20 mEq, Oral, QAM  pregabalin, 150 mg, Oral, TID  rosuvastatin, 10 mg, Oral, Nightly  saccharomyces boulardii, 500 mg, Oral, BID  Scopolamine, 1 patch, Transdermal, Q72H  sodium chloride, 10 mL, Intravenous, Q12H       Continuous Infusions:      PRN Meds:  •  acetaminophen **OR** acetaminophen **OR** acetaminophen  •  Calcium Gluconate-NaCl **AND** calcium gluconate **AND** Calcium, Ionized  •  chlorpheniramine  •  diphenoxylate-atropine  •  HYDROcodone-acetaminophen  •  loperamide  •  LORazepam  •  magnesium sulfate **OR** magnesium sulfate **OR** magnesium sulfate  •  melatonin  •  nitroglycerin  •  ondansetron **OR** ondansetron  •  potassium & sodium phosphates **OR** potassium & sodium phosphates  •  potassium chloride  •  potassium chloride  •  potassium chloride  •  prochlorperazine  •  prochlorperazine  •  sodium chloride     ALLERGIES:    Allergies   Allergen Reactions   • Niacin Unknown (See Comments)     Abstracted from [employer].       Objective    VITALS:   /83 (BP Location: Right  "arm, Patient Position: Lying)   Pulse 54   Temp 98 °F (36.7 °C) (Oral)   Resp 16   Ht 188 cm (74\")   Wt 77.8 kg (171 lb 8.3 oz)   SpO2 97%   BMI 22.02 kg/m²     PHYSICAL EXAM:  Physical Exam  Vitals and nursing note reviewed.   Constitutional:       General: He is not in acute distress.     Appearance: Normal appearance. He is well-developed. He is not diaphoretic.   HENT:      Head: Normocephalic and atraumatic.      Comments: Bitemporal wasting     Right Ear: External ear normal.      Left Ear: External ear normal.      Nose: Nose normal.      Mouth/Throat:      Mouth: Mucous membranes are moist.      Pharynx: Oropharynx is clear. No oropharyngeal exudate or posterior oropharyngeal erythema.      Comments: Dental fillings  Eyes:      General: No scleral icterus.     Extraocular Movements: Extraocular movements intact.      Conjunctiva/sclera: Conjunctivae normal.      Pupils: Pupils are equal, round, and reactive to light.   Cardiovascular:      Rate and Rhythm: Regular rhythm. Bradycardia present.      Heart sounds: Normal heart sounds. No murmur heard.        Comments: Monitor leads  Pulmonary:      Effort: Pulmonary effort is normal. No respiratory distress.      Breath sounds: Normal breath sounds. No wheezing or rales.   Abdominal:      General: Bowel sounds are normal. There is no distension.      Palpations: Abdomen is soft. There is no mass.      Tenderness: There is no abdominal tenderness. There is no guarding.   Genitourinary:     Comments: Deferred   Musculoskeletal:         General: No swelling, tenderness or deformity. Normal range of motion.      Cervical back: Normal range of motion and neck supple.      Right lower leg: No edema.      Left lower leg: No edema.      Comments: RUE IV   Lymphadenopathy:      Cervical: No cervical adenopathy.      Upper Body:      Right upper body: No supraclavicular adenopathy.      Left upper body: No supraclavicular adenopathy.   Skin:     General: Skin is " warm and dry.      Coloration: Skin is not pale.      Findings: No bruising, erythema or rash.   Neurological:      General: No focal deficit present.      Mental Status: He is alert and oriented to person, place, and time.      Coordination: Coordination normal.   Psychiatric:         Mood and Affect: Mood normal.         Behavior: Behavior normal.         Thought Content: Thought content normal.         RECENT LABS:  Lab Results (last 24 hours)     Procedure Component Value Units Date/Time    Basic Metabolic Panel [[id number]]  (Abnormal) Collected: 04/22/21 0424    Specimen: Blood Updated: 04/22/21 0525     Glucose 116 mg/dL      BUN 12 mg/dL      Creatinine 0.72 mg/dL      Sodium 144 mmol/L      Potassium 3.3 mmol/L      Chloride 106 mmol/L      CO2 28.0 mmol/L      Calcium 7.7 mg/dL      eGFR Non African Amer 107 mL/min/1.73      BUN/Creatinine Ratio 16.7     Anion Gap 10.0 mmol/L     Narrative:      GFR Normal >60  Chronic Kidney Disease <60  Kidney Failure <15      Magnesium [[id number]]  (Normal) Collected: 04/22/21 0424    Specimen: Blood Updated: 04/22/21 0525     Magnesium 1.8 mg/dL     Blood Culture - Blood, Arm, Left [[id number]] Collected: 04/16/21 2228    Specimen: Blood from Arm, Left Updated: 04/21/21 2245     Blood Culture No growth at 5 days    T3, Free [[id number]]  (Abnormal) Collected: 04/21/21 0512    Specimen: Blood Updated: 04/21/21 2051     T3, Free 1.15 pg/mL     Narrative:      Results may be falsely increased if patient taking Biotin.      Ammonia [[id number]]  (Normal) Collected: 04/21/21 1604    Specimen: Blood Updated: 04/21/21 1638     Ammonia 23 umol/L         PENDING RESULTS: Blood cultures,  CA 19-9 (office lab on 4/12/2021), CA 19-9    IMAGING REVIEWED:  CT Head Without Contrast    Result Date: 4/21/2021  1.There is some underlying cerebral atrophy. Depending on clinical findings additional workup with MR with and without contrast might be considered to reassess.  Electronically  Signed By-John Coffey MD On:4/21/2021 2:29 PM This report was finalized on [id number] by  John Coffey MD.    I have reviewed the patient's labs, imaging, reports, and other clinician documentation.    Assessment/Plan   ASSESSMENT  1. Clinical stage IIb pancreatic adenocarcinoma: S/p 5 cycles neoadjuvant FOLFIRINOX. Cycle 6 was due 4/19/2021.   2. Pancytopenia/Neutropenia/CHANDRA: Chemotherapy effect contributing.  Anemia work-up showed concomitant CHANDRA and completed Venofer course.  Ferritin was elevated (actute phase reactant).  Vitamin B-12 level and folate normal. No evidence of hemolysis.  Completed Venofer course.  Received Neupogen 4/17/2021 and 4/18/2021 with resolution of neutropenia.  3. Diarrhea/Nausea/Vomiting/Colitis: Diarrhea ongoing since mid-March 2021, possibly chemotherapy-induced.  Started on octreotide week of 4/5/2021 as outpatient with initial improvement after 3 injections.  Lipase level low. CT A/P 4/16/2021-thickening of terminal ileum. Stool studies negative other than lactoferrin positive. GI consulted - suspects typhlitis.   Blood cultures grew Staphylococcus not aureus, likely a contaminant. Repeat blood culture NGTD.  Gastrointestinal panel and C. difficile were negative. Received Unasyn and switched to Zosyn by GI.  Probiotic started in addition to home meds - Questran, scopolamine, pancreatic enzymes, and PPI.  Clear liquid diet started. S/p 2L lactated ringers. No plans for endoscopy at this time. Occult blood stool - positive.  On 4/18/2021 - octreotide started by primary team and GI increased Colestipol and added dicyclomine and Florastor and advanced diet to low residue diet with Ensure.   4. Anorexia/Unintentional weight loss: On cyproheptadine and Zenpep as outpatient.  Patient continues to decline feeding tube.  5. Hypomagnesemia/Hypolakemia/Chronic pain/Degenerative disc. disease/Anxiety CAD/HLD/Hypothyroidism/Hypokalemia: management per Primary team.   6. Lethargy -Meds  reviewed and no recent increased dosing, no recent Ativan or antiemetic/Phenergan dosing, and stable scopolamine patch for weeks.  No increased LFTs and ammonia level and CT head negative.  Improved.     PLAN  1. Follow CBC.  2. CA 19-9 remains pending.  3. Continue Sandostatin 100 mcg SQ Q12H, scheduled Lomotil, and as needed cholestyramine.   4. With switch to Depo Sandostatin as an outpatient.  5. Discussed feeding tube and patient states he is not ready for this yet.  6. Outpatient chemotherapy to resume with dosage adjustments post discharge.                      I discussed the patients findings and my recommendations with patient and family.    Electronically signed by Demian Mello MD, 04/22/21, 9:39 AM EDT.

## 2021-04-23 PROBLEM — E43 SEVERE MALNUTRITION (HCC): Status: ACTIVE | Noted: 2021-01-01

## 2021-04-23 NOTE — PROGRESS NOTES
[facility] Medicine Services Daily Progress Note      Hospitalist Team  LOS 3 days      Patient Care Team:  Gris Muro APRN as PCP - General (Nurse Practitioner)  Demian Mello MD as Consulting Physician (Hematology and Oncology)    Patient Location: 230/1      Subjective   Subjective     Chief Complaint / Subjective  Chief Complaint   Patient presents with   • Vomiting     N/V/D for 2 days.         Brief Synopsis of Hospital Course/HPI  Mr. Weber is a 76 y.o. male with past medical history of pancreatic cancer, hypothyroidism, hyperlipidemia, chronic pain, B12 deficiency and CAD who presents to [facility] complaining of epigastric pain.  Patient reports that for the past 2 to 3 months he has been having some abdominal pain which has become significantly worse over the past 5 days.  Pain is located primarily in the epigastric area described as sharp rated at 8/10 at its worst without obvious provoking and palliative factors.  Pain does have a waxing and waning intensity and some nausea with nonbloody vomiting as well as diarrhea are also reported.  Patient does have a history of pancreatic cancer and is currently undergoing chemotherapy.  He denies any other pain including chest pain, dyspnea, cough or fever, peripheral edema, syncope or near syncope.  He notes somewhat decreased p.o. intake as well as decreased urination.  He does not smoke or drink alcohol.  Patient also confirms compliance with all outpatient therapies.     In the ED patient had lab significant for potassium: 3.3, anion gap: 17.0 with a serum CO2 of 21.0, lipase: 8, WBCs: 1.40 with decreased absolute neutrophil count noted on differential, hemoglobin: 10.8, platelets: 91.  CT of abdomen and pelvis shows a 2 cm mass of the uncinate process of the pancreas consistent with pancreatic cancer which is noted is better defined today than on prior exam.  Mild dilation of the pancreatic duct which was not  previously identified is also noted.  Polypoid abnormality in the posterior bladder is noted is less well-defined today and hard to separate from the prostate.  Thickening of the wall of the distal ileum consistent with an infectious or inflammatory ileitis as well as mild gaseous distention of fluid throughout much of the colon and rectum is noted.     4/17/2021  Patient seen and examined  Continue antibiotics  C. difficile ordered  Dietitian consulted  Neutropenic precautions    4/18/2021  Patient seen and examined  Afebrile, white count improved with Neupogen  Continue antibiotics  Repeat blood cultures     (possible contaminated specimen in 1 bottle)  C. difficile negative    4/19/2021: decrease in diarrhea, still having nausea/vomiting  4/20/2021: poor appetite, diarrhea persists, lethargic/confused at times  4/21/2021: decrease in diarrhea overnight, but nurse reporting more diarrhea through the day, still lethargic, pt has refused PEG tube, wants to go home; per Oncology, ordered imaging due to lethargy, CT head unremarkable  4/22/2021: increasing lethargy and confusion, decreased appetite    Review of Systems   Constitutional: Positive for decreased appetite and malaise/fatigue. Negative for chills and fever.   HENT: Negative.    Eyes: Negative.    Cardiovascular: Negative.    Respiratory: Negative.    Endocrine: Negative.    Skin: Negative.    Musculoskeletal: Negative.    Gastrointestinal: Positive for abdominal pain, diarrhea, nausea and vomiting. Negative for constipation, hematemesis and hematochezia.   Genitourinary: Negative.    Neurological: Negative.    Psychiatric/Behavioral: Negative.      Review of Systems   All other systems reviewed and are negative.      Objective   Objective      Vital Signs  Temp:  [97.4 °F (36.3 °C)-98 °F (36.7 °C)] 97.4 °F (36.3 °C)  Heart Rate:  [54-55] 55  Resp:  [16] 16  BP: (143-147)/(79-83) 143/79  Oxygen Therapy  SpO2: 100 %  Pulse Oximetry Type: Intermittent  Device  "(Oxygen Therapy): room air  Flowsheet Rows      First Filed Value   Admission Height  188 cm (74\") Documented at 04/16/2021 1759   Admission Weight  70.3 kg (155 lb) Documented at 04/16/2021 1759        Intake & Output (last 3 days)       04/20 0701 - 04/21 0700 04/21 0701 - 04/22 0700 04/22 0701 - 04/23 0700    P.O. 480 960 480    Total Intake(mL/kg) 480 (6.3) 960 (12.3) 480 (6.2)    Net +480 +960 +480           Urine Unmeasured Occurrence  2 x     Stool Unmeasured Occurrence  1 x         Lines, Drains & Airways    Active LDAs     Name:   Placement date:   Placement time:   Site:   Days:    Peripheral IV 04/16/21 1839 Right Wrist   04/16/21 1839    Wrist   1    Peripheral IV 04/16/21 2222 Left Forearm   04/16/21    2222    Forearm   1    Single Lumen Implantable Port 03/26/21 Left Chest   03/26/21    1536    Chest   22                  Physical Exam:  Vitals reviewed.   Constitutional:       General: He is not in acute distress.     Appearance: Normal appearance. He is normal weight. He is not ill-appearing or toxic-appearing.   HENT:      Head: Normocephalic and atraumatic.      Right Ear: External ear normal.      Left Ear: External ear normal.      Nose: Nose normal.      Mouth/Throat:      Mouth: Mucous membranes are moist.   Eyes:      Extraocular Movements: Extraocular movements intact.   Cardiovascular:      Rate and Rhythm: Regular rhythm. Tachycardia present.      Pulses: Normal pulses.      Heart sounds: Normal heart sounds.   Pulmonary:      Effort: Pulmonary effort is normal.      Breath sounds: Normal breath sounds.   Abdominal:      General: Bowel sounds are normal. There is no distension.      Tenderness: There is abdominal tenderness.   Musculoskeletal:         General: Normal range of motion.      Cervical back: Normal range of motion.   Skin:     General: Skin is warm and dry.   Neurological:      General: No focal deficit present.      Mental Status: lethargic  Psychiatric:         Mood and " Affect: Mood normal.         Behavior: Behavior normal.         Thought Content: Thought content normal.         Judgment: Judgment normal.   Physical Exam          Procedures:              Results Review:     I reviewed the patient's new clinical results.      Lab Results (last 24 hours)     Procedure Component Value Units Date/Time    Cancer Antigen 19-9 [[id number]]  (Abnormal) Collected: 04/17/21 1101    Specimen: Blood Updated: 04/22/21 1905     CA 19-9 54.7 U/mL     Narrative:      Results may be falsely decreased if patient taking Biotin.     Blood Culture - Blood, Hand, Left [[id number]] Collected: 04/18/21 1222    Specimen: Blood from Hand, Left Updated: 04/22/21 1245     Blood Culture No growth at 4 days    Blood Culture - Blood, Hand, Right [[id number]] Collected: 04/18/21 1219    Specimen: Blood from Hand, Right Updated: 04/22/21 1245     Blood Culture No growth at 4 days    Basic Metabolic Panel [[id number]]  (Abnormal) Collected: 04/22/21 0424    Specimen: Blood Updated: 04/22/21 0525     Glucose 116 mg/dL      BUN 12 mg/dL      Creatinine 0.72 mg/dL      Sodium 144 mmol/L      Potassium 3.3 mmol/L      Chloride 106 mmol/L      CO2 28.0 mmol/L      Calcium 7.7 mg/dL      eGFR Non African Amer 107 mL/min/1.73      BUN/Creatinine Ratio 16.7     Anion Gap 10.0 mmol/L     Narrative:      GFR Normal >60  Chronic Kidney Disease <60  Kidney Failure <15      Magnesium [[id number]]  (Normal) Collected: 04/22/21 0424    Specimen: Blood Updated: 04/22/21 0525     Magnesium 1.8 mg/dL         No results found for: HGBA1C            Lab Results   Component Value Date    LIPASE 8 (L) 04/16/2021     Lab Results   Component Value Date    CHOL 149 04/17/2021    TRIG 130 04/17/2021    HDL 57 04/17/2021    LDL 69 04/17/2021       Lab Results   Lab Value Date/Time    INTRAOP  01/07/2021 1139     FNA PASS #1: Atypical suspicious for malignancy.    FNA Pass #2: Positive for malignancy.      FINALDX  04/19/2021 0330      Leukocytosis  Normocytic anemia  Anisopoikilocytosis  Thrombocytopenia  No blasts identified      FINALDX  01/14/2021 0930     Bladder tumor, transurethral resection:    Inverted urothelial papilloma    No muscularis propria identified    No dysplasia or malignancy identified     JEFERSON/tkd          Microbiology Results (last 10 days)     Procedure Component Value - Date/Time    Blood Culture - Blood, Hand, Left [[id number]] Collected: 04/18/21 1222    Lab Status: Preliminary result Specimen: Blood from Hand, Left Updated: 04/22/21 1245     Blood Culture No growth at 4 days    Blood Culture - Blood, Hand, Right [[id number]] Collected: 04/18/21 1219    Lab Status: Preliminary result Specimen: Blood from Hand, Right Updated: 04/22/21 1245     Blood Culture No growth at 4 days    Clostridium Difficile Toxin - Stool, Per Rectum [[id number]]  (Normal) Collected: 04/17/21 1222    Lab Status: Final result Specimen: Stool from Per Rectum Updated: 04/17/21 1345    Narrative:      The following orders were created for panel order Clostridium Difficile Toxin - Stool, Per Rectum.  Procedure                               Abnormality         Status                     ---------                               -----------         ------                     Clostridium Difficile EI...[[id number]]  Normal              Final result                 Please view results for these tests on the individual orders.    Clostridium Difficile EIA - Stool, Per Rectum [[id number]]  (Normal) Collected: 04/17/21 1222    Lab Status: Final result Specimen: Stool from Per Rectum Updated: 04/17/21 1345     C Diff GDH / Toxin Negative    Gastrointestinal Panel, PCR - Stool, Per Rectum [[id number]]  (Normal) Collected: 04/17/21 1003    Lab Status: Final result Specimen: Stool from Per Rectum Updated: 04/17/21 1139     Campylobacter Not Detected     Plesiomonas shigelloides Not Detected     Salmonella Not Detected     Vibrio Not Detected     Vibrio cholerae Not  Detected     Yersinia enterocolitica Not Detected     Enteroaggregative E. coli (EAEC) Not Detected     Enteropathogenic E. coli (EPEC) Not Detected     Enterotoxigenic E. coli (ETEC) lt/st Not Detected     Shiga-like toxin-producing E. coli (STEC) stx1/stx2 Not Detected     Shigella/Enteroinvasive E. coli (EIEC) Not Detected     Cryptosporidium Not Detected     Cyclospora cayetanensis Not Detected     Entamoeba histolytica Not Detected     Giardia lamblia Not Detected     Adenovirus F40/41 Not Detected     Astrovirus Not Detected     Norovirus GI/GII Not Detected     Rotavirus A Not Detected     Sapovirus (I, II, IV or V) Not Detected    Narrative:      If Aeromonas, Staphylococcus aureus or Bacillus cereus are suspected, please order [id number]: Stool Culture, Aeromonas, S aureus, B Cereus.    COVID PRE-OP / PRE-PROCEDURE SCREENING ORDER (NO ISOLATION) - Swab, Nasopharynx [[id number]]  (Normal) Collected: 04/16/21 2230    Lab Status: Final result Specimen: Swab from Nasopharynx Updated: 04/17/21 1536    Narrative:      The following orders were created for panel order COVID PRE-OP / PRE-PROCEDURE SCREENING ORDER (NO ISOLATION) - Swab, Nasopharynx.  Procedure                               Abnormality         Status                     ---------                               -----------         ------                     COVID-19,APTIMA PANTHER,...[[id number]]  Normal              Final result                 Please view results for these tests on the individual orders.    COVID-19,APTIMA PANTHERRACHELL IN-HOUSE, NP/OP SWAB IN UTM/VTM/SALINE TRANSPORT MEDIA,24 HR TAT - Swab, Nasopharynx [[id number]]  (Normal) Collected: 04/16/21 2230    Lab Status: Final result Specimen: Swab from Nasopharynx Updated: 04/17/21 1536     COVID19 Not Detected    Narrative:      Fact sheet for providers: https://www.fda.gov/media/714187/download     Fact sheet for patients: https://www.fda.gov/media/057582/download    Test performed by RT PCR.     Blood Culture - Blood, Arm, Right [[id number]]  (Abnormal) Collected: 04/16/21 2229    Lab Status: Final result Specimen: Blood from Arm, Right Updated: 04/18/21 1153     Blood Culture Staphylococcus, coagulase negative     Comment: Probable contaminant requires clinical correlation, susceptibility not performed unless requested by physician.          Isolated from Aerobic and Anaerobic Bottles     Gram Stain Anaerobic Bottle Gram positive cocci in clusters      Aerobic Bottle Gram positive cocci in clusters    Blood Culture ID, PCR - Blood, Arm, Right [[id number]]  (Abnormal) Collected: 04/16/21 2229    Lab Status: Final result Specimen: Blood from Arm, Right Updated: 04/17/21 1815     BCID, PCR Staphylococcus spp, not aureus. Identification by BCID PCR.     BOTTLE TYPE Anaerobic Bottle    Blood Culture - Blood, Arm, Left [[id number]] Collected: 04/16/21 2228    Lab Status: Final result Specimen: Blood from Arm, Left Updated: 04/21/21 2245     Blood Culture No growth at 5 days          ECG/EMG Results (most recent)     Procedure Component Value Units Date/Time    ECG 12 Lead [[id number]] Collected: 04/22/21 1135     Updated: 04/22/21 1744     QT Interval 471 ms     Narrative:      HEART RATE= 61  bpm  RR Interval= 984  ms  [place] Interval= 161  ms  P Horizontal Axis= 66  deg  P Front Axis= 116  deg  QRSD Interval= 143  ms  QT Interval= 471  ms  QRS Axis= 52  deg  T Wave Axis= 156  deg  - ABNORMAL ECG -  Sinus rhythm  Anterior infarct, old  Abnormal T, consider ischemia, lateral leads  When compared with ECG of 20-Dec-2020 19:48:20,  Nonspecific significant change  Electronically Signed By: Sharan Escalante ([facility]) 22-Apr-2021 17:44:03  Date and Time of Study: 2021-04-22 11:35:25              Results for orders placed during the hospital encounter of 08/19/19    Adult Transthoracic Echo Complete W/ Cont if Necessary Per Protocol    Interpretation Summary  Indications  Chest pain      Technically satisfactory study.  Mitral  valve is structurally normal.  Minimal mitral regurgitation  Tricuspid valve is structurally normal.  Aortic valve is structurally normal.  Pulmonic valve could not be well visualized.  No evidence for mitral tricuspid or aortic regurgitation is seen by Doppler study.  Left atrium is normal in size.  Right atrium is normal in size.  Left ventricle is normal in size and contractility with ejection fraction of 60%.  Right ventricle is normal in size.  Atrial septum is intact.  Aorta is normal.  No pericardial effusion or intracardiac thrombus is seen.    Impression  Minimal mitral regurgitation  Normal echo Doppler study.  Left ventricular ejection fraction is 60%.      CT Head Without Contrast    Result Date: 4/21/2021  1.There is some underlying cerebral atrophy. Depending on clinical findings additional workup with MR with and without contrast might be considered to reassess.  Electronically Signed By-John Coffey MD On:4/21/2021 2:29 PM This report was finalized on [id number] by  John Coffey MD.    CT Abdomen Pelvis With Contrast    Result Date: 4/16/2021  1. There is a 2 cm mass in the uncinate process of the pancreas which is consistent with pancreatic cancer. This is better defined today and grossly to the prior exam. There is mild dilatation of the pancreatic duct which was not previously identifiable. 2. On the prior exam, a polypoid abnormality in the posterior bladder was reported. This is less well-defined today and is hard to separate from the prostate. Clinical correlation recommended. 3. There is thickening of the wall of the distal ileum which is consistent with an infectious or inflammatory ileitis. 4. There is mild gaseous distention and fluid throughout much of the colon and the rectum.  Electronically Signed By-Kailey Pennington MD On:4/16/2021 8:57 PM This report was finalized on [id number] by  Kailey Pennington MD.          Xrays, labs reviewed personally by physician.    Medication Review:   I  have reviewed the patient's current medication list      Scheduled Meds  aspirin, 81 mg, Oral, Daily  cholestyramine light, 1 packet, Oral, Q12H  clotrimazole, 10 mg, Oral, 5x Daily  dicyclomine, 20 mg, Oral, TID  diphenoxylate-atropine, 2 tablet, Oral, 4x Daily  First Mouthwash (Magic Mouthwash), 5 mL, Swish & Spit, Q6H  levothyroxine, 75 mcg, Oral, Q AM  octreotide, 100 mcg, Subcutaneous, Q12H  Pancrelipase (Lip-Prot-Amyl), 36,000 units of lipase, Oral, TID  pantoprazole, 40 mg, Oral, QAM  piperacillin-tazobactam, 3.375 g, Intravenous, Q8H  potassium chloride, 20 mEq, Oral, QAM  pregabalin, 150 mg, Oral, TID  rosuvastatin, 10 mg, Oral, Nightly  saccharomyces boulardii, 500 mg, Oral, BID  Scopolamine, 1 patch, Transdermal, Q72H  sodium chloride, 10 mL, Intravenous, Q12H        Meds Infusions       Meds PRN  •  acetaminophen **OR** acetaminophen **OR** acetaminophen  •  Calcium Gluconate-NaCl **AND** calcium gluconate **AND** Calcium, Ionized  •  chlorpheniramine  •  diphenoxylate-atropine  •  HYDROcodone-acetaminophen  •  loperamide  •  LORazepam  •  magnesium sulfate **OR** magnesium sulfate **OR** magnesium sulfate  •  melatonin  •  nitroglycerin  •  ondansetron **OR** ondansetron  •  potassium & sodium phosphates **OR** potassium & sodium phosphates  •  potassium chloride  •  potassium chloride  •  potassium chloride  •  prochlorperazine  •  prochlorperazine  •  sodium chloride    I personally reviewed patient's x-ray films     I personally reviewed patient's EKG strips     Assessment/Plan   Assessment/Plan     Active Hospital Problems    Diagnosis  POA   • **Malignant neoplasm of head of pancreas (CMS/HCC) [C25.0]  Yes   • Pancytopenia due to chemotherapy (CMS/HCC) [D61.810]  Yes   • Nausea and vomiting [R11.2]  Yes   • Hypokalemia [E87.6]  Yes   • Hyperlipidemia [E78.5]  Yes   • Hypothyroidism [E03.9]  Yes   • Cobalamin deficiency [E53.8]  Yes   • Degeneration of lumbar or lumbosacral intervertebral disc  [M51.37]  Yes   • Atherosclerotic heart disease of native coronary artery with other forms of angina pectoris (CMS/HCC) [I25.118]  Yes      Resolved Hospital Problems   No resolved problems to display.       MEDICAL DECISION MAKING COMPLEXITY BY PROBLEM:     Nausea and vomiting in a patient with a history of malignant neoplasm of the head of the pancreas  -Patient presents with sudden worsening of chronic epigastric pain over approximately the past 5 days with several episodes of nausea and nonbloody vomiting  -CT of abdomen and pelvis shows a 2 cm mass of the uncinate process of the pancreas consistent with pancreatic cancer which is noted is better defined today than on prior exam.  Mild dilation of the pancreatic duct which was not previously identified is also noted.  Polypoid abnormality in the posterior bladder is noted is less well-defined today and hard to separate from the prostate.  Thickening of the wall of the distal ileum consistent with an infectious or inflammatory ileitis as well as mild gaseous distention of fluid throughout much of the colon and rectum is noted.  -Check lactic acid, procalcitonin and GI panel  -Blood cultures ordered and pending  -Unasyn started in the ED, continue  -Patient received 2 L LR bolus in ED, continue LR at 100 mL/h  -Clear liquid diet advance as tolerated  -Zofran as needed  -Monitor I's and O's-cardiac monitoring  -GI consulted in ED   --continue antibiotics for possible typhlitis   --continue colestipol, octreotide BID, dicyclomine, Florastor, pancreatic enzymes   --stool studies negative, will start Lomotil   --suspect diarrhea due to recent chemo     Pancytopenia/neutropenic fever  -WBCs: 1.40 with a decreased absolute neutrophil count noted on differential, hemoglobin: 10.8 and platelets: 91 in a patient currently undergoing chemotherapy  -Monitor CBC while admitted  -Unasyn as above  -Hematology consulted in ED   --s/p Venofer 300mg for 3 days  (4/18-4/20)   --responding to Neupogen, stopped 4/19/2021 given rise in WBC     Hypokalemia  -Potassium: 3.3  -Check magnesium  -Replacement protocol ordered     CAD  -Continue aspirin and cardiac monitoring     Hyperlipidemia  -Check lipid panel  -Continue statin     Hypothyroidism  -Check TSH  -Continue levothyroxine     B12 deficiency  -Check B12  -Continue supplementation     Chronic pain/degenerative disc disease  -Continue Lyrica and Norco      Anxiety  -Continue Ativan     VTE Prophylaxis -SCDs    VTE Prophylaxis -   Mechanical Order History:      Ordered        04/16/21 2237  Place Sequential Compression Device  Once         04/16/21 2237  Maintain Sequential Compression Device  Continuous                 Pharmalogical Order History:     None            Code Status -   Code Status and Medical Interventions:   Ordered at: 04/16/21 2225     Code Status:    CPR     Medical Interventions (Level of Support Prior to Arrest):    Full       This patient has been examined wearing appropriate Personal Protective Equipment. 04/22/21        Discharge Planning    Pending clinical course.      Continued Care and Services - Admitted Since 4/16/2021    Coordination has not been started for this encounter.           Electronically signed by Autumn Green MD, 04/22/21, 21:55 EDT.  [facility] Hospitalist Team

## 2021-04-23 NOTE — PLAN OF CARE
Goal Outcome Evaluation:  Plan of Care Reviewed With: patient     Outcome Summary: Pt continues to have diarrhea and nausea. Family remains at bedside. Small amounts of food eaten per each meal. Will continue to monitor.

## 2021-04-23 NOTE — PROGRESS NOTES
[facility] Daily Progress Note      Hospitalist Team  LOS 3 days      Patient Care Team:  Gris Muro APRN as PCP - General (Nurse Practitioner)  Demian Mello MD as Consulting Physician (Hematology and Oncology)    Patient Location: 230/1      Subjective   Subjective     Chief Complaint / Subjective  Chief Complaint   Patient presents with   • Vomiting     N/V/D for 2 days.         Brief Synopsis of Hospital Course/HPI  Mr. Weber is a 76 y.o. male with past medical history of pancreatic cancer, hypothyroidism, hyperlipidemia, chronic pain, B12 deficiency and CAD who presents to [facility] complaining of epigastric pain.  Patient reports that for the past 2 to 3 months he has been having some abdominal pain which has become significantly worse over the past 5 days.  Pain is located primarily in the epigastric area described as sharp rated at 8/10 at its worst without obvious provoking and palliative factors.  Pain does have a waxing and waning intensity and some nausea with nonbloody vomiting as well as diarrhea are also reported.  Patient does have a history of pancreatic cancer and is currently undergoing chemotherapy.  He denies any other pain including chest pain, dyspnea, cough or fever, peripheral edema, syncope or near syncope.  He notes somewhat decreased p.o. intake as well as decreased urination.  He does not smoke or drink alcohol.  Patient also confirms compliance with all outpatient therapies.     In the ED patient had lab significant for potassium: 3.3, anion gap: 17.0 with a serum CO2 of 21.0, lipase: 8, WBCs: 1.40 with decreased absolute neutrophil count noted on differential, hemoglobin: 10.8, platelets: 91.  CT of abdomen and pelvis shows a 2 cm mass of the uncinate process of the pancreas consistent with pancreatic cancer which is noted is better defined today than on prior exam.  Mild dilation of the pancreatic duct which was not  previously identified is also noted.  Polypoid abnormality in the posterior bladder is noted is less well-defined today and hard to separate from the prostate.  Thickening of the wall of the distal ileum consistent with an infectious or inflammatory ileitis as well as mild gaseous distention of fluid throughout much of the colon and rectum is noted.     4/17/2021  Patient seen and examined  Continue antibiotics  C. difficile ordered  Dietitian consulted  Neutropenic precautions    4/18/2021  Patient seen and examined  Afebrile, white count improved with Neupogen  Continue antibiotics  Repeat blood cultures     (possible contaminated specimen in 1 bottle)  C. difficile negative    4/19/2021: decrease in diarrhea, still having nausea/vomiting  4/20/2021: poor appetite, diarrhea persists, lethargic/confused at times  4/21/2021: decrease in diarrhea overnight, but nurse reporting more diarrhea through the day, still lethargic, pt has refused PEG tube, wants to go home; per Oncology, ordered imaging due to lethargy, CT head unremarkable    Review of Systems   Constitutional: Positive for decreased appetite and malaise/fatigue. Negative for chills and fever.   HENT: Negative.    Eyes: Negative.    Cardiovascular: Negative.    Respiratory: Negative.    Endocrine: Negative.    Skin: Negative.    Musculoskeletal: Negative.    Gastrointestinal: Positive for abdominal pain, diarrhea, nausea and vomiting. Negative for constipation, hematemesis and hematochezia.   Genitourinary: Negative.    Neurological: Negative.    Psychiatric/Behavioral: Negative.      Review of Systems   All other systems reviewed and are negative.    Date:: 4/18/2021    Objective   Objective      Vital Signs  Temp:  [97.4 °F (36.3 °C)-98 °F (36.7 °C)] 97.4 °F (36.3 °C)  Heart Rate:  [54-55] 55  Resp:  [16] 16  BP: (143-147)/(79-83) 143/79  Oxygen Therapy  SpO2: 100 %  Pulse Oximetry Type: Intermittent  Device (Oxygen Therapy): room air  Flowsheet Rows       "First Filed Value   Admission Height  188 cm (74\") Documented at 04/16/2021 1759   Admission Weight  70.3 kg (155 lb) Documented at 04/16/2021 1759        Intake & Output (last 3 days)       04/20 0701 - 04/21 0700 04/21 0701 - 04/22 0700 04/22 0701 - 04/23 0700    P.O. 480 960 480    Total Intake(mL/kg) 480 (6.3) 960 (12.3) 480 (6.2)    Net +480 +960 +480           Urine Unmeasured Occurrence  2 x     Stool Unmeasured Occurrence  1 x         Lines, Drains & Airways    Active LDAs     Name:   Placement date:   Placement time:   Site:   Days:    Peripheral IV 04/16/21 1839 Right Wrist   04/16/21 1839    Wrist   1    Peripheral IV 04/16/21 2222 Left Forearm   04/16/21    2222    Forearm   1    Single Lumen Implantable Port 03/26/21 Left Chest   03/26/21    1536    Chest   22                  Physical Exam:  Vitals reviewed.   Constitutional:       General: He is not in acute distress.     Appearance: Normal appearance. He is normal weight. He is not ill-appearing or toxic-appearing.   HENT:      Head: Normocephalic and atraumatic.      Right Ear: External ear normal.      Left Ear: External ear normal.      Nose: Nose normal.      Mouth/Throat:      Mouth: Mucous membranes are moist.   Eyes:      Extraocular Movements: Extraocular movements intact.   Cardiovascular:      Rate and Rhythm: Regular rhythm. Tachycardia present.      Pulses: Normal pulses.      Heart sounds: Normal heart sounds.   Pulmonary:      Effort: Pulmonary effort is normal.      Breath sounds: Normal breath sounds.   Abdominal:      General: Bowel sounds are normal. There is no distension.      Tenderness: There is abdominal tenderness.   Musculoskeletal:         General: Normal range of motion.      Cervical back: Normal range of motion.   Skin:     General: Skin is warm and dry.   Neurological:      General: No focal deficit present.      Mental Status: lethargic, confused at times  Psychiatric:         Mood and Affect: Mood normal.         " Behavior: Behavior normal.         Thought Content: Thought content normal.         Judgment: Judgment normal.   Physical Exam          Procedures:              Results Review:     I reviewed the patient's new clinical results.      Lab Results (last 24 hours)     Procedure Component Value Units Date/Time    Cancer Antigen 19-9 [[id number]]  (Abnormal) Collected: 04/17/21 1101    Specimen: Blood Updated: 04/22/21 1905     CA 19-9 54.7 U/mL     Narrative:      Results may be falsely decreased if patient taking Biotin.     Blood Culture - Blood, Hand, Left [[id number]] Collected: 04/18/21 1222    Specimen: Blood from Hand, Left Updated: 04/22/21 1245     Blood Culture No growth at 4 days    Blood Culture - Blood, Hand, Right [[id number]] Collected: 04/18/21 1219    Specimen: Blood from Hand, Right Updated: 04/22/21 1245     Blood Culture No growth at 4 days    Basic Metabolic Panel [[id number]]  (Abnormal) Collected: 04/22/21 0424    Specimen: Blood Updated: 04/22/21 0525     Glucose 116 mg/dL      BUN 12 mg/dL      Creatinine 0.72 mg/dL      Sodium 144 mmol/L      Potassium 3.3 mmol/L      Chloride 106 mmol/L      CO2 28.0 mmol/L      Calcium 7.7 mg/dL      eGFR Non African Amer 107 mL/min/1.73      BUN/Creatinine Ratio 16.7     Anion Gap 10.0 mmol/L     Narrative:      GFR Normal >60  Chronic Kidney Disease <60  Kidney Failure <15      Magnesium [[id number]]  (Normal) Collected: 04/22/21 0424    Specimen: Blood Updated: 04/22/21 0525     Magnesium 1.8 mg/dL         No results found for: HGBA1C            Lab Results   Component Value Date    LIPASE 8 (L) 04/16/2021     Lab Results   Component Value Date    CHOL 149 04/17/2021    TRIG 130 04/17/2021    HDL 57 04/17/2021    LDL 69 04/17/2021       Lab Results   Lab Value Date/Time    INTRAOP  01/07/2021 1139     FNA PASS #1: Atypical suspicious for malignancy.    FNA Pass #2: Positive for malignancy.      FINALDX  04/19/2021 0330     Leukocytosis  Normocytic  anemia  Anisopoikilocytosis  Thrombocytopenia  No blasts identified      FINALDX  01/14/2021 0930     Bladder tumor, transurethral resection:    Inverted urothelial papilloma    No muscularis propria identified    No dysplasia or malignancy identified     JEFERSON/tkd          Microbiology Results (last 10 days)     Procedure Component Value - Date/Time    Blood Culture - Blood, Hand, Left [[id number]] Collected: 04/18/21 1222    Lab Status: Preliminary result Specimen: Blood from Hand, Left Updated: 04/22/21 1245     Blood Culture No growth at 4 days    Blood Culture - Blood, Hand, Right [[id number]] Collected: 04/18/21 1219    Lab Status: Preliminary result Specimen: Blood from Hand, Right Updated: 04/22/21 1245     Blood Culture No growth at 4 days    Clostridium Difficile Toxin - Stool, Per Rectum [[id number]]  (Normal) Collected: 04/17/21 1222    Lab Status: Final result Specimen: Stool from Per Rectum Updated: 04/17/21 1345    Narrative:      The following orders were created for panel order Clostridium Difficile Toxin - Stool, Per Rectum.  Procedure                               Abnormality         Status                     ---------                               -----------         ------                     Clostridium Difficile EI...[[id number]]  Normal              Final result                 Please view results for these tests on the individual orders.    Clostridium Difficile EIA - Stool, Per Rectum [[id number]]  (Normal) Collected: 04/17/21 1222    Lab Status: Final result Specimen: Stool from Per Rectum Updated: 04/17/21 1345     C Diff GDH / Toxin Negative    Gastrointestinal Panel, PCR - Stool, Per Rectum [[id number]]  (Normal) Collected: 04/17/21 1003    Lab Status: Final result Specimen: Stool from Per Rectum Updated: 04/17/21 1139     Campylobacter Not Detected     Plesiomonas shigelloides Not Detected     Salmonella Not Detected     Vibrio Not Detected     Vibrio cholerae Not Detected     Yersinia  enterocolitica Not Detected     Enteroaggregative E. coli (EAEC) Not Detected     Enteropathogenic E. coli (EPEC) Not Detected     Enterotoxigenic E. coli (ETEC) lt/st Not Detected     Shiga-like toxin-producing E. coli (STEC) stx1/stx2 Not Detected     Shigella/Enteroinvasive E. coli (EIEC) Not Detected     Cryptosporidium Not Detected     Cyclospora cayetanensis Not Detected     Entamoeba histolytica Not Detected     Giardia lamblia Not Detected     Adenovirus F40/41 Not Detected     Astrovirus Not Detected     Norovirus GI/GII Not Detected     Rotavirus A Not Detected     Sapovirus (I, II, IV or V) Not Detected    Narrative:      If Aeromonas, Staphylococcus aureus or Bacillus cereus are suspected, please order [id number]: Stool Culture, Aeromonas, S aureus, B Cereus.    COVID PRE-OP / PRE-PROCEDURE SCREENING ORDER (NO ISOLATION) - Swab, Nasopharynx [[id number]]  (Normal) Collected: 04/16/21 2230    Lab Status: Final result Specimen: Swab from Nasopharynx Updated: 04/17/21 1536    Narrative:      The following orders were created for panel order COVID PRE-OP / PRE-PROCEDURE SCREENING ORDER (NO ISOLATION) - Swab, Nasopharynx.  Procedure                               Abnormality         Status                     ---------                               -----------         ------                     COVID-19,APTIMA PANTHER,...[[id number]]  Normal              Final result                 Please view results for these tests on the individual orders.    COVID-19,APTIMA PANTHER,[facility] IN-HOUSE, NP/OP SWAB IN UTM/VTM/[facility] TRANSPORT MEDIA,24 HR TAT - Swab, Nasopharynx [[id number]]  (Normal) Collected: 04/16/21 2230    Lab Status: Final result Specimen: Swab from Nasopharynx Updated: 04/17/21 1536     COVID19 Not Detected    Narrative:      Fact sheet for providers: https://www.fda.gov/media/282050/download     Fact sheet for patients: https://www.fda.gov/media/244676/download    Test performed by RT PCR.    Blood Culture -  Blood, Arm, Right [[id number]]  (Abnormal) Collected: 04/16/21 2229    Lab Status: Final result Specimen: Blood from Arm, Right Updated: 04/18/21 1153     Blood Culture Staphylococcus, coagulase negative     Comment: Probable contaminant requires clinical correlation, susceptibility not performed unless requested by physician.          Isolated from Aerobic and Anaerobic Bottles     Gram Stain Anaerobic Bottle Gram positive cocci in clusters      Aerobic Bottle Gram positive cocci in clusters    Blood Culture ID, PCR - Blood, Arm, Right [[id number]]  (Abnormal) Collected: 04/16/21 2229    Lab Status: Final result Specimen: Blood from Arm, Right Updated: 04/17/21 1815     BCID, PCR Staphylococcus spp, not aureus. Identification by BCID PCR.     BOTTLE TYPE Anaerobic Bottle    Blood Culture - Blood, Arm, Left [[id number]] Collected: 04/16/21 2228    Lab Status: Final result Specimen: Blood from Arm, Left Updated: 04/21/21 2245     Blood Culture No growth at 5 days          ECG/EMG Results (most recent)     Procedure Component Value Units Date/Time    ECG 12 Lead [[id number]] Collected: 04/22/21 1135     Updated: 04/22/21 1744     QT Interval 471 ms     Narrative:      HEART RATE= 61  bpm  RR Interval= 984  ms  KS Interval= 161  ms  P Horizontal Axis= 66  deg  P Front Axis= 116  deg  QRSD Interval= 143  ms  QT Interval= 471  ms  QRS Axis= 52  deg  T Wave Axis= 156  deg  - ABNORMAL ECG -  Sinus rhythm  Anterior infarct, old  Abnormal T, consider ischemia, lateral leads  When compared with ECG of 20-Dec-2020 19:48:20,  Nonspecific significant change  Electronically Signed By: Sharan Escalante ([facility]) 22-Apr-2021 17:44:03  Date and Time of Study: 2021-04-22 11:35:25              Results for orders placed during the hospital encounter of 08/19/19    Adult Transthoracic Echo Complete W/ Cont if Necessary Per Protocol    Interpretation Summary  Indications  Chest pain      Technically satisfactory study.  Mitral valve is  structurally normal.  Minimal mitral regurgitation  Tricuspid valve is structurally normal.  Aortic valve is structurally normal.  Pulmonic valve could not be well visualized.  No evidence for mitral tricuspid or aortic regurgitation is seen by Doppler study.  Left atrium is normal in size.  Right atrium is normal in size.  Left ventricle is normal in size and contractility with ejection fraction of 60%.  Right ventricle is normal in size.  Atrial septum is intact.  Aorta is normal.  No pericardial effusion or intracardiac thrombus is seen.    Impression  Minimal mitral regurgitation  Normal echo Doppler study.  Left ventricular ejection fraction is 60%.      CT Head Without Contrast    Result Date: 4/21/2021  1.There is some underlying cerebral atrophy. Depending on clinical findings additional workup with MR with and without contrast might be considered to reassess.  Electronically Signed By-John Coffey MD On:4/21/2021 2:29 PM This report was finalized on [id number] by  John Coffey MD.    CT Abdomen Pelvis With Contrast    Result Date: 4/16/2021  1. There is a 2 cm mass in the uncinate process of the pancreas which is consistent with pancreatic cancer. This is better defined today and grossly to the prior exam. There is mild dilatation of the pancreatic duct which was not previously identifiable. 2. On the prior exam, a polypoid abnormality in the posterior bladder was reported. This is less well-defined today and is hard to separate from the prostate. Clinical correlation recommended. 3. There is thickening of the wall of the distal ileum which is consistent with an infectious or inflammatory ileitis. 4. There is mild gaseous distention and fluid throughout much of the colon and the rectum.  Electronically Signed By-Kailey Pennington MD On:4/16/2021 8:57 PM This report was finalized on [id number] by  Kailey Pennington MD.          Xrays, labs reviewed personally by physician.    Medication Review:   I have  reviewed the patient's current medication list      Scheduled Meds  aspirin, 81 mg, Oral, Daily  cholestyramine light, 1 packet, Oral, Q12H  clotrimazole, 10 mg, Oral, 5x Daily  dicyclomine, 20 mg, Oral, TID  diphenoxylate-atropine, 2 tablet, Oral, 4x Daily  First Mouthwash (Magic Mouthwash), 5 mL, Swish & Spit, Q6H  levothyroxine, 75 mcg, Oral, Q AM  octreotide, 100 mcg, Subcutaneous, Q12H  Pancrelipase (Lip-Prot-Amyl), 36,000 units of lipase, Oral, TID  pantoprazole, 40 mg, Oral, QAM  piperacillin-tazobactam, 3.375 g, Intravenous, Q8H  potassium chloride, 20 mEq, Oral, QAM  pregabalin, 150 mg, Oral, TID  rosuvastatin, 10 mg, Oral, Nightly  saccharomyces boulardii, 500 mg, Oral, BID  Scopolamine, 1 patch, Transdermal, Q72H  sodium chloride, 10 mL, Intravenous, Q12H        Meds Infusions       Meds PRN  •  acetaminophen **OR** acetaminophen **OR** acetaminophen  •  Calcium Gluconate-NaCl **AND** calcium gluconate **AND** Calcium, Ionized  •  chlorpheniramine  •  diphenoxylate-atropine  •  HYDROcodone-acetaminophen  •  loperamide  •  LORazepam  •  magnesium sulfate **OR** magnesium sulfate **OR** magnesium sulfate  •  melatonin  •  nitroglycerin  •  ondansetron **OR** ondansetron  •  potassium & sodium phosphates **OR** potassium & sodium phosphates  •  potassium chloride  •  potassium chloride  •  potassium chloride  •  prochlorperazine  •  prochlorperazine  •  sodium chloride    I personally reviewed patient's x-ray films     I personally reviewed patient's EKG strips     Assessment/Plan   Assessment/Plan     Active Hospital Problems    Diagnosis  POA   • **Malignant neoplasm of head of pancreas (CMS/HCC) [C25.0]  Yes   • Pancytopenia due to chemotherapy (CMS/HCC) [D61.810]  Yes   • Nausea and vomiting [R11.2]  Yes   • Hypokalemia [E87.6]  Yes   • Hyperlipidemia [E78.5]  Yes   • Hypothyroidism [E03.9]  Yes   • Cobalamin deficiency [E53.8]  Yes   • Degeneration of lumbar or lumbosacral intervertebral disc [M51.37]   Yes   • Atherosclerotic heart disease of native coronary artery with other forms of angina pectoris (CMS/HCC) [I25.118]  Yes      Resolved Hospital Problems   No resolved problems to display.       MEDICAL DECISION MAKING COMPLEXITY BY PROBLEM:     Nausea and vomiting in a patient with a history of malignant neoplasm of the head of the pancreas  -Patient presents with sudden worsening of chronic epigastric pain over approximately the past 5 days with several episodes of nausea and nonbloody vomiting  -CT of abdomen and pelvis shows a 2 cm mass of the uncinate process of the pancreas consistent with pancreatic cancer which is noted is better defined today than on prior exam.  Mild dilation of the pancreatic duct which was not previously identified is also noted.  Polypoid abnormality in the posterior bladder is noted is less well-defined today and hard to separate from the prostate.  Thickening of the wall of the distal ileum consistent with an infectious or inflammatory ileitis as well as mild gaseous distention of fluid throughout much of the colon and rectum is noted.  -Check lactic acid, procalcitonin and GI panel  -Blood cultures ordered and pending  -Unasyn started in the ED, continue  -Patient received 2 L LR bolus in ED, continue LR at 100 mL/h  -Clear liquid diet advance as tolerated  -Zofran as needed  -Monitor I's and O's-cardiac monitoring  -GI consulted in ED   --continue antibiotics for possible typhlitis   --continue colestipol, octreotide BID, dicyclomine, Florastor, pancreatic enzymes   --stool studies negative, will start Lomotil   --suspect diarrhea due to recent chemo     Pancytopenia/neutropenic fever  -WBCs: 1.40 with a decreased absolute neutrophil count noted on differential, hemoglobin: 10.8 and platelets: 91 in a patient currently undergoing chemotherapy  -Monitor CBC while admitted  -Unasyn as above  -Hematology consulted in ED   --Venofer 300mg for 3 days (4/18-4/20)   --responding to  Neupogen, stopped 4/19/2021 given rise in WBC     Hypokalemia  -Potassium: 3.3  -Check magnesium  -Replacement protocol ordered     CAD  -Continue aspirin and cardiac monitoring     Hyperlipidemia  -Check lipid panel  -Continue statin     Hypothyroidism  -Check TSH  -Continue levothyroxine     B12 deficiency  -Check B12  -Continue supplementation     Chronic pain/degenerative disc disease  -Continue Lyrica and Norco      Anxiety  -Continue Ativan     VTE Prophylaxis -SCDs    VTE Prophylaxis -   Mechanical Order History:      Ordered        04/16/21 2237  Place Sequential Compression Device  Once         04/16/21 2237  Maintain Sequential Compression Device  Continuous                 Pharmalogical Order History:     None            Code Status -   Code Status and Medical Interventions:   Ordered at: 04/16/21 2225     Code Status:    CPR     Medical Interventions (Level of Support Prior to Arrest):    Full       This patient has been examined wearing appropriate Personal Protective Equipment. 04/22/21      Discharge Planning    Pending clinical course      Continued Care and Services - Admitted Since 4/16/2021    Coordination has not been started for this encounter.           Electronically signed by Autumn Green MD, 04/22/21, 21:55 EDT.  [facility] Hospitalist Team

## 2021-04-23 NOTE — NURSING NOTE
Patient wife and another family member is at bedside. Request to speak with a charge nurse. Upon entering room the wife and other guest state they want patient to be transferred to [facility] in [place] right now. I explained to them that I will have to notify the physician here and they will need orders as well as an accepting physician at the [facility]. Call placed to MD. Awaiting call back.

## 2021-04-23 NOTE — CASE MANAGEMENT/SOCIAL WORK
Continued Stay Note  TINY Trivedi     Patient Name: Clifford Weber  MRN: [id number]  Today's Date: 4/23/2021    Admit Date: 4/16/2021    Discharge Plan     Row Name 04/23/21 1545       Plan    Plan  DC Plan: From home with ex-spouse. No DME. IV antibiotics.    Plan Comments  Continues with nausea, taking in po food. . Remains on Octreotidase. Palliative following.        Met with patient in room wearing PPE: mask, goggles.      Maintained distance greater than six feet and spent less than 15 minutes in the room.              Enedina Rodriguez RN

## 2021-04-23 NOTE — NURSING NOTE
Family is refusing for patient to have a chair alarm or bed alarm, patient and family educated and still refusing, charge nurse notified.

## 2021-04-23 NOTE — PROGRESS NOTES
"Palliative Care Social Work Progress Note    Code Status:full code    Goals of Care: Full Treatment    Narrative:  Palliative care team met with pt, daughter, and other family member to discuss goals of care and assess for any needs or concerns.  Upon introducing the role of our team the pt's daughter quickly interjected and asked that we wait until pt's wife was present before we discussed pt's goals of care.  Pt reported that his wife \"takes care of all the business\", and seemed to suggest that she assists with his medical decisions.  Pt reports that he feels he has a grasp on what he is dealing with medically, but reports that \"...it's hard to keep up with.\"  We inquired about what may help in assisting his understanding and daughter reported that the family had called Dr. Mello's office today and were waiting for follow up.  Daughter expressed interest in holding a family meeting with pt's wife present and we offered to call pt's wife.  Daughter reported that the pt's wife would call us when she was available to schedule a meeting. Card was left for follow up.      Pt's wife called several minutes later, distressed that we had met with the pt without her being present.  I reviewed the content of our brief encounter.  Wife asked, \"Is this end-of-life things?\"  I expressed that we did not review end-of-life care, hospice, or anything of that nature given the daughter's request for the pt's wife to be present.  Wife then asked, \"Who gave you the right to see him?\"  I discussed that we were consulted by hospitalist.  Wife expressed her displeasure that our team had visited.  Wife then reported, \"Well, I'm going to take care of this,\" and then hung up.      Plan:  Emotional support provided.    Will provide additional support as requested by family.  Will continue to follow peripherally.          HORACE Das    "

## 2021-04-23 NOTE — PROGRESS NOTES
LOS: 4 days   Patient Care Team:  Gris Muro APRN as PCP - General (Nurse Practitioner)  Demian Mello MD as Consulting Physician (Hematology and Oncology)      Subjective     Interval History:     Subjective: Patient reports that he continues to have intermittent nausea/vomiting and diarrhea. No bright red blood per rectum or melena. States that diarrhea is typically green in color. Abdominal pain has significantly improved.      ROS:   No chest pain, shortness of breath, or cough.        Medication Review:     Current Facility-Administered Medications:   •  acetaminophen (TYLENOL) tablet 650 mg, 650 mg, Oral, Q4H PRN **OR** acetaminophen (TYLENOL) 160 MG/5ML solution 650 mg, 650 mg, Oral, Q4H PRN **OR** acetaminophen (TYLENOL) suppository 650 mg, 650 mg, Rectal, Q4H PRN, Derek Lester PA-C  •  aspirin EC tablet 81 mg, 81 mg, Oral, Daily, Derek Lester PA-C, 81 mg at 04/23/21 0830  •  calcium gluconate 1g/50ml 0.675% NaCl IV SOLN, 1 g, Intravenous, PRN **AND** calcium gluconate 2-0.675 GM/100ML NACL IVPB, 2 g, Intravenous, PRN **AND** Calcium, Ionized, , , PRN, Derek Lester PA-C  •  chlorpheniramine (CHLOR-TRIMETON) tablet 4 mg, 4 mg, Oral, Q6H PRN, Kajal Oliveira MD  •  cholestyramine light packet 4 g, 1 packet, Oral, Q12H, Tigist Frankel APRN, 4 g at 04/23/21 0829  •  clotrimazole (MYCELEX) juice 10 mg, 10 mg, Oral, 5x Daily, Demian Mello MD, 10 mg at 04/23/21 1118  •  dicyclomine (BENTYL) capsule 20 mg, 20 mg, Oral, TID, Tigist Frankel APRN, 20 mg at 04/23/21 0830  •  diphenoxylate-atropine (LOMOTIL) 2.5-0.025 MG per tablet 1 tablet, 1 tablet, Oral, 4x Daily PRN, Kajal Oliveira MD  •  diphenoxylate-atropine (LOMOTIL) 2.5-0.025 MG per tablet 2 tablet, 2 tablet, Oral, 4x Daily, Christi Lau APRN, 2 tablet at 04/23/21 1118  •  First Mouthwash (Magic Mouthwash) 5 mL, 5 mL, Swish & Spit, Q6H, Blanka Gimenez APRN, 5 mL at 04/23/21 1138  •   HYDROcodone-acetaminophen (NORCO) 5-325 MG per tablet 1 tablet, 1 tablet, Oral, Q6H PRN, Derek Lester PA-C, 1 tablet at 04/20/21 2220  •  levothyroxine (SYNTHROID, LEVOTHROID) tablet 75 mcg, 75 mcg, Oral, Q AM, Derek Lester PA-C, 75 mcg at 04/23/21 0709  •  loperamide (IMODIUM) capsule 2 mg, 2 mg, Oral, 4x Daily PRN, Kajal Oliveira MD, 2 mg at 04/22/21 1914  •  LORazepam (ATIVAN) tablet 0.5 mg, 0.5 mg, Oral, Q8H PRN, Derek Lester PA-C  •  Magnesium Sulfate 2 gram Bolus, followed by 8 gram infusion (total Mg dose 10 grams)- Mg less than or equal to 1mg/dL, 2 g, Intravenous, PRN **OR** Magnesium Sulfate 2 gram / 50mL Infusion (GIVE X 3 BAGS TO EQUAL 6GM TOTAL DOSE) - Mg 1.1 - 1.5 mg/dl, 2 g, Intravenous, PRN **OR** Magnesium Sulfate 4 gram infusion- Mg 1.6-1.9 mg/dL, 4 g, Intravenous, PRN, Derek Lester PA-C, Stopped at 04/17/21 1445  •  melatonin tablet 5 mg, 5 mg, Oral, Nightly PRN, Derek Lester PA-C  •  nitroglycerin (NITROSTAT) SL tablet 0.4 mg, 0.4 mg, Sublingual, Q5 Min PRN, Derek Lester PA-C  •  octreotide (sandoSTATIN) injection 100 mcg, 100 mcg, Subcutaneous, Q12H, Kajal Oliveira MD, 100 mcg at 04/23/21 0834  •  ondansetron (ZOFRAN) tablet 4 mg, 4 mg, Oral, Q6H PRN, 4 mg at 04/23/21 0900 **OR** ondansetron (ZOFRAN) injection 4 mg, 4 mg, Intravenous, Q6H PRN, Derek Lester PA-C, 4 mg at 04/23/21 0003  •  Pancrelipase (Lip-Prot-Amyl) (CREON) capsule 36,000 units of lipase, 36,000 units of lipase, Oral, TID, Derek Lester PA-C, 36,000 units of lipase at 04/23/21 0830  •  pantoprazole (PROTONIX) EC tablet 40 mg, 40 mg, Oral, QAM, Derek Lester PA-C, 40 mg at 04/23/21 0710  •  piperacillin-tazobactam (ZOSYN) IVPB 3.375 g in 100 mL NS (CD), 3.375 g, Intravenous, Q8H, Sharan Tellez MD, 3.375 g at 04/23/21 0834  •  potassium & sodium phosphates (PHOS-NAK) 280-160-250 MG packet - for Phosphorus less than 1.25 mg/dL, 2 packet,  Oral, Q6H PRN **OR** potassium & sodium phosphates (PHOS-NAK) 280-160-250 MG packet - for Phosphorus 1.25 - 2.5 mg/dL, 2 packet, Oral, Q6H PRN, Derek Lester PA-C  •  potassium chloride (K-DUR,KLOR-CON) CR tablet 40 mEq, 40 mEq, Oral, PRN, Derek Lester PA-C, 40 mEq at 04/19/21 1735  •  potassium chloride (KLOR-CON) packet 40 mEq, 40 mEq, Oral, PRN, Derek Lester PA-C, 40 mEq at 04/18/21 0633  •  potassium chloride (MICRO-K) CR capsule 20 mEq, 20 mEq, Oral, Norma ARITA Shefali A, MD, 20 mEq at 04/23/21 0844  •  potassium chloride 10 mEq in 100 mL IVPB, 10 mEq, Intravenous, Q1H PRN, Autumn Green MD, Last Rate: 100 mL/hr at 04/19/21 1645, 10 mEq at 04/19/21 1645  •  pregabalin (LYRICA) capsule 150 mg, 150 mg, Oral, TID, Derek Lester PA-C, 150 mg at 04/23/21 0829  •  prochlorperazine (COMPAZINE) injection 10 mg, 10 mg, Intravenous, Q6H PRN, Derek Lester PA-C, 10 mg at 04/17/21 0502  •  prochlorperazine (COMPAZINE) tablet 10 mg, 10 mg, Oral, Q8H PRN, Kajal Oliveira MD  •  rosuvastatin (CRESTOR) tablet 10 mg, 10 mg, Oral, Nightly, Derek Lester PA-C, 10 mg at 04/22/21 2342  •  saccharomyces boulardii (FLORASTOR) capsule 500 mg, 500 mg, Oral, BID, Tigist Frankel APRN, 500 mg at 04/23/21 0830  •  Scopolamine (TRANSDERM-SCOP) 1.5 MG/3DAYS patch 1 patch, 1 patch, Transdermal, Q72H, Derek Lester PA-C, 1 patch at 04/23/21 1120  •  sodium chloride 0.9 % flush 10 mL, 10 mL, Intravenous, Q12H, Derek Lester PA-C, 10 mL at 04/23/21 0844  •  sodium chloride 0.9 % flush 10 mL, 10 mL, Intravenous, PRN, Derek Lester PA-C, 10 mL at 04/20/21 0440      Objective     Vital Signs  Vitals:    04/22/21 0350 04/22/21 1933 04/23/21 0426 04/23/21 0600   BP: 147/83 143/79 124/70    BP Location: Right arm Right arm Right arm    Patient Position: Lying Lying Lying    Pulse: 54 55 (!) 47    Resp: 16 16 16    Temp: 98 °F (36.7 °C) 97.4 °F (36.3 °C) 97.5 °F (36.4 °C)     TempSrc: Oral Oral Oral    SpO2: 97% 100% 96%    Weight: 77.8 kg (171 lb 8.3 oz)  76.8 kg (169 lb 5 oz) 76.8 kg (169 lb 5 oz)   Height:           Physical Exam:     General Appearance:    Awake and alert, in no acute distress   Head:    Normocephalic, without obvious abnormality   Eyes:          Conjunctivae normal, anicteric sclera   Throat:   No oral lesions, no thrush, oral mucosa moist   Neck:   No adenopathy, supple, no JVD   Lungs:     respirations regular, even and unlabored   Abdomen:     Soft, very mild right-sided tenderness, no rebound or guarding, non-distended   Rectal:     Deferred   Extremities:   No edema, no cyanosis   Skin:   No bruising or rash, no jaundice        Results Review:    CBC    Results from last 7 days   Lab Units 04/23/21  0517 04/20/21  0348 04/19/21  0330 04/18/21  0126 04/17/21  0426 04/16/21  1837   WBC 10*3/mm3 14.20* 34.50* 13.70* 4.10 1.10* 1.40*   HEMOGLOBIN g/dL 8.9* 8.6* 8.6* 9.3* 10.8* 10.8*   PLATELETS 10*3/mm3 173 151 119* 109* 97* 91*     CMP   Results from last 7 days   Lab Units 04/23/21  0517 04/22/21  0424 04/21/21  1604 04/21/21  0512 04/20/21  1711 04/20/21  0348 04/19/21  2118 04/19/21  0330 04/18/21  0126 04/17/21  0426 04/16/21  1837   SODIUM mmol/L 146* 144  --  146*  --  144  --  141 140 136 136   POTASSIUM mmol/L 3.5 3.3*  --  3.2* 3.5 3.3* 3.3* 3.1* 3.2* 3.4* 3.3*   CHLORIDE mmol/L 106 106  --  107  --  106  --  106 104 97* 98   CO2 mmol/L 28.0 28.0  --  24.0  --  23.0  --  21.0* 23.0 20.0* 21.0*   BUN mg/dL 13 12  --  13  --  17  --  20 18 17 18   CREATININE mg/dL 0.61* 0.72*  --  0.75*  --  0.85  --  0.81 0.83 1.11 1.00   GLUCOSE mg/dL 104* 116*  --  104*  --  86  --  93 112* 145* 127*   ALBUMIN g/dL 2.70*  --   --   --   --  2.80*  --  2.70* 3.10*  --  3.60   BILIRUBIN mg/dL 0.3  --   --   --   --  0.4  --  0.5 0.6  --  0.6   ALK PHOS U/L 102  --   --   --   --  126*  --  109 115  --  152*   AST (SGOT) U/L 28  --   --   --   --  43*  --  32 39  --  40    ALT (SGPT) U/L 18  --   --   --   --  29  --  27 31  --  31   MAGNESIUM mg/dL 1.8 1.8  --  2.0  --  2.1  --  2.2 2.5* 1.7  --    LIPASE U/L  --   --   --   --   --   --   --   --   --   --  8*   AMMONIA umol/L  --   --  23  --   --   --   --   --   --   --   --      Cr Clearance Estimated Creatinine Clearance: 88 mL/min (A) (by C-G formula based on SCr of 0.61 mg/dL (L)).  Coag     HbA1C   Lab Results   Component Value Date    HGBA1C 5.5 08/21/2019     Blood Glucose No results found for: POCGLU  Infection   Results from last 7 days   Lab Units 04/18/21  1222 04/18/21  1219 04/16/21  2326 04/16/21  2229 04/16/21  2228   BLOODCX  No growth at 5 days No growth at 5 days  --  Staphylococcus, coagulase negative* No growth at 5 days   BCIDPCR   --   --   --  Staphylococcus spp, not aureus. Identification by BCID PCR.*  --    PROCALCITONIN ng/mL  --   --  3.52*  --   --      UA      Radiology(recent) CT Head Without Contrast    Result Date: 4/21/2021  1.There is some underlying cerebral atrophy. Depending on clinical findings additional workup with MR with and without contrast might be considered to reassess.  Electronically Signed By-John Coffey MD On:4/21/2021 2:29 PM This report was finalized on [id number] by  John Coffey MD.    XR Abdomen KUB    Result Date: 4/23/2021  No acute findings. Electronically signed by:  Carolina Gonzalez M.D.  4/23/2021 2:08 AM         Assessment/Plan     ASSESSMENT:  76-year-old male with history of pancreatic cancer s/p chemotherapy, colon polyps, Sales's esophagus, and CAD, complains of lower abdominal pain, black diarrhea, nausea/vomiting, and weakness.  CT consistent with pancreatic mass as well as colitis (infectious versus inflammatory).     -Diarrhea - ddx Typhlitis vs. Other   -Colitis per CT (infectious versus inflammatory)  -Pancreatic cancer -s/p chemotherapy and repeat PET scan, results unknown  -Nausea/vomiting -chronic but worse in past week  -Lower abdominal  pain  -Weakness  -History of esophagitis concerning for Sales's but biopsies negative  -History of colon polyps -overdue for surveillance colonoscopy  -CAD  -Hiatal hernia  -Pancytopenia, neutropenia - improved with Neupogen  -Hypokalemia  -Weight loss -approximately 100 pounds in the past year     PLAN:  Patient reports that he continues to have intermittent episodes of nausea/vomiting and diarrhea. Diarrhea is typically green in color. No overt GI bleeding. Abdominal pain has significantly improved since admission.  Discussion at the bedside between patient, patient's family, and Dr. Juarez in regards to the patient's symptoms.  The patient is on multiple medications to help control diarrhea. It was explained to the patient and family that there will be no magic pill that will be able to completely stop the patient's diarrhea as it is likely chemotherapy related.  We will continue colestipol, octreotide, dicyclomine, Lomotil, Florastor, and pancreatic enzymes. We can also add scheduled Imodium.  The patient is also concerned about very little oral intake, but does not wish to have feeding tube placed. Therefore, oral supplementation with Ensure was recommended along with antiemetics prior to meals.  There is not much else to add from a GI standpoint at this time.  We will see as needed.    Christi Lau, TESS  04/23/21  13:43 EDT

## 2021-04-23 NOTE — PROGRESS NOTES
Hematology/Oncology Inpatient Progress Note    PATIENT NAME: Clifford Weber  : 1945  MRN: [id number]    CHIEF COMPLAINT: Abdominal pain/Weakness     HISTORY OF PRESENT ILLNESS:   76 y.o. male presented to [facility] emergency department on 2021 with complaints of increased abdominal pain and weakness for prior 5 days.  He started having diarrhea approximately 2 to 3 weeks ago and was started on daily Sandostatin last week.  After the third injection his diarrhea had stopped but then restarted on 2021.  He vomited on 2021 with dark green emesis which prompted him to come to the hospital. He was feeling poorly and was experiencing intermittent confusion.  Urine output was low.  He was having abdominal pain as well.  Patient reported a cough and subjective fever with chills prior 24 hours before coming to the hospital. Abdominal discomfort was worse with eating. Labs in the emergency department were as follows: White blood count 1.4, hemoglobin 10.8, MCV 83.6, platelets 91,000, . CMP significant for potassium 3.3, alkaline phosphatase 152, lipase 8 (13-60), lactate 2.2 (0.5-2.0), TSH 2.330 (0.270-4.200), pro calcitonin 3.52 (0.00-0.25). Bood cultures and COVID-19 screening were pending. A CT scan of the abdomen and pelvis with contrast in the emergency department showed a 2 cm mass in the uncinate process of the pancreas which is consistent with pancreatic cancer.  This was better defined than on prior exam.  There was mild dilatation of the pancreatic duct which was not previously identifiable.  A polypoid abnormality in posterior bladder was less defined on today's exam and was difficult to separate from the prostate.  There was thickening of the wall of the distal ileum consistent with infectious or inflammatory ileitis.  There was mild gaseous distention and fluid throughout much of the colon and rectum.  Antibiotics with Unasyn were initiated.  He was admitted for further  evaluation and treatment. Gastroenterology was consulted.  Fecal lactoferrin was positive, gastrointestinal panel was negative. C. difficile testing was pending. His Vitamin B-12 level was 1190 (211-946).  Antibiotics were switched from Unasyn to Zosyn by GI 4/17/2021.     04/17/21  Hematology/Oncology was consulted by Dr. Nic Ochoa on this established patient followed for clinical stage IIb pancreatic adenocarcinoma.  He had  initially presented with abdominal pain and was hospitalized in December 2020.  At that time CT scan of the abdomen pelvis showed a hypoenhancing lesion in the uncinate process of the pancreas.  A polypoid nodularity was also seen along the posterior wall of the urinary bladder.  An MRI pancreatic protocol on 1/4/2021 showed a hypodense 2.1 cm mass suspicious for primary pancreatic malignancy.  On 1/7/2021 he underwent an EUS with FNA by Dr. Fierro.  There was a 2.4 cm at the uncinate process with one small peripancreatic lymph node that was 4 to 5 mm in size.  Dr. 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  The patient was seen by Dr. Diogenes Stapleton at [facility] on 1/21/2021 who recommended neoadjuvant chemotherapy with 4-6 cycles followed by a pancreatic protocol to determine eligibility for resection.  On 2/3/2021 CA 19-9 was 483.3 (< or = 35.0). He started FOLFIRINOX on 2/8/2021 and received his fifth cycle on 4/5/2021.  Diarrhea started mid-March of 2021.  A CT chest on 3/16/2021 revealed small scattered non-- calcified pulmonary nodules measuring 5 mm or less in size and a low-density pancreatic uncinate process with some stranding around it.  A CT scan of thoracic and lumbar spine on same day showed degenerative changes throughout the spine, multilevel disc disease and degenerative facet change resulting in multilevel canal stenosis and neural foraminal narrowing..  The  patient was last seen in the office on 4/12/2021 and was scheduled for 3-week follow-up. Octreotide was started for diarrhea with initial improvement after the third injection.  Cycle 6 of FOLFIRINOX was due on 4/19/2021.        PCP: Gris Muro APRN    INTERVAL HISTORY:  4/18/2021 - WBC 4.1, hemoglobin 9.3, platelets 109,000, ANC 0.98, reticulocytes 2.36 (0.),  haptoglobin 308 (),  iron 17 (59-1 58), iron saturation 7 (20-50), transferrin 153 (200-360), TIBC 228 (298-536), ferritin 2417 (), folate >20.0 (4.78-24.20), stool for C. Difficile negative (negative), Covid-19 screening negative (negative). Started daily Neupogen on 4/17/2021. Started Venofer 300 mg IV x 3 days.  4/19/2021 - WBC 13.7, hemoglobin 8.6, platelets 119,000, ANC 6.58, occult blood stool - positive.  Neupogen discontinued.  4/20/2021 - WBC 34.5, hemoglobin 8.6, platelets 151,000.  · 4/21/2021-remains sleepy.  Patient does not want feeding tube placed yet.  CT head without contrast with some atrophy.  · 4/22/2021-alert today with less nausea and diarrhea.  Wants to go home.  Ammonia 23 (16-60).  4/23/2021 - WBC 14.2, hemoglobin 8.9, platelets 173,000. Re-consulted GI per patient request.  CA 19-9 54.7 (<35.0).    History of present illness reviewed since last visit and changes noted on 04/23/21.    Subjective   Vomited once and had diarrhea once.  Patient requested GI see him again.    ROS:  Review of Systems   Constitutional: Negative for activity change, chills, fatigue, fever and unexpected weight change.   HENT: Negative for congestion, dental problem, hearing loss, mouth sores, nosebleeds, sore throat and trouble swallowing.    Eyes: Negative for photophobia and visual disturbance.   Respiratory: Negative for cough, chest tightness and shortness of breath.    Cardiovascular: Negative for chest pain, palpitations and leg swelling.   Gastrointestinal: Positive for diarrhea and vomiting. Negative for abdominal distention,  abdominal pain, blood in stool, constipation and nausea.   Endocrine: Negative for cold intolerance and heat intolerance.   Genitourinary: Negative for decreased urine volume, difficulty urinating, dysuria, frequency, hematuria and urgency.   Musculoskeletal: Negative for arthralgias and gait problem.   Skin: Negative for rash and wound.   Neurological: Negative for dizziness, tremors, weakness, light-headedness, numbness and headaches.   Hematological: Negative for adenopathy. Does not bruise/bleed easily.   Psychiatric/Behavioral: Negative for confusion and hallucinations. The patient is not nervous/anxious.    All other systems reviewed and are negative.    MEDICATIONS:    Scheduled Meds:  aspirin, 81 mg, Oral, Daily  cholestyramine light, 1 packet, Oral, Q12H  clotrimazole, 10 mg, Oral, 5x Daily  dicyclomine, 20 mg, Oral, TID  diphenoxylate-atropine, 2 tablet, Oral, 4x Daily  First Mouthwash (Magic Mouthwash), 5 mL, Swish & Spit, Q6H  levothyroxine, 75 mcg, Oral, Q AM  octreotide, 100 mcg, Subcutaneous, Q12H  Pancrelipase (Lip-Prot-Amyl), 36,000 units of lipase, Oral, TID  pantoprazole, 40 mg, Oral, QAM  piperacillin-tazobactam, 3.375 g, Intravenous, Q8H  potassium chloride, 20 mEq, Oral, QAM  pregabalin, 150 mg, Oral, TID  rosuvastatin, 10 mg, Oral, Nightly  saccharomyces boulardii, 500 mg, Oral, BID  Scopolamine, 1 patch, Transdermal, Q72H  sodium chloride, 10 mL, Intravenous, Q12H       Continuous Infusions:      PRN Meds:  •  acetaminophen **OR** acetaminophen **OR** acetaminophen  •  Calcium Gluconate-NaCl **AND** calcium gluconate **AND** Calcium, Ionized  •  chlorpheniramine  •  diphenoxylate-atropine  •  HYDROcodone-acetaminophen  •  loperamide  •  LORazepam  •  magnesium sulfate **OR** magnesium sulfate **OR** magnesium sulfate  •  melatonin  •  nitroglycerin  •  ondansetron **OR** ondansetron  •  potassium & sodium phosphates **OR** potassium & sodium phosphates  •  potassium chloride  •  potassium  "chloride  •  potassium chloride  •  prochlorperazine  •  prochlorperazine  •  sodium chloride     ALLERGIES:    Allergies   Allergen Reactions   • Niacin Unknown (See Comments)     [facility]       Objective    VITALS:   /70 (BP Location: Right arm, Patient Position: Lying)   Pulse (!) 47   Temp 97.5 °F (36.4 °C) (Oral) Comment (Src): notify nurse  Resp 16   Ht 188 cm (74\")   Wt 76.8 kg (169 lb 5 oz)   SpO2 96%   BMI 21.74 kg/m²     PHYSICAL EXAM:  Physical Exam  Vitals and nursing note reviewed.   Constitutional:       General: He is not in acute distress.     Appearance: Normal appearance. He is well-developed. He is not diaphoretic.   HENT:      Head: Normocephalic and atraumatic.      Comments: Bitemporal wasting     Right Ear: External ear normal.      Left Ear: External ear normal.      Nose: Nose normal.      Mouth/Throat:      Mouth: Mucous membranes are moist.      Pharynx: Oropharynx is clear. No oropharyngeal exudate or posterior oropharyngeal erythema.      Comments: Dental fillings  Eyes:      General: No scleral icterus.     Extraocular Movements: Extraocular movements intact.      Conjunctiva/sclera: Conjunctivae normal.      Pupils: Pupils are equal, round, and reactive to light.   Cardiovascular:      Rate and Rhythm: Regular rhythm. Bradycardia present.      Heart sounds: Normal heart sounds. No murmur heard.        Comments: Monitor leads  Pulmonary:      Effort: Pulmonary effort is normal. No respiratory distress.      Breath sounds: Normal breath sounds. No wheezing or rales.   Chest:      Comments: Left chest wall port.   Abdominal:      General: Bowel sounds are normal. There is no distension.      Palpations: Abdomen is soft. There is no mass.      Tenderness: There is no abdominal tenderness. There is no guarding.   Genitourinary:     Comments: Deferred   Musculoskeletal:         General: No swelling, tenderness or deformity. Normal range of motion.      " Cervical back: Normal range of motion and neck supple.      Right lower leg: No edema.      Left lower leg: No edema.      Comments: RUE IV.    Lymphadenopathy:      Cervical: No cervical adenopathy.      Upper Body:      Right upper body: No supraclavicular adenopathy.      Left upper body: No supraclavicular adenopathy.   Skin:     General: Skin is warm and dry.      Coloration: Skin is not pale.      Findings: No bruising, erythema or rash.   Neurological:      General: No focal deficit present.      Mental Status: He is alert and oriented to person, place, and time.      Coordination: Coordination normal.   Psychiatric:         Mood and Affect: Mood normal.         Behavior: Behavior normal.         Thought Content: Thought content normal.         RECENT LABS:  Lab Results (last 24 hours)     Procedure Component Value Units Date/Time    CBC Auto Differential [[id number]]  (Abnormal) Collected: 04/23/21 0517    Specimen: Blood Updated: 04/23/21 1121     WBC 14.20 10*3/mm3      RBC 3.17 10*6/mm3      Hemoglobin 8.9 g/dL      Hematocrit 26.7 %      MCV 84.1 fL      MCH 28.0 pg      MCHC 33.3 g/dL      RDW 17.1 %      RDW-SD 49.4 fl      MPV 9.5 fL      Platelets 173 10*3/mm3      nRBC 0.2 /100 WBC     CBC & Differential [[id number]]  (Abnormal) Collected: 04/23/21 0517    Specimen: Blood Updated: 04/23/21 1120    Narrative:      The following orders were created for panel order CBC & Differential.  Procedure                               Abnormality         Status                     ---------                               -----------         ------                     Scan Slide[[id number]]                                       In process                 CBC Auto Differential[[id number]]        Abnormal            Preliminary result           Please view results for these tests on the individual orders.    Scan Slide [[id number]] Collected: 04/23/21 0517    Specimen: Blood Updated: 04/23/21 1120    Extra Tubes  [[id number]] Collected: 04/23/21 0517    Specimen: Blood Updated: 04/23/21 0630    Narrative:      The following orders were created for panel order Extra Tubes.  Procedure                               Abnormality         Status                     ---------                               -----------         ------                     Lavender Top[[id number]]                                     Final result                 Please view results for these tests on the individual orders.    Lavender Top [[id number]] Collected: 04/23/21 0517    Specimen: Blood Updated: 04/23/21 0630     Extra Tube hold for add-on     Comment: Auto resulted       Basic Metabolic Panel [[id number]]  (Abnormal) Collected: 04/23/21 0517    Specimen: Blood Updated: 04/23/21 0557     Glucose 104 mg/dL      BUN 13 mg/dL      Creatinine 0.61 mg/dL      Sodium 146 mmol/L      Potassium 3.5 mmol/L      Chloride 106 mmol/L      CO2 28.0 mmol/L      Calcium 8.3 mg/dL      eGFR Non African Amer 129 mL/min/1.73      BUN/Creatinine Ratio 21.3     Anion Gap 12.0 mmol/L     Narrative:      GFR Normal >60  Chronic Kidney Disease <60  Kidney Failure <15      Magnesium [[id number]]  (Normal) Collected: 04/23/21 0517    Specimen: Blood Updated: 04/23/21 0557     Magnesium 1.8 mg/dL     Cancer Antigen 19-9 [[id number]]  (Abnormal) Collected: 04/17/21 1101    Specimen: Blood Updated: 04/22/21 1905     CA 19-9 54.7 U/mL     Narrative:      Results may be falsely decreased if patient taking Biotin.         PENDING RESULTS: Blood cultures (final).     IMAGING REVIEWED:  CT Head Without Contrast    Result Date: 4/21/2021  1.There is some underlying cerebral atrophy. Depending on clinical findings additional workup with MR with and without contrast might be considered to reassess.  Electronically Signed By-John Coffey MD On:4/21/2021 2:29 PM This report was finalized on [id number] by  John Coffey MD.    XR Abdomen KUB    Result Date: 4/23/2021  No acute findings.  Electronically signed by:  Carolina Gonzalez M.D.  4/23/2021 2:08 AM    I have reviewed the patient's labs, imaging, reports, and other clinician documentation.    Assessment/Plan   ASSESSMENT  1. Clinical stage IIb pancreatic adenocarcinoma: S/p 5 cycles neoadjuvant FOLFIRINOX. Cycle 6 was due 4/19/2021.  CA 19-9 improved.   2. Pancytopenia/Neutropenia/CHANDRA: Chemotherapy effect contributing.  Anemia work-up showed concomitant CHANDRA and completed Venofer course.  Ferritin was elevated (actute phase reactant).  Vitamin B-12 level and folate normal. No evidence of hemolysis.  Completed Venofer course.  Received Neupogen 4/17/2021 and 4/18/2021 with resolution of neutropenia. Thrombocytopenia resolved on 4/23/2021. Stable anemia.   3. Diarrhea/Nausea/Vomiting/Colitis: Diarrhea ongoing since mid-March 2021, possibly chemotherapy-induced.  Started on octreotide week of 4/5/2021 as outpatient with initial improvement after 3 injections.  Lipase level low. CT A/P 4/16/2021-thickening of terminal ileum. Stool studies negative other than lactoferrin positive. GI consulted - suspects typhlitis.   Blood cultures grew Staphylococcus not aureus, likely a contaminant. Repeat blood culture NGTD.  Gastrointestinal panel and C. difficile were negative. Received Unasyn and switched to Zosyn by GI. Probiotic started in addition to home meds - Questran, scopolamine, pancreatic enzymes, and PPI.  Clear liquid diet started. S/p 2L lactated ringers. No plans for endoscopy at this time. Occult blood stool - positive.  On 4/18/2021 - octreotide started by primary team.  GI increased Colestipol and added dicyclomine and Florastor and advanced diet to low residue diet with Ensure.  Re-consulted GI on 4/23/2021 per patient preference.   4. Bradycardia-patient persistently bradycardic in spite of no beta-blockers.  ? nausea related to this.  5. Anorexia/Unintentional weight loss: On cyproheptadine and Zenpep as outpatient.  Patient continues to  decline feeding tube.  6. Hypomagnesemia/Hypolakemia/Chronic pain/Degenerative disc. disease/Anxiety CAD/HLD/Hypothyroidism/Hypokalemia: management per Primary team.   7. Lethargy: Meds reviewed and no recent increased dosing, no recent Ativan or antiemetic/Phenergan dosing, and stable scopolamine patch for weeks.  No increased LFTs and ammonia level and CT head negative.  Improved.   8. Palliative care consulted to assist with goals of care.      PLAN  1. Follow CBC.  2. Continue Sandostatin 100 mcg SQ Q12H, scheduled Lomotil, and as needed cholestyramine.   3. With switch to Depo Sandostatin as an outpatient.  4. Discussed feeding tube previously and patient stated he was not ready for this yet.  5. Outpatient chemotherapy to resume with dosage adjustments post discharge.    6. Re-consulted GI per patient's request.   7. Requested cardiology consultation for bradycardia.  8. Await palliative care consult - consulted by Primary care.     Electronically signed by TESS Millan, 04/23/21, 11:51 AM EDT.       I have personally performed a face-to-face diagnostic evaluation on this patient.  I have discussed the case with Blanka Gimenez NP, have edited/reviewed the note, and agree with the care plan.  The patient claims to have vomited once and had one episode of diarrhea.  On examination, he has left-sided chest wall port which is not accessed.  Labs are significant for an anemia.  Family is questioning why than vomiting and diarrhea is not resolving and want to see GI again.  The patient has had persistent bradycardia.  Will have cardiology evaluate him.    I discussed the patients findings and my recommendations with patient and family.    Electronically signed by Demian Mello MD, 04/23/21, 2:38 PM EDT.

## 2021-04-23 NOTE — PLAN OF CARE
Family reporting pt to be having multiple episodes of green diarrhea and green vomitus multiple times during previous day shift.  Pt had one observed light episode of light green vomitus this shift, pt had one episode of nausea and vomiting successfully relieved by PRN Zophran 4mg.  KUB obtained this shift as ordered r/t to possible bowel obstruction and within normal limits with no acute issues.  Family also reporting pt to have poor decreased appetite and fluid intake during the day and pt only able to tolerate small amounts of food.  Physician Assistant notified and approval granted for IV fluids via Lactated Ringers at 100 cc per hour for five (5) hours and then reevaluate, order initiated and in progress.  Family also voicing concerns over lack of communication over MD plan of care and prognosis, Physician Assistant notified to advise primary attending MD to talk to family.  Oncology MD notified to talk to family about patient concerns.      Problem: Adult Inpatient Plan of Care  Goal: Plan of Care Review  Outcome: Ongoing, Progressing  Flowsheets (Taken 4/23/2021 0623)  Plan of Care Reviewed With:   patient   daughter   significant other     Problem: Malnutrition  Goal: Improved Nutritional Intake  Outcome: Ongoing, Not Progressing     Problem: Fall Injury Risk  Goal: Absence of Fall and Fall-Related Injury  Outcome: Ongoing, Progressing

## 2021-04-23 NOTE — CONSULTS
Referring Provider: Autumn Green MD  Reason for Consultation:  Bradycardia  History of pancreatic carcinoma    Patient Care Team:  Gris Muro APRN as PCP - General (Nurse Practitioner)  Demian Mello MD as Consulting Physician (Hematology and Oncology)    Chief complaint nausea vomiting and diarrhea    Subjective .     History of present illness:  Clifford Weber is a 76 y.o. male who presents with history of nausea vomiting and diarrhea.  Patient was recently admitted with history of pancreatic carcinoma hypothyroidism dyslipidemia.  Patient was having epigastric pain for last 2 to 3 months.  The discomfort has gotten worse lately.  Pain was 8 out of 10.  Waxing and waning intensity.  Patient was receiving chemotherapy for pancreatic carcinoma as an outpatient.  Patient was noted to have bradycardia on the monitor and cardiovascular consultation was requested.  Patient denies having any chest discomfort shortness of breath palpitations dizziness or syncope.  No other associated aggravating or elevating factors..         ROS      Since I have last seen, the patient has been without any chest discomfort ,shortness of breath, palpitations, dizziness or syncope.  Denies having any headache , constipation, loss of weight or loss of appetite.  Denies having any excessive bruising ,hematuria or blood in the stool.    Review of all systems negative except as indicated      History  Past Medical History:   Diagnosis Date   • Abnormal cardiovascular stress test 11/01/2016    Abnormal Cardiolite Stress Test. [facility]   • Anxiety 03/15/2012    Uses this PRN, has stress due to caring for elderly parents. [facility].   • Atherosclerotic heart disease 03/15/2012   • Borderline hypertension 10/19/2015   • Bronchitis, acute 05/16/2016   • Cancer (CMS/[facility])     PANCREATIC CANCER     • Chest pain 10/15/2014   • Chronic foot pain 10/16/2012    Will see his podiatrist. Abstracted  from [facility].   • Constipation 01/09/2018   • Coronary artery disease 10/24/2016   • DDD (degenerative disc disease), lumbar 02/13/2013   • Depression 08/10/2017   • Depression    • Diverticulosis    • Dyslipidemia    • Erectile dysfunction of organic origin 12/07/2012    Samples of cialis 20mg, voucher for the 5mg, samples of levitra 20mg and voucher for viagra 100mg. Call with preference. Order total T. Abstracted from [facility]   • GERD (gastroesophageal reflux disease) 12/07/2012   • H/O myocardial perfusion scan 11/01/2016    With Stress Test, abnormal. Abstracted from [facility]   • Hand pain 04/24/2014   • Headache 01/18/2016   • Hyperlipidemia 02/13/2018   • Hypertension 08/10/2017   • Hypogonadism, male 12/07/2012   • Hypothyroidism 02/08/2018   • Impacted cerumen, left ear 04/03/2019   • Influenza vaccination ordered 10/19/2016   • Insomnia 03/15/2012   • Lateral epicondylitis, left elbow 02/07/2017   • Left knee sprain 01/24/2013   • Leg pain, bilateral 04/02/2019   • Lumbar disc herniation with radiculopathy 09/08/2016   • Lumbar radiculitis 08/30/2016   • Mild memory disturbance 01/18/2016   • Myocardial infarction (CMS/[facility]) 11/01/2016   • Osteoarthritis of sacroiliac joint (CMS/[facility]) 03/12/2014   • Pain in right lower leg 07/11/2016   • Paresthesia 08/30/2016   • Paresthesia of both legs     Lower legs   • Postherpetic neuralgia 03/15/2016   • Preoperative cardiovascular examination 10/24/2016   • Preventative health care 02/07/2017   • S/P cardiac catheterization 01/01/2000    S/P cardiac cath, left heart-- Heart catheterization 2000 at [facility] in [place], [place]. No records available. Abstracted from [facility]   • Sciatica, right side 06/04/2013   • Shingles    • Shortness of breath 10/15/2014   • Spinal stenosis, lumbar 02/13/2013    TENS unit evidently not covered by Medicare. Abstracted from [facility]   • Vitamin B12 deficiency 08/10/2017   • Vitamin D deficiency 01/09/2018        Past Surgical History:   Procedure Laterality Date   • CARDIAC CATHETERIZATION  2000   • COLONOSCOPY  10/28/2009   • KNEE SURGERY Bilateral    • OTHER SURGICAL HISTORY      Lapscopic surgery, both knees. Abstracted by [facility].   • OTHER SURGICAL HISTORY      Kneww injections. Abstracted from [facility].   • OTHER SURGICAL HISTORY      Shot in lower back for bulging disc. Abstracted from [facility].   • UPPER ENDOSCOPIC ULTRASOUND W/ FNA N/A 1/7/2021    Procedure: ESOPHAGOGASTRODUODENOSCOPY WITH endoscopic ULTRASOUND AND FINE NEEDLE ASPIRATION;  Surgeon: David Fierro MD;  Location: [facility] ENDOSCOPY;  Service: Gastroenterology;  Laterality: N/A;  post op: pancreatic head mass       Family History   Problem Relation Age of Onset   • Alzheimer's disease Mother    • Hyperlipidemia Father    • Heart disease Father    • Hypertension Father    • Hyperlipidemia Brother    • Diabetes Brother    • Heart disease Brother    • Hypertension Brother    • Heart disease Maternal Grandfather    • Alzheimer's disease Paternal Grandmother    • Cancer Cousin    • Cancer Other    • Heart disease Brother    • Hypertension Brother        Social History     Tobacco Use   • Smoking status: Never Smoker   • Smokeless tobacco: Never Used   Substance Use Topics   • Alcohol use: No   • Drug use: No        Medications Prior to Admission   Medication Sig Dispense Refill Last Dose   • aspirin (ASPIR) 81 MG EC tablet Take 81 mg by mouth Daily.      • colestipol (COLESTID) 1 g tablet Take 1 g by mouth Every 8 (Eight) Hours As Needed.      • cyproheptadine (PERIACTIN) 4 MG tablet Take 4 mg by mouth 2 (two) times a day.      • diphenoxylate-atropine (LOMOTIL) 2.5-0.025 MG per tablet Take 1 tablet by mouth 4 (Four) Times a Day As Needed for Diarrhea.      • HYDROcodone-acetaminophen (NORCO) 5-325 MG per tablet Take 1 tablet by mouth Every 6 (Six) Hours As Needed.      • LORazepam (ATIVAN) 0.5 MG tablet Take 0.5 mg by mouth Every 8 (Eight)  Hours As Needed (nausea).      • Octreotide Acetate (SANDOSTATIN IJ) Inject  as directed.      • omeprazole (priLOSEC) 40 MG capsule Take 40 mg by mouth Daily.      • ondansetron (ZOFRAN) 4 MG tablet Take 4 mg by mouth Every 6 (Six) Hours As Needed for Nausea or Vomiting.      • Pancrelipase, Lip-Prot-Amyl, (ZENPEP) 06847-283155 units capsule delayed-release particles capsule Take 40,000 Units by mouth 3 (Three) Times a Day.      • potassium chloride (K-DUR,KLOR-CON) 10 MEQ CR tablet Take 20 mEq by mouth 2 (Two) Times a Day.      • pregabalin (LYRICA) 150 MG capsule Take 1 capsule by mouth 3 (Three) Times a Day. 90 capsule 3    • prochlorperazine (COMPAZINE) 10 MG tablet Take 10 mg by mouth Every 8 (Eight) Hours As Needed for Nausea or Vomiting.      • rosuvastatin (CRESTOR) 10 MG tablet TAKE ONE TABLET BY MOUTH AT BEDTIME 90 tablet 1    • Scopolamine (Transderm-Scop, 1.5 MG,) 1.5 MG/3DAYS patch Place 1 patch on the skin as directed by provider Every 72 (Seventy-Two) Hours.      • Synthroid 75 MCG tablet TAKE ONE TABLET BY MOUTH EVERY DAY 90 tablet 0    • vitamin D (ERGOCALCIFEROL) 1.25 MG (26931 UT) capsule capsule TAKE 1 CAPSULE BY MOUTH ONCE WEEKLY 12 capsule 0          Niacin    Scheduled Meds:aspirin, 81 mg, Oral, Daily  cholestyramine light, 1 packet, Oral, Q12H  clotrimazole, 10 mg, Oral, 5x Daily  dicyclomine, 20 mg, Oral, TID  diphenoxylate-atropine, 2 tablet, Oral, 4x Daily  First Mouthwash (Magic Mouthwash), 5 mL, Swish & Spit, Q6H  levothyroxine, 75 mcg, Oral, Q AM  loperamide, 4 mg, Oral, TID AC  octreotide, 100 mcg, Subcutaneous, Q12H  Pancrelipase (Lip-Prot-Amyl), 36,000 units of lipase, Oral, TID  pantoprazole, 40 mg, Oral, QAM  piperacillin-tazobactam, 3.375 g, Intravenous, Q8H  potassium chloride, 20 mEq, Oral, QAM  pregabalin, 150 mg, Oral, TID  rosuvastatin, 10 mg, Oral, Nightly  saccharomyces boulardii, 500 mg, Oral, BID  Scopolamine, 1 patch, Transdermal, Q72H  sodium chloride, 10 mL,  "Intravenous, Q12H      Continuous Infusions:   PRN Meds:.•  acetaminophen **OR** acetaminophen **OR** acetaminophen  •  Calcium Gluconate-NaCl **AND** calcium gluconate **AND** Calcium, Ionized  •  chlorpheniramine  •  diphenoxylate-atropine  •  HYDROcodone-acetaminophen  •  loperamide  •  LORazepam  •  magnesium sulfate **OR** magnesium sulfate **OR** magnesium sulfate  •  melatonin  •  nitroglycerin  •  ondansetron **OR** ondansetron  •  potassium & sodium phosphates **OR** potassium & sodium phosphates  •  potassium chloride  •  potassium chloride  •  potassium chloride  •  prochlorperazine  •  prochlorperazine  •  sodium chloride    Objective     VITAL SIGNS  Vitals:    04/22/21 1933 04/23/21 0426 04/23/21 0600 04/23/21 1429   BP: 143/79 124/70  149/80   BP Location: Right arm Right arm  Right arm   Patient Position: Lying Lying  Sitting   Pulse: 55 (!) 47  56   Resp: 16 16  17   Temp: 97.4 °F (36.3 °C) 97.5 °F (36.4 °C)  98.4 °F (36.9 °C)   TempSrc: Oral Oral  Oral   SpO2: 100% 96%  98%   Weight:  76.8 kg (169 lb 5 oz) 76.8 kg (169 lb 5 oz)    Height:           Flowsheet Rows      First Filed Value   Admission Height  188 cm (74\") Documented at 04/16/2021 1759   Admission Weight  70.3 kg (155 lb) Documented at 04/16/2021 1759            Intake/Output Summary (Last 24 hours) at 4/23/2021 1603  Last data filed at 4/23/2021 0820  Gross per 24 hour   Intake 600 ml   Output --   Net 600 ml        TELEMETRY: Sinus rhythm    Physical Exam:  The patient is alert, oriented and in no distress.  Vital signs as noted above.  Head and neck revealed no carotid bruits or jugular venous distention.  No thyromegaly or lymph adenopathy is present  Lungs clear.  No wheezing.  Breath sounds are normal bilaterally.  Heart normal first and second heart sounds.No murmur.  No precordial rub is present.  No gallop is present.  Abdomen soft and nontender.  No organomegaly is present.  Extremities with good peripheral pulses without any " pedal edema.  Skin warm and dry.  Musculoskeletal system is grossly normal  CNS grossly normal      Results Review:   I reviewed the patient's new clinical results.  Lab Results (last 24 hours)     Procedure Component Value Units Date/Time    Hepatic Function Panel [[id number]]  (Abnormal) Collected: 04/23/21 0517    Specimen: Blood Updated: 04/23/21 1308     Total Protein 4.8 g/dL      Albumin 2.70 g/dL      ALT (SGPT) 18 U/L      AST (SGOT) 28 U/L      Alkaline Phosphatase 102 U/L      Total Bilirubin 0.3 mg/dL      Bilirubin, Direct <0.2 mg/dL      Bilirubin, Indirect --     Comment: Unable to calculate       Blood Culture - Blood, Hand, Right [[id number]] Collected: 04/18/21 1219    Specimen: Blood from Hand, Right Updated: 04/23/21 1245     Blood Culture No growth at 5 days    Blood Culture - Blood, Hand, Left [[id number]] Collected: 04/18/21 1222    Specimen: Blood from Hand, Left Updated: 04/23/21 1245     Blood Culture No growth at 5 days    Manual Differential [[id number]]  (Abnormal) Collected: 04/23/21 0517    Specimen: Blood Updated: 04/23/21 1203     Neutrophil % 52.0 %      Lymphocyte % 21.0 %      Monocyte % 17.0 %      Bands %  4.0 %      Metamyelocyte % 3.0 %      Myelocyte % 2.0 %      Promyelocyte % 1.0 %      Neutrophils Absolute 7.95 10*3/mm3      Lymphocytes Absolute 2.98 10*3/mm3      Monocytes Absolute 2.41 10*3/mm3      Anisocytosis Slight/1+     Poikilocytes Mod/2+     WBC Morphology Normal     Platelet Morphology Normal    Narrative:      Reviewed by Pathologist within the past 30 days on 4/19/2021 .      CBC & Differential [[id number]]  (Abnormal) Collected: 04/23/21 0517    Specimen: Blood Updated: 04/23/21 1203    Narrative:      The following orders were created for panel order CBC & Differential.  Procedure                               Abnormality         Status                     ---------                               -----------         ------                     Scan Slide[[id number]]                                        Final result               CBC Auto Differential[[id number]]        Abnormal            Final result                 Please view results for these tests on the individual orders.    Scan Slide [[id number]] Collected: 04/23/21 0517    Specimen: Blood Updated: 04/23/21 1203     Scan Slide --     Comment: See Manual Differential Results       CBC Auto Differential [[id number]]  (Abnormal) Collected: 04/23/21 0517    Specimen: Blood Updated: 04/23/21 1203     WBC 14.20 10*3/mm3      RBC 3.17 10*6/mm3      Hemoglobin 8.9 g/dL      Hematocrit 26.7 %      MCV 84.1 fL      MCH 28.0 pg      MCHC 33.3 g/dL      RDW 17.1 %      RDW-SD 49.4 fl      MPV 9.5 fL      Platelets 173 10*3/mm3     Narrative:      The previously reported component NRBC is no longer being reported. Previous result was 0.2 /100 WBC (Reference Range: 0.0-0.2 /100 WBC) on 4/23/2021 at 1121 EDT.    Extra Tubes [[id number]] Collected: 04/23/21 0517    Specimen: Blood Updated: 04/23/21 0630    Narrative:      The following orders were created for panel order Extra Tubes.  Procedure                               Abnormality         Status                     ---------                               -----------         ------                     Lavender Top[[id number]]                                     Final result                 Please view results for these tests on the individual orders.    Lavender Top [[id number]] Collected: 04/23/21 0517    Specimen: Blood Updated: 04/23/21 0630     Extra Tube hold for add-on     Comment: Auto resulted       Basic Metabolic Panel [[id number]]  (Abnormal) Collected: 04/23/21 0517    Specimen: Blood Updated: 04/23/21 0557     Glucose 104 mg/dL      BUN 13 mg/dL      Creatinine 0.61 mg/dL      Sodium 146 mmol/L      Potassium 3.5 mmol/L      Chloride 106 mmol/L      CO2 28.0 mmol/L      Calcium 8.3 mg/dL      eGFR Non African Amer 129 mL/min/1.73      BUN/Creatinine Ratio 21.3     Anion Gap  12.0 mmol/L     Narrative:      GFR Normal >60  Chronic Kidney Disease <60  Kidney Failure <15      Magnesium [[id number]]  (Normal) Collected: 04/23/21 0517    Specimen: Blood Updated: 04/23/21 0557     Magnesium 1.8 mg/dL           Imaging Results (Last 24 Hours)     Procedure Component Value Units Date/Time    XR Abdomen KUB [[id number]] Collected: 04/23/21 0407     Updated: 04/23/21 0409    Narrative:      EXAM:  XR ABDOMEN KUB     DATE: 4/23/2021 2:49 AM    HISTORY: possible bowel obstruction    COMPARISON:  None.    FINDINGS:   Few gas-filled bowel loops, not substantially dilated. No evidence of free air. The diaphragmatic domes were not included on this study. Mild spinal curvature.      Impression:      No acute findings.    Electronically signed by:  Carolina Gonzalez M.D.    4/23/2021 2:08 AM      LAB RESULTS (LAST 7 DAYS)    CBC  Results from last 7 days   Lab Units 04/23/21  0517 04/20/21  0348 04/19/21 0330 04/18/21 0126 04/17/21  0426 04/16/21  1837   WBC 10*3/mm3 14.20* 34.50* 13.70* 4.10 1.10* 1.40*   RBC 10*6/mm3 3.17* 3.12* 3.06* 3.28* 3.76* 3.87*   HEMOGLOBIN g/dL 8.9* 8.6* 8.6* 9.3* 10.8* 10.8*   HEMATOCRIT % 26.7* 26.0* 26.3* 27.5* 31.4* 32.4*   MCV fL 84.1 83.3 85.9 84.0 83.7 83.6   PLATELETS 10*3/mm3 173 151 119* 109* 97* 91*       BMP  Results from last 7 days   Lab Units 04/23/21  0517 04/22/21  0424 04/21/21  0512 04/20/21  1711 04/20/21  0348 04/19/21  2118 04/19/21  0330 04/18/21  0126 04/17/21  0426   SODIUM mmol/L 146* 144 146*  --  144  --  141 140 136   POTASSIUM mmol/L 3.5 3.3* 3.2* 3.5 3.3* 3.3* 3.1* 3.2* 3.4*   CHLORIDE mmol/L 106 106 107  --  106  --  106 104 97*   CO2 mmol/L 28.0 28.0 24.0  --  23.0  --  21.0* 23.0 20.0*   BUN mg/dL 13 12 13  --  17  --  20 18 17   CREATININE mg/dL 0.61* 0.72* 0.75*  --  0.85  --  0.81 0.83 1.11   GLUCOSE mg/dL 104* 116* 104*  --  86  --  93 112* 145*   MAGNESIUM mg/dL 1.8 1.8 2.0  --  2.1  --  2.2 2.5* 1.7       CMP   Results from last 7 days    Lab Units 04/23/21  0517 04/22/21  0424 04/21/21  1604 04/21/21  0512 04/20/21  1711 04/20/21  0348 04/19/21  2118 04/19/21  0330 04/18/21  0126 04/17/21  0426 04/16/21  1837   SODIUM mmol/L 146* 144  --  146*  --  144  --  141 140 136 136   POTASSIUM mmol/L 3.5 3.3*  --  3.2* 3.5 3.3* 3.3* 3.1* 3.2* 3.4* 3.3*   CHLORIDE mmol/L 106 106  --  107  --  106  --  106 104 97* 98   CO2 mmol/L 28.0 28.0  --  24.0  --  23.0  --  21.0* 23.0 20.0* 21.0*   BUN mg/dL 13 12  --  13  --  17  --  20 18 17 18   CREATININE mg/dL 0.61* 0.72*  --  0.75*  --  0.85  --  0.81 0.83 1.11 1.00   GLUCOSE mg/dL 104* 116*  --  104*  --  86  --  93 112* 145* 127*   ALBUMIN g/dL 2.70*  --   --   --   --  2.80*  --  2.70* 3.10*  --  3.60   BILIRUBIN mg/dL 0.3  --   --   --   --  0.4  --  0.5 0.6  --  0.6   ALK PHOS U/L 102  --   --   --   --  126*  --  109 115  --  152*   AST (SGOT) U/L 28  --   --   --   --  43*  --  32 39  --  40   ALT (SGPT) U/L 18  --   --   --   --  29  --  27 31  --  31   LIPASE U/L  --   --   --   --   --   --   --   --   --   --  8*   AMMONIA umol/L  --   --  23  --   --   --   --   --   --   --   --          BNP        TROPONIN        CoAg        Creatinine Clearance  Estimated Creatinine Clearance: 88 mL/min (A) (by C-G formula based on SCr of 0.61 mg/dL (L)).    ABG        Radiology  XR Abdomen KUB    Result Date: 4/23/2021  No acute findings. Electronically signed by:  Carolina Gonzalez M.D.  4/23/2021 2:08 AM              EKG          I personally viewed and interpreted the patient's EKG/Telemetry data: Sinus rhythm nonspecific ST-T wave changes. Old inferior infarction changes    ECHOCARDIOGRAM:    Results for orders placed during the hospital encounter of 08/19/19    Adult Transthoracic Echo Complete W/ Cont if Necessary Per Protocol    Interpretation Summary  Indications  Chest pain      Technically satisfactory study.  Mitral valve is structurally normal.  Minimal mitral regurgitation  Tricuspid valve is  structurally normal.  Aortic valve is structurally normal.  Pulmonic valve could not be well visualized.  No evidence for mitral tricuspid or aortic regurgitation is seen by Doppler study.  Left atrium is normal in size.  Right atrium is normal in size.  Left ventricle is normal in size and contractility with ejection fraction of 60%.  Right ventricle is normal in size.  Atrial septum is intact.  Aorta is normal.  No pericardial effusion or intracardiac thrombus is seen.    Impression  Minimal mitral regurgitation  Normal echo Doppler study.  Left ventricular ejection fraction is 60%.              Cardiolite (Tc-99m Sestamibi) stress test      OTHER:     Assessment/Plan     Principal Problem:    Malignant neoplasm of head of pancreas (CMS/HCC)  Active Problems:    Atherosclerotic heart disease of native coronary artery with other forms of angina pectoris (CMS/HCC)    Cobalamin deficiency    Degeneration of lumbar or lumbosacral intervertebral disc    Hypothyroidism    Hyperlipidemia    Nausea and vomiting    Hypokalemia    Pancytopenia due to chemotherapy (CMS/HCC)    Severe malnutrition (CMS/HCC)    [[[[[[[[[[[[[[[[[[[    Impression  ===========  -Sinus bradycardia-asymptomatic.  Patient is not on any medications to cause bradycardia.  -Coronary artery disease-stable angina pectoris     Patient had cardiac catheterization in 2000 in [place].  Details are not available. ([place], [place] ).   Cardiac catheterization 11/04/2016 revealed 50% lad with anterior RCA.  Normal left ventricular function.  (Right radial approach)     Lexiscan Cardiolite test 8/20/2019 revealed mild apical infarction without ischemia.  Echocardiogram is normal 8/20/2019     -dyslipidemia.  Renal dysfunction dysfunction.    -History of pancreatic carcinoma-undergoing chemotherapy as an outpatient.     - strong family history of coronary artery disease     -Lapscopic surgery both knee Right rotor cuff  disorder      -History of lumbar bulging disc .           -allergy to niacin  ============  Plan  =========  Patient presented with epigastric discomfort nausea vomiting and diarrhea.  Patient was noted to have sinus bradycardia-asymptomatic.  Patient is not on any medications to cause bradycardia.  Patient has borderline coronary artery disease in the past.  Patient does not have any cardiac symptoms at this time.  Patient is not having any angina pectoris or congestive heart failure.  medications were reviewed and updated.  Echocardiogram.  Have discussed with patient's family at bedside.  No other cardiac work-up is needed at this time.  Further plan will depend on patient's progress.    [[[[[[[[[[[[[[[[[[[[[       Cordelia Jacobo MD  04/23/21  16:03 EDT

## 2021-04-23 NOTE — PROGRESS NOTES
Nutrition Services    Patient Name: Clifford Weber  YOB: 1945  MRN: [id number]  Admission date: 4/16/2021    PPE Documentation        PPE Worn By Provider Surgical mask & eye protection   PPE Worn By Patient  Pt & spouse not wearing PPE     PROGRESS NOTE      Encounter Information: Review of chart to monitor PO intakes. Per nsg narrative, pt having some episodes of N/V and diarrhea. KUB completed d/t possible bowel obstruction and WNL. RD visited at bedside, pt reports 2-3 episode of diarrhea this AM and cont'd nausea. Reports tired of boost plus and not drinking. Accepting of trying Boost Breeze instead. Oncology's note reports pt declined feeding tube - d/w pt & family, they confirm it's not something they would like to pursue at this time. Encouraged them to reach out to RD staff if they would like to discuss further and that it is available as an option to help meet nutrition if poor PO intake and nausea continue.  Discussed low residue diet in context of impaired pancreatic function and some options for patient for patient to try at lunch with nausea.  Recommended follow up review with MD regarding medications to help alleviate N/V/D. Currently on cholestyramine, bentyl, lomotil, octreotide, creon, scopolamine patch, zofran PRN.       PO Diet: Diet Gastrointestinal; Low Residue   PO Supplements: Boost Breeze TID  Yogurt TID    PO Intake:  Since last encounter on 4/21, eating 50-75% of meals -- having ongoing N/V        Labs (reviewed below): Reviewed->management per attending         GI Function:  + BM 4/21        Nutrition Intervention: Adjusting ONS. Will continue to follow.        Results from last 7 days   Lab Units 04/23/21  0517 04/22/21  0424 04/21/21  0512 04/20/21  0348 04/19/21  0330 04/18/21  0126   SODIUM mmol/L 146* 144 146* 144 141 140   POTASSIUM mmol/L 3.5 3.3* 3.2* 3.3* 3.1* 3.2*   CHLORIDE mmol/L 106 106 107 106 106 104   CO2 mmol/L 28.0 28.0 24.0 23.0 21.0* 23.0   BUN mg/dL 13 12  13 17 20 18   CREATININE mg/dL 0.61* 0.72* 0.75* 0.85 0.81 0.83   CALCIUM mg/dL 8.3* 7.7* 8.2* 8.5* 8.5* 8.9   BILIRUBIN mg/dL  --   --   --  0.4 0.5 0.6   ALK PHOS U/L  --   --   --  126* 109 115   ALT (SGPT) U/L  --   --   --  29 27 31   AST (SGOT) U/L  --   --   --  43* 32 39   GLUCOSE mg/dL 104* 116* 104* 86 93 112*     Results from last 7 days   Lab Units 04/23/21  0517 04/22/21  0424 04/21/21  0512 04/20/21  0348 04/17/21  0426   MAGNESIUM mg/dL 1.8 1.8 2.0 2.1 1.7   HEMOGLOBIN g/dL  --   --   --  8.6* 10.8*   HEMATOCRIT %  --   --   --  26.0* 31.4*   TRIGLYCERIDES mg/dL  --   --   --   --  130     COVID19   Date Value Ref Range Status   04/16/2021 Not Detected Not Detected - Ref. Range Final     Lab Results   Component Value Date    HGBA1C 5.5 08/21/2019       RD to follow up per protocol.    Electronically signed by:  Cherrie Hou RD  04/23/21 09:41 EDT

## 2021-04-23 NOTE — CONSULTS
Palliative Care Consultation    Patient Name: Clifford Weber  : 1945  MRN: [id number]  Allergies: Niacin    Requesting clinician:  Norma  Reason for consult: Consultation for clarification of goals of care and code status.      Patient Code Status:   Code Status and Medical Interventions:   Ordered at: 21 2225     Code Status:    CPR     Medical Interventions (Level of Support Prior to Arrest):    Full           Advanced Care Planning    Advanced Directives: Patient does not have advance directive  Health Care Directive on file: No  Health Care Surrogate:      Palliative Performance Scale Score:    Comments:         Chief Complaint:    Abdominal pain    History of Present Illness    Clifford Weber is a 76 y.o. male who presented to [facility] on  with complaints abdominal pain and weakness.  He started having diarrhea approximately 2 to 3 weeks ago and was started on daily Sandostatin last week.  After the third injection his diarrhea had stopped but then restarted on 2021.  He vomited on 2021 with dark green emesis which prompted him to come to the hospital. He was feeling poorly and was experiencing intermittent confusion.  Urine output was low.  He was having abdominal pain as well.  Patient reported a cough and subjective fever with chills prior 24 hours before coming to the hospital. Abdominal discomfort was worse with eating.    Labs in the emergency department were as follows: White blood count 1.4, hemoglobin 10.8, MCV 83.6, platelets 91,000, . CMP significant for potassium 3.3, alkaline phosphatase 152, lipase 8 (13-60), lactate 2.2 (0.5-2.0), TSH 2.330 (0.270-4.200), pro calcitonin 3.52 (0.00-0.25).     A CT scan of the abdomen and pelvis with contrast in the emergency department showed a 2 cm mass in the uncinate process of the pancreas which is consistent with pancreatic cancer.  This was better defined than on prior exam.  There was mild dilatation of the pancreatic duct which  was not previously identifiable.  A polypoid abnormality in posterior bladder was less defined on today's exam and was difficult to separate from the prostate.  There was thickening of the wall of the distal ileum consistent with infectious or inflammatory ileitis.  There was mild gaseous distention and fluid throughout much of the colon and rectum.  Antibiotics with Unasyn were initiated.    GI was consulted. Patient was started on multiple medications in an effort to control diarrhea, nausea, and vomiting.     4/23 Palliative consult for goals of care.    VITAL SIGNS:   Temp:  [97.4 °F (36.3 °C)-97.5 °F (36.4 °C)] 97.5 °F (36.4 °C)  Heart Rate:  [47-55] 47  Resp:  [16] 16  BP: (124-143)/(70-79) 124/70       PMH: anxiety, HTN, constipation, DDD, depression, CAD, GERD, HTN, HLD, insomnia, hypothyroidism    Past Surgical History:   Procedure Laterality Date   • CARDIAC CATHETERIZATION  2000   • COLONOSCOPY  10/28/2009   • KNEE SURGERY Bilateral    • OTHER SURGICAL HISTORY      Lapscopic surgery, both knees. Abstracted by [facility].   • OTHER SURGICAL HISTORY      Kneww injections. Abstracted from [facility].   • OTHER SURGICAL HISTORY      Shot in lower back for bulging disc. Abstracted from [facility].   • UPPER ENDOSCOPIC ULTRASOUND W/ FNA N/A 1/7/2021    Procedure: ESOPHAGOGASTRODUODENOSCOPY WITH endoscopic ULTRASOUND AND FINE NEEDLE ASPIRATION;  Surgeon: David Fierro MD;  Location: [facility] ENDOSCOPY;  Service: Gastroenterology;  Laterality: N/A;  post op: pancreatic head mass       Family History   Problem Relation Age of Onset   • Alzheimer's disease Mother    • Hyperlipidemia Father    • Heart disease Father    • Hypertension Father    • Hyperlipidemia Brother    • Diabetes Brother    • Heart disease Brother    • Hypertension Brother    • Heart disease Maternal Grandfather    • Alzheimer's disease Paternal Grandmother    • Cancer Cousin    • Cancer Other    • Heart disease Brother    • Hypertension Brother         Social History     Tobacco Use   • Smoking status: Never Smoker   • Smokeless tobacco: Never Used   Substance Use Topics   • Alcohol use: No   • Drug use: No           LABS:    Results from last 7 days   Lab Units 04/23/21  0517   WBC 10*3/mm3 14.20*   HEMOGLOBIN g/dL 8.9*   HEMATOCRIT % 26.7*   PLATELETS 10*3/mm3 173     Results from last 7 days   Lab Units 04/23/21  0517   SODIUM mmol/L 146*   POTASSIUM mmol/L 3.5   CHLORIDE mmol/L 106   CO2 mmol/L 28.0   BUN mg/dL 13   CREATININE mg/dL 0.61*   GLUCOSE mg/dL 104*   CALCIUM mg/dL 8.3*     Results from last 7 days   Lab Units 04/23/21  0517 04/20/21  1711 04/20/21  0348   SODIUM mmol/L 146*   < > 144   POTASSIUM mmol/L 3.5  --  3.3*   CHLORIDE mmol/L 106   < > 106   CO2 mmol/L 28.0   < > 23.0   BUN mg/dL 13   < > 17   CREATININE mg/dL 0.61*   < > 0.85   CALCIUM mg/dL 8.3*   < > 8.5*   BILIRUBIN mg/dL  --   --  0.4   ALK PHOS U/L  --   --  126*   ALT (SGPT) U/L  --   --  29   AST (SGOT) U/L  --   --  43*   GLUCOSE mg/dL 104*   < > 86    < > = values in this interval not displayed.         IMAGING STUDIES:  CT Head Without Contrast    Result Date: 4/21/2021  1.There is some underlying cerebral atrophy. Depending on clinical findings additional workup with MR with and without contrast might be considered to reassess.  Electronically Signed By-John Coffey MD On:4/21/2021 2:29 PM This report was finalized on [id number] by  John Coffey MD.    XR Abdomen KUB    Result Date: 4/23/2021  No acute findings. Electronically signed by:  Carolina Gonzalez M.D.  4/23/2021 2:08 AM        I reviewed the patient's new clinical results including labs, imaging, and vitals.        Scheduled Meds:  aspirin, 81 mg, Oral, Daily  cholestyramine light, 1 packet, Oral, Q12H  clotrimazole, 10 mg, Oral, 5x Daily  dicyclomine, 20 mg, Oral, TID  diphenoxylate-atropine, 2 tablet, Oral, 4x Daily  First Mouthwash (Magic Mouthwash), 5 mL, Swish & Spit, Q6H  levothyroxine, 75 mcg, Oral, Q  AM  octreotide, 100 mcg, Subcutaneous, Q12H  Pancrelipase (Lip-Prot-Amyl), 36,000 units of lipase, Oral, TID  pantoprazole, 40 mg, Oral, QAM  piperacillin-tazobactam, 3.375 g, Intravenous, Q8H  potassium chloride, 20 mEq, Oral, QAM  pregabalin, 150 mg, Oral, TID  rosuvastatin, 10 mg, Oral, Nightly  saccharomyces boulardii, 500 mg, Oral, BID  Scopolamine, 1 patch, Transdermal, Q72H  sodium chloride, 10 mL, Intravenous, Q12H      Continuous Infusions:         Review of Systems   Unable to perform ROS: Acuity of condition         Physical Exam  Vitals and nursing note reviewed.   Constitutional:       Appearance: He is ill-appearing.   HENT:      Head: Normocephalic and atraumatic.      Nose: Nose normal.      Mouth/Throat:      Mouth: Mucous membranes are dry.      Pharynx: Oropharynx is clear.   Eyes:      Extraocular Movements: Extraocular movements intact.      Conjunctiva/sclera: Conjunctivae normal.      Pupils: Pupils are equal, round, and reactive to light.   Cardiovascular:      Rate and Rhythm: Bradycardia present.      Pulses: Normal pulses.      Comments: Left chest wall port  Pulmonary:      Effort: Pulmonary effort is normal.   Abdominal:      General: Abdomen is flat.   Musculoskeletal:         General: Normal range of motion.      Cervical back: Normal range of motion.   Skin:     General: Skin is warm and dry.      Comments: RUE IV   Neurological:      Mental Status: He is alert. He is disoriented.      Comments: Able to answer some questions appropriately, but says things out of context.             PROBLEM LIST:    Malignant neoplasm of head of pancreas (CMS/HCC)    Atherosclerotic heart disease of native coronary artery with other forms of angina pectoris (CMS/HCC)    Cobalamin deficiency    Degeneration of lumbar or lumbosacral intervertebral disc    Hypothyroidism    Hyperlipidemia    Nausea and vomiting    Hypokalemia    Pancytopenia due to chemotherapy (CMS/HCC)    Severe malnutrition  "(CMS/[facility])          ASSESSMENT/PLAN:    Goals of care: Met with patient, daughter and additional family member at the bedside. Upon introducing ourselves, daughter interjects and asks that we wait until we are able to discuss when the patients wife is present. We asked if we would be able to reach out to wife to discuss a meeting regarding plan of care, and patients daughter told us not to call, that she would call herself. We briefly talked to patient about his understanding of his illness and overall prognosis. Patient is able to tell us that he has completed several rounds of \"treatment,\" that he feels like his diarrhea is well controlled when he is sitting down, but anytime he stands up, it starts again. At that time, general overview of treatment options were provided to the patient and family. The word hospice was never used as I could it would illicit additional frustrations. Palliative  left his card at the bedside. Upon returning to our office, Fernando received a call from the patients wife. Please refer to his note for documentation on this discussion. I did reach out to the hospitalist to make her aware of our discussion with family. Also reached out to oncology NP Blanka Gimenez to make her aware that family is waiting to discuss with Dr. Mello.   Pancreatic Ca: Follows with Dr. Mello. On chemotherapy, was due for cycle 6 on 4/19.  Diarrhea/Nausea/Vomiting/Colitis: CT A/P 4/16/2021-thickening of terminal ileum GI consulted. Received Unasyn and switched to Zosyn. No plans for endoscopy at this time. Refusing feeding tube.   CAD/HLD/Anxiety/DDD: Chronic conditions per primary      Decisional Capacity: yes  Patient's understanding of illness: limited  Patient goals of care:  Full code, all medical interventions at this time.      Thank you for this consult and allowing us to participate in patient's plan of care. Palliative Care Team will continue to follow patient.     Time spent:47 minutes " spent reviewing medical and medication records, assessing and examining patient, discussing with family, answering questions, providing some guidance about a plan and documentation of care, and coordinating care with other healthcare members, with > 50% time spent face to face.         Nery Leal, APRN  4/23/2021

## 2021-04-24 NOTE — DISCHARGE SUMMARY
[facility] Medicine Services  ELOPEMENT AGAINST MEDICAL ADVICE    Patient Name: Clifford Weber  : 1945  MRN: [id number]    Date of Admission: 2021  Date of Elopement:  2021  Primary Care Physician: Gris Muro APRN    Consults     Date and Time Order Name Status Description    2021  2:28 PM Inpatient Cardiology Consult Completed     2021  9:21 PM Gastroenterology (on-call MD unless specified) Completed     2021  9:21 PM Hematology and Oncology (on-call MD unless specified) Completed     2021  9:21 PM Hospitalist (on-call MD unless specified) Completed         Hospital Course     Presenting Problem:   Malignant neoplasm of head of pancreas (CMS/HCC) [C25.0]  Enteritis [K52.9]  Chemotherapy-induced neutropenia (CMS/HCC) [D70.1, T45.1X5A]  Dilation of pancreatic duct [K86.89]    Active Hospital Problems    Diagnosis  POA   • **Malignant neoplasm of head of pancreas (CMS/HCC) [C25.0]  Yes   • Severe malnutrition (CMS/HCC) [E43]  Yes   • Pancytopenia due to chemotherapy (CMS/HCC) [D61.810]  Yes   • Nausea and vomiting [R11.2]  Yes   • Hypokalemia [E87.6]  Yes   • Hyperlipidemia [E78.5]  Yes   • Hypothyroidism [E03.9]  Yes   • Cobalamin deficiency [E53.8]  Yes   • Degeneration of lumbar or lumbosacral intervertebral disc [M51.37]  Yes   • Atherosclerotic heart disease of native coronary artery with other forms of angina pectoris (CMS/HCC) [I25.118]  Yes      Resolved Hospital Problems   No resolved problems to display.          Hospital Course:  Clifford Weber is a 76 y.o. male with past medical history of pancreatic cancer, hypothyroidism, hyperlipidemia, chronic pain, B12 deficiency and CAD who presents to [facility] complaining of epigastric pain.  Patient reports that for the past 2 to 3 months he has been having some abdominal pain which has become significantly worse over the past 5 days.  Pain is located primarily in the epigastric area  described as sharp rated at 8/10 at its worst without obvious provoking and palliative factors.  Pain does have a waxing and waning intensity and some nausea with nonbloody vomiting as well as diarrhea are also reported.  Patient does have a history of pancreatic cancer and is currently undergoing chemotherapy.  He denies any other pain including chest pain, dyspnea, cough or fever, peripheral edema, syncope or near syncope.  He notes somewhat decreased p.o. intake as well as decreased urination.  He does not smoke or drink alcohol.  Patient also confirms compliance with all outpatient therapies.     In the ED patient had lab significant for potassium: 3.3, anion gap: 17.0 with a serum CO2 of 21.0, lipase: 8, WBCs: 1.40 with decreased absolute neutrophil count noted on differential, hemoglobin: 10.8, platelets: 91.  CT of abdomen and pelvis shows a 2 cm mass of the uncinate process of the pancreas consistent with pancreatic cancer which is noted is better defined today than on prior exam.  Mild dilation of the pancreatic duct which was not previously identified is also noted.  Polypoid abnormality in the posterior bladder is noted is less well-defined today and hard to separate from the prostate.  Thickening of the wall of the distal ileum consistent with an infectious or inflammatory ileitis as well as mild gaseous distention of fluid throughout much of the colon and rectum is noted.    Oncology was consulted to follow the established patient for his stage IIb pancreatic adenocarcinoma.  Patient was due to have cycle 6 of FOLFIRINOX on 4/19/2021, however that was delayed by the current admission.  Gastroenterology was consulted given the abdominal complaints the patient had.  He was started on empiric Unasyn for what appeared to be neutropenic enterocolitis or typhlitis.  Despite attempts at prescribing multiple medications to control his nausea vomiting and diarrhea, he would have small improvements, but the  symptoms would always return.  Per gastroenterology, it was felt that part of his symptoms were due to the chemotherapy itself and that this would not be treatable by any particular medication.  He was continued on colestipol, octreotide, dicyclomine, Lomotil, Florastor, pancreatic enzymes, and finally had added scheduled Imodium.  Per oncology, the patient was treated with Neupogen to boost his white blood cell count since he was neutropenic on admission, once his WBC count showed good response, it was discontinued..  Venofer was also given for 3 days.  Sandostatin was given subcutaneously with plans to switch to Depo Sandostatin as an outpatient.  Throughout the admission the patient's oral intake was very poor, he became increasingly weaker, lethargic, and more confused.  Dietitian was consulted and the patient was offered boost supplements, unfortunately he showed signs and symptoms of severe malnutrition.  The patient and the family however had refused placement of any feeding tube to augment his nutritional intake as they were not yet ready for this.  The patient had developed bradycardia and cardiology was consulted by oncology for further evaluation, the patient was diagnosed as having asymptomatic sinus bradycardia with no evidence of chest pain or congestive heart failure.  Cardiology had recommended an echocardiogram be done but no other cardiac work-up would be needed.    The patient's family had apparently expressed concerns that his symptoms were not improving despite the efforts of all consultants, they felt things had not been explained to them thoroughly.  Palliative care team was consulted in an effort to assist in clarifying the goals of care and therapy.  This seemed to upset the patient's family as they thought they were being forced to make end-of-life decisions, which were not discussed or mentioned per the documentation from the palliative care team.  The family requested transfer to another  hospital, after hours, and claimed to have an accepting physician.  Charge nurse notes that she had explained this process to the patient's family when they requested immediate transfer be done tonight.  She explained that a doctor to doctor communication would have to occur and she would need doctor's orders for transfer or discharge.  However, at no point was this physician contacted by any consultant here or accepting physician from another facility to process a transfer.  In the absence of definitive orders or plan to transfer or discharge, the family took the patient out of the hospital AMA to take him to [facility] in [place].    **Patient left AMA prior to completion of evaluation and management**      Day of Discharge     HPI:   **Patient left AMA prior to completion of evaluation and management**    Vital Signs:   Temp:  [97.5 °F (36.4 °C)-98.4 °F (36.9 °C)] 97.9 °F (36.6 °C)  Heart Rate:  [47-56] 55  Resp:  [16-18] 18  BP: (124-158)/(70-82) 158/82     Physical Exam (if applicable):  N/A    Pertinent  and/or Most Recent Results     Results from last 7 days   Lab Units 04/23/21  0517 04/22/21  0424 04/21/21  0512 04/20/21  1711 04/20/21  0348 04/19/21  2118 04/19/21  0330 04/18/21  0126 04/17/21  0426   WBC 10*3/mm3 14.20*  --   --   --  34.50*  --  13.70* 4.10 1.10*   HEMOGLOBIN g/dL 8.9*  --   --   --  8.6*  --  8.6* 9.3* 10.8*   HEMATOCRIT % 26.7*  --   --   --  26.0*  --  26.3* 27.5* 31.4*   PLATELETS 10*3/mm3 173  --   --   --  151  --  119* 109* 97*   SODIUM mmol/L 146* 144 146*  --  144  --  141 140 136   POTASSIUM mmol/L 3.5 3.3* 3.2* 3.5 3.3* 3.3* 3.1* 3.2* 3.4*   CHLORIDE mmol/L 106 106 107  --  106  --  106 104 97*   CO2 mmol/L 28.0 28.0 24.0  --  23.0  --  21.0* 23.0 20.0*   BUN mg/dL 13 12 13  --  17  --  20 18 17   CREATININE mg/dL 0.61* 0.72* 0.75*  --  0.85  --  0.81 0.83 1.11   GLUCOSE mg/dL 104* 116* 104*  --  86  --  93 112* 145*   CALCIUM mg/dL 8.3* 7.7* 8.2*  --  8.5*  --  8.5*  8.9 8.7     Results from last 7 days   Lab Units 04/23/21  0517 04/20/21  0348 04/19/21  0330 04/18/21  0126   BILIRUBIN mg/dL 0.3 0.4 0.5 0.6   ALK PHOS U/L 102 126* 109 115   ALT (SGPT) U/L 18 29 27 31   AST (SGOT) U/L 28 43* 32 39     Results from last 7 days   Lab Units 04/17/21  0426   CHOLESTEROL mg/dL 149   TRIGLYCERIDES mg/dL 130   HDL CHOL mg/dL 57     Results from last 7 days   Lab Units 04/21/21  0512 04/16/21  2326   TSH uIU/mL 1.460 2.330     Brief Urine Lab Results  (Last result in the past 365 days)      Color   Clarity   Blood   Leuk Est   Nitrite   Protein   CREAT   Urine HCG        12/20/20 2012 Yellow Clear Negative Negative Negative Negative               Microbiology Results Abnormal     Procedure Component Value - Date/Time    Blood Culture - Blood, Hand, Right [[id number]] Collected: 04/18/21 1219    Lab Status: Final result Specimen: Blood from Hand, Right Updated: 04/23/21 1245     Blood Culture No growth at 5 days    Blood Culture - Blood, Hand, Left [[id number]] Collected: 04/18/21 1222    Lab Status: Final result Specimen: Blood from Hand, Left Updated: 04/23/21 1245     Blood Culture No growth at 5 days    Blood Culture - Blood, Arm, Left [[id number]] Collected: 04/16/21 2228    Lab Status: Final result Specimen: Blood from Arm, Left Updated: 04/21/21 2245     Blood Culture No growth at 5 days    Blood Culture - Blood, Arm, Right [[id number]]  (Abnormal) Collected: 04/16/21 2229    Lab Status: Final result Specimen: Blood from Arm, Right Updated: 04/18/21 1153     Blood Culture Staphylococcus, coagulase negative     Comment: Probable contaminant requires clinical correlation, susceptibility not performed unless requested by physician.          Isolated from Aerobic and Anaerobic Bottles     Gram Stain Anaerobic Bottle Gram positive cocci in clusters      Aerobic Bottle Gram positive cocci in clusters    Blood Culture ID, PCR - Blood, Arm, Right [[id number]]  (Abnormal) Collected: 04/16/21  2229    Lab Status: Final result Specimen: Blood from Arm, Right Updated: 04/17/21 1815     BCID, PCR Staphylococcus spp, not aureus. Identification by BCID PCR.     BOTTLE TYPE Anaerobic Bottle    COVID PRE-OP / PRE-PROCEDURE SCREENING ORDER (NO ISOLATION) - Swab, Nasopharynx [[id number]]  (Normal) Collected: 04/16/21 2230    Lab Status: Final result Specimen: Swab from Nasopharynx Updated: 04/17/21 1536    Narrative:      The following orders were created for panel order COVID PRE-OP / PRE-PROCEDURE SCREENING ORDER (NO ISOLATION) - Swab, Nasopharynx.  Procedure                               Abnormality         Status                     ---------                               -----------         ------                     COVID-19,APTIMA PANTHER,...[[id number]]  Normal              Final result                 Please view results for these tests on the individual orders.    COVID-19,APTIMA PANTHER,[facility] IN-HOUSE, NP/OP SWAB IN UTM/VTM/[facility] TRANSPORT MEDIA,24 HR TAT - Swab, Nasopharynx [[id number]]  (Normal) Collected: 04/16/21 2230    Lab Status: Final result Specimen: Swab from Nasopharynx Updated: 04/17/21 1536     COVID19 Not Detected    Narrative:      Fact sheet for providers: https://www.fda.gov/media/584524/download     Fact sheet for patients: https://www.fda.gov/media/706897/download    Test performed by RT PCR.    Clostridium Difficile Toxin - Stool, Per Rectum [[id number]]  (Normal) Collected: 04/17/21 1222    Lab Status: Final result Specimen: Stool from Per Rectum Updated: 04/17/21 1345    Narrative:      The following orders were created for panel order Clostridium Difficile Toxin - Stool, Per Rectum.  Procedure                               Abnormality         Status                     ---------                               -----------         ------                     Clostridium Difficile EI...[[id number]]  Normal              Final result                 Please view results for these tests on the  individual orders.    Clostridium Difficile EIA - Stool, Per Rectum [[id number]]  (Normal) Collected: 04/17/21 1222    Lab Status: Final result Specimen: Stool from Per Rectum Updated: 04/17/21 1345     C Diff GDH / Toxin Negative    Gastrointestinal Panel, PCR - Stool, Per Rectum [[id number]]  (Normal) Collected: 04/17/21 1003    Lab Status: Final result Specimen: Stool from Per Rectum Updated: 04/17/21 1139     Campylobacter Not Detected     Plesiomonas shigelloides Not Detected     Salmonella Not Detected     Vibrio Not Detected     Vibrio cholerae Not Detected     Yersinia enterocolitica Not Detected     Enteroaggregative E. coli (EAEC) Not Detected     Enteropathogenic E. coli (EPEC) Not Detected     Enterotoxigenic E. coli (ETEC) lt/st Not Detected     Shiga-like toxin-producing E. coli (STEC) stx1/stx2 Not Detected     Shigella/Enteroinvasive E. coli (EIEC) Not Detected     Cryptosporidium Not Detected     Cyclospora cayetanensis Not Detected     Entamoeba histolytica Not Detected     Giardia lamblia Not Detected     Adenovirus F40/41 Not Detected     Astrovirus Not Detected     Norovirus GI/GII Not Detected     Rotavirus A Not Detected     Sapovirus (I, II, IV or V) Not Detected    Narrative:      If Aeromonas, Staphylococcus aureus or Bacillus cereus are suspected, please order [id number]: Stool Culture, Aeromonas, S aureus, B Cereus.          Imaging Results (All)     Procedure Component Value Units Date/Time    XR Abdomen KUB [[id number]] Collected: 04/23/21 0407     Updated: 04/23/21 0409    Narrative:      EXAM:  XR ABDOMEN KUB     DATE: 4/23/2021 2:49 AM    HISTORY: possible bowel obstruction    COMPARISON:  None.    FINDINGS:   Few gas-filled bowel loops, not substantially dilated. No evidence of free air. The diaphragmatic domes were not included on this study. Mild spinal curvature.      Impression:      No acute findings.    Electronically signed by:  Carolina Gonzalez M.D.    4/23/2021 2:08 AM    CT Head  Without Contrast [[id number]] Collected: 04/21/21 1427     Updated: 04/21/21 1431    Narrative:         DATE OF EXAM:  4/21/2021 2:10 PM     PROCEDURE:   CT HEAD WO CONTRAST-     INDICATIONS:   Somnolence/increase sleepiness; pancreatic cancer; evaluate for  metastatic disease; C25.0-Malignant neoplasm of head of pancreas;  K86.89-Other specified diseases of pancreas; K52.9-Noninfective  gastroenteritis and colitis, unspecified; D70.1-Agranulocytosis  secondary to cancer chemotherapy; T45.6G1R-Xtzrvur effect of  antineoplastic and immunosuppressive drugs, initial encounter     COMPARISON:  No Comparisons Available     TECHNIQUE:   Routine transaxial cuts were obtained through the head without the  administration of contrast. Automated exposure control and iterative  reconstruction methods were used.      FINDINGS:  There are some underlying cerebral atrophy more marked in the frontal  lobes. There is no underlying hemorrhage. There are no abnormal  extra-axial fluid collections. There is no midline shift. The visualized  portions of the paranasal sinuses seem clear.       Impression:      1.There is some underlying cerebral atrophy.  Depending on clinical findings additional workup with MR with and  without contrast might be considered to reassess.     Electronically Signed By-John Coffey MD On:4/21/2021 2:29 PM  This report was finalized on [id number] by  John Coffey MD.    CT Abdomen Pelvis With Contrast [[id number]] Collected: 04/16/21 2044     Updated: 04/16/21 2059    Narrative:      CT ABDOMEN PELVIS W CONTRAST-     Date of Exam: 4/16/2021 8:10 PM     Indication: Left lower quadrant pain.  History of pancreatic and bladder  cancer     Comparison: 12/20/2020     Technique: Contiguous axial CT images were obtained from the lung bases  to the superior iliac crests without contrast.  Following uneventful IV  administration of 100 cc Isovue-370 and oral contrast, contiguous axial  images obtained from lung  bases to the pubic symphysis. Sagittal and  coronal reconstructions were performed.  Automated exposure control and  iterative reconstruction methods were used.     FINDINGS:  There is motion artifact. There is no significant lung base abnormality.  No abnormality is seen in the liver, gallbladder, kidneys, adrenals, or  spleen. There is moderate atherosclerotic vascular calcification.     There is a mass in the uncinate 8 process of the pancreatic head that  measures about 2 x 1.5 cm x 2.4, which is better defined than on the  prior exam. There is mild dilatation of the pancreatic duct which was  not previously present. No biliary dilatation is seen.      On the prior exam, a polypoid nodularity was reported along the  posterior wall of the urinary bladder. The patient reportedly has  bladder cancer. This is not well demonstrated today and hard to separate  from the mildly enlarged prostate.        There is a small hiatal hernia. There is fluid in the stomach. There is  thickening of the wall of the terminal ileum. There is a short tubular  structure that is probably part of the appendix. There is mild  distention of the ascending and transverse colon, which contain gas and  fluid. The mid descending colon is nondistended. The sigmoid and rectum  are mildly distended and contain gas and fluid.     There is no free fluid or free air.     Bone windows show a mild lumbar levoscoliosis with multilevel  degenerative disc disease.       Impression:      1. There is a 2 cm mass in the uncinate process of the pancreas which is  consistent with pancreatic cancer. This is better defined today and  grossly to the prior exam. There is mild dilatation of the pancreatic  duct which was not previously identifiable.  2. On the prior exam, a polypoid abnormality in the posterior bladder  was reported. This is less well-defined today and is hard to separate  from the prostate. Clinical correlation recommended.  3. There is  thickening of the wall of the distal ileum which is  consistent with an infectious or inflammatory ileitis.  4. There is mild gaseous distention and fluid throughout much of the  colon and the rectum.     Electronically Signed By-Kailey Pennington MD On:4/16/2021 8:57 PM  This report was finalized on [id number] by  Kailey Pennington MD.          Results for orders placed during the hospital encounter of 08/19/19    Adult Transthoracic Echo Complete W/ Cont if Necessary Per Protocol    Interpretation Summary  Indications  Chest pain      Technically satisfactory study.  Mitral valve is structurally normal.  Minimal mitral regurgitation  Tricuspid valve is structurally normal.  Aortic valve is structurally normal.  Pulmonic valve could not be well visualized.  No evidence for mitral tricuspid or aortic regurgitation is seen by Doppler study.  Left atrium is normal in size.  Right atrium is normal in size.  Left ventricle is normal in size and contractility with ejection fraction of 60%.  Right ventricle is normal in size.  Atrial septum is intact.  Aorta is normal.  No pericardial effusion or intracardiac thrombus is seen.    Impression  Minimal mitral regurgitation  Normal echo Doppler study.  Left ventricular ejection fraction is 60%.        Discharge Details     Discharge Disposition:  **Patient left AMA prior to completion of evaluation and management, therefore discharge planning remains incomplete including absence of any needed discharging medications, testing arrangements or follow up unless otherwise specified**      No future appointments.          Electronically signed by Autumn Green MD, 04/23/21, 10:46 PM EDT.

## 2021-04-24 NOTE — NURSING NOTE
Dr Green here at nurses station, states to this nurse she will not be giving d/c orders to patient tonight to transfer to [facility]. Patient nurse went into room to explain this to patient wife and now 2 other visitors. Patient wife became upset and cursing and yelling at nursing staff. This nurse came in to room, explained to them that if they want to go to [facility] they will need to leave against medical advice, I do not have a physicians order to discharge them. The wife grabbed the paperwork from this nurse signed it and threw it back at this nurse. She then said get us the hell out of here now. Admit discharge nurse is taking out patients IV access so they can leave.

## 2021-04-26 NOTE — CASE MANAGEMENT/SOCIAL WORK
Case Management Discharge Note                              Final Discharge Disposition Code: 07 - left A

## 2021-05-25 NOTE — TELEPHONE ENCOUNTER
Tried calling again, this time I got vm.  Left message asking for a call back and informing that referral was initiated.

## 2021-05-25 NOTE — TELEPHONE ENCOUNTER
Spoke with pt's wife, Nicole.  She says [facility] is who they use. Spoke with Radha and she said she can route the order to [facility].  Nicole is afraid medicare wont pay for it unless it is one of the old standard heavy ones.  Amber told her this.  I spoke with Kyle at [facility] and he verified this info. He is going to try to push it through medicare but if not approved pt will purchase a transport w/c.

## 2021-05-25 NOTE — TELEPHONE ENCOUNTER
I have attempted multiple calls to the number listed in message as well as on pt chart but I get an Answer message that says the number is unallocated.

## 2021-05-25 NOTE — TELEPHONE ENCOUNTER
Patient is being referred to [facility] for Wheelchair and the office will contact patient to set this up.

## 2021-06-22 NOTE — TELEPHONE ENCOUNTER
What does she mean by dropped him?   I don't see the oncology notes in the computer for some reason. May we can get in touch with them to get more info. (Dr. Benavides's office)

## 2021-06-22 NOTE — TELEPHONE ENCOUNTER
"Caller: KYREE WU    Relationship: Emergency Contact    Best call back number: [phone]    What is the best time to reach you: ANYTIME    Who are you requesting to speak with (clinical staff, provider,  specific staff member): CLINICAL STAFF    Do you know the name of the person who called: KYREE WU    What was the call regarding: PATIENT'S WIFE CALLED AND SAID HER  WAS RECENTLY DIAGNOSED WITH PANCREATIC, BLADDER AND LIVER CANCER    SHE SAID HIS ONCOLOGIST DIAGNOSED HIM, AND TOLD HIM HE HAD ABOUT 6 MONTHS, BUT THEY GAVE HIM NO MEDICATION AND SHE SAID \"DROPPED\" HIM     SHE SAID THAT SHE WOULD LIKE TO SEE IF HE CAN GET IN TO SEE TREVIN SMITH ABOUT THIS AS SOON AS POSSIBLE, PATIENT LAST SAW TREVIN IN 2018    Do you require a callback: YES    "

## 2021-06-28 NOTE — TELEPHONE ENCOUNTER
Returned call-pt scheduled on 07/01/2021 to see KU and discuss prognosis and options for continuuim of care as advised by his oncologist who has determine there are no appropriate treatment options for him. He is set up for some upcoming clinical trials through [facility].

## 2021-06-28 NOTE — TELEPHONE ENCOUNTER
Caller: KYREE WU    Relationship to patient: Emergency Contact    Best call back number: [phone]    Patient is needing: PATIENT'S WIFE CALLED TO CHECK ON STATUS OF PREVIOUS MESSAGE    WARM TRANSFERRED TO OFFICE

## 2021-06-29 PROBLEM — I46.9 CARDIAC ARREST (HCC): Status: ACTIVE | Noted: 2021-01-01

## 2021-06-30 LAB — GLUCOSE BLDC GLUCOMTR-MCNC: 163 MG/DL (ref 70–105)

## 2021-07-01 LAB
BH BB BLOOD EXPIRATION DATE: NORMAL
BH BB BLOOD TYPE BARCODE: 5100
BH BB DISPENSE STATUS: NORMAL
BH BB PRODUCT CODE: NORMAL
BH BB UNIT NUMBER: NORMAL
CROSSMATCH INTERPRETATION: NORMAL
QT INTERVAL: 361 MS
UNIT  ABO: NORMAL
UNIT  RH: NORMAL

## 2021-11-29 ENCOUNTER — TELEPHONE (OUTPATIENT)
Dept: FAMILY MEDICINE CLINIC | Facility: CLINIC | Age: 76
End: 2021-11-29

## 2021-11-29 NOTE — TELEPHONE ENCOUNTER
"THIS PATIENT IS .    Message from the HUB:    \"PRACTICE MANAGER     MS. WU IS NEEDING MR CALL MY CHART CANCELLED SHE SAYS THAT HER EMAIL ADDRESS IS ATTACHED TO HIS MY CHART. SHE IS GETTING NON STOP MY CHART NOTIFICATIONS FOR HIM. SHE HAS SPOKEN TO THE MY CHART HELP DESK WAS TOLD TO CONTACT PRIMARY CARE PROVIDER TO GET CORRECTED.     KYREE WU   [phone]  \"      Sonia:  His MyChart shows it is inactive but evidently something has not been stopped.  Can you help on this?    "

## (undated) DEVICE — PAPR PRNT PK SONY W RIBN UPC55

## (undated) DEVICE — SYS BIOP SHARKCORE FNB W/NDL 25G

## (undated) DEVICE — PK ENDO GI 50

## (undated) DEVICE — BALN ENDO FOR EG/3630UR

## (undated) DEVICE — BITEBLOCK ENDO W/STRAP 60F A/ LF DISP

## (undated) DEVICE — Device